# Patient Record
Sex: MALE | Race: WHITE | NOT HISPANIC OR LATINO | Employment: OTHER | URBAN - METROPOLITAN AREA
[De-identification: names, ages, dates, MRNs, and addresses within clinical notes are randomized per-mention and may not be internally consistent; named-entity substitution may affect disease eponyms.]

---

## 2022-06-17 ENCOUNTER — TRANSFERRED RECORDS (OUTPATIENT)
Dept: HEALTH INFORMATION MANAGEMENT | Facility: CLINIC | Age: 67
End: 2022-06-17

## 2022-06-20 ENCOUNTER — TRANSFERRED RECORDS (OUTPATIENT)
Dept: HEALTH INFORMATION MANAGEMENT | Facility: CLINIC | Age: 67
End: 2022-06-20

## 2022-10-11 ENCOUNTER — TRANSFERRED RECORDS (OUTPATIENT)
Dept: CARDIOLOGY | Facility: CLINIC | Age: 67
End: 2022-10-11

## 2022-10-13 ENCOUNTER — TRANSFERRED RECORDS (OUTPATIENT)
Dept: HEALTH INFORMATION MANAGEMENT | Facility: CLINIC | Age: 67
End: 2022-10-13

## 2022-10-13 ENCOUNTER — MEDICAL CORRESPONDENCE (OUTPATIENT)
Dept: HEALTH INFORMATION MANAGEMENT | Facility: CLINIC | Age: 67
End: 2022-10-13

## 2022-10-14 ENCOUNTER — VIRTUAL VISIT (OUTPATIENT)
Dept: CARDIOLOGY | Facility: CLINIC | Age: 67
End: 2022-10-14
Attending: INTERNAL MEDICINE
Payer: MEDICARE

## 2022-10-14 DIAGNOSIS — I42.8 NONISCHEMIC CARDIOMYOPATHY (H): ICD-10-CM

## 2022-10-14 DIAGNOSIS — I50.22 CHRONIC SYSTOLIC HEART FAILURE (H): ICD-10-CM

## 2022-10-14 DIAGNOSIS — Z95.810 ICD (IMPLANTABLE CARDIOVERTER-DEFIBRILLATOR) IN PLACE: ICD-10-CM

## 2022-10-14 DIAGNOSIS — Z20.822 ENCOUNTER FOR LABORATORY TESTING FOR COVID-19 VIRUS: ICD-10-CM

## 2022-10-14 DIAGNOSIS — Z45.02 ICD (IMPLANTABLE CARDIOVERTER-DEFIBRILLATOR) DISCHARGE: ICD-10-CM

## 2022-10-14 DIAGNOSIS — I47.29 PAROXYSMAL VENTRICULAR TACHYCARDIA (H): Primary | ICD-10-CM

## 2022-10-14 PROCEDURE — G0463 HOSPITAL OUTPT CLINIC VISIT: HCPCS | Mod: PN,RTG | Performed by: INTERNAL MEDICINE

## 2022-10-14 PROCEDURE — 99215 OFFICE O/P EST HI 40 MIN: CPT | Mod: 95 | Performed by: INTERNAL MEDICINE

## 2022-10-14 NOTE — NURSING NOTE
Chief Complaint   Patient presents with     Consult     Pts nurse can fax medication list to clinic (no reconciliation available)     MAITE Dias/BERENICE

## 2022-10-14 NOTE — LETTER
October 18, 2022      TO: Rohit Garvey  [address]  [facility] [address]         Dear Rohit,    You are scheduled for a VT ablation, at [facility], [place], with Dr. Leon. The hospital is located at [address] [place] [address] on the [place] of the [place].  If you need to cancel this procedure, please call [phone]. Please note the following schedule below:    You will need to have a covid swab done prior to procedure.          - PCR Covid Swab:              - Perform within 4 days of procedure                             - If being performed at an outside lab, result needs to be faxed to [phone].       Pre-Anesthesia Phone Call will occur 1-2 days prior to procedure date.  You do not need to come to the [place].    Visitor Policy: Two visitors.    Date:__October 31, 2022__Time: __6am____To the [address] at the [facility]  VT Ablation    1. Please review the attached instructions on showering before your procedure at the end of this letter.  2. Your history and physical will be completed by our nurse practitioner when you arrive.  3. Please do not eat anything for 8 hours prior to your procedure. You may have sips of water up until 2 hours prior to your arrival.  4. If you are diabetic follow the following instructions: (Contact your diabetes team if you have questions)   * Hold oral diabetic medications (metformin, glipizide, etc) and short-acting insulins (aspart, lispro, regular) the morning of the procedure.              * Hold SGLT2 inhibitors (empagliflozin (Jardiance)) 4 days prior.   5. The morning of your procedure, you may take your scheduled medications with a sip of water.  HOLD:  - Amiodarone and mexiletine 2 days prior  - Carvedilol day of  6. You may stay overnight and need a  to take you home.        Post-Procedure Instructions  Care of groin site:    Remove the Band-Aid after 24 hours. If there is minor oozing, apply another Band-aid and  remove it after 12 hours.     Do NOT take a bath, use a hot tub, pool, or submerse in water for at least 3 days. You may shower.     It is normal to have a small bruise or lump at the site.    Do not scrub the site.    Do not use lotion or powder near the puncture site for 3 days.    If you start bleeding from the site in your groin: Lie down flat and press firmly on the site. Call your physician immediately, or, come to the emergency room.  Call 911 right away if you have bleeding that is heavy or does not stop.    Call your doctor/provider if:     You have a large or growing hard lump around the site.     The site is red, swollen, hot or tender.     Blood or fluid is draining from the site.     You have chills or a fever greater than 101 F (38 C).     Your leg or arm turns bluish, feels numb or cool.     You have hives, a rash or unusual itching.      Activity Restrictions    For the first 2 days: Do not stoop or squat. When you cough, sneeze or move your bowels, hold your hand over the puncture site and press gently.    Do not lift more than 10 pounds or exertional activity for 10 days.  - No driving for 24 hours after (with or without general anesthesia).     Date: __To be determined post procedure_____ Follow up appointment      Please do not hesitate to utilize [facility] or call us if you have any questions or concerns.    Zeinab Josue RN  Electrophysiology Nurse Coordinator  [phone]    Catrina HOLGUIN Procedure   [phone]        Showering Before Surgery   Your surgeon has asked you to take 2 showers before surgery.  Why is this important?  It is normal for bacteria (germs) to be on your skin. The skin protects us from these germs. When you have surgery, we cut the skin. Sometimes germs get into the cuts and cause infection (illness caused by germs). By following the instructions below and using special soap, you will lower the number of germs on your skin. This decreases your chance of  infection.  Special soap  Buy or get 8 ounces of antiseptic surgical soap called 4% CHG. Common name brands of this soap are Hibiclens and Exidine.   You can find it at your local pharmacy, clinic or retail store. If you have trouble, ask your pharmacist to help you find the right substitute.   A note about shaving:  Do not shave within 12 inches of your incision (surgical cut) area for at least 3 days before surgery. Shaving can make small cuts in the skin. This puts you at a higher risk of infection.  Items you will need for each shower:    1 newly washed towel    4 ounces of one of the above soaps  Follow these instructions:  The evening before surgery   1. Wash your hair and body with your regular shampoo and soap. Make sure you rinse the shampoo and soap from your hair and body.   2. Using clean hands, apply about 2 ounces of soap gently on your skin from the neck to your toes. Use on your groin area last. Do not use this soap on your face or head. If you get any soap in your eyes, ears or mouth, rinse right away.   3. Repeat step 2. It is very important to let the soap stay on your skin for at least 1 minute.   4. Rinse well and dry off using a clean towel.If you feel any tingling, itching or other irritation, rinse right away. It is normal to feel some coolness on the skin after using the antiseptic soap. Your skin may feel a bit dry after the shower, but do not use any lotions, creams or moisturizers. Do not use hair spray or other products in your hair.  5. Dress in freshly washed clothes or pajamas. Use fresh pillowcases and sheets on your bed.  The morning of surgery  1. Wash your hair and body with your regular shampoo and soap. Make sure you rinse the shampoo and soap from your hair and body.   2. Using clean hands, apply about 2 ounces of soap gently on your skin from the neck to your toes. Use on your groin area last. Do not use this soap on your face or head. If you get any soap in your eyes, ears or  mouth, rinse right away.   3. Repeat step 2. It is very important to let the soap stay on your skin for at least 1 minute.   4. Rinse well and dry off using a clean towel.If you feel any tingling, itching or other irritation, rinse right away. It is normal to feel some coolness on the skin after using the antiseptic soap. Your skin may feel a bit dry after the shower, but do not use any lotions, creams or moisturizers. Do not use hair spray or other products in your hair.  5. Dress in clean clothes.  If you have any questions about showering or an allergy to CHG soap, please call the Preadmissions Nursing Department at the hospital where you are having your surgery.  [facility]: [phone]  [facility]: [phone]  [place]: [phone] or [phone]  [facility]: [phone].  [facility]: [phone]  [place]: [phone]  [facility] ([place]): [phone]  [facility] ([facility]): [phone]  This phone number will be answered between the hours of 8:00 a.m. and 6:30 p.m. Monday through Friday.

## 2022-10-14 NOTE — PROGRESS NOTES
"Rohit is a 66 year old who is being evaluated via a billable video visit.      How would you like to obtain your AVS? Mail a copy  If the video visit is dropped, the invitation should be resent by: Send to e-mail at: [email or web address]  Will anyone else be joining your video visit?  pts sister Wendy and nurse at [facility], /[facility]                ELECTROPHYSIOLOGY VIRTUAL CLINIC VISIT  Mr. Rohit Garvey is a 66 year old male who is being evaluated via a billable video visit.      The patient has been notified of following:     \"This video visit will be conducted via a call between you and your physician/provider. We have found that certain health care needs can be provided without the need for an in-person physical exam.  This service lets us provide the care you need with a video conversation.  If a prescription is necessary we can send it directly to your pharmacy.  If lab work is needed we can place an order for that and you can then stop by our lab to have the test done at a later time.    If during the course of the call the physician/provider feels a video visit is not appropriate, you will not be charged for this service.\"     Physician/provider has received verbal consent for a Video Visit from the patient? Yes    Assessment/Recommendations   Assessment/Plan:    Mr Garvey is a 66 year old male patient with PMHx significant for NICM with EF 45% and VT s/p secondary prevention ICD 6/20/2022, HTN, HPL, type 2 DM, intellectual delay (possibly related to viral illnesses during infancy). He is referred for management of recurrence ICD therapies.    Mr. Najera is having a high burden of ventricular arrhythmias despite being on 2 antiarrhythmic medications, amiodarone and mexiletine.  The review of the device interrogation shows mostly a ventricular tachycardia at about 130 bpm, monomorphic on all available tracings with us in morphology.  ATP is sometimes successful however, can also " accelerate the rhythm into a fast VT or VF resulting in a shock.  The patient clearly has an indication for a VT ablation given recurrence of arrhythmias while on antiarrhythmic medications.  Increasing amiodarone at this stage has not shown to be beneficial long-term and puts the patient at risk of serious side effects.  We discussed with the patient the risks of the ablation procedure that include bleeding in the groin, vascular complications, pericardial effusion and tamponade, CVA, complete AV block, abdominal bleeding, and heart failure decompensation, with risk of death from any of the above.  Since the scar on the MRI is mid myocardial and subepicardial mostly, there is a good chance that we will also need epicardial access.  We will get the ischemic work-up that he had in June along with the images of the MRI to be available to guide the ablation procedure.  We will also get the potential CT scans that he had in June to guide our epicardial access.  We will have him stop amiodarone and mexiletine 2 days before the procedure.  The patient and family expressed good understanding and agreement to proceed with VT ablation.  We will try to fit the patient in as soon as we can in a timely manner.  We will plan to admit the patient for observation after the procedure if needed.    We will follow-up with the patient about a month after the VT ablation.     History of Present Illness/Subjective    Mr. Rohit Garvey is a 66 year old male who presents for VT management.    Mr Garvey is a 66 year old male patient with PMHx significant for NICM with EF 45% and VT s/p secondary prevention ICD 6/20/2022, HTN, HPL, type 2 DM, intellectual delay (possibly related to viral illnesses during infancy). He is referred for management of recurrence ICD therapies. The information was obtained from his sister and his nurse in the assisted living who were both present during this visit.    Mr Garvey lives in a nursing home due to  intellectual delay.  In June 2022, he was found to be very spacey and pale by the nursing staff.  On examination his heart rate was 130 bpm with a blood pressure of 80/55.  He was also fatigued.  The patient was transported to the ED and was awake all the time.  Upon arrival to the ED he was found to have a wide-complex tachycardia and he was reportedly given medications first for cardioversion.  After medications failed, he received a cardioversion.  The details of whether he ever lost consciousness during that visit is unknown.  He has had a history of syncope in the bathroom a few weeks prior to his admission in June.  He reportedly had an evaluation as an inpatient at that time that was negative for ischemia, possible angiogram, and he was found to have a mildly reduced LVEF of 45 to 50% on an echocardiogram.  He had a dual-chamber ICD system implanted on 6/20/2022.  The patient also had a cardiac MRI revealing a left ventricular ejection fraction of 44%, right ventricular ejection fraction of 35%, with subepicardial and mid myocardial late gadolinium enhancement extending from the base to the mid cavity and involving the anterior, anteroseptal, anterolateral, and lateral walls.  He was started on amiodarone and was discharged.  The patient's VT recurred after discharge with many episodes in September and October, with ATP and shocks he followed up with his primary electrophysiologist, Dr James, in [place].  Device interrogation showed 16 therapy sessions between shocks and ATP's in the last 2 weeks.  The patient does feel the fast heartbeat sometimes accompanied with some weakness before the ICD shocks.  A repeat echocardiogram done on 1/26/2022 shows a stable LV ejection fraction of 45 to 50% with mild LV dilation and mild mitral and aortic and tricuspid regurgitation, with RVSP mildly elevated at 39 mmHg.  The patient was referred for consideration of VT ablation.  The patient is currently on amiodarone 200  twice daily and mexiletine 150 mg 3 times daily for rhythm control.    I have reviewed and updated the patient's Past Medical History, Social History, Family History and Medication List.     Cardiographics (Personally Reviewed) :   TTE June 2022 in September 2022 reviewed as above.  CMR June 17, 2022 reviewed as above.       Physical Examination   The rest of a comprehensive physical examination is deferred due to public health emergency video visit restrictions.  CONSITUTIONAL: no acute distress  HEENT: no icterus, no redness or discharge, neck supple  CV: no visible edema of visualized extremities. No JVD.   RESPIRATORY: respirations nonlabored, no cough  NEURO: AA&Ox3, speech fluent/appropriate, motor grossly nonfocal  PSYCH: cooperative, affect appropriate  DERM: no rashes on visualized face/neck/upper extremities       Medications  Allergies   No current outpatient medications on file.   Amiodarone 200 BID  Mexiletine 150 TID  Furosemide 20 BID  Jardiance 10mg daily  Fish Oil 1000 mg TID  Calcium 600 BID  Metformin 500 2 tabs BID  Carvedilol 12.5 BID  Atrovastatin 80 daily  Tamsolusin 0.4 mg daily  Metamucil daily   Aspirin 81 daily No Known Allergies      Lab Results (Personally Reviewed)    Chemistry/lipid CBC Cardiac Enzymes/BNP/TSH/INR   No results found for: CREATININE, BUN, NA, CO2  No results found for: CR    No results found for: CHOL, HDL, LDL, CHOLHDL   No results found for: WBC, HGB, HCT, MCV, PLT No results found for: CKTOTAL, CKMB, TROPONINI, BNP, TSH, INR       Video-Visit Details    Type of service:  Video Visit    Video Start Time: 12:00    Video End Time:12:45    Originating Location (pt. Location): Assisted Living    Distant Location (provider location):  [facility]     Platform used for Video Visit: Kimberly    I have reviewed the note as documented above.  This accurately captures the substance of my virtual visit with the patient. The patient states understanding  and is agreeable with the plan.   Sky Leon MD [facility]  Cardiology - Electrophysiology    Total time spent on patient visit, reviewing notes, imaging, labs, orders, and completing necessary documentation: 60 minutes.

## 2022-10-14 NOTE — LETTER
"10/14/2022      RE: Rohit Garvey  No address on file.       Dear Colleague,    Thank you for the opportunity to participate in the care of your patient, Rohit Garvey, at the [facility] [place] [facility]. Please see a copy of my visit note below.    Rohit is a 66 year old who is being evaluated via a billable video visit.      How would you like to obtain your AVS? Mail a copy  If the video visit is dropped, the invitation should be resent by: Send to e-mail at: [email or web address]  Will anyone else be joining your video visit?  pts sister Wendy and nurse at assisted [facility]  MAITE Dias/BERENICE                ELECTROPHYSIOLOGY VIRTUAL CLINIC VISIT  Mr. Rohit Garvey is a 66 year old male who is being evaluated via a billable video visit.      The patient has been notified of following:     \"This video visit will be conducted via a call between you and your physician/provider. We have found that certain health care needs can be provided without the need for an in-person physical exam.  This service lets us provide the care you need with a video conversation.  If a prescription is necessary we can send it directly to your pharmacy.  If lab work is needed we can place an order for that and you can then stop by our lab to have the test done at a later time.    If during the course of the call the physician/provider feels a video visit is not appropriate, you will not be charged for this service.\"     Physician/provider has received verbal consent for a Video Visit from the patient? Yes    Assessment/Recommendations   Assessment/Plan:    Mr Garvey is a 66 year old male patient with PMHx significant for NICM with EF 45% and VT s/p secondary prevention ICD 6/20/2022, HTN, HPL, type 2 DM, intellectual delay (possibly related to viral illnesses during infancy). He is referred for management of recurrence ICD therapies.    Mr. Najera is " having a high burden of ventricular arrhythmias despite being on 2 antiarrhythmic medications, amiodarone and mexiletine.  The review of the device interrogation shows mostly a ventricular tachycardia at about 130 bpm, monomorphic on all available tracings with us in morphology.  ATP is sometimes successful however, can also accelerate the rhythm into a fast VT or VF resulting in a shock.  The patient clearly has an indication for a VT ablation given recurrence of arrhythmias while on antiarrhythmic medications.  Increasing amiodarone at this stage has not shown to be beneficial long-term and puts the patient at risk of serious side effects.  We discussed with the patient the risks of the ablation procedure that include bleeding in the groin, vascular complications, pericardial effusion and tamponade, CVA, complete AV block, abdominal bleeding, and heart failure decompensation, with risk of death from any of the above.  Since the scar on the MRI is mid myocardial and subepicardial mostly, there is a good chance that we will also need epicardial access.  We will get the ischemic work-up that he had in June along with the images of the MRI to be available to guide the ablation procedure.  We will also get the potential CT scans that he had in June to guide our epicardial access.  We will have him stop amiodarone and mexiletine 2 days before the procedure.  The patient and family expressed good understanding and agreement to proceed with VT ablation.  We will try to fit the patient in as soon as we can in a timely manner.  We will plan to admit the patient for observation after the procedure if needed.    We will follow-up with the patient about a month after the VT ablation.     History of Present Illness/Subjective    Mr. Rohit Garvey is a 66 year old male who presents for VT management.    Mr Garvey is a 66 year old male patient with PMHx significant for NICM with EF 45% and VT s/p secondary prevention ICD  6/20/2022, HTN, HPL, type 2 DM, intellectual delay (possibly related to viral illnesses during infancy). He is referred for management of recurrence ICD therapies. The information was obtained from his sister and his nurse in the assisted living who were both present during this visit.    Mr Garvey lives in a nursing home due to intellectual delay.  In June 2022, he was found to be very spacey and pale by the nursing staff.  On examination his heart rate was 130 bpm with a blood pressure of 80/55.  He was also fatigued.  The patient was transported to the ED and was awake all the time.  Upon arrival to the ED he was found to have a wide-complex tachycardia and he was reportedly given medications first for cardioversion.  After medications failed, he received a cardioversion.  The details of whether he ever lost consciousness during that visit is unknown.  He has had a history of syncope in the bathroom a few weeks prior to his admission in June.  He reportedly had an evaluation as an inpatient at that time that was negative for ischemia, possible angiogram, and he was found to have a mildly reduced LVEF of 45 to 50% on an echocardiogram.  He had a dual-chamber ICD system implanted on 6/20/2022.  The patient also had a cardiac MRI revealing a left ventricular ejection fraction of 44%, right ventricular ejection fraction of 35%, with subepicardial and mid myocardial late gadolinium enhancement extending from the base to the mid cavity and involving the anterior, anteroseptal, anterolateral, and lateral walls.  He was started on amiodarone and was discharged.  The patient's VT recurred after discharge with many episodes in September and October, with ATP and shocks he followed up with his primary electrophysiologist, Dr James, in [place].  Device interrogation showed 16 therapy sessions between shocks and ATP's in the last 2 weeks.  The patient does feel the fast heartbeat sometimes accompanied with some weakness  before the ICD shocks.  A repeat echocardiogram done on 1/26/2022 shows a stable LV ejection fraction of 45 to 50% with mild LV dilation and mild mitral and aortic and tricuspid regurgitation, with RVSP mildly elevated at 39 mmHg.  The patient was referred for consideration of VT ablation.  The patient is currently on amiodarone 200 twice daily and mexiletine 150 mg 3 times daily for rhythm control.    I have reviewed and updated the patient's Past Medical History, Social History, Family History and Medication List.     Cardiographics (Personally Reviewed) :   TTE June 2022 in September 2022 reviewed as above.  CMR June 17, 2022 reviewed as above.       Physical Examination   The rest of a comprehensive physical examination is deferred due to public health emergency video visit restrictions.  CONSITUTIONAL: no acute distress  HEENT: no icterus, no redness or discharge, neck supple  CV: no visible edema of visualized extremities. No JVD.   RESPIRATORY: respirations nonlabored, no cough  NEURO: AA&Ox3, speech fluent/appropriate, motor grossly nonfocal  PSYCH: cooperative, affect appropriate  DERM: no rashes on visualized face/neck/upper extremities       Medications  Allergies   No current outpatient medications on file.   Amiodarone 200 BID  Mexiletine 150 TID  Furosemide 20 BID  Jardiance 10mg daily  Fish Oil 1000 mg TID  Calcium 600 BID  Metformin 500 2 tabs BID  Carvedilol 12.5 BID  Atrovastatin 80 daily  Tamsolusin 0.4 mg daily  Metamucil daily   Aspirin 81 daily No Known Allergies      Lab Results (Personally Reviewed)    Chemistry/lipid CBC Cardiac Enzymes/BNP/TSH/INR   No results found for: CREATININE, BUN, NA, CO2  No results found for: CR    No results found for: CHOL, HDL, LDL, CHOLHDL   No results found for: WBC, HGB, HCT, MCV, PLT No results found for: CKTOTAL, CKMB, TROPONINI, BNP, TSH, INR       Video-Visit Details    Type of service:  Video Visit    Video Start Time: 12:00    Video End  Time:12:45    Originating Location (pt. Location): Assisted Living    Distant Location (provider location):  [facility]     Platform used for Video Visit: Kimberly    I have reviewed the note as documented above.  This accurately captures the substance of my virtual visit with the patient. The patient states understanding and is agreeable with the plan.   Sky Leon MD [facility]  Cardiology - Electrophysiology    Total time spent on patient visit, reviewing notes, imaging, labs, orders, and completing necessary documentation: 60 minutes.                      Please do not hesitate to contact me if you have any questions/concerns.     Sincerely,     Sky Leon MD

## 2022-10-14 NOTE — LETTER
Date:October 17, 2022      Provider requested that no letter be sent. Do not send.       [facility]

## 2022-10-18 RX ORDER — SODIUM CHLORIDE 9 MG/ML
INJECTION, SOLUTION INTRAVENOUS CONTINUOUS
Status: CANCELLED | OUTPATIENT
Start: 2022-10-18

## 2022-10-18 RX ORDER — FUROSEMIDE 20 MG
20 TABLET ORAL DAILY PRN
Status: ON HOLD | COMMUNITY
Start: 2022-09-21 | End: 2022-12-29

## 2022-10-18 RX ORDER — MEXILETINE HYDROCHLORIDE 150 MG/1
CAPSULE ORAL
Status: ON HOLD | COMMUNITY
Start: 2022-10-03 | End: 2022-11-05

## 2022-10-18 RX ORDER — ASPIRIN 81 MG/1
81 TABLET, COATED ORAL DAILY
Status: ON HOLD | COMMUNITY
Start: 2022-08-04 | End: 2022-12-29

## 2022-10-18 RX ORDER — TAMSULOSIN HYDROCHLORIDE 0.4 MG/1
0.4 CAPSULE ORAL EVERY EVENING
COMMUNITY
Start: 2022-09-19

## 2022-10-18 RX ORDER — CARVEDILOL 12.5 MG/1
12.5 TABLET ORAL 2 TIMES DAILY WITH MEALS
Status: ON HOLD | COMMUNITY
Start: 2022-09-29 | End: 2022-11-05

## 2022-10-18 RX ORDER — LISINOPRIL 5 MG/1
5 TABLET ORAL EVERY MORNING
Status: ON HOLD | COMMUNITY
Start: 2022-06-21 | End: 2022-11-01

## 2022-10-18 RX ORDER — ATORVASTATIN CALCIUM 80 MG/1
80 TABLET, FILM COATED ORAL DAILY
COMMUNITY
Start: 2022-09-21

## 2022-10-18 RX ORDER — LIDOCAINE 40 MG/G
CREAM TOPICAL
Status: CANCELLED | OUTPATIENT
Start: 2022-10-18

## 2022-10-18 RX ORDER — PSYLLIUM SEED (WITH SUGAR)
1-2 POWDER (GRAM) ORAL DAILY
COMMUNITY
Start: 2022-07-16

## 2022-10-18 RX ORDER — AMIODARONE HYDROCHLORIDE 200 MG/1
TABLET ORAL
Status: ON HOLD | COMMUNITY
Start: 2022-09-21 | End: 2022-11-01

## 2022-10-18 RX ORDER — EMPAGLIFLOZIN 10 MG/1
10 TABLET, FILM COATED ORAL DAILY
COMMUNITY
Start: 2022-09-27

## 2022-10-20 ENCOUNTER — TELEPHONE (OUTPATIENT)
Dept: CARDIOLOGY | Facility: CLINIC | Age: 67
End: 2022-10-20

## 2022-10-20 NOTE — TELEPHONE ENCOUNTER
[facility] ABBY Michael wants to speak to Dr. Leon's RNCC regarding med holds and any other orders prior to the Oct 31 VT ablation.     Catrina Yeung  Periop Electrophysiology   [phone]

## 2022-10-21 NOTE — TELEPHONE ENCOUNTER
Called and spoke to ABBY Michael at patient's long term care facility. She said that patient's sister, Wendy, had dropped off the instruction sheet. Fernanda doesn't have further questions regarding medication holds. Patient was covid positive 8/23/22 so does not require covid swab prior to procedure. Fernanda faxed this information to [phone] & [phone].

## 2022-10-26 ENCOUNTER — DOCUMENTATION ONLY (OUTPATIENT)
Dept: OTHER | Facility: CLINIC | Age: 67
End: 2022-10-26

## 2022-10-27 ENCOUNTER — TELEPHONE (OUTPATIENT)
Dept: CARDIOLOGY | Facility: CLINIC | Age: 67
End: 2022-10-27

## 2022-10-28 ENCOUNTER — DOCUMENTATION ONLY (OUTPATIENT)
Dept: OTHER | Facility: CLINIC | Age: 67
End: 2022-10-28

## 2022-10-31 ENCOUNTER — ANCILLARY PROCEDURE (OUTPATIENT)
Dept: CARDIOLOGY | Facility: CLINIC | Age: 67
DRG: 273 | End: 2022-10-31
Attending: INTERNAL MEDICINE
Payer: MEDICARE

## 2022-10-31 ENCOUNTER — ANESTHESIA (OUTPATIENT)
Dept: CARDIOLOGY | Facility: CLINIC | Age: 67
DRG: 273 | End: 2022-10-31
Payer: MEDICARE

## 2022-10-31 ENCOUNTER — ANESTHESIA EVENT (OUTPATIENT)
Dept: CARDIOLOGY | Facility: CLINIC | Age: 67
DRG: 273 | End: 2022-10-31
Payer: MEDICARE

## 2022-10-31 ENCOUNTER — HOSPITAL ENCOUNTER (INPATIENT)
Facility: CLINIC | Age: 67
LOS: 4 days | Discharge: HOME HEALTH | DRG: 273 | End: 2022-11-05
Attending: INTERNAL MEDICINE | Admitting: INTERNAL MEDICINE
Payer: MEDICARE

## 2022-10-31 DIAGNOSIS — Z45.02 ENCOUNTER FOR ADJUSTMENT AND MANAGEMENT OF AUTOMATIC IMPLANTABLE CARDIAC DEFIBRILLATOR: ICD-10-CM

## 2022-10-31 DIAGNOSIS — I47.29 PAROXYSMAL VENTRICULAR TACHYCARDIA (H): Primary | ICD-10-CM

## 2022-10-31 DIAGNOSIS — I47.29 PAROXYSMAL VENTRICULAR TACHYCARDIA (H): ICD-10-CM

## 2022-10-31 DIAGNOSIS — I42.8 NICM (NONISCHEMIC CARDIOMYOPATHY) (H): Primary | ICD-10-CM

## 2022-10-31 LAB
ABO/RH(D): NORMAL
ACT BLD: 114 SECONDS (ref 74–150)
ACT BLD: 118 SECONDS (ref 74–150)
ACT BLD: 177 SECONDS (ref 74–150)
ACT BLD: 211 SECONDS (ref 74–150)
ACT BLD: 280 SECONDS (ref 74–150)
ACT BLD: 297 SECONDS (ref 74–150)
ACT BLD: 301 SECONDS (ref 74–150)
ACT BLD: 309 SECONDS (ref 74–150)
ACT BLD: 322 SECONDS (ref 74–150)
ACT BLD: 335 SECONDS (ref 74–150)
ACT BLD: 373 SECONDS (ref 74–150)
ALBUMIN SERPL BCG-MCNC: 3.8 G/DL (ref 3.5–5.2)
ALP SERPL-CCNC: 97 U/L (ref 35–129)
ALT SERPL W P-5'-P-CCNC: 159 U/L (ref 10–50)
ANION GAP SERPL CALCULATED.3IONS-SCNC: 12 MMOL/L (ref 7–15)
ANTIBODY SCREEN: NEGATIVE
APTT PPP: 27 SECONDS (ref 22–38)
AST SERPL W P-5'-P-CCNC: 165 U/L (ref 10–50)
ATRIAL RATE - MUSE: 52 BPM
BASOPHILS # BLD AUTO: 0 10E3/UL (ref 0–0.2)
BASOPHILS NFR BLD AUTO: 0 %
BILIRUB SERPL-MCNC: 1 MG/DL
BUN SERPL-MCNC: 24 MG/DL (ref 8–23)
CALCIUM SERPL-MCNC: 8.4 MG/DL (ref 8.8–10.2)
CHLORIDE SERPL-SCNC: 105 MMOL/L (ref 98–107)
CREAT SERPL-MCNC: 1.37 MG/DL (ref 0.51–1.17)
CREAT SERPL-MCNC: 1.43 MG/DL (ref 0.51–1.17)
DEPRECATED HCO3 PLAS-SCNC: 22 MMOL/L (ref 22–29)
DIASTOLIC BLOOD PRESSURE - MUSE: NORMAL MMHG
EOSINOPHIL # BLD AUTO: 0 10E3/UL (ref 0–0.7)
EOSINOPHIL NFR BLD AUTO: 0 %
ERYTHROCYTE [DISTWIDTH] IN BLOOD BY AUTOMATED COUNT: 17.6 % (ref 10–15)
GFR SERPL CREATININE-BSD FRML MDRD: 54 ML/MIN/1.73M2
GFR SERPL CREATININE-BSD FRML MDRD: 57 ML/MIN/1.73M2
GLUCOSE BLDC GLUCOMTR-MCNC: 148 MG/DL (ref 70–99)
GLUCOSE BLDC GLUCOMTR-MCNC: 225 MG/DL (ref 70–99)
GLUCOSE SERPL-MCNC: 323 MG/DL (ref 70–99)
HCT VFR BLD AUTO: 34.2 % (ref 35–53)
HGB BLD-MCNC: 11.1 G/DL (ref 11.7–17.7)
IMM GRANULOCYTES # BLD: 0.2 10E3/UL
IMM GRANULOCYTES NFR BLD: 1 %
INR PPP: 1.23 (ref 0.85–1.15)
INTERPRETATION ECG - MUSE: NORMAL
LYMPHOCYTES # BLD AUTO: 0.4 10E3/UL (ref 0.8–5.3)
LYMPHOCYTES NFR BLD AUTO: 3 %
MCH RBC QN AUTO: 29.9 PG (ref 26.5–33)
MCHC RBC AUTO-ENTMCNC: 32.5 G/DL (ref 31.5–36.5)
MCV RBC AUTO: 92 FL (ref 78–100)
MONOCYTES # BLD AUTO: 0.2 10E3/UL (ref 0–1.3)
MONOCYTES NFR BLD AUTO: 1 %
NEUTROPHILS # BLD AUTO: 12.3 10E3/UL (ref 1.6–8.3)
NEUTROPHILS NFR BLD AUTO: 95 %
NRBC # BLD AUTO: 0 10E3/UL
NRBC BLD AUTO-RTO: 0 /100
P AXIS - MUSE: 26 DEGREES
PLATELET # BLD AUTO: 191 10E3/UL (ref 150–450)
POTASSIUM SERPL-SCNC: 4.2 MMOL/L (ref 3.4–5.3)
POTASSIUM SERPL-SCNC: 5.3 MMOL/L (ref 3.4–5.3)
PR INTERVAL - MUSE: 204 MS
PROT SERPL-MCNC: 5.9 G/DL (ref 6.4–8.3)
QRS DURATION - MUSE: 148 MS
QT - MUSE: 538 MS
QTC - MUSE: 500 MS
R AXIS - MUSE: -71 DEGREES
RBC # BLD AUTO: 3.71 10E6/UL (ref 3.8–5.9)
SODIUM SERPL-SCNC: 139 MMOL/L (ref 136–145)
SPECIMEN EXPIRATION DATE: NORMAL
SYSTOLIC BLOOD PRESSURE - MUSE: NORMAL MMHG
T AXIS - MUSE: 84 DEGREES
VENTRICULAR RATE- MUSE: 52 BPM
WBC # BLD AUTO: 13.1 10E3/UL (ref 4–11)

## 2022-10-31 PROCEDURE — 93662 INTRACARDIAC ECG (ICE): CPT | Performed by: INTERNAL MEDICINE

## 2022-10-31 PROCEDURE — 999N000054 HC STATISTIC EKG NON-CHARGEABLE

## 2022-10-31 PROCEDURE — 96372 THER/PROPH/DIAG INJ SC/IM: CPT

## 2022-10-31 PROCEDURE — 93654 COMPRE EP EVAL TX VT: CPT | Performed by: INTERNAL MEDICINE

## 2022-10-31 PROCEDURE — 250N000012 HC RX MED GY IP 250 OP 636 PS 637

## 2022-10-31 PROCEDURE — 250N000011 HC RX IP 250 OP 636: Performed by: ANESTHESIOLOGY

## 2022-10-31 PROCEDURE — 272N000001 HC OR GENERAL SUPPLY STERILE: Performed by: INTERNAL MEDICINE

## 2022-10-31 PROCEDURE — 250N000009 HC RX 250: Performed by: NURSE ANESTHETIST, CERTIFIED REGISTERED

## 2022-10-31 PROCEDURE — 93005 ELECTROCARDIOGRAM TRACING: CPT

## 2022-10-31 PROCEDURE — 82962 GLUCOSE BLOOD TEST: CPT

## 2022-10-31 PROCEDURE — 36415 COLL VENOUS BLD VENIPUNCTURE: CPT | Performed by: INTERNAL MEDICINE

## 2022-10-31 PROCEDURE — 93010 ELECTROCARDIOGRAM REPORT: CPT | Mod: 76 | Performed by: INTERNAL MEDICINE

## 2022-10-31 PROCEDURE — 93462 L HRT CATH TRNSPTL PUNCTURE: CPT | Performed by: INTERNAL MEDICINE

## 2022-10-31 PROCEDURE — C1733 CATH, EP, OTHR THAN COOL-TIP: HCPCS | Performed by: INTERNAL MEDICINE

## 2022-10-31 PROCEDURE — C1887 CATHETER, GUIDING: HCPCS | Performed by: INTERNAL MEDICINE

## 2022-10-31 PROCEDURE — 85730 THROMBOPLASTIN TIME PARTIAL: CPT | Performed by: INTERNAL MEDICINE

## 2022-10-31 PROCEDURE — 80053 COMPREHEN METABOLIC PANEL: CPT | Performed by: ANESTHESIOLOGY

## 2022-10-31 PROCEDURE — 258N000003 HC RX IP 258 OP 636: Performed by: ANESTHESIOLOGY

## 2022-10-31 PROCEDURE — 86901 BLOOD TYPING SEROLOGIC RH(D): CPT | Performed by: NURSE PRACTITIONER

## 2022-10-31 PROCEDURE — B2111ZZ FLUOROSCOPY OF MULTIPLE CORONARY ARTERIES USING LOW OSMOLAR CONTRAST: ICD-10-PCS | Performed by: INTERNAL MEDICINE

## 2022-10-31 PROCEDURE — 02K83ZZ MAP CONDUCTION MECHANISM, PERCUTANEOUS APPROACH: ICD-10-PCS | Performed by: INTERNAL MEDICINE

## 2022-10-31 PROCEDURE — 84132 ASSAY OF SERUM POTASSIUM: CPT | Performed by: ANESTHESIOLOGY

## 2022-10-31 PROCEDURE — 93462 L HRT CATH TRNSPTL PUNCTURE: CPT | Mod: GC | Performed by: INTERNAL MEDICINE

## 2022-10-31 PROCEDURE — 93287 PERI-PX DEVICE EVAL & PRGR: CPT

## 2022-10-31 PROCEDURE — 93654 COMPRE EP EVAL TX VT: CPT | Mod: GC | Performed by: INTERNAL MEDICINE

## 2022-10-31 PROCEDURE — C1894 INTRO/SHEATH, NON-LASER: HCPCS | Performed by: INTERNAL MEDICINE

## 2022-10-31 PROCEDURE — 0W9D30Z DRAINAGE OF PERICARDIAL CAVITY WITH DRAINAGE DEVICE, PERCUTANEOUS APPROACH: ICD-10-PCS | Performed by: INTERNAL MEDICINE

## 2022-10-31 PROCEDURE — 85610 PROTHROMBIN TIME: CPT | Performed by: INTERNAL MEDICINE

## 2022-10-31 PROCEDURE — 36415 COLL VENOUS BLD VENIPUNCTURE: CPT | Performed by: ANESTHESIOLOGY

## 2022-10-31 PROCEDURE — 250N000013 HC RX MED GY IP 250 OP 250 PS 637: Performed by: NURSE PRACTITIONER

## 2022-10-31 PROCEDURE — C1732 CATH, EP, DIAG/ABL, 3D/VECT: HCPCS | Performed by: INTERNAL MEDICINE

## 2022-10-31 PROCEDURE — 84132 ASSAY OF SERUM POTASSIUM: CPT | Performed by: INTERNAL MEDICINE

## 2022-10-31 PROCEDURE — 85004 AUTOMATED DIFF WBC COUNT: CPT | Performed by: INTERNAL MEDICINE

## 2022-10-31 PROCEDURE — C1759 CATH, INTRA ECHOCARDIOGRAPHY: HCPCS | Performed by: INTERNAL MEDICINE

## 2022-10-31 PROCEDURE — 36415 COLL VENOUS BLD VENIPUNCTURE: CPT | Performed by: NURSE PRACTITIONER

## 2022-10-31 PROCEDURE — 93662 INTRACARDIAC ECG (ICE): CPT | Mod: 26 | Performed by: INTERNAL MEDICINE

## 2022-10-31 PROCEDURE — 86850 RBC ANTIBODY SCREEN: CPT | Performed by: NURSE PRACTITIONER

## 2022-10-31 PROCEDURE — 85347 COAGULATION TIME ACTIVATED: CPT

## 2022-10-31 PROCEDURE — 370N000017 HC ANESTHESIA TECHNICAL FEE, PER MIN: Performed by: INTERNAL MEDICINE

## 2022-10-31 PROCEDURE — 99152 MOD SED SAME PHYS/QHP 5/>YRS: CPT | Mod: GC | Performed by: INTERNAL MEDICINE

## 2022-10-31 PROCEDURE — 250N000009 HC RX 250: Performed by: ANESTHESIOLOGY

## 2022-10-31 PROCEDURE — 250N000011 HC RX IP 250 OP 636: Performed by: NURSE ANESTHETIST, CERTIFIED REGISTERED

## 2022-10-31 RX ORDER — CEFAZOLIN SODIUM 1 G/3ML
INJECTION, POWDER, FOR SOLUTION INTRAMUSCULAR; INTRAVENOUS PRN
Status: DISCONTINUED | OUTPATIENT
Start: 2022-10-31 | End: 2022-10-31

## 2022-10-31 RX ORDER — PHENYLEPHRINE HCL IN 0.9% NACL 50MG/250ML
.5-1.25 PLASTIC BAG, INJECTION (ML) INTRAVENOUS CONTINUOUS
Status: DISCONTINUED | OUTPATIENT
Start: 2022-10-31 | End: 2022-10-31 | Stop reason: HOSPADM

## 2022-10-31 RX ORDER — TAMSULOSIN HYDROCHLORIDE 0.4 MG/1
0.4 CAPSULE ORAL ONCE
Status: DISCONTINUED | OUTPATIENT
Start: 2022-10-31 | End: 2022-11-03

## 2022-10-31 RX ORDER — ONDANSETRON 2 MG/ML
INJECTION INTRAMUSCULAR; INTRAVENOUS PRN
Status: DISCONTINUED | OUTPATIENT
Start: 2022-10-31 | End: 2022-10-31

## 2022-10-31 RX ORDER — HEPARIN SODIUM 1000 [USP'U]/ML
INJECTION, SOLUTION INTRAVENOUS; SUBCUTANEOUS PRN
Status: DISCONTINUED | OUTPATIENT
Start: 2022-10-31 | End: 2022-10-31

## 2022-10-31 RX ORDER — ATORVASTATIN CALCIUM 20 MG/1
20 TABLET, FILM COATED ORAL DAILY
Status: DISCONTINUED | OUTPATIENT
Start: 2022-10-31 | End: 2022-11-05 | Stop reason: HOSPADM

## 2022-10-31 RX ORDER — COLCHICINE 0.6 MG/1
0.6 TABLET ORAL DAILY
Status: DISCONTINUED | OUTPATIENT
Start: 2022-11-01 | End: 2022-11-01

## 2022-10-31 RX ORDER — NALOXONE HYDROCHLORIDE 0.4 MG/ML
0.4 INJECTION, SOLUTION INTRAMUSCULAR; INTRAVENOUS; SUBCUTANEOUS
Status: DISCONTINUED | OUTPATIENT
Start: 2022-10-31 | End: 2022-11-03

## 2022-10-31 RX ORDER — PROTAMINE SULFATE 10 MG/ML
INJECTION, SOLUTION INTRAVENOUS PRN
Status: DISCONTINUED | OUTPATIENT
Start: 2022-10-31 | End: 2022-10-31

## 2022-10-31 RX ORDER — PROPOFOL 10 MG/ML
INJECTION, EMULSION INTRAVENOUS PRN
Status: DISCONTINUED | OUTPATIENT
Start: 2022-10-31 | End: 2022-10-31

## 2022-10-31 RX ORDER — DEXAMETHASONE SODIUM PHOSPHATE 4 MG/ML
INJECTION, SOLUTION INTRA-ARTICULAR; INTRALESIONAL; INTRAMUSCULAR; INTRAVENOUS; SOFT TISSUE PRN
Status: DISCONTINUED | OUTPATIENT
Start: 2022-10-31 | End: 2022-10-31

## 2022-10-31 RX ORDER — LIDOCAINE 40 MG/G
CREAM TOPICAL
Status: DISCONTINUED | OUTPATIENT
Start: 2022-10-31 | End: 2022-10-31 | Stop reason: HOSPADM

## 2022-10-31 RX ORDER — SODIUM CHLORIDE, SODIUM LACTATE, POTASSIUM CHLORIDE, CALCIUM CHLORIDE 600; 310; 30; 20 MG/100ML; MG/100ML; MG/100ML; MG/100ML
INJECTION, SOLUTION INTRAVENOUS CONTINUOUS PRN
Status: DISCONTINUED | OUTPATIENT
Start: 2022-10-31 | End: 2022-10-31

## 2022-10-31 RX ORDER — MEXILETINE HYDROCHLORIDE 150 MG/1
150 CAPSULE ORAL EVERY 8 HOURS SCHEDULED
Status: DISCONTINUED | OUTPATIENT
Start: 2022-10-31 | End: 2022-11-01

## 2022-10-31 RX ORDER — FUROSEMIDE 20 MG
20 TABLET ORAL DAILY
Status: DISCONTINUED | OUTPATIENT
Start: 2022-10-31 | End: 2022-11-05 | Stop reason: HOSPADM

## 2022-10-31 RX ORDER — NALOXONE HYDROCHLORIDE 0.4 MG/ML
0.2 INJECTION, SOLUTION INTRAMUSCULAR; INTRAVENOUS; SUBCUTANEOUS
Status: DISCONTINUED | OUTPATIENT
Start: 2022-10-31 | End: 2022-11-03

## 2022-10-31 RX ORDER — AMIODARONE HYDROCHLORIDE 200 MG/1
200 TABLET ORAL DAILY
Status: DISCONTINUED | OUTPATIENT
Start: 2022-10-31 | End: 2022-11-05 | Stop reason: HOSPADM

## 2022-10-31 RX ORDER — DOPAMINE HYDROCHLORIDE 160 MG/100ML
INJECTION, SOLUTION INTRAVENOUS CONTINUOUS PRN
Status: DISCONTINUED | OUTPATIENT
Start: 2022-10-31 | End: 2022-10-31

## 2022-10-31 RX ORDER — OXYCODONE AND ACETAMINOPHEN 5; 325 MG/1; MG/1
1 TABLET ORAL EVERY 4 HOURS PRN
Status: DISCONTINUED | OUTPATIENT
Start: 2022-10-31 | End: 2022-11-03

## 2022-10-31 RX ORDER — FENTANYL CITRATE 50 UG/ML
INJECTION, SOLUTION INTRAMUSCULAR; INTRAVENOUS PRN
Status: DISCONTINUED | OUTPATIENT
Start: 2022-10-31 | End: 2022-10-31

## 2022-10-31 RX ORDER — CARVEDILOL 12.5 MG/1
12.5 TABLET ORAL 2 TIMES DAILY WITH MEALS
Status: DISCONTINUED | OUTPATIENT
Start: 2022-10-31 | End: 2022-11-05 | Stop reason: HOSPADM

## 2022-10-31 RX ORDER — LISINOPRIL 5 MG/1
5 TABLET ORAL EVERY MORNING
Status: DISCONTINUED | OUTPATIENT
Start: 2022-11-01 | End: 2022-11-01

## 2022-10-31 RX ORDER — SODIUM CHLORIDE 9 MG/ML
INJECTION, SOLUTION INTRAVENOUS CONTINUOUS
Status: DISCONTINUED | OUTPATIENT
Start: 2022-10-31 | End: 2022-10-31 | Stop reason: HOSPADM

## 2022-10-31 RX ADMIN — SODIUM CHLORIDE, POTASSIUM CHLORIDE, SODIUM LACTATE AND CALCIUM CHLORIDE: 600; 310; 30; 20 INJECTION, SOLUTION INTRAVENOUS at 13:12

## 2022-10-31 RX ADMIN — INSULIN ASPART 2 UNITS: 100 INJECTION, SOLUTION INTRAVENOUS; SUBCUTANEOUS at 17:00

## 2022-10-31 RX ADMIN — Medication 20 MG: at 11:40

## 2022-10-31 RX ADMIN — SUGAMMADEX 200 MG: 100 INJECTION, SOLUTION INTRAVENOUS at 15:40

## 2022-10-31 RX ADMIN — PHENYLEPHRINE HYDROCHLORIDE 50 MCG: 10 INJECTION INTRAVENOUS at 09:33

## 2022-10-31 RX ADMIN — ATORVASTATIN CALCIUM 20 MG: 20 TABLET, FILM COATED ORAL at 21:22

## 2022-10-31 RX ADMIN — FENTANYL CITRATE 100 MCG: 50 INJECTION, SOLUTION INTRAMUSCULAR; INTRAVENOUS at 08:33

## 2022-10-31 RX ADMIN — HEPARIN SODIUM 10000 UNITS: 1000 INJECTION, SOLUTION INTRAVENOUS; SUBCUTANEOUS at 10:07

## 2022-10-31 RX ADMIN — HEPARIN SODIUM 12000 UNITS: 1000 INJECTION, SOLUTION INTRAVENOUS; SUBCUTANEOUS at 11:28

## 2022-10-31 RX ADMIN — HEPARIN SODIUM 2000 UNITS: 1000 INJECTION, SOLUTION INTRAVENOUS; SUBCUTANEOUS at 13:45

## 2022-10-31 RX ADMIN — NOREPINEPHRINE BITARTRATE 6.4 MCG: 1 INJECTION, SOLUTION, CONCENTRATE INTRAVENOUS at 10:11

## 2022-10-31 RX ADMIN — PHENYLEPHRINE HYDROCHLORIDE 100 MCG: 10 INJECTION INTRAVENOUS at 09:22

## 2022-10-31 RX ADMIN — DOPAMINE HYDROCHLORIDE 3 MCG/KG/MIN: 160 INJECTION, SOLUTION INTRAVENOUS at 10:11

## 2022-10-31 RX ADMIN — ONDANSETRON 4 MG: 2 INJECTION INTRAMUSCULAR; INTRAVENOUS at 14:42

## 2022-10-31 RX ADMIN — Medication 50 MG: at 08:33

## 2022-10-31 RX ADMIN — Medication 30 MG: at 09:56

## 2022-10-31 RX ADMIN — PROPOFOL 180 MG: 10 INJECTION, EMULSION INTRAVENOUS at 08:33

## 2022-10-31 RX ADMIN — HEPARIN SODIUM 10000 UNITS: 1000 INJECTION, SOLUTION INTRAVENOUS; SUBCUTANEOUS at 09:54

## 2022-10-31 RX ADMIN — FENTANYL CITRATE 50 MCG: 50 INJECTION, SOLUTION INTRAMUSCULAR; INTRAVENOUS at 10:17

## 2022-10-31 RX ADMIN — NOREPINEPHRINE BITARTRATE 6.4 MCG: 1 INJECTION, SOLUTION, CONCENTRATE INTRAVENOUS at 13:11

## 2022-10-31 RX ADMIN — NOREPINEPHRINE BITARTRATE 6.4 MCG: 1 INJECTION, SOLUTION, CONCENTRATE INTRAVENOUS at 10:07

## 2022-10-31 RX ADMIN — NOREPINEPHRINE BITARTRATE 12.8 MCG: 1 INJECTION, SOLUTION, CONCENTRATE INTRAVENOUS at 10:13

## 2022-10-31 RX ADMIN — DEXAMETHASONE SODIUM PHOSPHATE 4 MG: 4 INJECTION, SOLUTION INTRA-ARTICULAR; INTRALESIONAL; INTRAMUSCULAR; INTRAVENOUS; SOFT TISSUE at 08:58

## 2022-10-31 RX ADMIN — PROTAMINE SULFATE 50 MG: 10 INJECTION, SOLUTION INTRAVENOUS at 14:41

## 2022-10-31 RX ADMIN — PHENYLEPHRINE HYDROCHLORIDE 100 MCG: 10 INJECTION INTRAVENOUS at 10:05

## 2022-10-31 RX ADMIN — MEXILETINE HYDROCHLORIDE 150 MG: 150 CAPSULE ORAL at 21:22

## 2022-10-31 RX ADMIN — SODIUM CHLORIDE, POTASSIUM CHLORIDE, SODIUM LACTATE AND CALCIUM CHLORIDE: 600; 310; 30; 20 INJECTION, SOLUTION INTRAVENOUS at 08:18

## 2022-10-31 RX ADMIN — FUROSEMIDE 20 MG: 20 TABLET ORAL at 21:23

## 2022-10-31 RX ADMIN — SODIUM CHLORIDE, SODIUM LACTATE, POTASSIUM CHLORIDE, CALCIUM CHLORIDE: 600; 310; 30; 20 INJECTION, SOLUTION INTRAVENOUS at 09:36

## 2022-10-31 RX ADMIN — AMIODARONE HYDROCHLORIDE 200 MG: 200 TABLET ORAL at 21:23

## 2022-10-31 RX ADMIN — Medication 10 MG: at 13:54

## 2022-10-31 RX ADMIN — Medication 20 MG: at 12:43

## 2022-10-31 RX ADMIN — CEFAZOLIN 2 G: 1 INJECTION, POWDER, FOR SOLUTION INTRAMUSCULAR; INTRAVENOUS at 13:11

## 2022-10-31 RX ADMIN — PROTAMINE SULFATE 50 MG: 10 INJECTION, SOLUTION INTRAVENOUS at 10:22

## 2022-10-31 RX ADMIN — CEFAZOLIN 2 G: 1 INJECTION, POWDER, FOR SOLUTION INTRAMUSCULAR; INTRAVENOUS at 09:11

## 2022-10-31 RX ADMIN — MIDAZOLAM 1 MG: 1 INJECTION INTRAMUSCULAR; INTRAVENOUS at 08:26

## 2022-10-31 RX ADMIN — PHENYLEPHRINE HYDROCHLORIDE 0.3 MCG/KG/MIN: 10 INJECTION INTRAVENOUS at 09:35

## 2022-10-31 RX ADMIN — Medication 20 MG: at 10:54

## 2022-10-31 ASSESSMENT — VISUAL ACUITY: OU: NORMAL ACUITY

## 2022-10-31 ASSESSMENT — ACTIVITIES OF DAILY LIVING (ADL)
ADLS_ACUITY_SCORE: 35
ADLS_ACUITY_SCORE: 37
ADLS_ACUITY_SCORE: 35
ADLS_ACUITY_SCORE: 35

## 2022-10-31 NOTE — Clinical Note
Potential access sites were evaluated for patency using ultrasound.   The right femoral artery, right femoral vein, and left femoral vein were selected. Access was obtained under with Sonosite and Fluoroscopic guidance using a micropuncture 21 gauge needle with direct visualization of needle entry.

## 2022-10-31 NOTE — ANESTHESIA POSTPROCEDURE EVALUATION
Patient: Rohit Garvey    Procedure: Procedure(s):  EP Ablation VT       Anesthesia Type:  General    Note:  Disposition: Inpatient   Postop Pain Control: Uneventful            Sign Out: Well controlled pain   PONV: No   Neuro/Psych: Uneventful            Sign Out: Acceptable/Baseline neuro status   Airway/Respiratory: Uneventful            Sign Out: Acceptable/Baseline resp. status   CV/Hemodynamics: Uneventful            Sign Out: Acceptable CV status; No obvious hypovolemia; No obvious fluid overload   Other NRE: NONE   DID A NON-ROUTINE EVENT OCCUR? No           Last vitals:  Vitals Value Taken Time   BP 90/64 10/31/22 1830   Temp 36.6  C (97.8  F) 10/31/22 1700   Pulse 49 10/31/22 1839   Resp 9 10/31/22 1839   SpO2 98 % 10/31/22 1839   Vitals shown include unvalidated device data.    Electronically Signed By: ZEN PRINCE MD  October 31, 2022  6:41 PM

## 2022-10-31 NOTE — DISCHARGE INSTRUCTIONS
General discharge instructions  Please take HALF of your Lisinopril dose until cardiology follow up, take 2.5 mg daily  Please HOLD your Coreg until cardiology follow up  Please follow up with PCP and cardiology within one week  Please follow up with EP cardiology as arranged    Post-Ablation Discharge Instructions    Care of groin site:        Remove the Band-Aid after 24 hours. If there is minor oozing, apply another Band-aid and remove it after 12 hours.         Do NOT take a bath, use a hot tub, pool, or submerse in water for at least 3 days You may shower.         It is normal to have a small bruise or lump at the site.        Do not scrub the site.        Do not use lotion or powder near the puncture site for 3 days.        For the first 2 days: Do not stoop or squat. When you cough, sneeze or move your bowels, hold your hand over the puncture site and press gently.        Do not lift more than 10 pounds or exertional activity for 10 days.      If you start bleeding from the site in your groin:  Lie down flat and press firmly on the site.  Call your physician immediately, or, come to the emergency room.    Call 911 right away if you have bleeding that is heavy or does not stop.     Call your doctor/provider if:        You have a large or growing hard lump around the site.        The site is red, swollen, hot or tender.        Blood or fluid is draining from the site.        You have chills or a fever greater than 101 F (38 C).        Your leg or arm turns bluish, feels numb or cool.        You have hives, a rash or unusual itching.     Cardiovascular Clinic:   [address] [place] [address]. [place] [address]  Your Care Team:  EP Cardiology   Telephone Number     Sandhya Leon [phone]   Zeinab Josue RN [phone]     For scheduling appts or procedures:    Catrina Yeung   [phone]   For the Device Clinic (Pacemakers and ICD's)   RN's :   Livia Peters During  business hours: [phone]     After business hours:   [phone]- select option 4 and ask for job code [phone].       As always, Thank you for trusting us with your health care needs

## 2022-10-31 NOTE — ANESTHESIA CARE TRANSFER NOTE
Patient: Rohit Garvey    Procedure: Procedure(s):  EP Ablation VT       Diagnosis: ventricular tachycardia  Diagnosis Additional Information: No value filed.    Anesthesia Type:   General     Note:    Oropharynx: oropharynx clear of all foreign objects and spontaneously breathing  Level of Consciousness: drowsy  Oxygen Supplementation: face mask  Level of Supplemental Oxygen (L/min / FiO2): 6  Independent Airway: airway patency satisfactory and stable  Dentition: dentition unchanged  Vital Signs Stable: post-procedure vital signs reviewed and stable  Report to RN Given: handoff report given  Patient transferred to: PACU    Handoff Report: Identifed the Patient, Identified the Reponsible Provider, Reviewed the pertinent medical history, Discussed the surgical course, Reviewed Intra-OP anesthesia mangement and issues during anesthesia, Set expectations for post-procedure period and Allowed opportunity for questions and acknowledgement of understanding      Vitals:  Vitals Value Taken Time   /70 10/31/22 1559   Temp     Pulse 51 10/31/22 1605   Resp 17 10/31/22 1605   SpO2 100 % 10/31/22 1605   Vitals shown include unvalidated device data.    Electronically Signed By: LINDA Rowell CRNA  October 31, 2022  4:07 PM

## 2022-10-31 NOTE — H&P
H&P from Dr. Leon's Office Visit on 10/14/22 reviewed. Following today's exam there are no interval changes.       LINDA Hendricks CNP  Electrophysiology Consult Service  Pager: [phone]

## 2022-10-31 NOTE — ANESTHESIA PREPROCEDURE EVALUATION
Anesthesia Pre-Procedure Evaluation    Patient: Rohit Garvey   MRN: [id number] : 1955        Procedure : Procedure(s):  EP Ablation VT          Past Medical History:   Diagnosis Date     Diabetes (H)      Hypertension       History reviewed. No pertinent surgical history.   No Known Allergies   Social History     Tobacco Use     Smoking status: Never     Smokeless tobacco: Never   Substance Use Topics     Alcohol use: Not on file      Wt Readings from Last 1 Encounters:   10/31/22 69.3 kg (152 lb 12.5 oz)        Anesthesia Evaluation            ROS/MED HX  ENT/Pulmonary:       Neurologic:       Cardiovascular:     (+) hypertension-----Irregular Heartbeat/Palpitations,  (-) murmur and wheezes   METS/Exercise Tolerance:     Hematologic:       Musculoskeletal:       GI/Hepatic:       Renal/Genitourinary:       Endo:     (+) type II DM,     Psychiatric/Substance Use:       Infectious Disease:       Malignancy:       Other:            Physical Exam    Airway        Mallampati: II   TM distance: > 3 FB   Neck ROM: full   Mouth opening: > 3 cm    Respiratory Devices and Support         Dental       (+) upper dentures, lower dentures and missing      Cardiovascular          Rhythm and rate: regular and normal (-) no systolic click and no murmur    Pulmonary   pulmonary exam normal        breath sounds clear to auscultation   (-) no wheezes        OUTSIDE LABS:  CBC: No results found for: WBC, HGB, HCT, PLT  BMP:   Lab Results   Component Value Date    POTASSIUM 4.2 10/31/2022    CR 1.37 (H) 10/31/2022     COAGS: No results found for: PTT, INR, FIBR  POC: No results found for: BGM, HCG, HCGS  HEPATIC: No results found for: ALBUMIN, PROTTOTAL, ALT, AST, GGT, ALKPHOS, BILITOTAL, BILIDIRECT, HENNY  OTHER: No results found for: PH, LACT, A1C, NEETA, PHOS, MAG, LIPASE, AMYLASE, TSH, T4, T3, CRP, SED    Anesthesia Plan    ASA Status:  2   NPO Status:  NPO Appropriate    Anesthesia Type: General.     - Airway: ETT    Induction: Intravenous.   Maintenance: Inhalation.        Consents    Anesthesia Plan(s) and associated risks, benefits, and realistic alternatives discussed. Questions answered and patient/representative(s) expressed understanding.    - Discussed:     - Discussed with:  Patient      - Extended Intubation/Ventilatory Support Discussed: Yes.      - Patient is DNR/DNI Status: No    Use of blood products discussed: Yes.     - Discussed with: Patient.     Postoperative Care    Pain management: IV analgesics.   PONV prophylaxis: Ondansetron (or other 5HT-3), Dexamethasone or Solumedrol     Comments:                Christopher J. Behrens, MD

## 2022-10-31 NOTE — Clinical Note
Pt arrived under the care of anesthesia. All vital signs, medications, airway management, etc are managed by CRNA at bedside. See anesthesia flowsheet for details.

## 2022-10-31 NOTE — Clinical Note
Hemodynamic equipment used: 5 lead ECG, LIKEPAK Hands Off Patches, LIFEPAK With 3 Leads, Machine BP Cuff and pulse oximeter probe. Floor

## 2022-10-31 NOTE — Clinical Note
dry, intact, no bleeding and no hematoma. Sheaths removed. Peterson pressure held. Hemostasis obtained. 6 hours bedrest ordered.

## 2022-10-31 NOTE — OR NURSING
Informed Sandhya Alvarez NP that patient did not have type and screen and also did not take morning dose of carvedilol. Okay to proceed without dose of carvedilol and no type and screen per Sandhya Alvarez NP

## 2022-10-31 NOTE — ANESTHESIA PROCEDURE NOTES
Airway       Patient location during procedure: OR       Procedure Start/Stop Times: 10/31/2022 8:36 AM  Staff -        CRNA: Kervin Matthews APRN CRNA       Performed By: CRNA  Consent for Airway        Urgency: elective  Indications and Patient Condition       Indications for airway management: mohsen-procedural       Induction type:intravenous       Mask difficulty assessment: 1 - vent by mask    Final Airway Details       Final airway type: endotracheal airway       Successful airway: ETT - single and Oral  Endotracheal Airway Details        ETT size (mm): 7.5       Cuffed: yes       Successful intubation technique: direct laryngoscopy       DL Blade Type: Arango 2       Grade View of Cords: 1       Adjucts: stylet       Position: Right       Measured from: gums/teeth       Secured at (cm): 23       Bite block used: Oral Airway (At end of case)    Post intubation assessment        Placement verified by: capnometry, equal breath sounds and chest rise        Number of attempts at approach: 1       Number of other approaches attempted: 0       Secured with: pink tape       Ease of procedure: easy       Dentition: Intact and Unchanged    Medication(s) Administered   Medication Administration Time: 10/31/2022 8:36 AM

## 2022-11-01 ENCOUNTER — APPOINTMENT (OUTPATIENT)
Dept: GENERAL RADIOLOGY | Facility: CLINIC | Age: 67
DRG: 273 | End: 2022-11-01
Attending: INTERNAL MEDICINE
Payer: MEDICARE

## 2022-11-01 ENCOUNTER — APPOINTMENT (OUTPATIENT)
Dept: CT IMAGING | Facility: CLINIC | Age: 67
DRG: 273 | End: 2022-11-01
Attending: NURSE PRACTITIONER
Payer: MEDICARE

## 2022-11-01 ENCOUNTER — APPOINTMENT (OUTPATIENT)
Dept: CARDIOLOGY | Facility: CLINIC | Age: 67
DRG: 273 | End: 2022-11-01
Attending: NURSE PRACTITIONER
Payer: MEDICARE

## 2022-11-01 ENCOUNTER — ANCILLARY PROCEDURE (OUTPATIENT)
Dept: CARDIOLOGY | Facility: CLINIC | Age: 67
DRG: 273 | End: 2022-11-01
Attending: INTERNAL MEDICINE
Payer: MEDICARE

## 2022-11-01 ENCOUNTER — APPOINTMENT (OUTPATIENT)
Dept: ULTRASOUND IMAGING | Facility: CLINIC | Age: 67
DRG: 273 | End: 2022-11-01
Attending: INTERNAL MEDICINE
Payer: MEDICARE

## 2022-11-01 DIAGNOSIS — I42.8 NONISCHEMIC CARDIOMYOPATHY (H): ICD-10-CM

## 2022-11-01 DIAGNOSIS — Z45.02 ENCOUNTER FOR ADJUSTMENT AND MANAGEMENT OF AUTOMATIC IMPLANTABLE CARDIAC DEFIBRILLATOR: ICD-10-CM

## 2022-11-01 DIAGNOSIS — I42.8 NONISCHEMIC CARDIOMYOPATHY (H): Primary | ICD-10-CM

## 2022-11-01 PROBLEM — I47.20 PAROXYSMAL VENTRICULAR TACHYCARDIA (H): Status: ACTIVE | Noted: 2022-11-01

## 2022-11-01 PROBLEM — I47.29 PAROXYSMAL VENTRICULAR TACHYCARDIA (H): Status: ACTIVE | Noted: 2022-11-01

## 2022-11-01 LAB
ALBUMIN SERPL BCG-MCNC: 3.7 G/DL (ref 3.5–5.2)
ALBUMIN UR-MCNC: 30 MG/DL
ALP SERPL-CCNC: 115 U/L (ref 35–129)
ALT SERPL W P-5'-P-CCNC: 216 U/L (ref 10–50)
ANION GAP SERPL CALCULATED.3IONS-SCNC: 10 MMOL/L (ref 7–15)
ANION GAP SERPL CALCULATED.3IONS-SCNC: 14 MMOL/L (ref 7–15)
APPEARANCE UR: ABNORMAL
AST SERPL W P-5'-P-CCNC: ABNORMAL U/L
BASE EXCESS BLDV CALC-SCNC: -2.9 MMOL/L (ref -7.7–1.9)
BASE EXCESS BLDV CALC-SCNC: -4.7 MMOL/L (ref -7.7–1.9)
BASOPHILS # BLD AUTO: 0 10E3/UL (ref 0–0.2)
BASOPHILS NFR BLD AUTO: 0 %
BILIRUB SERPL-MCNC: 0.7 MG/DL
BILIRUB UR QL STRIP: NEGATIVE
BUN SERPL-MCNC: 33.3 MG/DL (ref 8–23)
BUN SERPL-MCNC: 37.6 MG/DL (ref 8–23)
CALCIUM SERPL-MCNC: 6.9 MG/DL (ref 8.8–10.2)
CALCIUM SERPL-MCNC: 7.8 MG/DL (ref 8.8–10.2)
CHLORIDE SERPL-SCNC: 101 MMOL/L (ref 98–107)
CHLORIDE SERPL-SCNC: 108 MMOL/L (ref 98–107)
COLOR UR AUTO: YELLOW
CREAT SERPL-MCNC: 1.76 MG/DL (ref 0.51–1.17)
CREAT SERPL-MCNC: 1.96 MG/DL (ref 0.51–1.17)
DEPRECATED HCO3 PLAS-SCNC: 18 MMOL/L (ref 22–29)
DEPRECATED HCO3 PLAS-SCNC: 19 MMOL/L (ref 22–29)
EOSINOPHIL # BLD AUTO: 0 10E3/UL (ref 0–0.7)
EOSINOPHIL NFR BLD AUTO: 0 %
ERYTHROCYTE [DISTWIDTH] IN BLOOD BY AUTOMATED COUNT: 18.2 % (ref 10–15)
ERYTHROCYTE [DISTWIDTH] IN BLOOD BY AUTOMATED COUNT: 18.4 % (ref 10–15)
GFR SERPL CREATININE-BSD FRML MDRD: 37 ML/MIN/1.73M2
GFR SERPL CREATININE-BSD FRML MDRD: 42 ML/MIN/1.73M2
GLUCOSE BLDC GLUCOMTR-MCNC: 253 MG/DL (ref 70–99)
GLUCOSE BLDC GLUCOMTR-MCNC: 258 MG/DL (ref 70–99)
GLUCOSE BLDC GLUCOMTR-MCNC: 292 MG/DL (ref 70–99)
GLUCOSE BLDC GLUCOMTR-MCNC: 319 MG/DL (ref 70–99)
GLUCOSE BLDC GLUCOMTR-MCNC: 324 MG/DL (ref 70–99)
GLUCOSE BLDC GLUCOMTR-MCNC: 377 MG/DL (ref 70–99)
GLUCOSE BLDC GLUCOMTR-MCNC: 378 MG/DL (ref 70–99)
GLUCOSE SERPL-MCNC: 313 MG/DL (ref 70–99)
GLUCOSE SERPL-MCNC: 339 MG/DL (ref 70–99)
GLUCOSE UR STRIP-MCNC: 200 MG/DL
HCO3 BLDV-SCNC: 21 MMOL/L (ref 21–28)
HCO3 BLDV-SCNC: 23 MMOL/L (ref 21–28)
HCT VFR BLD AUTO: 27.9 % (ref 35–53)
HCT VFR BLD AUTO: 31.7 % (ref 35–53)
HGB BLD-MCNC: 10 G/DL (ref 11.7–17.7)
HGB BLD-MCNC: 9.1 G/DL (ref 11.7–17.7)
HGB UR QL STRIP: ABNORMAL
HYALINE CASTS: 4 /LPF
IMM GRANULOCYTES # BLD: 0.1 10E3/UL
IMM GRANULOCYTES NFR BLD: 1 %
KETONES UR STRIP-MCNC: ABNORMAL MG/DL
LACTATE SERPL-SCNC: 1.9 MMOL/L (ref 0.7–2)
LEUKOCYTE ESTERASE UR QL STRIP: ABNORMAL
LVEF ECHO: NORMAL
LYMPHOCYTES # BLD AUTO: 0.3 10E3/UL (ref 0.8–5.3)
LYMPHOCYTES NFR BLD AUTO: 2 %
MAGNESIUM SERPL-MCNC: 1.5 MG/DL (ref 1.7–2.3)
MCH RBC QN AUTO: 29.8 PG (ref 26.5–33)
MCH RBC QN AUTO: 30.4 PG (ref 26.5–33)
MCHC RBC AUTO-ENTMCNC: 31.5 G/DL (ref 31.5–36.5)
MCHC RBC AUTO-ENTMCNC: 32.6 G/DL (ref 31.5–36.5)
MCV RBC AUTO: 93 FL (ref 78–100)
MCV RBC AUTO: 94 FL (ref 78–100)
MONOCYTES # BLD AUTO: 1.1 10E3/UL (ref 0–1.3)
MONOCYTES NFR BLD AUTO: 7 %
MUCOUS THREADS #/AREA URNS LPF: PRESENT /LPF
NEUTROPHILS # BLD AUTO: 14.8 10E3/UL (ref 1.6–8.3)
NEUTROPHILS NFR BLD AUTO: 90 %
NITRATE UR QL: NEGATIVE
NRBC # BLD AUTO: 0 10E3/UL
NRBC BLD AUTO-RTO: 0 /100
O2/TOTAL GAS SETTING VFR VENT: 21 %
O2/TOTAL GAS SETTING VFR VENT: 28 %
OXYHGB MFR BLDV: 43 % (ref 70–75)
OXYHGB MFR BLDV: 63 % (ref 70–75)
PCO2 BLDV: 40 MM HG (ref 40–50)
PCO2 BLDV: 44 MM HG (ref 40–50)
PH BLDV: 7.33 [PH] (ref 7.32–7.43)
PH BLDV: 7.33 [PH] (ref 7.32–7.43)
PH UR STRIP: 5 [PH] (ref 5–7)
PHOSPHATE SERPL-MCNC: 4 MG/DL (ref 2.5–4.5)
PLATELET # BLD AUTO: 153 10E3/UL (ref 150–450)
PLATELET # BLD AUTO: 155 10E3/UL (ref 150–450)
PO2 BLDV: 27 MM HG (ref 25–47)
PO2 BLDV: 39 MM HG (ref 25–47)
POTASSIUM SERPL-SCNC: 4.4 MMOL/L (ref 3.4–5.3)
POTASSIUM SERPL-SCNC: 4.8 MMOL/L (ref 3.4–5.3)
PROT SERPL-MCNC: 5.9 G/DL (ref 6.4–8.3)
RBC # BLD AUTO: 2.99 10E6/UL (ref 3.8–5.9)
RBC # BLD AUTO: 3.36 10E6/UL (ref 3.8–5.9)
RBC URINE: 20 /HPF
SODIUM SERPL-SCNC: 134 MMOL/L (ref 136–145)
SODIUM SERPL-SCNC: 136 MMOL/L (ref 136–145)
SP GR UR STRIP: 1.02 (ref 1–1.03)
SQUAMOUS EPITHELIAL: <1 /HPF
UROBILINOGEN UR STRIP-MCNC: NORMAL MG/DL
WBC # BLD AUTO: 14.4 10E3/UL (ref 4–11)
WBC # BLD AUTO: 16.3 10E3/UL (ref 4–11)
WBC URINE: 12 /HPF

## 2022-11-01 PROCEDURE — 99233 SBSQ HOSP IP/OBS HIGH 50: CPT | Performed by: INTERNAL MEDICINE

## 2022-11-01 PROCEDURE — 74176 CT ABD & PELVIS W/O CONTRAST: CPT | Mod: 26 | Performed by: RADIOLOGY

## 2022-11-01 PROCEDURE — 36415 COLL VENOUS BLD VENIPUNCTURE: CPT | Performed by: NURSE PRACTITIONER

## 2022-11-01 PROCEDURE — 87086 URINE CULTURE/COLONY COUNT: CPT | Performed by: INTERNAL MEDICINE

## 2022-11-01 PROCEDURE — 99291 CRITICAL CARE FIRST HOUR: CPT | Mod: 25 | Performed by: STUDENT IN AN ORGANIZED HEALTH CARE EDUCATION/TRAINING PROGRAM

## 2022-11-01 PROCEDURE — 93308 TTE F-UP OR LMTD: CPT | Mod: 26 | Performed by: INTERNAL MEDICINE

## 2022-11-01 PROCEDURE — 87040 BLOOD CULTURE FOR BACTERIA: CPT | Performed by: INTERNAL MEDICINE

## 2022-11-01 PROCEDURE — 99356 PR PROLONGED SERV,INPATIENT,1ST HR: CPT | Performed by: INTERNAL MEDICINE

## 2022-11-01 PROCEDURE — 250N000011 HC RX IP 250 OP 636: Performed by: STUDENT IN AN ORGANIZED HEALTH CARE EDUCATION/TRAINING PROGRAM

## 2022-11-01 PROCEDURE — 258N000003 HC RX IP 258 OP 636: Performed by: INTERNAL MEDICINE

## 2022-11-01 PROCEDURE — 250N000009 HC RX 250: Performed by: INTERNAL MEDICINE

## 2022-11-01 PROCEDURE — 82805 BLOOD GASES W/O2 SATURATION: CPT | Performed by: HOSPITALIST

## 2022-11-01 PROCEDURE — 200N000002 HC R&B ICU UMMC

## 2022-11-01 PROCEDURE — 71045 X-RAY EXAM CHEST 1 VIEW: CPT | Mod: 26 | Performed by: RADIOLOGY

## 2022-11-01 PROCEDURE — 250N000011 HC RX IP 250 OP 636: Performed by: INTERNAL MEDICINE

## 2022-11-01 PROCEDURE — 250N000012 HC RX MED GY IP 250 OP 636 PS 637: Performed by: INTERNAL MEDICINE

## 2022-11-01 PROCEDURE — 82962 GLUCOSE BLOOD TEST: CPT

## 2022-11-01 PROCEDURE — 250N000009 HC RX 250: Performed by: STUDENT IN AN ORGANIZED HEALTH CARE EDUCATION/TRAINING PROGRAM

## 2022-11-01 PROCEDURE — 82805 BLOOD GASES W/O2 SATURATION: CPT | Performed by: INTERNAL MEDICINE

## 2022-11-01 PROCEDURE — 250N000013 HC RX MED GY IP 250 OP 250 PS 637: Performed by: NURSE PRACTITIONER

## 2022-11-01 PROCEDURE — 272N000451 HC KIT SHRLOCK 5FR POWER PICC DOUBLE LUMEN

## 2022-11-01 PROCEDURE — 93325 DOPPLER ECHO COLOR FLOW MAPG: CPT | Mod: 26 | Performed by: INTERNAL MEDICINE

## 2022-11-01 PROCEDURE — 250N000011 HC RX IP 250 OP 636: Performed by: HOSPITALIST

## 2022-11-01 PROCEDURE — 83605 ASSAY OF LACTIC ACID: CPT | Performed by: INTERNAL MEDICINE

## 2022-11-01 PROCEDURE — 99357 PR PROLONGED SERV,INPATIENT,EA ADDL 30 MIN: CPT | Performed by: INTERNAL MEDICINE

## 2022-11-01 PROCEDURE — 93325 DOPPLER ECHO COLOR FLOW MAPG: CPT

## 2022-11-01 PROCEDURE — 80069 RENAL FUNCTION PANEL: CPT | Performed by: NURSE PRACTITIONER

## 2022-11-01 PROCEDURE — 83735 ASSAY OF MAGNESIUM: CPT | Performed by: STUDENT IN AN ORGANIZED HEALTH CARE EDUCATION/TRAINING PROGRAM

## 2022-11-01 PROCEDURE — 81003 URINALYSIS AUTO W/O SCOPE: CPT | Performed by: INTERNAL MEDICINE

## 2022-11-01 PROCEDURE — G1010 CDSM STANSON: HCPCS

## 2022-11-01 PROCEDURE — 71250 CT THORAX DX C-: CPT | Mod: MG

## 2022-11-01 PROCEDURE — 76705 ECHO EXAM OF ABDOMEN: CPT

## 2022-11-01 PROCEDURE — 36415 COLL VENOUS BLD VENIPUNCTURE: CPT | Performed by: INTERNAL MEDICINE

## 2022-11-01 PROCEDURE — 93321 DOPPLER ECHO F-UP/LMTD STD: CPT | Mod: 26 | Performed by: INTERNAL MEDICINE

## 2022-11-01 PROCEDURE — 84145 PROCALCITONIN (PCT): CPT | Performed by: INTERNAL MEDICINE

## 2022-11-01 PROCEDURE — 76705 ECHO EXAM OF ABDOMEN: CPT | Mod: 26 | Performed by: RADIOLOGY

## 2022-11-01 PROCEDURE — 999N000065 XR CHEST PORT 1 VIEW

## 2022-11-01 PROCEDURE — 36569 INSJ PICC 5 YR+ W/O IMAGING: CPT

## 2022-11-01 PROCEDURE — 93280 PM DEVICE PROGR EVAL DUAL: CPT | Mod: 26 | Performed by: INTERNAL MEDICINE

## 2022-11-01 PROCEDURE — 85027 COMPLETE CBC AUTOMATED: CPT | Performed by: INTERNAL MEDICINE

## 2022-11-01 PROCEDURE — 258N000003 HC RX IP 258 OP 636: Performed by: NURSE PRACTITIONER

## 2022-11-01 PROCEDURE — 84155 ASSAY OF PROTEIN SERUM: CPT | Performed by: INTERNAL MEDICINE

## 2022-11-01 PROCEDURE — 258N000003 HC RX IP 258 OP 636: Performed by: STUDENT IN AN ORGANIZED HEALTH CARE EDUCATION/TRAINING PROGRAM

## 2022-11-01 PROCEDURE — 85027 COMPLETE CBC AUTOMATED: CPT | Performed by: NURSE PRACTITIONER

## 2022-11-01 PROCEDURE — 71250 CT THORAX DX C-: CPT | Mod: 26 | Performed by: RADIOLOGY

## 2022-11-01 PROCEDURE — 93280 PM DEVICE PROGR EVAL DUAL: CPT

## 2022-11-01 PROCEDURE — 93321 DOPPLER ECHO F-UP/LMTD STD: CPT

## 2022-11-01 RX ORDER — COLCHICINE 0.6 MG/1
0.3 TABLET ORAL DAILY
Qty: 60 TABLET | Refills: 0 | Status: ON HOLD | OUTPATIENT
Start: 2022-11-01 | End: 2023-04-27

## 2022-11-01 RX ORDER — HEPARIN SODIUM,PORCINE 10 UNIT/ML
5-20 VIAL (ML) INTRAVENOUS EVERY 24 HOURS
Status: DISCONTINUED | OUTPATIENT
Start: 2022-11-01 | End: 2022-11-05 | Stop reason: HOSPADM

## 2022-11-01 RX ORDER — LIDOCAINE 40 MG/G
CREAM TOPICAL
Status: ACTIVE | OUTPATIENT
Start: 2022-11-01 | End: 2022-11-04

## 2022-11-01 RX ORDER — METRONIDAZOLE 500 MG/100ML
500 INJECTION, SOLUTION INTRAVENOUS EVERY 12 HOURS
Status: DISCONTINUED | OUTPATIENT
Start: 2022-11-01 | End: 2022-11-02

## 2022-11-01 RX ORDER — POLYETHYLENE GLYCOL 3350 17 G/17G
17 POWDER, FOR SOLUTION ORAL DAILY PRN
Status: DISCONTINUED | OUTPATIENT
Start: 2022-11-01 | End: 2022-11-05 | Stop reason: HOSPADM

## 2022-11-01 RX ORDER — COLCHICINE 0.6 MG/1
0.6 TABLET ORAL 2 TIMES DAILY
Qty: 60 TABLET | Refills: 0 | Status: SHIPPED | OUTPATIENT
Start: 2022-11-01 | End: 2022-11-01

## 2022-11-01 RX ORDER — NOREPINEPHRINE BITARTRATE 0.06 MG/ML
.01-.6 INJECTION, SOLUTION INTRAVENOUS CONTINUOUS
Status: DISCONTINUED | OUTPATIENT
Start: 2022-11-01 | End: 2022-11-01

## 2022-11-01 RX ORDER — CEFTRIAXONE 2 G/1
2 INJECTION, POWDER, FOR SOLUTION INTRAMUSCULAR; INTRAVENOUS EVERY 24 HOURS
Status: DISCONTINUED | OUTPATIENT
Start: 2022-11-01 | End: 2022-11-03

## 2022-11-01 RX ORDER — HYDROMORPHONE HCL IN WATER/PF 6 MG/30 ML
0.2 PATIENT CONTROLLED ANALGESIA SYRINGE INTRAVENOUS ONCE
Status: DISCONTINUED | OUTPATIENT
Start: 2022-11-01 | End: 2022-11-03

## 2022-11-01 RX ORDER — COLCHICINE 0.6 MG
0.3 TABLET ORAL DAILY
Status: DISCONTINUED | OUTPATIENT
Start: 2022-11-02 | End: 2022-11-05 | Stop reason: HOSPADM

## 2022-11-01 RX ORDER — MAGNESIUM SULFATE HEPTAHYDRATE 40 MG/ML
4 INJECTION, SOLUTION INTRAVENOUS ONCE
Status: COMPLETED | OUTPATIENT
Start: 2022-11-01 | End: 2022-11-02

## 2022-11-01 RX ORDER — DOBUTAMINE HYDROCHLORIDE 200 MG/100ML
2.5 INJECTION INTRAVENOUS CONTINUOUS
Status: DISCONTINUED | OUTPATIENT
Start: 2022-11-01 | End: 2022-11-05 | Stop reason: HOSPADM

## 2022-11-01 RX ORDER — VANCOMYCIN HYDROCHLORIDE 1 G/200ML
1000 INJECTION, SOLUTION INTRAVENOUS EVERY 24 HOURS
Status: DISCONTINUED | OUTPATIENT
Start: 2022-11-02 | End: 2022-11-03

## 2022-11-01 RX ORDER — NICOTINE POLACRILEX 4 MG
15-30 LOZENGE BUCCAL
Status: DISCONTINUED | OUTPATIENT
Start: 2022-11-01 | End: 2022-11-05 | Stop reason: HOSPADM

## 2022-11-01 RX ORDER — AMIODARONE HYDROCHLORIDE 200 MG/1
200 TABLET ORAL DAILY
Qty: 90 TABLET | Refills: 3 | Status: ON HOLD
Start: 2022-11-01 | End: 2022-12-30

## 2022-11-01 RX ORDER — DEXTROSE MONOHYDRATE 25 G/50ML
25-50 INJECTION, SOLUTION INTRAVENOUS
Status: DISCONTINUED | OUTPATIENT
Start: 2022-11-01 | End: 2022-11-05 | Stop reason: HOSPADM

## 2022-11-01 RX ORDER — HEPARIN SODIUM,PORCINE 10 UNIT/ML
5-20 VIAL (ML) INTRAVENOUS
Status: DISCONTINUED | OUTPATIENT
Start: 2022-11-01 | End: 2022-11-05 | Stop reason: HOSPADM

## 2022-11-01 RX ORDER — NOREPINEPHRINE BITARTRATE 0.06 MG/ML
.01-.6 INJECTION, SOLUTION INTRAVENOUS CONTINUOUS
Status: DISCONTINUED | OUTPATIENT
Start: 2022-11-01 | End: 2022-11-05

## 2022-11-01 RX ADMIN — INSULIN ASPART 3 UNITS: 100 INJECTION, SOLUTION INTRAVENOUS; SUBCUTANEOUS at 20:25

## 2022-11-01 RX ADMIN — LIDOCAINE HYDROCHLORIDE ANHYDROUS 3 ML: 10 INJECTION, SOLUTION INFILTRATION at 18:37

## 2022-11-01 RX ADMIN — AMIODARONE HYDROCHLORIDE 200 MG: 200 TABLET ORAL at 08:23

## 2022-11-01 RX ADMIN — NOREPINEPHRINE BITARTRATE 0.03 MCG/KG/MIN: 1 INJECTION, SOLUTION, CONCENTRATE INTRAVENOUS at 18:31

## 2022-11-01 RX ADMIN — Medication 0.06 MCG/KG/MIN: at 20:27

## 2022-11-01 RX ADMIN — MEXILETINE HYDROCHLORIDE 150 MG: 150 CAPSULE ORAL at 08:23

## 2022-11-01 RX ADMIN — MEXILETINE HYDROCHLORIDE 150 MG: 150 CAPSULE ORAL at 13:21

## 2022-11-01 RX ADMIN — SODIUM CHLORIDE 1000 ML: 9 INJECTION, SOLUTION INTRAVENOUS at 13:20

## 2022-11-01 RX ADMIN — CARVEDILOL 12.5 MG: 12.5 TABLET, FILM COATED ORAL at 08:23

## 2022-11-01 RX ADMIN — FUROSEMIDE 20 MG: 20 TABLET ORAL at 08:23

## 2022-11-01 RX ADMIN — SODIUM CHLORIDE, POTASSIUM CHLORIDE, SODIUM LACTATE AND CALCIUM CHLORIDE 500 ML: 600; 310; 30; 20 INJECTION, SOLUTION INTRAVENOUS at 18:26

## 2022-11-01 RX ADMIN — DOBUTAMINE HYDROCHLORIDE 2.5 MCG/KG/MIN: 200 INJECTION INTRAVENOUS at 22:18

## 2022-11-01 RX ADMIN — CEFTRIAXONE SODIUM 2 G: 2 INJECTION, POWDER, FOR SOLUTION INTRAMUSCULAR; INTRAVENOUS at 20:17

## 2022-11-01 RX ADMIN — MAGNESIUM SULFATE HEPTAHYDRATE 4 G: 40 INJECTION, SOLUTION INTRAVENOUS at 22:39

## 2022-11-01 RX ADMIN — VANCOMYCIN HYDROCHLORIDE 1750 MG: 1 INJECTION, POWDER, LYOPHILIZED, FOR SOLUTION INTRAVENOUS at 20:34

## 2022-11-01 RX ADMIN — LISINOPRIL 5 MG: 5 TABLET ORAL at 08:22

## 2022-11-01 RX ADMIN — SODIUM CHLORIDE 750 ML: 9 INJECTION, SOLUTION INTRAVENOUS at 07:14

## 2022-11-01 RX ADMIN — METRONIDAZOLE 500 MG: 500 INJECTION, SOLUTION INTRAVENOUS at 21:30

## 2022-11-01 RX ADMIN — COLCHICINE 0.6 MG: 0.6 TABLET, FILM COATED ORAL at 09:36

## 2022-11-01 RX ADMIN — ATORVASTATIN CALCIUM 20 MG: 20 TABLET, FILM COATED ORAL at 08:23

## 2022-11-01 ASSESSMENT — ACTIVITIES OF DAILY LIVING (ADL)
ADLS_ACUITY_SCORE: 36
ADLS_ACUITY_SCORE: 35
ADLS_ACUITY_SCORE: 36
ADLS_ACUITY_SCORE: 35
ADLS_ACUITY_SCORE: 35
ADLS_ACUITY_SCORE: 36
ADLS_ACUITY_SCORE: 35
ADLS_ACUITY_SCORE: 36

## 2022-11-01 NOTE — PROGRESS NOTES
Admission Status; Secondary Review Determination     Under the authority of the Utilization Management Commitee, the utilization review process indicated a secondary review on the above patient. The review outcome is based on review of the medical records, discussions with staff, and applying clinical experience noted on the date of the review.     (x) Inpatient Status Appropriate - This patient's medical care is consistent with medical management for inpatient care and reasonable inpatient medical practice.     RATIONALE FOR DETERMINATION: 66 year old male patient with PMHx significant for NICM with EF 45% and VT s/p secondary prevention ICD 6/20/2022, HTN, HPL, type 2 DM, intellectual delay (possibly related to viral illnesses during infancy). He underwent EP ablation of VT due to recurring ICD therapies.    Procedure was performed on 10/31/22.      Today, his post-procedure course notable for hypotension requiring initial 750 ml NS bolus at 0714 and a 1000 ml NS bolus at 1320 today.     His SBP today has declined into the 70s today, with most recent BP 77/53.  HR has been in the 49-69 bpm range.    Repeat labs this aftn show hgb near 9 (11 yesterday) and creatinine elevated to 1.76 (was 1.4 yesterday)    TTE shows reduced EF 35-40%.      Given the patient's course is now complicated by significant hypotension post-procedure, inpatient status is appropriate.      At the time of admission with the information available to the attending physician more than 2 nights Hospital complex care was anticipated, based on patient risk of adverse outcome if treated as outpatient and complex care required. Inpatient admission is appropriate based on the Medicare guidelines.    The information on this document is developed by the utilization review team in order for the business office to ensure compliance. This only denotes the appropriateness of proper admission status and does not reflect the quality of care rendered.   The  definitions of Inpatient Status and Observation Status used in making the determination above are those provided in the CMS Coverage Manual, Chapter 1 and Chapter 6, section 70.4.     Sincerely,     Jomar Arroyo MD  Utilization Review   Physician Advisor   [facility]

## 2022-11-01 NOTE — H&P
Cardiology ICU Inpatient H&P  November 1, 2022      HPI:   Rohit Garvey is a 67 year old man with history of NICM with EF 45% and VT s/p secondary prevention ICD 6/20/2022, HTN, HLD, type 2 DM, intellectual delay (possibly related to viral illnesses during infancy). He was admitted on 10/31/22 following a VT ablation after having recurrent ventricular arrhythmias while being on amiodarone and mexiletine. He had both endocardial and epicardial ablation catheters via R femoral vein and artery access and anterior access for the pericardial space. The RV septum and up to the pulmonary valve, and the LV septum up to the RCC of the aortic valve. A pericardial drain was placed. He was admitted overnight for monitoring. Notably he is pacer dependent and had >90% RV pacing despite lengthening the AV delay in the EP lab.     On 11/1 morning, he started getting more hypotensive with systolic blood pressures in the 70s-80s. Pacer was set with lower rate limit of 50 which he was being paced at. He received 1.75 liters of NS without improvement in his blood pressures. He started having worsening ROSALINO. Hgb dropped from 11.1 to 9.1. Echo showed a small pericardial effusion without any evidence of tamponade. He has had minimal output from his pericardial drain today. Because of ongoing hypotension he is being transferred to the ICU.     At the time of interview, the patient denies chest pain, dyspnea at rest or with exertion, orthopnea, PND, palpitations, lightheadedness, abdominal pain, cough, headaches, back pain, flank pain.     Review of Systems:    Complete review of systems was performed and negative except per HPI.    PMH:  Past Medical History:   Diagnosis Date     Diabetes (H)      Hypertension      Active Problems:  Patient Active Problem List    Diagnosis Date Noted     Paroxysmal ventricular tachycardia 11/01/2022     Priority: Medium     Social History:  Social History     Tobacco Use     Smoking status: Never      Smokeless tobacco: Never     Family History:  History reviewed. No pertinent family history.    Medications:    amiodarone  200 mg Oral Daily     atorvastatin  20 mg Oral Daily     [Held by provider] carvedilol  12.5 mg Oral BID w/meals     [START ON 11/2/2022] colchicine  0.3 mg Oral Daily     [Held by provider] furosemide  20 mg Oral Daily     insulin aspart  1-3 Units Subcutaneous TID AC     insulin aspart  1-3 Units Subcutaneous At Bedtime     [Held by provider] metFORMIN  500 mg Oral BID w/meals     tamsulosin  0.4 mg Oral Once           Physical Exam:  Temp:  [96.9  F (36.1  C)-98.8  F (37.1  C)] 98.4  F (36.9  C)  Pulse:  [49-70] 70  Resp:  [4-23] 16  BP: ()/(42-90) 87/57  SpO2:  [94 %-100 %] 94 %    Intake/Output Summary (Last 24 hours) at 11/1/2022 1625  Last data filed at 11/1/2022 1354  Gross per 24 hour   Intake 2460 ml   Output 1215 ml   Net 1245 ml     GEN: pleasant, no acute distress  HEENT: no icterus  CV: normal rate, regular rhythm, loud friction rub. Normal S1/S2. JVP ~8 cm H2O.   CHEST: clear to ausculation bilaterally, no rales or wheezing  ABD: mildly distended, soft, no tenderness throughout, no guarding, negative ross's sign   EXTR: pulses intact in BLE and BUE. No clubbing, cyanosis or edema. No flank bruising. Bilateral thighs are soft without palpable fluid collections.   NEURO: alert oriented, speech fluent/appropriate, motor grossly nonfocal    Diagnostics:  All labs and imaging were reviewed, of note:  Cr 1.3->1.7  Glucose 339  WBC 14  Hgb 11.1->9.1       Transthoracic echocardiogram:   11/01/22   Left ventricular function is decreased. The ejection fraction is 35-40%  (moderately reduced). Moderate diffuse hypokinesis.  Global right ventricular function appears moderately reduced based on limited  views.  Small pericardial effusion, with the largest collection of fluid posterior to  the left ventricle at the apex (1.1cm).  There is no prior study for direct  comparison.    ---------------------------------------------------------------------------------  Assessment and Plan:  67 year old man with NICM and paroxysmal VT s/p dual chamber ICD in 6/2022 admitted initially to [facility] floor service for monitoring after endocardial and epicardial VT ablation on 10/31/22. Transferred to the ICU on 11/1 with shock of unclear etiology.     Neurologic  #history of developmental delay   -no acute issues     CV  #Shock, unclear etiology  Favoring distributive shock vs cardiogenic shock from bradycardia and being pacer dependent, essentially with minimal native conduction system function (>90% RV pacing noted in EP lab when AV delay lengthened). No evidence of tamponade on echo. No evidence of RP bleeding on CT chest/abd/pelvis or exam. Transaminases are mildly elevated but no clinical s/s of cholangitis or hepatitis.   -Workup: CT chest/abd/pelvis, procalcitonin, blood cultures, UA, abdominal US   -treatment:    - 500 LR now     - NE to keep maps >65     - PICC line placement for Shena output and pressor if needed     - increased lower rate limit 80 from 50 earlier     #Chronic systolic heart failure/HFrEF (EF 35-40) secondary to nonischemic cardiomyopathy  NYHA Symptom Class II  ACC/AHA Stage C  ACE-I/ARB/ARNi: holding home lisinopril 5   BB: holding home coreg 12.5   Aldosterone antagonist: not on PTA   SGLT2i: holding home jardiance with ROSALINO   SCD prophylaxis: ICD [employer]   Fluid status: Euvolemic  Diuretic: hold home lasix 20 mg daily     #Hx of VT s/p ablation 10/31/22   - continue pericardial drain to water seal for now  - continue amiodarone 200 mg daily   - stopped mexiletine per EP recs   - continue colchicine 0.3 mg daily for post epicardial ablation pericarditis prophylaxis   - EP following     #HTN  -hold home lisionpril and coreg     #HLD    - holding home atorvastatin with elevated LFTs     Pulmonary  On room air no concerns     GI  #Elevated transaminases   Mild. No  abdominal pain. Hepatocellular pattern. Perhaps congestive hepatopathy.   -abdominal US  -trend LFTs     Renal  #ROSALINO   Baseline Cr ~1. Suspect ATN from hypotension.   -UA   -treat shock as above   -trend creatinine     Infectious Disease  No active issues, infectious workup as above     Endocrine   #hyperglycemia  #DM2  -start lantus 5 units at bedtime   -increase sliding scale insulin to 1:50 correction factor   -ACHS fingersticks   -hold metformin and jardiance     ICU Checklist:  #Feeding: regular diet   #Analgesia: percocet   #Sedation: n/a  #Thromboembolism ppx: n/a  #HOB: 30 degrees   #Ulcer ppx: n/a  #Glucose: as above   #SBT/SAT: n/a  #Bowel reg: miralax   #Lines/tubes/drains:   -PIV x2  -pericardial drain  #Code status: full   #Dispo: ICU     I have discussed the above with Dr. Weston who agrees with the above assessment and plan    Navdeep Verma MD  Cardiology Fellow  Pager: [phone]

## 2022-11-01 NOTE — PLAN OF CARE
A&Ox4. Flat effect. VSS except for asymptomatic hypotension. Bilateral groin sites wnl and JENNIFER. Pericardial drain to suction, no output. Duque pulled at 0915 and only dribbling since. Bladder scan at 1600 50cc. Chest CT performed. Blood cultures drawn. D/t low BP, transfer to ICU orders placed, report given to 4C and patient left unit at 1645.

## 2022-11-01 NOTE — OR NURSING
EZIO ROBISON called for low SBP, new bleeding around pericardial drain, and high blood sugar. lowest 81/58. Oral amiodarone, and lasix to be given as scheduled once received from pharmacy

## 2022-11-01 NOTE — PROGRESS NOTES
Transfer        8:06 AM  ---------------------------------------------------------------------  Transferred to/from: PACU  Via: Bed  Reason for transfer:  Level of care  Family: pt spoke with sister ritchie  Belongings: shoes, shirt pants, jacket,cane  Chart: Sent with pt  Medications: Meds from bin sent with pt    RN skin assessment completed with Ya MORA, Kiana NELSON     Temp:  [96.9  F (36.1  C)-98.8  F (37.1  C)] 98.8  F (37.1  C)  Pulse:  [49-56] 49  Resp:  [4-23] 17  BP: ()/(45-90) 109/90  SpO2:  [96 %-100 %] 100 %

## 2022-11-01 NOTE — OR NURSING
MD at bedside to assess patient. Increased amount of drainage around pericardial drain. Also ~5mL UOP in past 2 hours. BPs stable. No new interventions/orders at this time

## 2022-11-01 NOTE — PROVIDER NOTIFICATION
EP notified of patient's low BP and elevated blood glucose. Spoke with Sandhya Hurtado. Plan to give fluid bolus and evaluate echo results. Continue to monitor.

## 2022-11-01 NOTE — PROGRESS NOTES
[facility]   EP Cardiology  Progress Note     ASSESSMENT/PLAN:  Rohit Garvey is a 67 year old year old adult with PMH significant for NICM with LVEF 45% and VT s/p secondary prevention ICD 6/20/2022, HTN, HPL, DM2, intellectual delay (possibly related to viral illnesses during infancy)    NICM LVEF 40-45%, NYHA II:  - ACEi/ARB: on hold due to ROSALINO (as below). Confirmed was not on PTA lasix so d/c'd off medication list.   - BB: On hold due to hypotension   - Aldosterone antagonist: deferred due to ROSALINO.  - SCD prophylaxis: s/p ICD 6/2022 as secondary prevention  - Fluid status: likely hypovolemic, giving fluids (as below), holding PTA lasix.    Ventricular Tachycardia:   S/p secondary prevention ICD 6/2022 with ongoing recurrences despite 2 AATs. Now s/p endo/epi VT ablation 10/31/22. Had post procedure hypotension overnight s/p IVF bolus. Now again having persistent hypotension with minimal urine output, s/p another IVF bolus. Groin sites CDI, no bleeding, no hematomas.    - Limited echo showed small pericardial effusion. Unlikely cause of his hypotension, but did aspirate pericardial tube and placed to h20 seal container.   - Repeat labs showed Hgb declined to 9.1 and Scr up to 1.78. Hgb might be dilutional but will get CT C/A/P to rule out any site of bleedings. ROSALINO likely d/t hypotension. Holding Coreg, Lisinopril, Lasix.   - Increased LRL to 80 bpm to support cardiac output  - Transfer to [facility] ICU service for pressor due to persistent hypotension.   - Continue amiodarone 200 mg daily   - colchicine 0.3 mg daily (dose d/t drug interactions with amio/statin) for pericarditis prophylaxis post epicardial ablation    ROSALINO:  - likely due to hypotension, holding antihypertensive/diuretics for now, pressor for BP support  - avoid nephrotoxins  - trend SCR  - strict I&O    DM2  Hyperglycemia:   - holding metformin d/t ROSALINO  - insulin sliding scale and BS checks    Chronic Stable Conditions:    HLP: On Statin      FEN: Regular Cardiac Diet  Code status: Full code  Prophylaxis:  SCD, ambulation  Isolation: None  Disposition: Transfer to ICU for higher level of care    ===============================================================    Interval History:  Notes from overnight indicate patient had hypotension. Labs showed Hgb 11.1 and SCr 1.43 BUN 24. He was given lasix yesterday night. Low urine output overnight into today. He was given IV fluid bolus 750cc this morning and BPs improved.     HPI:  Mr. Garvey is a 66 year old male patient with PMHx significant for NICM with LVEF 45% and VT s/p secondary prevention ICD 6/20/2022, HTN, HPL, DM2, intellectual delay (possibly related to viral illnesses during infancy). In 6/2022, he was found to be very spacey and pale by the nursing staff.  On examination his heart rate was 130 bpm with a blood pressure of 80/55.  He was also fatigued.  The patient was transported to the [facility] ER and was awake all the time.  Upon arrival to the ER he was found to have a wide-complex tachycardia and he was reportedly given medications first for cardioversion.  After medications failed, he received an external defibrillation. He has had a history of syncope in the bathroom a few weeks prior to his admission in June.  He reportedly had an evaluation as an inpatient at that time that was negative for ischemia, possible angiogram, and he was found to have a mildly reduced LVEF of 45 to 50% on an echocardiogram.  He had a dual-chamber ICD system implanted on 6/20/2022.  The patient also had a cardiac MRI revealing a left ventricular ejection fraction of 44%, right ventricular ejection fraction of 35%, with subepicardial and mid myocardial late gadolinium enhancement extending from the base to the mid cavity and involving the anterior, anteroseptal, anterolateral, and lateral walls.  He was started on amiodarone and was discharged.The patient's VT recurred after discharge with many  episodes in September and October, with ATP and shocks he followed up with his primary electrophysiologist, Dr James, in [place].  Device interrogation showed 16 therapy sessions between shocks and ATP's in the last 2 weeks.  The patient does feel the fast heartbeat sometimes accompanied with some weakness before the ICD shocks.  A repeat echocardiogram done on 1/26/2022 shows a stable LV ejection fraction of 45 to 50% with mild LV dilation and mild mitral and aortic and tricuspid regurgitation, with RVSP mildly elevated at 39 mmHg.  The patient was referred for consideration of VT ablation.  The patient is currently on amiodarone 200 twice daily and mexiletine 150 mg 3 times daily for rhythm control. He was referred here for consideration for ablation. He underewnt endo/epi VT ablation on 10/31/22.     Physical Exam:  Temp:  [96.9  F (36.1  C)-98.8  F (37.1  C)] 98.4  F (36.9  C)  Pulse:  [49-79] 79  Resp:  [4-23] 16  BP: ()/(42-90) 86/58  SpO2:  [94 %-100 %] 94 %    I/O:    Wt:   Wt Readings from Last 5 Encounters:   10/31/22 69.3 kg (152 lb 12.5 oz)         General: NAD  HEENT:  PERRLA, EOMI.   Neck: supple.    CV: RRR. No murmur appreciated. No rubs or gallops. Peripheral radial pulse intact.  Resp: No increased work of breathing or use of accessory muscles, breathing comfortably on room air.  Lung sounds clear throughout/bilaterally  Abdomen:  Normal active bowel sounds.  Abdomen is soft. No distension, non-tender to palpation.    Extremities: WWP.   Skin:  Warm and dry. No erythema, rashes, ulceration or diaphoresis.  Neuro: Alert and oriented x3.    Lines/Drains: pericardial drain capped, oozing surrounding site     Medications:   amiodarone  200 mg Oral Daily    atorvastatin  20 mg Oral Daily    [Held by provider] carvedilol  12.5 mg Oral BID w/meals    [START ON 11/2/2022] colchicine  0.3 mg Oral Daily    [Held by provider] furosemide  20 mg Oral Daily    insulin aspart  1-3 Units Subcutaneous TID AC     insulin aspart  1-3 Units Subcutaneous At Bedtime    [Held by provider] metFORMIN  500 mg Oral BID w/meals    tamsulosin  0.4 mg Oral Once         Labs:   CMP  Recent Labs   Lab 11/01/22  1350 11/01/22  1217 11/01/22  0851 11/01/22  0046 10/31/22  2119 10/31/22  0707 10/31/22  0636     --   --   --  139  --   --    POTASSIUM 4.4  --   --   --  5.3  --  4.2   CHLORIDE 108*  --   --   --  105  --   --    CO2 18*  --   --   --  22  --   --    ANIONGAP 10  --   --   --  12  --   --    * 377* 319* 378* 323*   < >  --    BUN 33.3*  --   --   --  24.0*  --   --    CR 1.76*  --   --   --  1.43*  --  1.37*   GFRESTIMATED 42*  --   --   --  54*  --  57*   NEETA 6.9*  --   --   --  8.4*  --   --    PROTTOTAL  --   --   --   --  5.9*  --   --    ALBUMIN  --   --   --   --  3.8  --   --    BILITOTAL  --   --   --   --  1.0  --   --    ALKPHOS  --   --   --   --  97  --   --    AST  --   --   --   --  165*  --   --    ALT  --   --   --   --  159*  --   --     < > = values in this interval not displayed.     CBC  Recent Labs   Lab 11/01/22  1350 10/31/22  2119   WBC 14.4* 13.1*   RBC 2.99* 3.71*   HGB 9.1* 11.1*   HCT 27.9* 34.2*   MCV 93 92   MCH 30.4 29.9   MCHC 32.6 32.5   RDW 18.2* 17.6*    191     INR  Recent Labs   Lab 10/31/22  2119   INR 1.23*     Diagnostics:  ECG:      10/31/22 Echo:  Interpretation Summary  Left ventricular function is decreased. The ejection fraction is 35-40%  (moderately reduced). Moderate diffuse hypokinesis.  Global right ventricular function appears moderately reduced based on limited  views.  Small pericardial effusion, with the largest collection of fluid posterior to  the left ventricle at the apex (1.1cm).  There is no prior study for direct comparison.    APPTime Spent on this Encounter   I spent 120 minutes on the unit/floor managing the care of Rohit Garvey. Over 50% of my time was spent on the following:   - Counseling the patient and/or family regarding: diagnostic  results, prognosis and risks and benefits of treatment options  - Coordination of care with the: consultant(s) and nurse      Patient seen and discussed with Dr. Leon, who agrees with above plan.    Sandhya Nova, CNP  Electrophysiology Consult Service  Pager: [phone]    EP STAFF NOTE  I have seen and examined the patient as part of a shared visit with EZIO Hendricks NP.  I agree with the note above. I reviewed today's vital signs and medications. I have reviewed and discussed with the advanced practice provider their physical examination, assessment, and plan   Briefly, soft BPs after VT ablatioin  My key history/exam findings are: RRR.   The key management decisions made by me: drain aspirated, IVF, CT scan, transfer to cards ICU team.  Total time spent on patient visit, reviewing notes, imaging, labs, orders, and completing necessary documentation: 45 minutes.  >50% of visit spent on counseling patient and/or coordination of care.     Sky Leon MD [facility]  Cardiology - Electrophysiology

## 2022-11-01 NOTE — OR NURSING
MD at bedside assessing patient. Labs ordered. Low SBP and urine output discussed - no new orders at this time. Bedrest up at 2145

## 2022-11-01 NOTE — PROGRESS NOTES
"Admitted/transferred from: 6C at 1730  Reason for admission/transfer: hypotension requiring pressors  Patient status upon admission/transfer: hypotensive but stable  Interventions: Picc palcement LR bolus, and peripheral levo ordered  Plan: Central levo once picc placement verified for MAP greater than 65. Broad spectrum antibiotics   2 RN skin assessment: completed by Moon  Result of skin assessment and interventions/actions: Two puncture sites in groin. 1x Chest tube. Dressing clean and intact.     Height, weight, drug calc weight: 5'4\", 76.5 kg    Patient belongings: No belongings or medications with patient.   "

## 2022-11-01 NOTE — PLAN OF CARE
D: S/p VT ablation 10/31/22. PMHx significant for NICM with EF 45% and VT s/p secondary prevention ICD 6/20/2022, HTN, HPL, type 2 DM, and intellectual delay.     I: Monitored vitals and assessed pt status. Assisted with ambulation to and from bathroom. Pericardial drain placed to waterseal per EP verbal order at bedside. Drain site dressing changed. 1L fluid bolus given for low BP and oliguria.      Changed: Aviles removed at 0915  Running: PIV x2 SL  PRN: 1L fluid bolus given for hypotension  Tele: AV Paced @ 70  O2: room air  Mobility: Up to bathroom with assist of 1, GB and cane     A: A&Ox4. VSS. Bilateral groin sites CDI. Pericardial drain to H2O seal. Site bleeding and new shadowing on dressing. Blood glucose elevated- EP aware and metformin restarted.      P:  Monitor BP and labs. Repeat Hgb at 1900. Bladder scan if no UOP after bolus post aviles removal. Continue to monitor pt status and report changes to EP treatment team.

## 2022-11-01 NOTE — PROCEDURES
[facility]    Double Lumen PICC Placement    Date/Time: 11/1/2022 6:31 PM  Performed by: Rachael Prasad RN  Authorized by: Navdeep Verma MD   Indications: vascular access      UNIVERSAL PROTOCOL   Site Marked: Yes  Prior Images Obtained and Reviewed:  Yes  Required items: Required blood products, implants, devices and special equipment available    Patient identity confirmed:  Verbally with patient, arm band and hospital-assigned identification number  Patient was reevaluated immediately before administering moderate or deep sedation or anesthesia  Confirmation Checklist:  Patient's identity using two indicators, relevant allergies, procedure was appropriate and matched the consent or emergent situation and correct equipment/implants were available  Time out: Immediately prior to the procedure a time out was called    Universal Protocol: the Joint Commission Universal Protocol was followed    Preparation: Patient was prepped and draped in usual sterile fashion       ANESTHESIA    Anesthesia: See MAR for details  Local Anesthetic:  Lidocaine 1% without epinephrine  Anesthetic Total (mL):  3      SEDATION    Patient Sedated: No        Preparation: skin prepped with ChloraPrep  Skin prep agent: skin prep agent completely dried prior to procedure  Sterile barriers: maximum sterile barriers were used: cap, mask, sterile gown, sterile gloves, and large sterile sheet  Hand hygiene: hand hygiene performed prior to central venous catheter insertion  Type of line used: PICC  Catheter type: double lumen  Lumen type: non-valved and power PICC  Lumen Identification: Purple and Red  Catheter size: 5 Fr  Brand: Bard  Lot number: [id number]  Placement method: venipuncture, MST, ultrasound and tip navigation system  Number of attempts: 1  Difficulty threading catheter: no  Successful placement: yes  Orientation: right    Location: brachial vein (medial) (vein diameter- 0.56cm)  Site  rationale: left AICD  Arm circumference: adults 10 cm  Extremity circumference: 32  Visible catheter length: 0  Total catheter length: 42  Dressing and securement: alcohol impregnated caps, blood cleaned with CHG, chlorhexidine disc applied, glue, site cleansed, statlock, sterile dressing applied and transparent securement dressing  Post procedure assessment: blood return through all ports, free fluid flow and placement verified by x-ray  PROCEDURE Describe Procedure: Pending CXR.

## 2022-11-01 NOTE — UTILIZATION REVIEW
Admission Status; Secondary Review Determination      Under the authority of the Utilization Management Commitee, the utilization review process indicated a secondary review on the above patient. The review outcome is based on review of the medical records, discussions with staff, and applying clinical experience noted on the date of the review.      (x) Inpatient Status Appropriate - This patient's medical care is consistent with medical management for inpatient care and reasonable inpatient medical practice.      RATIONALE FOR DETERMINATION: 66 year old male patient with PMHx significant for NICM with EF 45% and VT s/p secondary prevention ICD 6/20/2022, HTN, HPL, type 2 DM, intellectual delay (possibly related to viral illnesses during infancy). He underwent EP ablation of VT due to recurring ICD therapies.     Procedure was performed on 10/31/22.       Today, his post-procedure course notable for hypotension requiring initial 750 ml NS bolus at 0714 and a 1000 ml NS bolus at 1320 today.      His SBP today has declined into the 70s today, with most recent BP 77/53.  HR has been in the 49-69 bpm range.     Repeat labs this aftn show hgb near 9 (11 yesterday) and creatinine elevated to 1.76 (was 1.4 yesterday)     TTE shows reduced EF 35-40%.       Given the patient's course is now complicated by significant hypotension post-procedure, inpatient status is appropriate.       At the time of admission with the information available to the attending physician more than 2 nights Hospital complex care was anticipated, based on patient risk of adverse outcome if treated as outpatient and complex care required. Inpatient admission is appropriate based on the Medicare guidelines.    The information on this document is developed by the utilization review team in order for the business office to ensure compliance. This only denotes the appropriateness of proper admission status and does not reflect the quality of care  rendered.   The definitions of Inpatient Status and Observation Status used in making the determination above are those provided in the CMS Coverage Manual, Chapter 1 and Chapter 6, section 70.4.      Sincerely,      Jomar Arroyo MD  Utilization Review   Physician Advisor   [facility]

## 2022-11-02 ENCOUNTER — APPOINTMENT (OUTPATIENT)
Dept: CARDIOLOGY | Facility: CLINIC | Age: 67
DRG: 273 | End: 2022-11-02
Payer: MEDICARE

## 2022-11-02 LAB
ALBUMIN SERPL BCG-MCNC: 3.4 G/DL (ref 3.5–5.2)
ALBUMIN SERPL BCG-MCNC: 3.6 G/DL (ref 3.5–5.2)
ALBUMIN SERPL BCG-MCNC: 3.6 G/DL (ref 3.5–5.2)
ALP SERPL-CCNC: 126 U/L (ref 35–129)
ALP SERPL-CCNC: 139 U/L (ref 35–129)
ALP SERPL-CCNC: 164 U/L (ref 35–129)
ALT SERPL W P-5'-P-CCNC: 257 U/L (ref 10–50)
ALT SERPL W P-5'-P-CCNC: 275 U/L (ref 10–50)
ALT SERPL W P-5'-P-CCNC: 452 U/L (ref 10–50)
ANION GAP SERPL CALCULATED.3IONS-SCNC: 12 MMOL/L (ref 7–15)
ANION GAP SERPL CALCULATED.3IONS-SCNC: 12 MMOL/L (ref 7–15)
ANION GAP SERPL CALCULATED.3IONS-SCNC: 9 MMOL/L (ref 7–15)
AST SERPL W P-5'-P-CCNC: 245 U/L (ref 10–50)
AST SERPL W P-5'-P-CCNC: 262 U/L (ref 10–50)
AST SERPL W P-5'-P-CCNC: 404 U/L (ref 10–50)
ATRIAL RATE - MUSE: 50 BPM
BASE EXCESS BLDV CALC-SCNC: -1.6 MMOL/L (ref -7.7–1.9)
BASE EXCESS BLDV CALC-SCNC: -2.2 MMOL/L (ref -7.7–1.9)
BASE EXCESS BLDV CALC-SCNC: -3.3 MMOL/L (ref -7.7–1.9)
BASE EXCESS BLDV CALC-SCNC: -3.8 MMOL/L (ref -7.7–1.9)
BASOPHILS # BLD AUTO: 0 10E3/UL (ref 0–0.2)
BASOPHILS # BLD AUTO: 0 10E3/UL (ref 0–0.2)
BASOPHILS NFR BLD AUTO: 0 %
BASOPHILS NFR BLD AUTO: 0 %
BILIRUB SERPL-MCNC: 1 MG/DL
BILIRUB SERPL-MCNC: 1 MG/DL
BILIRUB SERPL-MCNC: 1.1 MG/DL
BUN SERPL-MCNC: 39 MG/DL (ref 8–23)
BUN SERPL-MCNC: 40.9 MG/DL (ref 8–23)
BUN SERPL-MCNC: 41.1 MG/DL (ref 8–23)
CALCIUM SERPL-MCNC: 7.7 MG/DL (ref 8.8–10.2)
CALCIUM SERPL-MCNC: 7.8 MG/DL (ref 8.8–10.2)
CALCIUM SERPL-MCNC: 7.9 MG/DL (ref 8.8–10.2)
CHLORIDE SERPL-SCNC: 101 MMOL/L (ref 98–107)
CHLORIDE SERPL-SCNC: 103 MMOL/L (ref 98–107)
CHLORIDE SERPL-SCNC: 103 MMOL/L (ref 98–107)
CREAT SERPL-MCNC: 1.7 MG/DL (ref 0.51–1.17)
CREAT SERPL-MCNC: 1.86 MG/DL (ref 0.51–1.17)
CREAT SERPL-MCNC: 1.91 MG/DL (ref 0.51–1.17)
DEPRECATED HCO3 PLAS-SCNC: 18 MMOL/L (ref 22–29)
DEPRECATED HCO3 PLAS-SCNC: 18 MMOL/L (ref 22–29)
DEPRECATED HCO3 PLAS-SCNC: 20 MMOL/L (ref 22–29)
DIASTOLIC BLOOD PRESSURE - MUSE: NORMAL MMHG
EOSINOPHIL # BLD AUTO: 0 10E3/UL (ref 0–0.7)
EOSINOPHIL # BLD AUTO: 0 10E3/UL (ref 0–0.7)
EOSINOPHIL NFR BLD AUTO: 0 %
EOSINOPHIL NFR BLD AUTO: 0 %
ERYTHROCYTE [DISTWIDTH] IN BLOOD BY AUTOMATED COUNT: 18.2 % (ref 10–15)
ERYTHROCYTE [DISTWIDTH] IN BLOOD BY AUTOMATED COUNT: 18.4 % (ref 10–15)
GFR SERPL CREATININE-BSD FRML MDRD: 38 ML/MIN/1.73M2
GFR SERPL CREATININE-BSD FRML MDRD: 39 ML/MIN/1.73M2
GFR SERPL CREATININE-BSD FRML MDRD: 44 ML/MIN/1.73M2
GLUCOSE BLDC GLUCOMTR-MCNC: 217 MG/DL (ref 70–99)
GLUCOSE BLDC GLUCOMTR-MCNC: 229 MG/DL (ref 70–99)
GLUCOSE BLDC GLUCOMTR-MCNC: 236 MG/DL (ref 70–99)
GLUCOSE BLDC GLUCOMTR-MCNC: 239 MG/DL (ref 70–99)
GLUCOSE BLDC GLUCOMTR-MCNC: 241 MG/DL (ref 70–99)
GLUCOSE BLDC GLUCOMTR-MCNC: 262 MG/DL (ref 70–99)
GLUCOSE BLDC GLUCOMTR-MCNC: 271 MG/DL (ref 70–99)
GLUCOSE SERPL-MCNC: 249 MG/DL (ref 70–99)
GLUCOSE SERPL-MCNC: 251 MG/DL (ref 70–99)
GLUCOSE SERPL-MCNC: 261 MG/DL (ref 70–99)
HCO3 BLDV-SCNC: 22 MMOL/L (ref 21–28)
HCO3 BLDV-SCNC: 23 MMOL/L (ref 21–28)
HCT VFR BLD AUTO: 28.8 % (ref 35–53)
HCT VFR BLD AUTO: 31.3 % (ref 35–53)
HGB BLD-MCNC: 10.6 G/DL (ref 11.7–17.7)
HGB BLD-MCNC: 9.8 G/DL (ref 11.7–17.7)
IMM GRANULOCYTES # BLD: 0.1 10E3/UL
IMM GRANULOCYTES # BLD: 0.1 10E3/UL
IMM GRANULOCYTES NFR BLD: 1 %
IMM GRANULOCYTES NFR BLD: 1 %
INTERPRETATION ECG - MUSE: NORMAL
LACTATE SERPL-SCNC: 0.8 MMOL/L (ref 0.7–2)
LYMPHOCYTES # BLD AUTO: 0.4 10E3/UL (ref 0.8–5.3)
LYMPHOCYTES # BLD AUTO: 0.5 10E3/UL (ref 0.8–5.3)
LYMPHOCYTES NFR BLD AUTO: 3 %
LYMPHOCYTES NFR BLD AUTO: 3 %
MAGNESIUM SERPL-MCNC: 2.8 MG/DL (ref 1.7–2.3)
MCH RBC QN AUTO: 29.9 PG (ref 26.5–33)
MCH RBC QN AUTO: 29.9 PG (ref 26.5–33)
MCHC RBC AUTO-ENTMCNC: 33.9 G/DL (ref 31.5–36.5)
MCHC RBC AUTO-ENTMCNC: 34 G/DL (ref 31.5–36.5)
MCV RBC AUTO: 88 FL (ref 78–100)
MCV RBC AUTO: 88 FL (ref 78–100)
MONOCYTES # BLD AUTO: 1.2 10E3/UL (ref 0–1.3)
MONOCYTES # BLD AUTO: 2 10E3/UL (ref 0–1.3)
MONOCYTES NFR BLD AUTO: 10 %
MONOCYTES NFR BLD AUTO: 11 %
MRSA DNA SPEC QL NAA+PROBE: NEGATIVE
NEUTROPHILS # BLD AUTO: 10.3 10E3/UL (ref 1.6–8.3)
NEUTROPHILS # BLD AUTO: 15.8 10E3/UL (ref 1.6–8.3)
NEUTROPHILS NFR BLD AUTO: 85 %
NEUTROPHILS NFR BLD AUTO: 86 %
NRBC # BLD AUTO: 0 10E3/UL
NRBC # BLD AUTO: 0 10E3/UL
NRBC BLD AUTO-RTO: 0 /100
NRBC BLD AUTO-RTO: 0 /100
O2/TOTAL GAS SETTING VFR VENT: 2 %
O2/TOTAL GAS SETTING VFR VENT: 28 %
OXYHGB MFR BLDV: 58 % (ref 70–75)
OXYHGB MFR BLDV: 66 % (ref 70–75)
OXYHGB MFR BLDV: 97 % (ref 70–75)
OXYHGB MFR BLDV: 98 % (ref 70–75)
P AXIS - MUSE: -23 DEGREES
PCO2 BLDV: 35 MM HG (ref 40–50)
PCO2 BLDV: 35 MM HG (ref 40–50)
PCO2 BLDV: 41 MM HG (ref 40–50)
PCO2 BLDV: 42 MM HG (ref 40–50)
PH BLDV: 7.33 [PH] (ref 7.32–7.43)
PH BLDV: 7.34 [PH] (ref 7.32–7.43)
PH BLDV: 7.41 [PH] (ref 7.32–7.43)
PH BLDV: 7.42 [PH] (ref 7.32–7.43)
PHOSPHATE SERPL-MCNC: 4.2 MG/DL (ref 2.5–4.5)
PLATELET # BLD AUTO: 148 10E3/UL (ref 150–450)
PLATELET # BLD AUTO: 201 10E3/UL (ref 150–450)
PO2 BLDV: 108 MM HG (ref 25–47)
PO2 BLDV: 119 MM HG (ref 25–47)
PO2 BLDV: 34 MM HG (ref 25–47)
PO2 BLDV: 40 MM HG (ref 25–47)
POTASSIUM SERPL-SCNC: 4.2 MMOL/L (ref 3.4–5.3)
POTASSIUM SERPL-SCNC: 4.3 MMOL/L (ref 3.4–5.3)
POTASSIUM SERPL-SCNC: 4.3 MMOL/L (ref 3.4–5.3)
PR INTERVAL - MUSE: 186 MS
PROCALCITONIN SERPL IA-MCNC: 0.2 NG/ML
PROT SERPL-MCNC: 5.7 G/DL (ref 6.4–8.3)
PROT SERPL-MCNC: 5.8 G/DL (ref 6.4–8.3)
PROT SERPL-MCNC: 5.8 G/DL (ref 6.4–8.3)
QRS DURATION - MUSE: 172 MS
QT - MUSE: 598 MS
QTC - MUSE: 545 MS
R AXIS - MUSE: -69 DEGREES
RBC # BLD AUTO: 3.28 10E6/UL (ref 3.8–5.9)
RBC # BLD AUTO: 3.55 10E6/UL (ref 3.8–5.9)
SA TARGET DNA: NEGATIVE
SODIUM SERPL-SCNC: 130 MMOL/L (ref 136–145)
SODIUM SERPL-SCNC: 133 MMOL/L (ref 136–145)
SODIUM SERPL-SCNC: 133 MMOL/L (ref 136–145)
SYSTOLIC BLOOD PRESSURE - MUSE: NORMAL MMHG
T AXIS - MUSE: 98 DEGREES
TOTAL PROTEIN SERUM FOR ELP: 5.3 G/DL (ref 6.4–8.3)
VENTRICULAR RATE- MUSE: 50 BPM
WBC # BLD AUTO: 12 10E3/UL (ref 4–11)
WBC # BLD AUTO: 18.4 10E3/UL (ref 4–11)

## 2022-11-02 PROCEDURE — 84450 TRANSFERASE (AST) (SGOT): CPT | Performed by: INTERNAL MEDICINE

## 2022-11-02 PROCEDURE — 93308 TTE F-UP OR LMTD: CPT | Mod: 26 | Performed by: INTERNAL MEDICINE

## 2022-11-02 PROCEDURE — 84155 ASSAY OF PROTEIN SERUM: CPT | Performed by: INTERNAL MEDICINE

## 2022-11-02 PROCEDURE — 87641 MR-STAPH DNA AMP PROBE: CPT

## 2022-11-02 PROCEDURE — 83735 ASSAY OF MAGNESIUM: CPT | Performed by: HOSPITALIST

## 2022-11-02 PROCEDURE — 250N000011 HC RX IP 250 OP 636

## 2022-11-02 PROCEDURE — 93325 DOPPLER ECHO COLOR FLOW MAPG: CPT

## 2022-11-02 PROCEDURE — 200N000002 HC R&B ICU UMMC

## 2022-11-02 PROCEDURE — 99291 CRITICAL CARE FIRST HOUR: CPT | Mod: GC | Performed by: STUDENT IN AN ORGANIZED HEALTH CARE EDUCATION/TRAINING PROGRAM

## 2022-11-02 PROCEDURE — 80053 COMPREHEN METABOLIC PANEL: CPT | Performed by: STUDENT IN AN ORGANIZED HEALTH CARE EDUCATION/TRAINING PROGRAM

## 2022-11-02 PROCEDURE — 250N000011 HC RX IP 250 OP 636: Performed by: INTERNAL MEDICINE

## 2022-11-02 PROCEDURE — 250N000011 HC RX IP 250 OP 636: Performed by: STUDENT IN AN ORGANIZED HEALTH CARE EDUCATION/TRAINING PROGRAM

## 2022-11-02 PROCEDURE — 250N000012 HC RX MED GY IP 250 OP 636 PS 637: Performed by: INTERNAL MEDICINE

## 2022-11-02 PROCEDURE — 84165 PROTEIN E-PHORESIS SERUM: CPT | Mod: TC | Performed by: PATHOLOGY

## 2022-11-02 PROCEDURE — 84100 ASSAY OF PHOSPHORUS: CPT | Performed by: HOSPITALIST

## 2022-11-02 PROCEDURE — 82805 BLOOD GASES W/O2 SATURATION: CPT | Performed by: INTERNAL MEDICINE

## 2022-11-02 PROCEDURE — 93325 DOPPLER ECHO COLOR FLOW MAPG: CPT | Mod: 26 | Performed by: INTERNAL MEDICINE

## 2022-11-02 PROCEDURE — 83605 ASSAY OF LACTIC ACID: CPT | Performed by: STUDENT IN AN ORGANIZED HEALTH CARE EDUCATION/TRAINING PROGRAM

## 2022-11-02 PROCEDURE — 250N000013 HC RX MED GY IP 250 OP 250 PS 637: Performed by: NURSE PRACTITIONER

## 2022-11-02 PROCEDURE — 85004 AUTOMATED DIFF WBC COUNT: CPT | Performed by: INTERNAL MEDICINE

## 2022-11-02 PROCEDURE — 93321 DOPPLER ECHO F-UP/LMTD STD: CPT

## 2022-11-02 PROCEDURE — 93321 DOPPLER ECHO F-UP/LMTD STD: CPT | Mod: 26 | Performed by: INTERNAL MEDICINE

## 2022-11-02 PROCEDURE — 83521 IG LIGHT CHAINS FREE EACH: CPT | Performed by: INTERNAL MEDICINE

## 2022-11-02 RX ORDER — METRONIDAZOLE 500 MG/100ML
500 INJECTION, SOLUTION INTRAVENOUS EVERY 12 HOURS
Status: DISCONTINUED | OUTPATIENT
Start: 2022-11-02 | End: 2022-11-03

## 2022-11-02 RX ORDER — FUROSEMIDE 10 MG/ML
40 INJECTION INTRAMUSCULAR; INTRAVENOUS ONCE
Status: COMPLETED | OUTPATIENT
Start: 2022-11-02 | End: 2022-11-02

## 2022-11-02 RX ORDER — HEPARIN SODIUM 5000 [USP'U]/.5ML
5000 INJECTION, SOLUTION INTRAVENOUS; SUBCUTANEOUS EVERY 8 HOURS
Status: DISCONTINUED | OUTPATIENT
Start: 2022-11-02 | End: 2022-11-05 | Stop reason: HOSPADM

## 2022-11-02 RX ADMIN — Medication 0.3 MG: at 07:54

## 2022-11-02 RX ADMIN — CEFTRIAXONE SODIUM 2 G: 2 INJECTION, POWDER, FOR SOLUTION INTRAMUSCULAR; INTRAVENOUS at 18:21

## 2022-11-02 RX ADMIN — INSULIN GLARGINE 5 UNITS: 100 INJECTION, SOLUTION SUBCUTANEOUS at 00:56

## 2022-11-02 RX ADMIN — VANCOMYCIN HYDROCHLORIDE 1000 MG: 1 INJECTION, SOLUTION INTRAVENOUS at 21:14

## 2022-11-02 RX ADMIN — HEPARIN SODIUM 5000 UNITS: 5000 INJECTION, SOLUTION INTRAVENOUS; SUBCUTANEOUS at 22:00

## 2022-11-02 RX ADMIN — FUROSEMIDE 40 MG: 10 INJECTION, SOLUTION INTRAVENOUS at 12:28

## 2022-11-02 RX ADMIN — METRONIDAZOLE 500 MG: 500 INJECTION, SOLUTION INTRAVENOUS at 07:48

## 2022-11-02 RX ADMIN — METRONIDAZOLE 500 MG: 500 INJECTION, SOLUTION INTRAVENOUS at 19:59

## 2022-11-02 RX ADMIN — HEPARIN SODIUM 5000 UNITS: 5000 INJECTION, SOLUTION INTRAVENOUS; SUBCUTANEOUS at 14:57

## 2022-11-02 RX ADMIN — INSULIN ASPART 2 UNITS: 100 INJECTION, SOLUTION INTRAVENOUS; SUBCUTANEOUS at 07:54

## 2022-11-02 RX ADMIN — AMIODARONE HYDROCHLORIDE 200 MG: 200 TABLET ORAL at 07:48

## 2022-11-02 ASSESSMENT — ACTIVITIES OF DAILY LIVING (ADL)
ADLS_ACUITY_SCORE: 40
ADLS_ACUITY_SCORE: 41
ADLS_ACUITY_SCORE: 40
ADLS_ACUITY_SCORE: 41
ADLS_ACUITY_SCORE: 40
ADLS_ACUITY_SCORE: 36
ADLS_ACUITY_SCORE: 40
ADLS_ACUITY_SCORE: 41
ADLS_ACUITY_SCORE: 40
ADLS_ACUITY_SCORE: 41

## 2022-11-02 NOTE — PROGRESS NOTES
Cardiology ICU Progress Note    Brief HPI:  67 year old man with NICM and paroxysmal VT s/p dual chamber ICD in 6/2022 admitted initially to [facility] floor service for monitoring after endocardial and epicardial VT ablation on 10/31/22. Transferred to the ICU on 11/1 with shock.     Subjective and Interval:  -mixed venous oxyhemoglobin 43% after PICC line placed yesterday, started on dobutamine 2.5 and came up to 66% corresponding to a Shena CO/CI of 5.2/2.8  -feeling the same this am, no complaints, no chest pain or dyspnea or orthopnea     Assessment and plan by system:   Today's changes:  -q6h Shena, wean dobutamine as able  -continue antibiotics today, follow cultures   -sending amyloid serum workup, should see cardiology and genetics as outpatient at some point (?Fabry disease)     Neurologic  #history of developmental delay   -no acute issues     Cardiovascular  #Cardiogenic shock   Favoring cardiogenic shock perhaps from myocardial stunning as the etiology of his decompensation (had 5 different VT foci ablated on 10/31). His response to dobutamine supports this. Less likely would be a distributive shock from sepsis as he has no signs or symptoms of an infectious source. No evidence of tamponade on echo. No evidence of RP bleeding on CT chest/abd/pelvis or exam. Transaminases are mildly elevated but no clinical s/s of cholangitis or hepatitis.   -continue dobutamine and NE as needed, trend mixed venous blood gases     #Chronic systolic heart failure/HFrEF (EF 35-40) secondary to nonischemic cardiomyopathy  NYHA Symptom Class II  ACC/AHA Stage C  ACE-I/ARB/ARNi: holding home lisinopril 5   BB: holding home coreg 12.5   Aldosterone antagonist: not on PTA   SGLT2i: holding home jardiance with ROSALINO   SCD prophylaxis: ICD Medtronic   Fluid status: Euvolemic  Diuretic: hold home lasix 20 mg daily      #Hx of VT s/p ablation 10/31/22   - clamp pericardial drain today and get echo, planning to pull this afternoon   -  "continue amiodarone 200 mg daily   - stopped mexiletine per EP recs   - continue colchicine 0.3 mg daily for post epicardial ablation pericarditis prophylaxis   - EP following      #HTN  -hold home lisionpril and coreg      #HLD    - holding home atorvastatin with elevated LFTs      Pulmonary  #no active issues, on room air         Gastrointestinal, Nutrition  #Elevated transaminases   Mild. No abdominal pain. Hepatocellular pattern. Suspect from ischemic hepatitis when he was hypotensive. Abdominal US with cholelithiasis without evidence of cholecystitis.   -abdominal US  -trend LFTs       Renal, Electrolytes  #ROSALINO   Baseline Cr ~1. Suspect ATN from hypotension.   -treat shock as above   -trend creatinine     Infectious Disease  -follow blood cultures from 11/1, UC, continue vanc/ctx/flagyl   -mrsa nasal swab     Hematology  #Leukocytosis  -suspect from procedure and shock, trend     #Anemia   -hgb 10.6, stable, trend      Endocrinology  #hyperglycemia  #DM2  -increase lantus to 10 units at bedtime   -increase sliding scale insulin to 1:25 correction factor   -ACHS fingersticks   -hold metformin and jardiance     Integumentary:  - No skin issues      ICU Checklist:  #Feeding: regular  #Analgesia: percocet  #Sedation: n/a  #Thromboembolism ppx: heparin  #HOB: 30 degrees   #Ulcer ppx: n/a  #Glucose: as above  #SBT/SAT: n/a  #Bowel reg: miralax  #Lines/tubes/drains: PIV x2, RUE PICC, R radial arterial line, aviles, pericardial drain   #Code status: full   #Family: updated at bedside   #Dispo: ICU     Patient seen and discussed with staff physician.    Navdeep Verma MD  Cardiology Fellow  Pager: [phone]    Objective:  Most recent vital signs:  BP (!) 172/76   Pulse 80   Temp 98.8  F (37.1  C) (Oral)   Resp 22   Ht 1.626 m (5' 4\")   Wt 78.5 kg (173 lb 1 oz)   SpO2 97%   BMI 29.71 kg/m    Temp:  [97.6  F (36.4  C)-100.3  F (37.9  C)] 98.8  F (37.1  C)  Pulse:  [49-99] 80  Resp:  [12-32] 22  BP: ()/(42-76) " 172/76  MAP:  [52 mmHg-102 mmHg] 71 mmHg  Arterial Line BP: ()/(40-76) 104/54  SpO2:  [88 %-100 %] 97 %  Wt Readings from Last 2 Encounters:   11/02/22 78.5 kg (173 lb 1 oz)       Intake/Output Summary (Last 24 hours) at 11/2/2022 0831  Last data filed at 11/2/2022 0800  Gross per 24 hour   Intake 4120.01 ml   Output 633 ml   Net 3487.01 ml     Physical exam:  GEN: pleasant, no acute distress  HEENT: no icterus  CV: normal rate, regular rhythm, loud friction rub. Normal S1/S2. JVP ~8 cm H2O.   CHEST: clear to ausculation bilaterally, no rales or wheezing  ABD: mildly distended, soft, no tenderness throughout, no guarding, negative ross's sign   EXTR: pulses intact in BLE and BUE. No clubbing, cyanosis or edema. No flank bruising. Bilateral thighs are soft without palpable fluid collections.   NEURO: alert oriented, speech fluent/appropriate, motor grossly nonfocal    Labs (Past three days):  CBC  Recent Labs   Lab 11/02/22  0355 11/01/22  1641 11/01/22  1350 10/31/22  2119   WBC 18.4* 16.3* 14.4* 13.1*   RBC 3.55* 3.36* 2.99* 3.71*   HGB 10.6* 10.0* 9.1* 11.1*   HCT 31.3* 31.7* 27.9* 34.2*   MCV 88 94 93 92   MCH 29.9 29.8 30.4 29.9   MCHC 33.9 31.5 32.6 32.5   RDW 18.4* 18.4* 18.2* 17.6*    153 155 191     BMP  Recent Labs   Lab 11/02/22  0753 11/02/22  0355 11/02/22  0055 11/01/22  1706 11/01/22  1641 11/01/22  1350   NA  --  133* 133*  --  134* 136   POTASSIUM  --  4.3 4.3  --  4.8 4.4   CHLORIDE  --  103 103  --  101 108*   CO2  --  18* 18*  --  19* 18*   ANIONGAP  --  12 12  --  14 10   * 229*  249* 239*  251*   < > 313* 339*   BUN  --  40.9* 41.1*  --  37.6* 33.3*   CR  --  1.86* 1.91*  --  1.96* 1.76*   GFRESTIMATED  --  39* 38*  --  37* 42*   NEETA  --  7.9* 7.8*  --  7.8* 6.9*   MAG  --  2.8*  --   --  1.5*  --    PHOS  --  4.2  --   --  4.0  --     < > = values in this interval not displayed.     Troponins:     INR  Recent Labs   Lab 10/31/22  2119   INR 1.23*     Liver panel  Recent  Labs   Lab 11/02/22  0355 11/02/22  0055 11/01/22  1641 10/31/22  2119   PROTTOTAL 5.8* 5.8* 5.9* 5.9*   ALBUMIN 3.6 3.6 3.7 3.8   BILITOTAL 1.0 1.0 0.7 1.0   ALKPHOS 139* 126 115 97   * 245*  --  165*   * 257* 216* 159*       Imaging/procedure results:  CT Chest Abdomen Pelvis w/o Contrast  Narrative: EXAMINATION: CT CHEST ABDOMEN PELVIS W/O CONTRAST, 11/1/2022 4:30 PM    TECHNIQUE:  Helical CT images from the thoracic inlet through the  symphysis pubis were obtained  without contrast.     COMPARISON: Outside cardiac MRI 6/17/2022    HISTORY: concern for bleeding    FINDINGS:    Chest:   9 mm hypodense right thyroid nodule. Normal branching pattern of the  thoracic great vessels. Left chest wall cardiac device with leads  terminating in the right atrium and right ventricle. Anterior  mediastinal drain. Heart is mildly enlarged. Trace pericardial  effusion. Mild coronary artery calcifications. Aortic annulus  calcifications. The main pulmonary artery and thoracic aorta are  normal in caliber. No axillary, mediastinal or hilar lymphadenopathy.  Esophagus is unremarkable.    Evaluation of the lung parenchyma is limited due to motion artifact.  Layering debris in the mid thoracic trachea. Small right greater than  left bilateral pleural effusions and adjacent atelectasis. No  pneumothorax. Mild apical predominant centrilobular changes.    Abdomen and pelvis: No focal hepatic lesion. Liver appears high in  density which can be seen with amiodarone use given provided history  on electronic medical record. Residual contrast within the gallbladder  and calcified gallstone. Mild nonspecific gallbladder wall thickening.  No intra or extrahepatic biliary ductal dilation. Unremarkable  noncontrast appearance of the spleen, adrenal glands, and pancreas.  2.4 cm exophytic cyst in the superior pole of the left kidney. Mild  bilateral symmetric perinephric soft tissue stranding. Kidneys appear  slightly tendons which  may represent delayed nephrogram given excreted  contrast in the bladder and recent contrast cardiac studies.  Otherwise, unremarkable appearance of the kidneys. No hydronephrosis  or hydroureter.    Incompletely distended urinary bladder with residual contrast and foci  of air anteriorly. Mild prostatomegaly. Symmetric seminal vesicles.  Pelvic phleboliths. The small and large bowel is normal in caliber  without evidence of a bowel obstruction. Appendix is normal with some  retained contrast material noted in the lumen. Colonic diverticulosis  without adjacent inflammatory change. Trace fluid free fluid in the  paracolic gutters and pelvis. No intra-abdominal free air. No enlarged  abdominal or pelvic lymph nodes. No abdominal aortic aneurysm. No  evidence of intra-abdominal bleed.    Bones and soft tissues: Mild soft tissue edema overlying the bilateral  hips. No acute or aggressive appearing osseous lesion. Bilateral paolo  and screw fixation in the proximal femurs. Multilevel degenerative  changes of the spine.  Impression: IMPRESSION:   1. Postoperative changes in the chest with mediastinal drain in place  and trace pericardial effusion.   2. Small right greater than left bilateral pleural effusions and  adjacent atelectasis.  3. Cholelithiasis and residual contrast within the gallbladder. The  gallbladder is incompletely distended with nonspecific mild  gallbladder wall thickening. Recommend correlation for focal symptoms  and further evaluation with dedicated gallbladder ultrasound if  clinically indicated although this is favored to be due to degree of  distention.  4. Trace abdominal pelvic free fluid. No evidence of intra-abdominal  bleed as questioned.  5. Colonic diverticulosis without CT evidence of acute diverticulitis.  6. Small amount of antidependent air within the urinary bladder,  recommend correlation for recent catheterization. Excreted contrast in  the bladder and slight dense nephrograms in the  kidneys consistent  with recent cardiac studies.  7. Increased density of the liver can be seen with amiodarone  usage/toxicity. Correlate clinically with length of the use and  levels.    I have personally reviewed the examination and initial interpretation  and I agree with the findings.    ZEN SHEPARD MD         SYSTEM ID:  [id number]  US Abdomen Limited Portable  Narrative: EXAMINATION: Limited Abdominal Ultrasound, 11/1/2022 6:55 PM     COMPARISON: same day CT    HISTORY: Elevated transaminases    FINDINGS:   Limited evaluation due to patient unable to hold breath or move.    Fluid: No evidence of ascites or pleural effusions.    Liver: The liver demonstrates slightly increased echotexture,  measuring 15.5 cm in craniocaudal dimension. There is no focal mass.     Gallbladder: Partially decompressed with mild wall thickening  measuring up to 4.9 mm. Multiple small stones. No pericholecystic  fluid, positive sonographic Hurd's sign.    Bile Ducts: Both the intra- and extrahepatic biliary system are of  normal caliber.  The common bile duct measures 3.1 mm in diameter.    Pancreas: Visualized portions of the head and body of the pancreas are  unremarkable.     Kidney: The right kidney measures 10.8 cm long. There is no  hydronephrosis or hydroureter, no shadowing renal calculi, cystic  lesion or mass.   Impression: IMPRESSION:   1.  Cholelithiasis. Mild wall thickening is likely secondary to  nondistention. No pericholecystic fluid or positive sonographic  Hurd's sign to suggest acute cholecystitis. Consider HIDA scan if  cholecystitis is suspected clinically.  2.  Liver appears echogenic without evidence of steatosis on  corresponding CT same date possibly due to other forms of intrinsic  parenchymal disease.    I have personally reviewed the examination and initial interpretation  and I agree with the findings.    ZEN SHEPARD MD         SYSTEM ID:  [id number]  XR Chest Port 1 View  Narrative: EXAM:  XR CHEST PORT 1 VIEW  11/1/2022 6:55 PM     HISTORY:  eval PICC line       COMPARISON:  Same day CT chest abdomen pelvis    FINDINGS:   AP portable view of the chest. Right arm PICC tip projects in the  right atrium. Left chest wall cardiac device with leads projecting  over the right atrium and right ventricle. Mediastinal drain. Midline  trachea. Mild cardiomegaly. Small bilateral pleural effusions and  bibasilar hazy opacities, likely atelectasis. Visualized upper abdomen  is unremarkable. No acute osseous abnormality.  Impression: IMPRESSION:   1. Right arm PICC tip projects in the right atrium.  2. Cardiomegaly and small bilateral pleural effusions and adjacent  atelectasis.    I have personally reviewed the examination and initial interpretation  and I agree with the findings.    ZEN SHEPARD MD         SYSTEM ID:  [id number]  Cardiac Device Check - Inpatient  Patient seen on PCU 6C for evaluation and iterative programming of their ICD per MD orders.    Device: Medtronic [id number] Evera MRI XT   Normal device function  Mode: AAIR<>DDDR  bpm  AP: 70%  : 90%  Thoracic Impedance: Near reference line.   Short V-V intervals: 0  Lead Trends Appear Stable.  Estimated battery longevity to RRT = 4.8-6.3 years.  Battery voltage = 2.90 V.   Atrial Arrhythmia: None  AF [place] = 0%\X090A\Anticoagulant: None  Ventricular Arrhythmia: None  Setting Changes: Lower rate limit increased to 80 bpm per Dr Leon    Plan: Continue to follow patient through hospitalization, then resume normal follow up.  Sandhya Briggs RN    Dual lead ICD    I have reviewed and interpreted the device interrogation, settings, programming and nurse's summary. The device is functioning within normal device parameters. I agree with the current findings, assessment and plan.  Echo Limited  [id number]  [id number]  [id number]  [id number]^SANDEEP BUI^SANDHYA^ANUP     [facility],[place]  Echocardiography [facility]  [address] [place] [address]  [place] [address]     Name: RONEY SLOAN  MRN: [id number]  : 1955  Study Date: 2022 10:51 AM  Age: 67 yrs  Gender: Male  [place] [facility] [place]  Reason For Study: Pericardial Effusion  Ordering Physician: OLIVER SANTANA  Referring Physician: KRYSTAL FOLEY  Performed By: Boone Walker     BSA: 1.7 m2  Height: 64 in  Weight: 152 lb  HR: 50  BP: 109/90 mmHg  ______________________________________________________________________________  Procedure  Limited Portable Echo Adult.  ______________________________________________________________________________  Interpretation Summary  Left ventricular function is decreased. The ejection fraction is 35-40%  (moderately reduced). Moderate diffuse hypokinesis.  Global right ventricular function appears moderately reduced based on limited  views.  Small pericardial effusion, with the largest collection of fluid posterior to  the left ventricle at the apex (1.1cm).  There is no prior study for direct comparison.  ______________________________________________________________________________  Left Ventricle  Left ventricular size is normal. Left ventricular wall thickness is normal.  Left ventricular function is decreased. The ejection fraction is 35-40%  (moderately reduced). Left ventricular diastolic function is not assessable.  Moderate diffuse hypokinesis is present.     Right Ventricle  Global right ventricular function appears moderately reduced based on limited  views.     Mitral Valve  The mitral valve is normal. Trace mitral insufficiency is present.     Aortic Valve  The valve leaflets are not well visualized. On Doppler interrogation, there is  no significant stenosis or regurgitation.     Tricuspid Valve  Mild tricuspid insufficiency is present. The peak velocity of the tricuspid  regurgitant jet is not obtainable. Pulmonary artery systolic pressure cannot  be assessed.     Pulmonic Valve  The pulmonic valve is normal. Trace pulmonic  insufficiency is present.     Vessels  The inferior vena cava cannot be assessed.     Pericardium  Small pericardial effusion, with the largest collection of fluid posterior to  the left ventricle (1.1cm).     Compared to Previous Study  There is no prior study for direct comparison.     ______________________________________________________________________________  MMode/2D Measurements & Calculations  IVSd: 1.3 cm  LVIDd: 4.5 cm  LVIDs: 3.3 cm  LVPWd: 0.85 cm  FS: 26.6 %  LV mass(C)d: 164.6 grams  LV mass(C)dI: 94.5 grams/m2     Ao root diam: 2.7 cm  LA dimension: 4.2 cm  LA/Ao: 1.6  RWT: 0.38     Doppler Measurements & Calculations  Ao V2 max: 155.0 cm/sec  Ao max P.6 mmHg  TR max monserrat: 184.0 cm/sec  TR max P.5 mmHg     ______________________________________________________________________________  Report approved by: Sarah Fnich 2022 12:05 PM

## 2022-11-02 NOTE — PROCEDURES
Operation/Procedure: Right radial arterial line placement    Consent for operation or procedure: Risks and benefits discussed with patient and consent obtained    Anesthesia: 1 cc of 1% Lidocaine    Indications: Hemodynamic monitoring    After properly positioning the patient's wrist in the standard fashion, the site was prepped and draped in a sterile fashion.  Lidocaine was administered subcutaneously as a local infiltration and given time to take effect.  Next, the radial artery was entered, noting bright red, pulsatile flow.  A guidewire was easily inserted, the needle removed, and the catheter was then placed using the Seldinger technique.  The guidewire was removed, with good flow present.  The catheter was then connected to the transducer with a good waveform noted.  The catheter was secured with suture and an occlusive dressing was placed after properly cleaning and prepping the site.    Complications:  The patient tolerated the procedure well and no complications were noted.    Estimated Blood Loss: minimal     Plan: Arterial line to remain in place for hemodynamic monitoring.    Ad Elias MD PhD  Cardiology Fellow  P: Text Page

## 2022-11-02 NOTE — PHARMACY-VANCOMYCIN DOSING SERVICE
Pharmacy Vancomycin Initial Note  Date of Service 2022  Patient's  1955  67 year old, adult    Indication: Sepsis    Current estimated CrCl = Estimated Creatinine Clearance (based on SCr of 1.96 mg/dL (H))  Female: 27.9 mL/min (A)  Male: 34.2 mL/min (A)    Creatinine for last 3 days  10/31/2022:  6:36 AM Creatinine 1.37 mg/dL;  9:19 PM Creatinine 1.43 mg/dL  2022:  1:50 PM Creatinine 1.76 mg/dL;  4:41 PM Creatinine 1.96 mg/dL    Recent Vancomycin Level(s) for last 3 days  No results found for requested labs within last 72 hours.      Vancomycin IV Administrations (past 72 hours)      No vancomycin orders with administrations in past 72 hours.                Nephrotoxins and other renal medications (From now, onward)    Start     Dose/Rate Route Frequency Ordered Stop    22  vancomycin (VANCOCIN) 1000 mg in dextrose 5% 200 mL PREMIX         1,000 mg  200 mL/hr over 1 Hours Intravenous EVERY 24 HOURS 22  vancomycin (VANCOCIN) 1,750 mg in sodium chloride 0.9 % 500 mL intermittent infusion         1,750 mg  over 120 Minutes Intravenous ONCE 22 183  norepinephrine (LEVOPHED) 4 mg/250 mL NS infusion ADULT (PERIPHERAL)         0.03-0.125 mcg/kg/min × 69.3 kg (Dosing Weight)  7.8-32.5 mL/hr  Intravenous CONTINUOUS 22 1822      10/31/22 2000  [Held by provider]  furosemide (LASIX) tablet 20 mg        (Held by provider since 2022 at 1345 by Sandhya Mireles, LINDA CNP.Hold Reason: Change in Vitals)    20 mg Oral DAILY 10/31/22 1951            Contrast Orders - past 72 hours (72h ago, onward)    None          [employer]ight[employer] Prediction of Planned Initial Vancomycin Regimen    Loading dose: 1750 mg at 19:30 2022.  Regimen: 1000 mg IV every 24 hours.  Start time: 19:39 on 2022  Exposure target: AUC24 (range)400-600 mg/L.hr   AUC24,ss: 523 mg/L.hr  Probability of AUC24 > 400: 78 %  Ctrough,ss: 16.6  mg/L  Probability of Ctrough,ss > 20: 33 %  Probability of nephrotoxicity (Lodise DELMY 2009): 12 %        Plan:  1. Start vancomycin  1750 mg iv x 1 then 1000 mg iv q24h   2. Vancomycin monitoring method: AUC  3. Vancomycin therapeutic monitoring goal: 400-600 mg*h/L  4. Pharmacy will check vancomycin levels as appropriate in 1-3 Days.    5. Serum creatinine levels will be ordered daily for the first week of therapy and at least twice weekly for subsequent weeks.      Jazz Quinonez, PharmD

## 2022-11-02 NOTE — PROVIDER NOTIFICATION
Cards 2/ [facility] CC notified @ 2055 that maps in 50s with Levo @ 0.12. Team coming to place Mirian and thinking about placing aviles.

## 2022-11-02 NOTE — PLAN OF CARE
Goal Outcome Evaluation:      Plan of Care Reviewed With: patient    Overall Patient Progress: no changeOverall Patient Progress: no change    Outcome Evaluation: Increasing Presser needs, A-line placed by team. Pt experencing oligura. Duque placed. Low Svo2 and Oligura. Team started Dobutamine @ 2.5 mcg/kg/min. Levo needs decreased and oligura resolved. Pt also had some failure to capture HR=75 instead of low set pacer rate of 80, after starting dobutamine HR was @ 80.   For vital signs and complete assessments, please see documentation flowsheets.

## 2022-11-03 ENCOUNTER — APPOINTMENT (OUTPATIENT)
Dept: PHYSICAL THERAPY | Facility: CLINIC | Age: 67
DRG: 273 | End: 2022-11-03
Payer: MEDICARE

## 2022-11-03 ENCOUNTER — APPOINTMENT (OUTPATIENT)
Dept: OCCUPATIONAL THERAPY | Facility: CLINIC | Age: 67
DRG: 273 | End: 2022-11-03
Payer: MEDICARE

## 2022-11-03 LAB
ALBUMIN SERPL BCG-MCNC: 3 G/DL (ref 3.5–5.2)
ALBUMIN SERPL BCG-MCNC: 3.1 G/DL (ref 3.5–5.2)
ALBUMIN SERPL ELPH-MCNC: 3.2 G/DL (ref 3.7–5.1)
ALP SERPL-CCNC: 155 U/L (ref 35–129)
ALP SERPL-CCNC: 168 U/L (ref 35–129)
ALPHA1 GLOB SERPL ELPH-MCNC: 0.3 G/DL (ref 0.2–0.4)
ALPHA2 GLOB SERPL ELPH-MCNC: 0.5 G/DL (ref 0.5–0.9)
ALT SERPL W P-5'-P-CCNC: 502 U/L (ref 10–50)
ALT SERPL W P-5'-P-CCNC: 535 U/L (ref 10–50)
ANION GAP SERPL CALCULATED.3IONS-SCNC: 10 MMOL/L (ref 7–15)
ANION GAP SERPL CALCULATED.3IONS-SCNC: 9 MMOL/L (ref 7–15)
AST SERPL W P-5'-P-CCNC: 386 U/L (ref 10–50)
AST SERPL W P-5'-P-CCNC: 421 U/L (ref 10–50)
B-GLOBULIN SERPL ELPH-MCNC: 0.7 G/DL (ref 0.6–1)
BASE EXCESS BLDV CALC-SCNC: -0.4 MMOL/L (ref -7.7–1.9)
BASE EXCESS BLDV CALC-SCNC: 1.7 MMOL/L (ref -7.7–1.9)
BASE EXCESS BLDV CALC-SCNC: 1.8 MMOL/L (ref -7.7–1.9)
BASE EXCESS BLDV CALC-SCNC: 2.2 MMOL/L (ref -7.7–1.9)
BASOPHILS # BLD AUTO: 0 10E3/UL (ref 0–0.2)
BASOPHILS # BLD AUTO: 0 10E3/UL (ref 0–0.2)
BASOPHILS NFR BLD AUTO: 0 %
BASOPHILS NFR BLD AUTO: 0 %
BILIRUB SERPL-MCNC: 0.8 MG/DL
BILIRUB SERPL-MCNC: 0.8 MG/DL
BUN SERPL-MCNC: 27.7 MG/DL (ref 8–23)
BUN SERPL-MCNC: 32.9 MG/DL (ref 8–23)
CALCIUM SERPL-MCNC: 7.7 MG/DL (ref 8.8–10.2)
CALCIUM SERPL-MCNC: 7.7 MG/DL (ref 8.8–10.2)
CHLORIDE SERPL-SCNC: 102 MMOL/L (ref 98–107)
CHLORIDE SERPL-SCNC: 104 MMOL/L (ref 98–107)
CREAT SERPL-MCNC: 1.32 MG/DL (ref 0.51–1.17)
CREAT SERPL-MCNC: 1.34 MG/DL (ref 0.51–1.17)
DEPRECATED HCO3 PLAS-SCNC: 20 MMOL/L (ref 22–29)
DEPRECATED HCO3 PLAS-SCNC: 21 MMOL/L (ref 22–29)
EOSINOPHIL # BLD AUTO: 0.1 10E3/UL (ref 0–0.7)
EOSINOPHIL # BLD AUTO: 0.4 10E3/UL (ref 0–0.7)
EOSINOPHIL NFR BLD AUTO: 1 %
EOSINOPHIL NFR BLD AUTO: 5 %
ERYTHROCYTE [DISTWIDTH] IN BLOOD BY AUTOMATED COUNT: 18 % (ref 10–15)
ERYTHROCYTE [DISTWIDTH] IN BLOOD BY AUTOMATED COUNT: 18.2 % (ref 10–15)
GAMMA GLOB SERPL ELPH-MCNC: 0.6 G/DL (ref 0.7–1.6)
GFR SERPL CREATININE-BSD FRML MDRD: 58 ML/MIN/1.73M2
GFR SERPL CREATININE-BSD FRML MDRD: 59 ML/MIN/1.73M2
GLUCOSE BLDC GLUCOMTR-MCNC: 131 MG/DL (ref 70–99)
GLUCOSE BLDC GLUCOMTR-MCNC: 166 MG/DL (ref 70–99)
GLUCOSE BLDC GLUCOMTR-MCNC: 179 MG/DL (ref 70–99)
GLUCOSE BLDC GLUCOMTR-MCNC: 202 MG/DL (ref 70–99)
GLUCOSE SERPL-MCNC: 143 MG/DL (ref 70–99)
GLUCOSE SERPL-MCNC: 186 MG/DL (ref 70–99)
HCO3 BLDV-SCNC: 24 MMOL/L (ref 21–28)
HCO3 BLDV-SCNC: 27 MMOL/L (ref 21–28)
HCT VFR BLD AUTO: 26.6 % (ref 35–53)
HCT VFR BLD AUTO: 26.6 % (ref 35–53)
HGB BLD-MCNC: 9 G/DL (ref 11.7–17.7)
HGB BLD-MCNC: 9.1 G/DL (ref 11.7–17.7)
IMM GRANULOCYTES # BLD: 0 10E3/UL
IMM GRANULOCYTES # BLD: 0.1 10E3/UL
IMM GRANULOCYTES NFR BLD: 0 %
IMM GRANULOCYTES NFR BLD: 1 %
KAPPA LC FREE SER-MCNC: 2.13 MG/DL (ref 0.33–1.94)
KAPPA LC FREE/LAMBDA FREE SER NEPH: 1.15 {RATIO} (ref 0.26–1.65)
LACTATE SERPL-SCNC: 0.7 MMOL/L (ref 0.7–2)
LAMBDA LC FREE SERPL-MCNC: 1.85 MG/DL (ref 0.57–2.63)
LYMPHOCYTES # BLD AUTO: 0.4 10E3/UL (ref 0.8–5.3)
LYMPHOCYTES # BLD AUTO: 0.4 10E3/UL (ref 0.8–5.3)
LYMPHOCYTES NFR BLD AUTO: 4 %
LYMPHOCYTES NFR BLD AUTO: 5 %
M PROTEIN SERPL ELPH-MCNC: 0 G/DL
MAGNESIUM SERPL-MCNC: 2.2 MG/DL (ref 1.7–2.3)
MCH RBC QN AUTO: 29.4 PG (ref 26.5–33)
MCH RBC QN AUTO: 29.5 PG (ref 26.5–33)
MCHC RBC AUTO-ENTMCNC: 33.8 G/DL (ref 31.5–36.5)
MCHC RBC AUTO-ENTMCNC: 34.2 G/DL (ref 31.5–36.5)
MCV RBC AUTO: 86 FL (ref 78–100)
MCV RBC AUTO: 87 FL (ref 78–100)
MONOCYTES # BLD AUTO: 0.6 10E3/UL (ref 0–1.3)
MONOCYTES # BLD AUTO: 0.8 10E3/UL (ref 0–1.3)
MONOCYTES NFR BLD AUTO: 7 %
MONOCYTES NFR BLD AUTO: 9 %
NEUTROPHILS # BLD AUTO: 6.6 10E3/UL (ref 1.6–8.3)
NEUTROPHILS # BLD AUTO: 7.7 10E3/UL (ref 1.6–8.3)
NEUTROPHILS NFR BLD AUTO: 83 %
NEUTROPHILS NFR BLD AUTO: 85 %
NRBC # BLD AUTO: 0 10E3/UL
NRBC # BLD AUTO: 0 10E3/UL
NRBC BLD AUTO-RTO: 0 /100
NRBC BLD AUTO-RTO: 0 /100
O2/TOTAL GAS SETTING VFR VENT: 2 %
O2/TOTAL GAS SETTING VFR VENT: 21 %
OXYHGB MFR BLDV: 47 % (ref 70–75)
OXYHGB MFR BLDV: 56 % (ref 70–75)
OXYHGB MFR BLDV: 58 % (ref 70–75)
OXYHGB MFR BLDV: 97 % (ref 70–75)
PCO2 BLDV: 39 MM HG (ref 40–50)
PCO2 BLDV: 41 MM HG (ref 40–50)
PCO2 BLDV: 42 MM HG (ref 40–50)
PCO2 BLDV: 43 MM HG (ref 40–50)
PH BLDV: 7.4 [PH] (ref 7.32–7.43)
PH BLDV: 7.41 [PH] (ref 7.32–7.43)
PH BLDV: 7.41 [PH] (ref 7.32–7.43)
PH BLDV: 7.43 [PH] (ref 7.32–7.43)
PHOSPHATE SERPL-MCNC: 2.4 MG/DL (ref 2.5–4.5)
PLATELET # BLD AUTO: 113 10E3/UL (ref 150–450)
PLATELET # BLD AUTO: 120 10E3/UL (ref 150–450)
PO2 BLDV: 27 MM HG (ref 25–47)
PO2 BLDV: 32 MM HG (ref 25–47)
PO2 BLDV: 32 MM HG (ref 25–47)
PO2 BLDV: 99 MM HG (ref 25–47)
POTASSIUM SERPL-SCNC: 3.5 MMOL/L (ref 3.4–5.3)
POTASSIUM SERPL-SCNC: 3.9 MMOL/L (ref 3.4–5.3)
PROT PATTERN SERPL ELPH-IMP: ABNORMAL
PROT SERPL-MCNC: 5.3 G/DL (ref 6.4–8.3)
PROT SERPL-MCNC: 5.4 G/DL (ref 6.4–8.3)
RBC # BLD AUTO: 3.06 10E6/UL (ref 3.8–5.9)
RBC # BLD AUTO: 3.08 10E6/UL (ref 3.8–5.9)
SODIUM SERPL-SCNC: 132 MMOL/L (ref 136–145)
SODIUM SERPL-SCNC: 134 MMOL/L (ref 136–145)
WBC # BLD AUTO: 8 10E3/UL (ref 4–11)
WBC # BLD AUTO: 9.1 10E3/UL (ref 4–11)

## 2022-11-03 PROCEDURE — 97161 PT EVAL LOW COMPLEX 20 MIN: CPT | Mod: GP

## 2022-11-03 PROCEDURE — 250N000013 HC RX MED GY IP 250 OP 250 PS 637: Performed by: STUDENT IN AN ORGANIZED HEALTH CARE EDUCATION/TRAINING PROGRAM

## 2022-11-03 PROCEDURE — 250N000011 HC RX IP 250 OP 636

## 2022-11-03 PROCEDURE — 82805 BLOOD GASES W/O2 SATURATION: CPT

## 2022-11-03 PROCEDURE — 82805 BLOOD GASES W/O2 SATURATION: CPT | Performed by: INTERNAL MEDICINE

## 2022-11-03 PROCEDURE — 99291 CRITICAL CARE FIRST HOUR: CPT | Mod: FS | Performed by: STUDENT IN AN ORGANIZED HEALTH CARE EDUCATION/TRAINING PROGRAM

## 2022-11-03 PROCEDURE — 97165 OT EVAL LOW COMPLEX 30 MIN: CPT | Mod: GO | Performed by: OCCUPATIONAL THERAPIST

## 2022-11-03 PROCEDURE — 250N000011 HC RX IP 250 OP 636: Performed by: HOSPITALIST

## 2022-11-03 PROCEDURE — 84165 PROTEIN E-PHORESIS SERUM: CPT | Mod: 26

## 2022-11-03 PROCEDURE — 83605 ASSAY OF LACTIC ACID: CPT

## 2022-11-03 PROCEDURE — 97530 THERAPEUTIC ACTIVITIES: CPT | Mod: GP

## 2022-11-03 PROCEDURE — 97535 SELF CARE MNGMENT TRAINING: CPT | Mod: GO | Performed by: OCCUPATIONAL THERAPIST

## 2022-11-03 PROCEDURE — 80053 COMPREHEN METABOLIC PANEL: CPT | Performed by: INTERNAL MEDICINE

## 2022-11-03 PROCEDURE — 84100 ASSAY OF PHOSPHORUS: CPT | Performed by: STUDENT IN AN ORGANIZED HEALTH CARE EDUCATION/TRAINING PROGRAM

## 2022-11-03 PROCEDURE — 200N000002 HC R&B ICU UMMC

## 2022-11-03 PROCEDURE — 250N000013 HC RX MED GY IP 250 OP 250 PS 637: Performed by: NURSE PRACTITIONER

## 2022-11-03 PROCEDURE — 84155 ASSAY OF PROTEIN SERUM: CPT | Performed by: INTERNAL MEDICINE

## 2022-11-03 PROCEDURE — 84450 TRANSFERASE (AST) (SGOT): CPT | Performed by: INTERNAL MEDICINE

## 2022-11-03 PROCEDURE — 97116 GAIT TRAINING THERAPY: CPT | Mod: GP

## 2022-11-03 PROCEDURE — 85048 AUTOMATED LEUKOCYTE COUNT: CPT | Performed by: INTERNAL MEDICINE

## 2022-11-03 PROCEDURE — 250N000011 HC RX IP 250 OP 636: Performed by: STUDENT IN AN ORGANIZED HEALTH CARE EDUCATION/TRAINING PROGRAM

## 2022-11-03 PROCEDURE — 83735 ASSAY OF MAGNESIUM: CPT | Performed by: STUDENT IN AN ORGANIZED HEALTH CARE EDUCATION/TRAINING PROGRAM

## 2022-11-03 RX ORDER — FUROSEMIDE 10 MG/ML
80 INJECTION INTRAMUSCULAR; INTRAVENOUS ONCE
Status: COMPLETED | OUTPATIENT
Start: 2022-11-03 | End: 2022-11-03

## 2022-11-03 RX ORDER — POTASSIUM CHLORIDE 750 MG/1
20 TABLET, EXTENDED RELEASE ORAL ONCE
Status: COMPLETED | OUTPATIENT
Start: 2022-11-03 | End: 2022-11-03

## 2022-11-03 RX ADMIN — METRONIDAZOLE 500 MG: 500 INJECTION, SOLUTION INTRAVENOUS at 08:15

## 2022-11-03 RX ADMIN — POTASSIUM CHLORIDE 20 MEQ: 750 TABLET, EXTENDED RELEASE ORAL at 20:19

## 2022-11-03 RX ADMIN — FUROSEMIDE 80 MG: 10 INJECTION, SOLUTION INTRAVENOUS at 06:16

## 2022-11-03 RX ADMIN — POTASSIUM & SODIUM PHOSPHATES POWDER PACK 280-160-250 MG 1 PACKET: 280-160-250 PACK at 16:10

## 2022-11-03 RX ADMIN — POTASSIUM & SODIUM PHOSPHATES POWDER PACK 280-160-250 MG 1 PACKET: 280-160-250 PACK at 12:05

## 2022-11-03 RX ADMIN — Medication 0.3 MG: at 08:15

## 2022-11-03 RX ADMIN — POTASSIUM & SODIUM PHOSPHATES POWDER PACK 280-160-250 MG 1 PACKET: 280-160-250 PACK at 08:15

## 2022-11-03 RX ADMIN — HEPARIN SODIUM 5000 UNITS: 5000 INJECTION, SOLUTION INTRAVENOUS; SUBCUTANEOUS at 14:46

## 2022-11-03 RX ADMIN — HEPARIN SODIUM 5000 UNITS: 5000 INJECTION, SOLUTION INTRAVENOUS; SUBCUTANEOUS at 06:16

## 2022-11-03 RX ADMIN — HEPARIN SODIUM 5000 UNITS: 5000 INJECTION, SOLUTION INTRAVENOUS; SUBCUTANEOUS at 21:48

## 2022-11-03 RX ADMIN — DOBUTAMINE HYDROCHLORIDE 2.5 MCG/KG/MIN: 200 INJECTION INTRAVENOUS at 20:45

## 2022-11-03 RX ADMIN — AMIODARONE HYDROCHLORIDE 200 MG: 200 TABLET ORAL at 08:15

## 2022-11-03 ASSESSMENT — ACTIVITIES OF DAILY LIVING (ADL)
ADLS_ACUITY_SCORE: 41

## 2022-11-03 NOTE — PROGRESS NOTES
Major Shift Events: dobutamine stopped briefly this am, levo was turned on right after. Oxyhemoglobin was low and dobutamine restarted. Vital signs stable. Eating well voiding on his own and having bowel movements.   Plan: continue current plan. Possible swan tomorrow.   For vital signs and complete assessments, please see documentation flowsheets.

## 2022-11-03 NOTE — PLAN OF CARE
ICU End of Shift Summary. See flowsheets for vital signs and detailed assessment.    Changes this shift: Pressors stopped since yesterday evening, MAP remains >65. VSS HR in 80's on 1-2L NC. Voiding using bedside urinal, had one BM overnight. Dobutamine infusing at a straight rate of 2.5. No acute overnight event. Calls appropriately.     Plan:    Continue with plan of care and notify provider with changed.       Goal Outcome Evaluation:      Plan of Care Reviewed With: patient    Overall Patient Progress: improvingOverall Patient Progress: improving

## 2022-11-03 NOTE — PROGRESS NOTES
11/03/22 0916   Appointment Info   Signing Clinician's Name / Credentials (OT) Denise JonasOTR/L   Living Environment   People in Home facility resident  (Assisted Living)   Current Living Arrangements assisted living   Home Accessibility no concerns   Transportation Anticipated family or friend will provide   Living Environment Comments pt. lives in AL facility everything on one level, walk in shower.   Self-Care   Usual Activity Tolerance good   Current Activity Tolerance fair   Regular Exercise Yes   Activity/Exercise Type other (see comments)  (group exercise class at AL)   Exercise Amount/Frequency 2 times/wk   Equipment Currently Used at Home walker, rolling;grab bar, tub/shower;shower chair   Fall history within last six months yes   Number of times patient has fallen within last six months   (per pt.s sister he had a recent fall in the bathroom 2/2 low BP)   Activity/Exercise/Self-Care Comment pt. reports being indep. with BADLs including dressing, bathing, toileting and amb. with 4WW   Instrumental Activities of Daily Living (IADL)   IADL Comments has A at AL with IADLs prn   General Information   Onset of Illness/Injury or Date of Surgery 11/01/22   Referring Physician Addis Machuca, APRN CNP   Patient/Family Therapy Goal Statement (OT) to get back home   Additional Occupational Profile Info/Pertinent History of Current Problem hx of NICM and VT s/p 2ry prevention ICD placed in June 2022 who has had recurrent VT despite amiodarone and mexiletine and was admitted for an elective endo/epi ablation of his VT   Existing Precautions/Restrictions fall  (s/p ablation)   Cognitive Status Examination   Orientation Status orientation to person, place and time   Cognitive Status Comments pt.s cognition appears baseline; hx. DD   Visual Perception   Visual Impairment/Limitations WFL   Range of Motion Comprehensive   General Range of Motion no range of motion deficits identified   Strength Comprehensive (MMT)    Comment, General Manual Muscle Testing (MMT) Assessment BUE strength not formally tested, appears WFL, with general post proc. weakness   Clinical Impression   Criteria for Skilled Therapeutic Interventions Met (OT) Yes, treatment indicated   OT Diagnosis impaired ADLs/mobility   Influenced by the following impairments post op weakness,prec.   OT Problem List-Impairments impacting ADL activity tolerance impaired;strength;post-surgical precautions   ADL comments/analysis below baseline with ADLs/mobility   Assessment of Occupational Performance 3-5 Performance Deficits   Identified Performance Deficits dressing, bathing, toileting   Planned Therapy Interventions (OT) ADL retraining;transfer training;home program guidelines;progressive activity/exercise;strengthening   Clinical Decision Making Complexity (OT) low complexity   Anticipated Equipment Needs Upon Discharge (OT)   (TBD)   Risk & Benefits of therapy have been explained evaluation/treatment results reviewed;care plan/treatment goals reviewed;risks/benefits reviewed;current/potential barriers reviewed;participants voiced agreement with care plan;participants included;patient;sibling   OT Total Evaluation Time   OT Eval, Low Complexity Minutes (49298) 10   OT Goals   Therapy Frequency (OT) Daily   OT Predicted Duration/Target Date for Goal Attainment 11/10/22   OT Discharge Planning   OT Plan OT PLAN: ADLs, standing g/h, item ret., toileting   OT Discharge Recommendation (DC Rec) home with assist   OT Rationale for DC Rec pt. doing well and has great home support. Anticipate that pt. will be approp. for d/c back to AL.   OT Brief overview of current status min/CGA with BADls, transf.s   Total Session Time   Total Session Time (sum of timed and untimed services) 10

## 2022-11-03 NOTE — PROGRESS NOTES
"   11/03/22 1100   Appointment Info   Signing Clinician's Name / Credentials (PT) Sarah Arndt, PT   Living Environment   People in Home facility resident  (Assisted living facility)   Current Living Arrangements assisted living   Home Accessibility no concerns   Transportation Anticipated family or friend will provide   Living Environment Comments Lives in [facility], all needs on one level, walk in shower   Self-Care   Usual Activity Tolerance good   Current Activity Tolerance fair   Regular Exercise Yes   Activity/Exercise Type other (see comments)  (group class at facility)   Exercise Amount/Frequency 2 times/wk   Equipment Currently Used at Home walker, rolling;grab bar, tub/shower;shower chair   Fall history within last six months yes   Number of times patient has fallen within last six months 1  (fell in bathroom due to low blood pressure)   Activity/Exercise/Self-Care Comment Previously IND-MOD IND, uses 4WW \"almost all the time\", MOD IND-IND with BADLs   General Information   Onset of Illness/Injury or Date of Surgery 11/01/22   Referring Physician Addis Machuca, APRN CNP   Patient/Family Therapy Goals Statement (PT) to go home   Pertinent History of Current Problem (include personal factors and/or comorbidities that impact the POC) 67 year old man with NICM and paroxysmal VT s/p dual chamber ICD in 6/2022 admitted initially to [facility] floor service for monitoring after endocardial and epicardial VT ablation on 10/31/22. Transferred to the ICU on 11/1 with shock   Existing Precautions/Restrictions cardiac;fall   Heart Disease Risk Factors Medical history;Age;Gender;Overweight   Cognition   Affect/Mental Status (Cognition) WFL   Orientation Status (Cognition) oriented x 3   Follows Commands (Cognition) WFL   Pain Assessment   Patient Currently in Pain No   Posture    Posture Forward head position;Protracted shoulders   Range of Motion (ROM)   Range of Motion ROM is WFL   ROM Comment Grossly WFL with functional " mobility   Strength (Manual Muscle Testing)   Strength (Manual Muscle Testing) Deficits observed during functional mobility   Transfers   Comment, (Transfers) Sit <> stand CGA   Gait/Stairs (Locomotion)   Comment, (Gait/Stairs) Amb x 10' with 4WW, CGA-min A   Balance   Balance Comments Good static sitting balance, impaired dynamic balance with unsteadiness noted, furniture surfing   Clinical Impression   Criteria for Skilled Therapeutic Intervention Yes, treatment indicated   PT Diagnosis (PT) Impaired functional mobility   Influenced by the following impairments cognition, strength, fatigue, activity tolerance, dizziness   Functional limitations due to impairments gait, stairs, transfers, bed mobility, functional endurance   Clinical Presentation (PT Evaluation Complexity) Stable/Uncomplicated   Clinical Presentation Rationale clinical reasoning   Clinical Decision Making (Complexity) low complexity   Planned Therapy Interventions (PT) balance training;gait training;home exercise program;neuromuscular re-education;patient/family education;strengthening;stair training;transfer training;progressive activity/exercise;risk factor education;home program guidelines   Risk & Benefits of therapy have been explained evaluation/treatment results reviewed;care plan/treatment goals reviewed;risks/benefits reviewed;patient   PT Total Evaluation Time   PT Eval, Low Complexity Minutes (83266) 11   Plan of Care Review   Plan of Care Reviewed With patient   Physical Therapy Goals   PT Frequency 6x/week   PT Predicted Duration/Target Date for Goal Attainment 11/10/22   PT Goals Transfers;Gait;Stairs;PT Goal 1   PT: Transfers Modified independent;Sit to/from stand;Within precautions   PT: Gait Rolling walker;150 feet;Within precautions;Modified independent   PT: Stairs Supervision/stand-by assist;Within precautions;3 stairs;Rail on both sides   PT: Goal 1 Pt will complete a formal balance assessment to demonstrate pt is a decreased  falls risk for home mobility   PT Discharge Planning   PT Plan Progress gait, stairs, balance   PT Discharge Recommendation (DC Rec) home with assist;home with home care physical therapy   PT Rationale for DC Rec Pt moving close to baseline, CGA-min A to manage 4WW and safely utilize breaks, anticipate pt will continue to improve and will be safe to discharge back to [facility], may benefit from home PT to address balance and strength deficits   PT Brief overview of current status Ax1 with FWW, gait belt   Total Session Time   Total Session Time (sum of timed and untimed services) 11

## 2022-11-03 NOTE — PHARMACY-ADMISSION MEDICATION HISTORY
Admission Medication History Completed by Pharmacy    See [facility] Admission Navigator for allergy information, preferred outpatient pharmacy, prior to admission medications and immunization status.     Medication History Sources:     Chart review, SureScripts    Changes made to PTA medication list (reason):    Added: None    Deleted: None    Changed: Amiodarone was 200 mg BID on last fill from 10/17/22 > to admission    Additional Information:    Lisinopril has not been filled since 6/21/22, likely patient not taking any more (set to not continue on discharge)    Prior to Admission medications    Medication Sig Last Dose Taking? Auth Provider Long Term End Date   amiodarone (PACERONE) 200 MG tablet Take 1 tablet (200 mg) by mouth daily  Yes Sandhya Mireles APRN CNP Yes    ASPIRIN LOW DOSE 81 MG EC tablet Take 81 mg by mouth daily 10/30/2022 Yes Reported, Patient     atorvastatin (LIPITOR) 80 MG tablet Take 80 mg by mouth daily 10/30/2022 Yes Reported, Patient Yes    colchicine (COLCYRS) 0.6 MG tablet Take 0.5 tablets (0.3 mg) by mouth daily  Yes Sandhya Mireles APRN CNP     furosemide (LASIX) 20 MG tablet Take 20 mg by mouth daily as needed 10/30/2022 Yes Reported, Patient Yes    GNP NATURAL FIBER 28.3 % POWD Give 1-2 TABLESPOONFUL DAILY in a GLASS of JUICE 10/30/2022 Yes Reported, Patient     metFORMIN (GLUCOPHAGE) 500 MG tablet Take 1,000 mg by mouth 2 times daily (with meals) 10/30/2022 Yes Reported, Patient Yes    mexiletine (MEXITIL) 150 MG capsule TAKE ONE CAPSULE BY MOUTH THREE TIMES DAILY WITH FOOD 10/30/2022 Yes Reported, Patient Yes    tamsulosin (FLOMAX) 0.4 MG capsule Take 0.4 mg by mouth every evening 10/30/2022 Yes Reported, Patient     carvedilol (COREG) 12.5 MG tablet Take 12.5 mg by mouth 2 times daily (with meals) 10/29/2022  Reported, Patient Yes    JARDIANCE 10 MG TABS tablet Take 10 mg by mouth daily 10/26/2022  Reported, Patient         Date completed: 11/03/22    Medication  history completed by: Elenita Marcano [facility]

## 2022-11-03 NOTE — PROGRESS NOTES
Cardiology ICU Progress Note    Brief HPI:  67 year old man with NICM and paroxysmal VT s/p dual chamber ICD in 6/2022 admitted initially to [facility] floor service for monitoring after endocardial and epicardial VT ablation on 10/31/22. Transferred to the ICU on 11/1 with shock.     Subjective and Interval:  cSVO2  56% this morning on 2.5 of dobutamine, norepinephrine off. Feeling good. Good UO, CR normalizing 1.34, lactic 0.70, LFT's still rising.      Today's changes:  -failed trial of dobutamine off with a cSVO2 of 47% and hypotension  -restart dobutamine at 2.5  -cSVO2 Q 6 hours  -diurese with lasix for a goal of even to sightly negative today  -Stop antibiotics  - PT/OT    Assessment and plan by system:  Neurologic  #History of developmental delay   -no acute issues     Cardiovascular  #Cardiogenic shock   Favoring cardiogenic shock perhaps from myocardial stunning as the etiology of his decompensation (had 5 different VT foci ablated on 10/31). His response to dobutamine supports this. Less likely would be a distributive shock from sepsis as he has no signs or symptoms of an infectious source. No evidence of tamponade on echo. No evidence of RP bleeding on CT chest/abd/pelvis or exam. Transaminases are mildly elevated but no clinical s/s of cholangitis or hepatitis.   -Try to wean dobutamine off today    #Chronic systolic heart failure/HFrEF (EF 35-40) secondary to nonischemic cardiomyopathy  NYHA Symptom Class II  ACC/AHA Stage C  ACE-I/ARB/ARNi: holding home lisinopril 5   BB: holding home coreg 12.5   Aldosterone antagonist: not on PTA   SGLT2i: holding home jardiance with ROSALINO   Diuretic: hold home lasix 20 mg daily   SCD prophylaxis: ICD [employer]   -Fluid status: diurese        #Hx of VT s/p ablation 10/31/22   - pericardial drain pulled 11/2  - continue amiodarone 200 mg daily   - stopped mexiletine per EP recs   - continue colchicine 0.3 mg daily for post epicardial ablation pericarditis prophylaxis   - EP  "following      #HTN  -hold home lisionpril and coreg      #HLD    - holding home atorvastatin with elevated LFTs      Pulmonary  #no active issues  -on room air         Gastrointestinal, Nutrition  #Elevated transaminases   Mild. No abdominal pain. Hepatocellular pattern. Suspect from ischemic hepatitis when he was hypotensive. Abdominal US with cholelithiasis without evidence of cholecystitis.   -LFT's still rising      -trend LFTs       Renal, Electrolytes  #ROSALINO   Baseline Cr ~1. Suspect ATN from hypotension.   Cr  1.34 (trending down 1.96->1.86->1.70)  -treat shock as above   -trend creatinine     Infectious Disease  -follow blood cultures from 11/1, UC,  ctx/flagyl discontinued 11/3  -mrsa nasal swab negative-Vanco discontinued 11/3    Hematology  #Leukocytosis  -suspect from procedure and shock, trend   WBC 9.1 trending down from 18  #Anemia   -hgb 9, slowly trending down   -monitor daily  Endocrinology  #hyperglycemia  #DM2  -increase lantus to 10 units at bedtime   -increase sliding scale insulin to 1:25 correction factor   -ACHS fingersticks   -hold metformin and jardiance     Integumentary:  - No skin issues      ICU Checklist:  #Feeding: regular  #Analgesia: percocet  #Sedation: n/a  #Thromboembolism ppx: heparin  #HOB: 30 degrees   #Ulcer ppx: n/a  #Glucose: as above  #SBT/SAT: n/a  #Bowel reg: miralax  #Lines/tubes/drains: PIV x2, RUE PICC, R radial arterial line, aviles, pericardial drain   #Code status: full   #Family: updated at bedside   #Dispo: ICU     Patient seen and discussed with staff physician.    LINDA Nolasco DNP CNP  [facility] Cardiology  Pager [phone]    Objective:  Most recent vital signs:  BP (!) 172/76   Pulse 80   Temp 98.2  F (36.8  C) (Oral)   Resp 19   Ht 1.626 m (5' 4\")   Wt 74.4 kg (164 lb 0.4 oz)   SpO2 99%   BMI 28.15 kg/m    Temp:  [98  F (36.7  C)-98.9  F (37.2  C)] 98.2  F (36.8  C)  Pulse:  [76-80] 80  Resp:  [13-30] 19  MAP:  [63 mmHg-83 mmHg] 78 " mmHg  Arterial Line BP: ()/(47-63) 133/55  SpO2:  [85 %-100 %] 99 %  Wt Readings from Last 2 Encounters:   11/03/22 74.4 kg (164 lb 0.4 oz)         Intake/Output Summary (Last 24 hours) at 11/3/2022 0716  Last data filed at 11/3/2022 0700  Gross per 24 hour   Intake 1728.13 ml   Output 1955 ml   Net -226.87 ml       Physical exam:  GEN: pleasant, no acute distress  HEENT: no icterus  CV: normal rate, regular rhythm. Normal S1/S2.    CHEST: clear to ausculation bilaterally, no rales or wheezing  ABD: soft, no tenderness throughout, no guarding   EXTR: pulses intact in BLE and BUE. No clubbing, cyanosis or edema.   NEURO: alert oriented, speech fluent/appropriate, motor grossly nonfocal    Labs (Past three days):  CBC  Recent Labs   Lab 11/03/22 0415 11/02/22  1656 11/02/22  0355 11/01/22  1641   WBC 9.1 12.0* 18.4* 16.3*   RBC 3.06* 3.28* 3.55* 3.36*   HGB 9.0* 9.8* 10.6* 10.0*   HCT 26.6* 28.8* 31.3* 31.7*   MCV 87 88 88 94   MCH 29.4 29.9 29.9 29.8   MCHC 33.8 34.0 33.9 31.5   RDW 18.2* 18.2* 18.4* 18.4*   * 148* 201 153     BMP  Recent Labs   Lab 11/03/22  0415 11/02/22  2205 11/02/22  1656 11/02/22  1654 11/02/22  0753 11/02/22  0355 11/02/22  0055 11/01/22  1706 11/01/22  1641   *  --  130*  --   --  133* 133*  --  134*   POTASSIUM 3.9  --  4.2  --   --  4.3 4.3  --  4.8   CHLORIDE 104  --  101  --   --  103 103  --  101   CO2 21*  --  20*  --   --  18* 18*  --  19*   ANIONGAP 9  --  9  --   --  12 12  --  14   * 236* 261* 262*   < > 229*  249* 239*  251*   < > 313*   BUN 32.9*  --  39.0*  --   --  40.9* 41.1*  --  37.6*   CR 1.34*  --  1.70*  --   --  1.86* 1.91*  --  1.96*   GFRESTIMATED 58*  --  44*  --   --  39* 38*  --  37*   NEETA 7.7*  --  7.7*  --   --  7.9* 7.8*  --  7.8*   MAG 2.2  --   --   --   --  2.8*  --   --  1.5*   PHOS 2.4*  --   --   --   --  4.2  --   --  4.0    < > = values in this interval not displayed.     Troponins:     INR  Recent Labs   Lab 10/31/22  7134    INR 1.23*     Liver panel  Recent Labs   Lab 22  0415 22  1656 22  0355 22  0055   PROTTOTAL 5.3* 5.7* 5.8* 5.8*   ALBUMIN 3.1* 3.4* 3.6 3.6   BILITOTAL 0.8 1.1 1.0 1.0   ALKPHOS 155* 164* 139* 126   * 404* 262* 245*   * 452* 275* 257*       Imaging/procedure results:  Echocardiogram Limited  [id number]  [id number]  [id number]  [id number]^VILMA^BUDDY^JOSSELYN     [facility]  Echocardiography Laboratory  [address] [place] [id number]     Name: RONEY SLOAN  MRN: [id number]  : 1955  Study Date: 2022 11:07 AM  Age: 67 yrs  Gender: Male  [place] [facility] [place] For Study: Pericardial Effusion  Ordering Physician: BUDDY ESPARZA  Referring Physician: KRYSTAL FOLEY  Performed By: Adriana Pearson RDCS     BSA: 1.7 m2  Height: 64 in  Weight: 152 lb  BP: 172/76 mmHg  ______________________________________________________________________________  Procedure  Limited Portable Echo Adult.  ______________________________________________________________________________  Interpretation Summary  Trivial pericardial effusion is present.  Dilation of the inferior vena cava is present with normal respiratory  variation in diameter.  ______________________________________________________________________________  Vessels  Dilation of the inferior vena cava is present with normal respiratory  variation in diameter.     Pericardium  Trivial pericardial effusion is present.     ______________________________________________________________________________  Report approved by: Sarah Stoddard 2022 11:53 AM     ______________________________________________________________________________

## 2022-11-03 NOTE — PLAN OF CARE
ICU End of Shift Summary. See flowsheets for vital signs and detailed assessment.    Changes this shift: Levophed off since 1700, maintaining MAP goal >65.  Dobutamine continues at 2.5. ECHO. Pericardial drain removed at 1300. BG high, increased sliding scale to high insulin resistance. Eating well. Lasix with good UOP. Duque discontinued this evening with small void since.     Plan:  Continue with plan of care.

## 2022-11-04 ENCOUNTER — APPOINTMENT (OUTPATIENT)
Dept: PHYSICAL THERAPY | Facility: CLINIC | Age: 67
DRG: 273 | End: 2022-11-04
Attending: INTERNAL MEDICINE
Payer: MEDICARE

## 2022-11-04 ENCOUNTER — APPOINTMENT (OUTPATIENT)
Dept: OCCUPATIONAL THERAPY | Facility: CLINIC | Age: 67
DRG: 273 | End: 2022-11-04
Attending: INTERNAL MEDICINE
Payer: MEDICARE

## 2022-11-04 LAB
ALBUMIN SERPL BCG-MCNC: 3 G/DL (ref 3.5–5.2)
ALBUMIN SERPL BCG-MCNC: 3.1 G/DL (ref 3.5–5.2)
ALBUMIN SERPL BCG-MCNC: 3.1 G/DL (ref 3.5–5.2)
ALP SERPL-CCNC: 159 U/L (ref 35–129)
ALP SERPL-CCNC: 181 U/L (ref 35–129)
ALP SERPL-CCNC: 202 U/L (ref 35–129)
ALT SERPL W P-5'-P-CCNC: 506 U/L (ref 10–50)
ALT SERPL W P-5'-P-CCNC: 509 U/L (ref 10–50)
ALT SERPL W P-5'-P-CCNC: 512 U/L (ref 10–50)
ANION GAP SERPL CALCULATED.3IONS-SCNC: 11 MMOL/L (ref 7–15)
AST SERPL W P-5'-P-CCNC: 288 U/L (ref 10–50)
AST SERPL W P-5'-P-CCNC: 299 U/L (ref 10–50)
AST SERPL W P-5'-P-CCNC: 316 U/L (ref 10–50)
BACTERIA UR CULT: NO GROWTH
BASE EXCESS BLDV CALC-SCNC: 1.6 MMOL/L (ref -7.7–1.9)
BASE EXCESS BLDV CALC-SCNC: 2 MMOL/L (ref -7.7–1.9)
BASE EXCESS BLDV CALC-SCNC: 2.2 MMOL/L (ref -7.7–1.9)
BASE EXCESS BLDV CALC-SCNC: 4.3 MMOL/L (ref -7.7–1.9)
BASOPHILS # BLD AUTO: 0 10E3/UL (ref 0–0.2)
BASOPHILS # BLD AUTO: 0 10E3/UL (ref 0–0.2)
BASOPHILS NFR BLD AUTO: 0 %
BASOPHILS NFR BLD AUTO: 0 %
BILIRUB SERPL-MCNC: 0.7 MG/DL
BILIRUB SERPL-MCNC: 0.7 MG/DL
BILIRUB SERPL-MCNC: 0.8 MG/DL
BUN SERPL-MCNC: 18.6 MG/DL (ref 8–23)
BUN SERPL-MCNC: 19.9 MG/DL (ref 8–23)
BUN SERPL-MCNC: 21.7 MG/DL (ref 8–23)
CALCIUM SERPL-MCNC: 7.7 MG/DL (ref 8.8–10.2)
CALCIUM SERPL-MCNC: 8 MG/DL (ref 8.8–10.2)
CALCIUM SERPL-MCNC: 8 MG/DL (ref 8.8–10.2)
CHLORIDE SERPL-SCNC: 100 MMOL/L (ref 98–107)
CHLORIDE SERPL-SCNC: 104 MMOL/L (ref 98–107)
CHLORIDE SERPL-SCNC: 94 MMOL/L (ref 98–107)
CREAT SERPL-MCNC: 1.01 MG/DL (ref 0.51–1.17)
CREAT SERPL-MCNC: 1.04 MG/DL (ref 0.51–1.17)
CREAT SERPL-MCNC: 1.07 MG/DL (ref 0.51–1.17)
DEPRECATED HCO3 PLAS-SCNC: 21 MMOL/L (ref 22–29)
DEPRECATED HCO3 PLAS-SCNC: 22 MMOL/L (ref 22–29)
DEPRECATED HCO3 PLAS-SCNC: 23 MMOL/L (ref 22–29)
EOSINOPHIL # BLD AUTO: 0.5 10E3/UL (ref 0–0.7)
EOSINOPHIL # BLD AUTO: 0.5 10E3/UL (ref 0–0.7)
EOSINOPHIL NFR BLD AUTO: 7 %
EOSINOPHIL NFR BLD AUTO: 9 %
ERYTHROCYTE [DISTWIDTH] IN BLOOD BY AUTOMATED COUNT: 17.8 % (ref 10–15)
ERYTHROCYTE [DISTWIDTH] IN BLOOD BY AUTOMATED COUNT: 18 % (ref 10–15)
GFR SERPL CREATININE-BSD FRML MDRD: 76 ML/MIN/1.73M2
GFR SERPL CREATININE-BSD FRML MDRD: 79 ML/MIN/1.73M2
GFR SERPL CREATININE-BSD FRML MDRD: 82 ML/MIN/1.73M2
GLUCOSE BLDC GLUCOMTR-MCNC: 128 MG/DL (ref 70–99)
GLUCOSE BLDC GLUCOMTR-MCNC: 137 MG/DL (ref 70–99)
GLUCOSE BLDC GLUCOMTR-MCNC: 171 MG/DL (ref 70–99)
GLUCOSE BLDC GLUCOMTR-MCNC: 193 MG/DL (ref 70–99)
GLUCOSE BLDC GLUCOMTR-MCNC: 267 MG/DL (ref 70–99)
GLUCOSE BLDC GLUCOMTR-MCNC: 284 MG/DL (ref 70–99)
GLUCOSE SERPL-MCNC: 132 MG/DL (ref 70–99)
GLUCOSE SERPL-MCNC: 206 MG/DL (ref 70–99)
GLUCOSE SERPL-MCNC: 270 MG/DL (ref 70–99)
HBA1C MFR BLD: 6.7 %
HCO3 BLDV-SCNC: 27 MMOL/L (ref 21–28)
HCO3 BLDV-SCNC: 29 MMOL/L (ref 21–28)
HCT VFR BLD AUTO: 26.9 % (ref 35–53)
HCT VFR BLD AUTO: 27.1 % (ref 35–53)
HGB BLD-MCNC: 9.1 G/DL (ref 11.7–17.7)
HGB BLD-MCNC: 9.3 G/DL (ref 11.7–17.7)
IMM GRANULOCYTES # BLD: 0 10E3/UL
IMM GRANULOCYTES # BLD: 0.1 10E3/UL
IMM GRANULOCYTES NFR BLD: 0 %
IMM GRANULOCYTES NFR BLD: 1 %
LACTATE SERPL-SCNC: 1.4 MMOL/L (ref 0.7–2)
LYMPHOCYTES # BLD AUTO: 0.4 10E3/UL (ref 0.8–5.3)
LYMPHOCYTES # BLD AUTO: 0.4 10E3/UL (ref 0.8–5.3)
LYMPHOCYTES NFR BLD AUTO: 5 %
LYMPHOCYTES NFR BLD AUTO: 6 %
MAGNESIUM SERPL-MCNC: 1.9 MG/DL (ref 1.7–2.3)
MCH RBC QN AUTO: 29.9 PG (ref 26.5–33)
MCH RBC QN AUTO: 30.2 PG (ref 26.5–33)
MCHC RBC AUTO-ENTMCNC: 33.8 G/DL (ref 31.5–36.5)
MCHC RBC AUTO-ENTMCNC: 34.3 G/DL (ref 31.5–36.5)
MCV RBC AUTO: 88 FL (ref 78–100)
MCV RBC AUTO: 89 FL (ref 78–100)
MONOCYTES # BLD AUTO: 0.5 10E3/UL (ref 0–1.3)
MONOCYTES # BLD AUTO: 0.5 10E3/UL (ref 0–1.3)
MONOCYTES NFR BLD AUTO: 7 %
MONOCYTES NFR BLD AUTO: 8 %
NEUTROPHILS # BLD AUTO: 4.6 10E3/UL (ref 1.6–8.3)
NEUTROPHILS # BLD AUTO: 6 10E3/UL (ref 1.6–8.3)
NEUTROPHILS NFR BLD AUTO: 77 %
NEUTROPHILS NFR BLD AUTO: 80 %
NRBC # BLD AUTO: 0 10E3/UL
NRBC # BLD AUTO: 0 10E3/UL
NRBC BLD AUTO-RTO: 0 /100
NRBC BLD AUTO-RTO: 0 /100
O2/TOTAL GAS SETTING VFR VENT: 21 %
OXYHGB MFR BLDV: 57 % (ref 70–75)
OXYHGB MFR BLDV: 58 % (ref 70–75)
OXYHGB MFR BLDV: 59 % (ref 70–75)
OXYHGB MFR BLDV: 61 % (ref 70–75)
PCO2 BLDV: 42 MM HG (ref 40–50)
PH BLDV: 7.41 [PH] (ref 7.32–7.43)
PH BLDV: 7.41 [PH] (ref 7.32–7.43)
PH BLDV: 7.42 [PH] (ref 7.32–7.43)
PH BLDV: 7.45 [PH] (ref 7.32–7.43)
PHOSPHATE SERPL-MCNC: 2.3 MG/DL (ref 2.5–4.5)
PLATELET # BLD AUTO: 123 10E3/UL (ref 150–450)
PLATELET # BLD AUTO: 137 10E3/UL (ref 150–450)
PO2 BLDV: 30 MM HG (ref 25–47)
PO2 BLDV: 32 MM HG (ref 25–47)
PO2 BLDV: 32 MM HG (ref 25–47)
PO2 BLDV: 33 MM HG (ref 25–47)
POTASSIUM SERPL-SCNC: 3.8 MMOL/L (ref 3.4–5.3)
POTASSIUM SERPL-SCNC: 4.2 MMOL/L (ref 3.4–5.3)
POTASSIUM SERPL-SCNC: 4.4 MMOL/L (ref 3.4–5.3)
PROT SERPL-MCNC: 5.3 G/DL (ref 6.4–8.3)
PROT SERPL-MCNC: 5.4 G/DL (ref 6.4–8.3)
PROT SERPL-MCNC: 5.5 G/DL (ref 6.4–8.3)
RBC # BLD AUTO: 3.04 10E6/UL (ref 3.8–5.9)
RBC # BLD AUTO: 3.08 10E6/UL (ref 3.8–5.9)
SODIUM SERPL-SCNC: 128 MMOL/L (ref 136–145)
SODIUM SERPL-SCNC: 132 MMOL/L (ref 136–145)
SODIUM SERPL-SCNC: 137 MMOL/L (ref 136–145)
WBC # BLD AUTO: 6 10E3/UL (ref 4–11)
WBC # BLD AUTO: 7.5 10E3/UL (ref 4–11)

## 2022-11-04 PROCEDURE — 200N000002 HC R&B ICU UMMC

## 2022-11-04 PROCEDURE — 83036 HEMOGLOBIN GLYCOSYLATED A1C: CPT | Performed by: NURSE PRACTITIONER

## 2022-11-04 PROCEDURE — 93010 ELECTROCARDIOGRAM REPORT: CPT | Performed by: INTERNAL MEDICINE

## 2022-11-04 PROCEDURE — 250N000011 HC RX IP 250 OP 636: Performed by: STUDENT IN AN ORGANIZED HEALTH CARE EDUCATION/TRAINING PROGRAM

## 2022-11-04 PROCEDURE — 97535 SELF CARE MNGMENT TRAINING: CPT | Mod: GO | Performed by: OCCUPATIONAL THERAPIST

## 2022-11-04 PROCEDURE — 80053 COMPREHEN METABOLIC PANEL: CPT | Performed by: INTERNAL MEDICINE

## 2022-11-04 PROCEDURE — 84100 ASSAY OF PHOSPHORUS: CPT

## 2022-11-04 PROCEDURE — 83605 ASSAY OF LACTIC ACID: CPT | Performed by: NURSE PRACTITIONER

## 2022-11-04 PROCEDURE — 85025 COMPLETE CBC W/AUTO DIFF WBC: CPT | Performed by: INTERNAL MEDICINE

## 2022-11-04 PROCEDURE — 250N000013 HC RX MED GY IP 250 OP 250 PS 637: Performed by: NURSE PRACTITIONER

## 2022-11-04 PROCEDURE — 82805 BLOOD GASES W/O2 SATURATION: CPT | Performed by: INTERNAL MEDICINE

## 2022-11-04 PROCEDURE — 84450 TRANSFERASE (AST) (SGOT): CPT | Performed by: NURSE PRACTITIONER

## 2022-11-04 PROCEDURE — 250N000013 HC RX MED GY IP 250 OP 250 PS 637: Performed by: STUDENT IN AN ORGANIZED HEALTH CARE EDUCATION/TRAINING PROGRAM

## 2022-11-04 PROCEDURE — 93005 ELECTROCARDIOGRAM TRACING: CPT

## 2022-11-04 PROCEDURE — 84155 ASSAY OF PROTEIN SERUM: CPT | Performed by: NURSE PRACTITIONER

## 2022-11-04 PROCEDURE — 250N000011 HC RX IP 250 OP 636: Performed by: NURSE PRACTITIONER

## 2022-11-04 PROCEDURE — 97116 GAIT TRAINING THERAPY: CPT | Mod: GP

## 2022-11-04 PROCEDURE — 83735 ASSAY OF MAGNESIUM: CPT

## 2022-11-04 PROCEDURE — 82805 BLOOD GASES W/O2 SATURATION: CPT | Performed by: NURSE PRACTITIONER

## 2022-11-04 PROCEDURE — 84155 ASSAY OF PROTEIN SERUM: CPT | Performed by: INTERNAL MEDICINE

## 2022-11-04 PROCEDURE — 97530 THERAPEUTIC ACTIVITIES: CPT | Mod: GP

## 2022-11-04 PROCEDURE — 84450 TRANSFERASE (AST) (SGOT): CPT | Performed by: INTERNAL MEDICINE

## 2022-11-04 PROCEDURE — 250N000011 HC RX IP 250 OP 636

## 2022-11-04 PROCEDURE — 99291 CRITICAL CARE FIRST HOUR: CPT | Mod: FS | Performed by: NURSE PRACTITIONER

## 2022-11-04 RX ORDER — MAGNESIUM SULFATE HEPTAHYDRATE 40 MG/ML
2 INJECTION, SOLUTION INTRAVENOUS ONCE
Status: COMPLETED | OUTPATIENT
Start: 2022-11-04 | End: 2022-11-04

## 2022-11-04 RX ORDER — FUROSEMIDE 10 MG/ML
40 INJECTION INTRAMUSCULAR; INTRAVENOUS ONCE
Status: COMPLETED | OUTPATIENT
Start: 2022-11-04 | End: 2022-11-04

## 2022-11-04 RX ORDER — SENNOSIDES 8.6 MG
8.6 TABLET ORAL 2 TIMES DAILY
Status: DISCONTINUED | OUTPATIENT
Start: 2022-11-04 | End: 2022-11-05 | Stop reason: HOSPADM

## 2022-11-04 RX ADMIN — FUROSEMIDE 40 MG: 10 INJECTION, SOLUTION INTRAVENOUS at 16:59

## 2022-11-04 RX ADMIN — POTASSIUM & SODIUM PHOSPHATES POWDER PACK 280-160-250 MG 1 PACKET: 280-160-250 PACK at 14:18

## 2022-11-04 RX ADMIN — POTASSIUM & SODIUM PHOSPHATES POWDER PACK 280-160-250 MG 1 PACKET: 280-160-250 PACK at 09:36

## 2022-11-04 RX ADMIN — AMIODARONE HYDROCHLORIDE 200 MG: 200 TABLET ORAL at 07:47

## 2022-11-04 RX ADMIN — HEPARIN SODIUM 5000 UNITS: 5000 INJECTION, SOLUTION INTRAVENOUS; SUBCUTANEOUS at 06:44

## 2022-11-04 RX ADMIN — POTASSIUM & SODIUM PHOSPHATES POWDER PACK 280-160-250 MG 1 PACKET: 280-160-250 PACK at 06:44

## 2022-11-04 RX ADMIN — Medication 0.3 MG: at 07:47

## 2022-11-04 RX ADMIN — HEPARIN SODIUM 5000 UNITS: 5000 INJECTION, SOLUTION INTRAVENOUS; SUBCUTANEOUS at 22:25

## 2022-11-04 RX ADMIN — HEPARIN SODIUM 5000 UNITS: 5000 INJECTION, SOLUTION INTRAVENOUS; SUBCUTANEOUS at 14:18

## 2022-11-04 RX ADMIN — MAGNESIUM SULFATE HEPTAHYDRATE 2 G: 40 INJECTION, SOLUTION INTRAVENOUS at 06:44

## 2022-11-04 ASSESSMENT — ACTIVITIES OF DAILY LIVING (ADL)
ADLS_ACUITY_SCORE: 41
DEPENDENT_IADLS:: CLEANING;COOKING;LAUNDRY;SHOPPING;MEAL PREPARATION;MEDICATION MANAGEMENT;MONEY MANAGEMENT;TRANSPORTATION
ADLS_ACUITY_SCORE: 41

## 2022-11-04 NOTE — PLAN OF CARE
ICU End of Shift Summary. See flowsheets for vital signs and detailed assessment.    Changes this shift: A&Ox4, follows commands appropriately. RA, VSS, MAP >65. Dobutamine off this morning and Arterial line removed. One dose 40mg Lasix given with good effect, multiple Bm's on bedside commode.     Plan: Transfer to [facility] when bed available.       Plan of Care Reviewed With: patient, sibling    Overall Patient Progress: improvingOverall Patient Progress: improving    Outcome Evaluation: Dobutamine off. A. line removed. VSS on RA. PT/OT today.

## 2022-11-04 NOTE — PROGRESS NOTES
Cardiology ICU Progress Note    Brief HPI: Rohit Garvey is a 67 year old man with PMHx of NICM, paroxysmal VT s/p dual chamber ICD (6/2022) who was admitted 10/31/22 after elective endocardial and epicardial VT ablation. Patient was transferred to the ICU on 11/1 with c/f cardiogenic shock.     Subjective and Interval: NAEO. Patient slept well. Net neg yesterday with lasix. Denies SOB, chest pain. Yesterday, failed trial of dobutamine off with a SVO2 of 47% and hypotension requiring Levophed briefly, stopped antibiotics, PT/OT ordered    Assessment and plan by system:   Today's changes:  Trial Dobutamine wean again with repeat labs at 1100  Bowel reg  Add on LFT's if not already drawn  Add on A1c  Follow up BMP (pending     Neurologic  # History of developmental delay   -no acute issues      Cardiovascular  # Cardiogenic shock  # Paroxysmal VT s/p dual chamber ICD  and VT ablation 10/31/22  # Chronic systolic heart failure/HFrEF (EF 35-40) secondary to nonischemic cardiomyopathy   # Hx of VT s/p ablation 10/31/22   # HTN  # HLD    # Pericardial effusion s/p pericardial drain. Pulled 11/2  Favoring cardiogenic shock perhaps from myocardial stunning as the etiology of his decompensation (had 5 different VT foci ablated on 10/31). His response to dobutamine supports this.    TTE 11/2- trivial effusion, dilated IVC  TTE 11/1- EF 35-40%, mod diffuse hypokinesis, small effusion    Pressors/Inotropes  Remains on Dobutamine @ 2.5 mcg/kg  Off pressors since yest, 11/3 10 am.     Hemodynamics:  SvO2 61  Lactic today pending, previously WNL    GDMT:  ACE-I/ARB/ARNi: holding home lisinopril 5   BB: holding home coreg 12.5   Aldosterone antagonist: not on PTA   SGLT2i: holding home jardiance with ROSALINO   Diuretic: hold home lasix 20 mg daily   SCD prophylaxis: ICD Medtronic   Statin: holding home atorvastatin with elevated LFTs     Plan:  - trial wean off Dobutamine  - EP following and appreciate recommendations. Will  continue amiodarone 200 mg daily, stopped mexiletine per their recs   - continue colchicine 0.3 mg daily for post epicardial ablation pericarditis prophylaxis      Pulmonary  No active issues  -on room air         Gastrointestinal, Nutrition  # Elevated transaminases   # Cholelithiasis without cholecystitis.  Todays LFT's pending. ALT climbing. yest 502-->535, AST downtrending yest 386 (421) Tbili WNL  No abdominal pain. Suspect from ischemic hepatitis when he was hypotensive. Abdominal US with cholelithiasis without evidence of cholecystitis.   Nutrition: Regular diet  BM: Bowel reg in place with Miralax    Plan:  -trend LFTs       Renal, Electrolytes  # ROSALINO   # Hyponatremia  # Hypophosphatemia  # Hypoalbuminemia  Baseline Cr ~1. Suspect ATN from hypotension.   BMP pending this am. Cr yest  1.32 (1.34)  I/O's: 2.3 L UOP yest, net neg 1.3 L yest    Plan:  - Strict I/O's    Infectious Disease  # Leukocytosis- resolved  WBC WNL 7.5, tmax 99  Cultures negative thus far  MRSA neg  Antibiotics:   Ceftriaxone 11/1-11/3   Flagyl 11/1- 11/3   Vanc 11/1- 11/3  Plan:  -follow blood cultures from 11/1,      Hematology  # Anemia, acute blood loss, critical illness  # Thrombocytopenia  Stable counts. Hgb 9.1 (9.1) Plt 123 (113)     Plan:  Heparin for DVT Prophy    Endocrinology  # DM2  Blood sugars last 24 hours 128-202    -increased Lantus to 15 units at bedtime   -increase sliding scale insulin to 1:25 correction factor     Plan:  Continue to hold metformin and jardiance   Add on A1c    Integumentary  - No skin issues    Lines/tubes/drains:   PIV x2, RUE PICC, R radial arterial line, aviles      ICU:  #Feeding: regular  #Analgesia: n/a  #Sedation: n/a  #Thromboembolism ppx: heparin  #HOB: 30 degrees   #Ulcer ppx: n/a  #Glucose: Lantus and SSI  #Family: updated at bedside   #Dispo: ICU     Patient seen and discussed with staff physician.    Time Spent on this Encounter   I spent 45 minutes on the unit/floor managing the care  "of Rohit Garvey. Over 50% of my time was spent on the following:   - Counseling the patient and/or family regarding: diagnostic results, prognosis, risks and benefits of treatment options, recommended follow-up and medical compliance  - Coordination of care with the: consultant(s), care coordinator/ and nurse  Specifically plan of care, medications, therapy and rehab, nutrition and incentive spirometry    LINDA Cantor CNP, APRN, CNP  [facility]  Interventional Cardiology-CSI Service  Pager [phone]     Objective:  Most recent vital signs:  BP (!) 172/76   Pulse 79   Temp 97.6  F (36.4  C) (Oral)   Resp 14   Ht 1.626 m (5' 4\")   Wt 76.6 kg (168 lb 14 oz)   SpO2 99%   BMI 28.99 kg/m    Temp:  [97.6  F (36.4  C)-99  F (37.2  C)] 97.6  F (36.4  C)  Pulse:  [78-80] 79  Resp:  [13-29] 14  MAP:  [62 mmHg-90 mmHg] 88 mmHg  Arterial Line BP: ()/(42-71) 137/65  SpO2:  [95 %-99 %] 99 %  Wt Readings from Last 2 Encounters:   11/04/22 76.6 kg (168 lb 14 oz)       Intake/Output Summary (Last 24 hours) at 11/4/2022 0556  Last data filed at 11/4/2022 0500  Gross per 24 hour   Intake 1090.82 ml   Output 2400 ml   Net -1309.18 ml     Physical exam:  General: In bed, in NAD  HEENT: PERRL, no scleral icterus or injection  CARDIAC: RRR, no m/r/g appreciated. Peripheral pulses dopplered  RESP: room air, CTAB, no wheezes, rhonchi or crackles appreciated.  GI: soft, BS hypoactive  :no aviles  EXTREMITIES: NO LE edema, pulses 2+. Femoral access site w/o bleeding, dressing c/d/i.   SKIN: No acute lesions appreciated  NEURO: A & O x 4    Labs (Past three days):  CBC  Recent Labs   Lab 11/04/22  0353 11/03/22  1610 11/03/22  0415 11/02/22  1656   WBC 7.5 8.0 9.1 12.0*   RBC 3.04* 3.08* 3.06* 3.28*   HGB 9.1* 9.1* 9.0* 9.8*   HCT 26.9* 26.6* 26.6* 28.8*   MCV 89 86 87 88   MCH 29.9 29.5 29.4 29.9   MCHC 33.8 34.2 33.8 34.0   RDW 18.0* 18.0* 18.2* 18.2* "   * 113* 120* 148*     BMP  Recent Labs   Lab 22  0353 22  0214 22  2151 22  1615 22  1610 22  0805 22  0415 22  2205 22  1656 22  0753 22  0355 22  1706 22  1641   NA  --   --   --   --  132*  --  134*  --  130*  --  133*   < > 134*   POTASSIUM  --   --   --   --  3.5  --  3.9  --  4.2  --  4.3   < > 4.8   CHLORIDE  --   --   --   --  102  --  104  --  101  --  103   < > 101   CO2  --   --   --   --  20*  --  21*  --  20*  --  18*   < > 19*   ANIONGAP  --   --   --   --  10  --  9  --  9  --  12   < > 14   GLC  --  128* 166* 179* 186*   < > 143*   < > 261*   < > 229*  249*   < > 313*   BUN  --   --   --   --  27.7*  --  32.9*  --  39.0*  --  40.9*   < > 37.6*   CR  --   --   --   --  1.32*  --  1.34*  --  1.70*  --  1.86*   < > 1.96*   GFRESTIMATED  --   --   --   --  59*  --  58*  --  44*  --  39*   < > 37*   NEETA  --   --   --   --  7.7*  --  7.7*  --  7.7*  --  7.9*   < > 7.8*   MAG 1.9  --   --   --   --   --  2.2  --   --   --  2.8*  --  1.5*   PHOS 2.3*  --   --   --   --   --  2.4*  --   --   --  4.2  --  4.0    < > = values in this interval not displayed.     Troponins:     INR  Recent Labs   Lab 10/31/22  2119   INR 1.23*     Liver panel  Recent Labs   Lab 22  1610 22  0415 22  1656 22  0355   PROTTOTAL 5.4* 5.3* 5.7* 5.8*   ALBUMIN 3.0* 3.1* 3.4* 3.6   BILITOTAL 0.8 0.8 1.1 1.0   ALKPHOS 168* 155* 164* 139*   * 421* 404* 262*   * 502* 452* 275*       Imaging/procedure results:  Echocardiogram Limited  [id number]  [id number]  [id number]  [id number]^VILMA^BUDDY^JOSSELYN     [facility],[place]  Echocardiography Laboratory  [address] [place], [place] [id number]     Name: RONEY SLOAN  MRN: [id number]  : 1955  Study Date: 2022 11:07 AM  Age: 67 yrs  Gender: Male  [place] [facility] [place] For Study: Pericardial Effusion  Ordering Physician: VILMA  BUDDY ARCOS  Referring Physician: KRYSTAL FOLEY  Performed By: Adriana Pearson RDCS     BSA: 1.7 m2  Height: 64 in  Weight: 152 lb  BP: 172/76 mmHg  ______________________________________________________________________________  Procedure  Limited Portable Echo Adult.  ______________________________________________________________________________  Interpretation Summary  Trivial pericardial effusion is present.  Dilation of the inferior vena cava is present with normal respiratory  variation in diameter.  ______________________________________________________________________________  Vessels  Dilation of the inferior vena cava is present with normal respiratory  variation in diameter.     Pericardium  Trivial pericardial effusion is present.     ______________________________________________________________________________  Report approved by: Sarah Stoddard 11/02/2022 11:53 AM     ______________________________________________________________________________

## 2022-11-04 NOTE — CONSULTS
Care Management Initial Consult    General Information  Assessment completed with: Patient, Family,    Type of CM/SW Visit: Initial Assessment    Primary Care Provider verified and updated as needed:     Readmission within the last 30 days: no previous admission in last 30 days   Return Category: Progression of disease  Reason for Consult: discharge planning  Advance Care Planning: Advance Care Planning Reviewed: no concerns identified, questions answered, present on chart          Communication Assessment  Patient's communication style: spoken language (English or Bilingual)    Hearing Difficulty or Deaf: yes   Wear Glasses or Blind: no    Cognitive  Cognitive/Neuro/Behavioral: WDL  Level of Consciousness: alert  Arousal Level: opens eyes spontaneously  Orientation: oriented x 4  Mood/Behavior: calm, cooperative  Best Language: 0 - No aphasia  Speech: clear, spontaneous, logical    Living Environment:   People in home: other (see comments) (Residents of assisted living)     Current living Arrangements: residential facility  Name of Facility: [facility] [place]   Able to return to prior arrangements: yes       Family/Social Support:  Care provided by: other (see comments) (staff of [facility])  Provides care for: no one  Marital Status: Single  Sibling(s)          Description of Support System: Supportive, Involved    Support Assessment: Adequate family and caregiver support, Adequate social supports    Current Resources:   Patient receiving home care services: No     Community Resources: None  Equipment currently used at home: walker, rolling, grab bar, tub/shower, shower chair  Supplies currently used at home: None    Employment/Financial:  Employment Status: disabled        Financial Concerns: No concerns identified   Referral to Financial Worker: No       Lifestyle & Psychosocial Needs:  Social Determinants of Health     Tobacco Use: Low Risk      Smoking Tobacco Use: Never     Smokeless Tobacco Use: Never      Passive Exposure: Not on file   Alcohol Use: Not on file   Financial Resource Strain: Not on file   Food Insecurity: Not on file   Transportation Needs: Not on file   Physical Activity: Not on file   Stress: Not on file   Social Connections: Not on file   Intimate Partner Violence: Not on file   Depression: Not at risk     PHQ-2 Score: 2   Housing Stability: Not on file       Functional Status:  Prior to admission patient needed assistance:   Dependent ADLs:: Independent  Dependent IADLs:: Cleaning, Cooking, Laundry, Shopping, Meal Preparation, Medication Management, Money Management, Transportation  Assesssment of Functional Status: Not at baseline with ADL Functioning, Not at baseline with mobility    Mental Health Status:  Mental Health Status: No Current Concerns       Chemical Dependency Status:  Chemical Dependency Status: No Current Concerns             Values/Beliefs:  Spiritual, Cultural Beliefs, Sikh Practices, Values that affect care: no               Additional Information:  Met with patient and sister in patients room. Patient lives at [facility] in [place], [place]. He is mostly independent at baseline but has services at the [facility] for meals, laundry, and medication management by a RN at the facility. Patients sister is his health care directive person. She stated that she and her  will drive patient back to [facility] at discharge. Writer spoke to Naldo RN who stated they work with [facility] for home PT/OT. Writer  made referrals to [facility]  for home PT/OT to see patient at his [facility]. RNCC/SW will continue to follow for needs.     Update: [facility] can accept for PT/OT-faxed over orders. Will need to fax over discharge paperwork when patient discharges.     [facility]  [phone]    [facility]-PT/OT (Accepted)   P:[phone]  F;[phone]    Maddie He, RN, BSN  RN Care Coordinator    [facility]  [phone]         Maddie He RN

## 2022-11-04 NOTE — PLAN OF CARE
ICU End of Shift Summary. See flowsheets for vital signs and detailed assessment.    Changes this shift: Appears to have slept well. On room air overnight. Levophed remains off, MAPs > 65. BM x1. Voiding well.     Plan: Continue with plan of care.      Goal Outcome Evaluation:      Plan of Care Reviewed With: patient    Overall Patient Progress: improvingOverall Patient Progress: improving    Outcome Evaluation: VSS on RA. Up Ax1 to bedside commode. Dobutamine @ 2.5

## 2022-11-05 VITALS
DIASTOLIC BLOOD PRESSURE: 61 MMHG | TEMPERATURE: 98.4 F | SYSTOLIC BLOOD PRESSURE: 101 MMHG | RESPIRATION RATE: 16 BRPM | OXYGEN SATURATION: 97 % | BODY MASS INDEX: 27.78 KG/M2 | HEIGHT: 64 IN | HEART RATE: 80 BPM | WEIGHT: 162.7 LBS

## 2022-11-05 LAB
ALBUMIN SERPL BCG-MCNC: 3.2 G/DL (ref 3.5–5.2)
ALP SERPL-CCNC: 197 U/L (ref 35–129)
ALT SERPL W P-5'-P-CCNC: 472 U/L (ref 10–50)
ANION GAP SERPL CALCULATED.3IONS-SCNC: 11 MMOL/L (ref 7–15)
AST SERPL W P-5'-P-CCNC: 256 U/L (ref 10–50)
BASE EXCESS BLDV CALC-SCNC: 2.6 MMOL/L (ref -7.7–1.9)
BASOPHILS # BLD AUTO: 0 10E3/UL (ref 0–0.2)
BASOPHILS NFR BLD AUTO: 0 %
BILIRUB SERPL-MCNC: 0.9 MG/DL
BUN SERPL-MCNC: 15.2 MG/DL (ref 8–23)
CALCIUM SERPL-MCNC: 7.5 MG/DL (ref 8.8–10.2)
CHLORIDE SERPL-SCNC: 102 MMOL/L (ref 98–107)
CREAT SERPL-MCNC: 1.03 MG/DL (ref 0.51–1.17)
DEPRECATED HCO3 PLAS-SCNC: 22 MMOL/L (ref 22–29)
EOSINOPHIL # BLD AUTO: 0.5 10E3/UL (ref 0–0.7)
EOSINOPHIL NFR BLD AUTO: 9 %
ERYTHROCYTE [DISTWIDTH] IN BLOOD BY AUTOMATED COUNT: 17.8 % (ref 10–15)
GFR SERPL CREATININE-BSD FRML MDRD: 80 ML/MIN/1.73M2
GLUCOSE BLDC GLUCOMTR-MCNC: 115 MG/DL (ref 70–99)
GLUCOSE SERPL-MCNC: 115 MG/DL (ref 70–99)
HCO3 BLDV-SCNC: 28 MMOL/L (ref 21–28)
HCT VFR BLD AUTO: 28.1 % (ref 35–53)
HGB BLD-MCNC: 9.2 G/DL (ref 11.7–17.7)
HOLD SPECIMEN: NORMAL
HOLD SPECIMEN: NORMAL
IMM GRANULOCYTES # BLD: 0 10E3/UL
IMM GRANULOCYTES NFR BLD: 1 %
LYMPHOCYTES # BLD AUTO: 0.5 10E3/UL (ref 0.8–5.3)
LYMPHOCYTES NFR BLD AUTO: 8 %
MAGNESIUM SERPL-MCNC: 1.9 MG/DL (ref 1.7–2.3)
MCH RBC QN AUTO: 29.5 PG (ref 26.5–33)
MCHC RBC AUTO-ENTMCNC: 32.7 G/DL (ref 31.5–36.5)
MCV RBC AUTO: 90 FL (ref 78–100)
MDC_IDC_EPISODE_DTM: NORMAL
MDC_IDC_EPISODE_DURATION: 1 S
MDC_IDC_EPISODE_DURATION: 10 S
MDC_IDC_EPISODE_DURATION: 10 S
MDC_IDC_EPISODE_DURATION: 106 S
MDC_IDC_EPISODE_DURATION: 11 S
MDC_IDC_EPISODE_DURATION: 126 S
MDC_IDC_EPISODE_DURATION: 13 S
MDC_IDC_EPISODE_DURATION: 13 S
MDC_IDC_EPISODE_DURATION: 141 S
MDC_IDC_EPISODE_DURATION: 157 S
MDC_IDC_EPISODE_DURATION: 16 S
MDC_IDC_EPISODE_DURATION: 17 S
MDC_IDC_EPISODE_DURATION: 2 S
MDC_IDC_EPISODE_DURATION: 20 S
MDC_IDC_EPISODE_DURATION: 206 S
MDC_IDC_EPISODE_DURATION: 221 S
MDC_IDC_EPISODE_DURATION: 23 S
MDC_IDC_EPISODE_DURATION: 26 S
MDC_IDC_EPISODE_DURATION: 28 S
MDC_IDC_EPISODE_DURATION: 3 S
MDC_IDC_EPISODE_DURATION: 30 S
MDC_IDC_EPISODE_DURATION: 37 S
MDC_IDC_EPISODE_DURATION: 39 S
MDC_IDC_EPISODE_DURATION: 4 S
MDC_IDC_EPISODE_DURATION: 4 S
MDC_IDC_EPISODE_DURATION: 43 S
MDC_IDC_EPISODE_DURATION: 43 S
MDC_IDC_EPISODE_DURATION: 44 S
MDC_IDC_EPISODE_DURATION: 5 S
MDC_IDC_EPISODE_DURATION: 5 S
MDC_IDC_EPISODE_DURATION: 502 S
MDC_IDC_EPISODE_DURATION: 56 S
MDC_IDC_EPISODE_DURATION: 646 S
MDC_IDC_EPISODE_DURATION: 72 S
MDC_IDC_EPISODE_DURATION: 77 S
MDC_IDC_EPISODE_DURATION: 808 S
MDC_IDC_EPISODE_DURATION: 9 S
MDC_IDC_EPISODE_DURATION: 91 S
MDC_IDC_EPISODE_DURATION: 93 S
MDC_IDC_EPISODE_DURATION: 93 S
MDC_IDC_EPISODE_DURATION: 96 S
MDC_IDC_EPISODE_ID: 1
MDC_IDC_EPISODE_ID: 102
MDC_IDC_EPISODE_ID: 114
MDC_IDC_EPISODE_ID: 115
MDC_IDC_EPISODE_ID: 152
MDC_IDC_EPISODE_ID: 177
MDC_IDC_EPISODE_ID: 179
MDC_IDC_EPISODE_ID: 203
MDC_IDC_EPISODE_ID: 210
MDC_IDC_EPISODE_ID: 214
MDC_IDC_EPISODE_ID: 216
MDC_IDC_EPISODE_ID: 225
MDC_IDC_EPISODE_ID: 234
MDC_IDC_EPISODE_ID: 237
MDC_IDC_EPISODE_ID: 238
MDC_IDC_EPISODE_ID: 24
MDC_IDC_EPISODE_ID: 253
MDC_IDC_EPISODE_ID: 254
MDC_IDC_EPISODE_ID: 255
MDC_IDC_EPISODE_ID: 260
MDC_IDC_EPISODE_ID: 267
MDC_IDC_EPISODE_ID: 273
MDC_IDC_EPISODE_ID: 294
MDC_IDC_EPISODE_ID: 295
MDC_IDC_EPISODE_ID: 30
MDC_IDC_EPISODE_ID: 301
MDC_IDC_EPISODE_ID: 315
MDC_IDC_EPISODE_ID: 319
MDC_IDC_EPISODE_ID: 335
MDC_IDC_EPISODE_ID: 336
MDC_IDC_EPISODE_ID: 337
MDC_IDC_EPISODE_ID: 338
MDC_IDC_EPISODE_ID: 339
MDC_IDC_EPISODE_ID: 340
MDC_IDC_EPISODE_ID: 341
MDC_IDC_EPISODE_ID: 342
MDC_IDC_EPISODE_ID: 343
MDC_IDC_EPISODE_ID: 344
MDC_IDC_EPISODE_ID: 345
MDC_IDC_EPISODE_ID: 346
MDC_IDC_EPISODE_ID: 347
MDC_IDC_EPISODE_ID: 348
MDC_IDC_EPISODE_ID: 349
MDC_IDC_EPISODE_ID: 350
MDC_IDC_EPISODE_ID: 351
MDC_IDC_EPISODE_ID: 352
MDC_IDC_EPISODE_ID: 353
MDC_IDC_EPISODE_ID: 354
MDC_IDC_EPISODE_ID: 355
MDC_IDC_EPISODE_ID: 356
MDC_IDC_EPISODE_ID: 357
MDC_IDC_EPISODE_ID: 4
MDC_IDC_EPISODE_ID: 43
MDC_IDC_EPISODE_ID: 45
MDC_IDC_EPISODE_ID: 49
MDC_IDC_EPISODE_ID: 5
MDC_IDC_EPISODE_ID: 50
MDC_IDC_EPISODE_ID: 73
MDC_IDC_EPISODE_ID: 79
MDC_IDC_EPISODE_ID: 8
MDC_IDC_EPISODE_ID: 85
MDC_IDC_EPISODE_ID: 87
MDC_IDC_EPISODE_ID: 92
MDC_IDC_EPISODE_ID: 96
MDC_IDC_EPISODE_TYPE: NORMAL
MDC_IDC_LEAD_IMPLANT_DT: NORMAL
MDC_IDC_LEAD_LOCATION: NORMAL
MDC_IDC_LEAD_LOCATION_DETAIL_1: NORMAL
MDC_IDC_LEAD_MFG: NORMAL
MDC_IDC_LEAD_MODEL: NORMAL
MDC_IDC_LEAD_POLARITY_TYPE: NORMAL
MDC_IDC_LEAD_SERIAL: NORMAL
MDC_IDC_LEAD_SPECIAL_FUNCTION: NORMAL
MDC_IDC_MSMT_BATTERY_DTM: NORMAL
MDC_IDC_MSMT_BATTERY_DTM: NORMAL
MDC_IDC_MSMT_BATTERY_REMAINING_LONGEVITY: 68 MO
MDC_IDC_MSMT_BATTERY_REMAINING_LONGEVITY: 76 MO
MDC_IDC_MSMT_BATTERY_RRT_TRIGGER: 2.73
MDC_IDC_MSMT_BATTERY_RRT_TRIGGER: 2.73
MDC_IDC_MSMT_BATTERY_STATUS: NORMAL
MDC_IDC_MSMT_BATTERY_STATUS: NORMAL
MDC_IDC_MSMT_BATTERY_VOLTAGE: 2.85 V
MDC_IDC_MSMT_BATTERY_VOLTAGE: 2.98 V
MDC_IDC_MSMT_CAP_CHARGE_DTM: NORMAL
MDC_IDC_MSMT_CAP_CHARGE_ENERGY: 18 J
MDC_IDC_MSMT_CAP_CHARGE_ENERGY: 18 J
MDC_IDC_MSMT_CAP_CHARGE_ENERGY: 35 J
MDC_IDC_MSMT_CAP_CHARGE_ENERGY: 35 J
MDC_IDC_MSMT_CAP_CHARGE_TIME: 3.7
MDC_IDC_MSMT_CAP_CHARGE_TIME: 3.7
MDC_IDC_MSMT_CAP_CHARGE_TIME: 7.84
MDC_IDC_MSMT_CAP_CHARGE_TIME: 7.84
MDC_IDC_MSMT_CAP_CHARGE_TYPE: NORMAL
MDC_IDC_MSMT_LEADCHNL_RA_IMPEDANCE_VALUE: 399 OHM
MDC_IDC_MSMT_LEADCHNL_RA_IMPEDANCE_VALUE: 437 OHM
MDC_IDC_MSMT_LEADCHNL_RA_PACING_THRESHOLD_AMPLITUDE: 1.25 V
MDC_IDC_MSMT_LEADCHNL_RA_PACING_THRESHOLD_AMPLITUDE: 1.25 V
MDC_IDC_MSMT_LEADCHNL_RA_PACING_THRESHOLD_PULSEWIDTH: 0.4 MS
MDC_IDC_MSMT_LEADCHNL_RA_PACING_THRESHOLD_PULSEWIDTH: 0.4 MS
MDC_IDC_MSMT_LEADCHNL_RA_SENSING_INTR_AMPL: 0.5 MV
MDC_IDC_MSMT_LEADCHNL_RA_SENSING_INTR_AMPL: 0.6 MV
MDC_IDC_MSMT_LEADCHNL_RV_IMPEDANCE_VALUE: 266 OHM
MDC_IDC_MSMT_LEADCHNL_RV_IMPEDANCE_VALUE: 266 OHM
MDC_IDC_MSMT_LEADCHNL_RV_IMPEDANCE_VALUE: 342 OHM
MDC_IDC_MSMT_LEADCHNL_RV_IMPEDANCE_VALUE: 342 OHM
MDC_IDC_MSMT_LEADCHNL_RV_PACING_THRESHOLD_AMPLITUDE: 1.75 V
MDC_IDC_MSMT_LEADCHNL_RV_PACING_THRESHOLD_AMPLITUDE: 2 V
MDC_IDC_MSMT_LEADCHNL_RV_PACING_THRESHOLD_PULSEWIDTH: 0.4 MS
MDC_IDC_MSMT_LEADCHNL_RV_PACING_THRESHOLD_PULSEWIDTH: 0.4 MS
MDC_IDC_MSMT_LEADCHNL_RV_SENSING_INTR_AMPL: 5.1 MV
MDC_IDC_PG_IMPLANT_DTM: NORMAL
MDC_IDC_PG_IMPLANT_DTM: NORMAL
MDC_IDC_PG_MFG: NORMAL
MDC_IDC_PG_MFG: NORMAL
MDC_IDC_PG_MODEL: NORMAL
MDC_IDC_PG_MODEL: NORMAL
MDC_IDC_PG_SERIAL: NORMAL
MDC_IDC_PG_SERIAL: NORMAL
MDC_IDC_PG_TYPE: NORMAL
MDC_IDC_PG_TYPE: NORMAL
MDC_IDC_SESS_CLINIC_NAME: NORMAL
MDC_IDC_SESS_CLINIC_NAME: NORMAL
MDC_IDC_SESS_DTM: NORMAL
MDC_IDC_SESS_DTM: NORMAL
MDC_IDC_SESS_TYPE: NORMAL
MDC_IDC_SESS_TYPE: NORMAL
MDC_IDC_SET_BRADY_AT_MODE_SWITCH_RATE: 171 {BEATS}/MIN
MDC_IDC_SET_BRADY_AT_MODE_SWITCH_RATE: 171 {BEATS}/MIN
MDC_IDC_SET_BRADY_HYSTRATE: NORMAL
MDC_IDC_SET_BRADY_HYSTRATE: NORMAL
MDC_IDC_SET_BRADY_LOWRATE: 50 {BEATS}/MIN
MDC_IDC_SET_BRADY_LOWRATE: 60 {BEATS}/MIN
MDC_IDC_SET_BRADY_MAX_SENSOR_RATE: 110 {BEATS}/MIN
MDC_IDC_SET_BRADY_MAX_SENSOR_RATE: 110 {BEATS}/MIN
MDC_IDC_SET_BRADY_MAX_TRACKING_RATE: 110 {BEATS}/MIN
MDC_IDC_SET_BRADY_MAX_TRACKING_RATE: 110 {BEATS}/MIN
MDC_IDC_SET_BRADY_MODE: NORMAL
MDC_IDC_SET_BRADY_MODE: NORMAL
MDC_IDC_SET_BRADY_PAV_DELAY_LOW: 180 MS
MDC_IDC_SET_BRADY_PAV_DELAY_LOW: 180 MS
MDC_IDC_SET_BRADY_SAV_DELAY_LOW: 150 MS
MDC_IDC_SET_BRADY_SAV_DELAY_LOW: 150 MS
MDC_IDC_SET_LEADCHNL_RA_PACING_AMPLITUDE: 2.25 V
MDC_IDC_SET_LEADCHNL_RA_PACING_AMPLITUDE: 2.25 V
MDC_IDC_SET_LEADCHNL_RA_PACING_ANODE_ELECTRODE_1: NORMAL
MDC_IDC_SET_LEADCHNL_RA_PACING_ANODE_ELECTRODE_1: NORMAL
MDC_IDC_SET_LEADCHNL_RA_PACING_ANODE_LOCATION_1: NORMAL
MDC_IDC_SET_LEADCHNL_RA_PACING_ANODE_LOCATION_1: NORMAL
MDC_IDC_SET_LEADCHNL_RA_PACING_CAPTURE_MODE: NORMAL
MDC_IDC_SET_LEADCHNL_RA_PACING_CAPTURE_MODE: NORMAL
MDC_IDC_SET_LEADCHNL_RA_PACING_CATHODE_ELECTRODE_1: NORMAL
MDC_IDC_SET_LEADCHNL_RA_PACING_CATHODE_ELECTRODE_1: NORMAL
MDC_IDC_SET_LEADCHNL_RA_PACING_CATHODE_LOCATION_1: NORMAL
MDC_IDC_SET_LEADCHNL_RA_PACING_CATHODE_LOCATION_1: NORMAL
MDC_IDC_SET_LEADCHNL_RA_PACING_POLARITY: NORMAL
MDC_IDC_SET_LEADCHNL_RA_PACING_POLARITY: NORMAL
MDC_IDC_SET_LEADCHNL_RA_PACING_PULSEWIDTH: 0.4 MS
MDC_IDC_SET_LEADCHNL_RA_PACING_PULSEWIDTH: 0.4 MS
MDC_IDC_SET_LEADCHNL_RA_SENSING_ANODE_ELECTRODE_1: NORMAL
MDC_IDC_SET_LEADCHNL_RA_SENSING_ANODE_ELECTRODE_1: NORMAL
MDC_IDC_SET_LEADCHNL_RA_SENSING_ANODE_LOCATION_1: NORMAL
MDC_IDC_SET_LEADCHNL_RA_SENSING_ANODE_LOCATION_1: NORMAL
MDC_IDC_SET_LEADCHNL_RA_SENSING_CATHODE_ELECTRODE_1: NORMAL
MDC_IDC_SET_LEADCHNL_RA_SENSING_CATHODE_ELECTRODE_1: NORMAL
MDC_IDC_SET_LEADCHNL_RA_SENSING_CATHODE_LOCATION_1: NORMAL
MDC_IDC_SET_LEADCHNL_RA_SENSING_CATHODE_LOCATION_1: NORMAL
MDC_IDC_SET_LEADCHNL_RA_SENSING_POLARITY: NORMAL
MDC_IDC_SET_LEADCHNL_RA_SENSING_POLARITY: NORMAL
MDC_IDC_SET_LEADCHNL_RA_SENSING_SENSITIVITY: 0.3 MV
MDC_IDC_SET_LEADCHNL_RA_SENSING_SENSITIVITY: 0.3 MV
MDC_IDC_SET_LEADCHNL_RV_PACING_AMPLITUDE: 3.5 V
MDC_IDC_SET_LEADCHNL_RV_PACING_AMPLITUDE: 4 V
MDC_IDC_SET_LEADCHNL_RV_PACING_ANODE_ELECTRODE_1: NORMAL
MDC_IDC_SET_LEADCHNL_RV_PACING_ANODE_ELECTRODE_1: NORMAL
MDC_IDC_SET_LEADCHNL_RV_PACING_ANODE_LOCATION_1: NORMAL
MDC_IDC_SET_LEADCHNL_RV_PACING_ANODE_LOCATION_1: NORMAL
MDC_IDC_SET_LEADCHNL_RV_PACING_CAPTURE_MODE: NORMAL
MDC_IDC_SET_LEADCHNL_RV_PACING_CAPTURE_MODE: NORMAL
MDC_IDC_SET_LEADCHNL_RV_PACING_CATHODE_ELECTRODE_1: NORMAL
MDC_IDC_SET_LEADCHNL_RV_PACING_CATHODE_ELECTRODE_1: NORMAL
MDC_IDC_SET_LEADCHNL_RV_PACING_CATHODE_LOCATION_1: NORMAL
MDC_IDC_SET_LEADCHNL_RV_PACING_CATHODE_LOCATION_1: NORMAL
MDC_IDC_SET_LEADCHNL_RV_PACING_POLARITY: NORMAL
MDC_IDC_SET_LEADCHNL_RV_PACING_POLARITY: NORMAL
MDC_IDC_SET_LEADCHNL_RV_PACING_PULSEWIDTH: 0.4 MS
MDC_IDC_SET_LEADCHNL_RV_PACING_PULSEWIDTH: 0.4 MS
MDC_IDC_SET_LEADCHNL_RV_SENSING_ANODE_ELECTRODE_1: NORMAL
MDC_IDC_SET_LEADCHNL_RV_SENSING_ANODE_ELECTRODE_1: NORMAL
MDC_IDC_SET_LEADCHNL_RV_SENSING_ANODE_LOCATION_1: NORMAL
MDC_IDC_SET_LEADCHNL_RV_SENSING_ANODE_LOCATION_1: NORMAL
MDC_IDC_SET_LEADCHNL_RV_SENSING_CATHODE_ELECTRODE_1: NORMAL
MDC_IDC_SET_LEADCHNL_RV_SENSING_CATHODE_ELECTRODE_1: NORMAL
MDC_IDC_SET_LEADCHNL_RV_SENSING_CATHODE_LOCATION_1: NORMAL
MDC_IDC_SET_LEADCHNL_RV_SENSING_CATHODE_LOCATION_1: NORMAL
MDC_IDC_SET_LEADCHNL_RV_SENSING_POLARITY: NORMAL
MDC_IDC_SET_LEADCHNL_RV_SENSING_POLARITY: NORMAL
MDC_IDC_SET_LEADCHNL_RV_SENSING_SENSITIVITY: 0.3 MV
MDC_IDC_SET_LEADCHNL_RV_SENSING_SENSITIVITY: 0.3 MV
MDC_IDC_SET_ZONE_DETECTION_BEATS_DENOMINATOR: 40 {BEATS}
MDC_IDC_SET_ZONE_DETECTION_BEATS_DENOMINATOR: 40 {BEATS}
MDC_IDC_SET_ZONE_DETECTION_BEATS_NUMERATOR: 30 {BEATS}
MDC_IDC_SET_ZONE_DETECTION_BEATS_NUMERATOR: 30 {BEATS}
MDC_IDC_SET_ZONE_DETECTION_INTERVAL: 300 MS
MDC_IDC_SET_ZONE_DETECTION_INTERVAL: 300 MS
MDC_IDC_SET_ZONE_DETECTION_INTERVAL: 350 MS
MDC_IDC_SET_ZONE_DETECTION_INTERVAL: 350 MS
MDC_IDC_SET_ZONE_DETECTION_INTERVAL: 510 MS
MDC_IDC_SET_ZONE_DETECTION_INTERVAL: 510 MS
MDC_IDC_SET_ZONE_DETECTION_INTERVAL: 540 MS
MDC_IDC_SET_ZONE_DETECTION_INTERVAL: 540 MS
MDC_IDC_SET_ZONE_DETECTION_INTERVAL: NORMAL
MDC_IDC_SET_ZONE_DETECTION_INTERVAL: NORMAL
MDC_IDC_SET_ZONE_TYPE: NORMAL
MDC_IDC_STAT_AT_BURDEN_PERCENT: 0 %
MDC_IDC_STAT_AT_BURDEN_PERCENT: 0 %
MDC_IDC_STAT_AT_DTM_END: NORMAL
MDC_IDC_STAT_AT_DTM_END: NORMAL
MDC_IDC_STAT_AT_DTM_START: NORMAL
MDC_IDC_STAT_AT_DTM_START: NORMAL
MDC_IDC_STAT_BRADY_AP_VP_PERCENT: 65.74 %
MDC_IDC_STAT_BRADY_AP_VP_PERCENT: 65.74 %
MDC_IDC_STAT_BRADY_AP_VS_PERCENT: 0.33 %
MDC_IDC_STAT_BRADY_AP_VS_PERCENT: 0.33 %
MDC_IDC_STAT_BRADY_AS_VP_PERCENT: 33.82 %
MDC_IDC_STAT_BRADY_AS_VP_PERCENT: 33.82 %
MDC_IDC_STAT_BRADY_AS_VS_PERCENT: 0.11 %
MDC_IDC_STAT_BRADY_AS_VS_PERCENT: 0.11 %
MDC_IDC_STAT_BRADY_DTM_END: NORMAL
MDC_IDC_STAT_BRADY_DTM_END: NORMAL
MDC_IDC_STAT_BRADY_DTM_START: NORMAL
MDC_IDC_STAT_BRADY_DTM_START: NORMAL
MDC_IDC_STAT_BRADY_RA_PERCENT_PACED: 66.04 %
MDC_IDC_STAT_BRADY_RA_PERCENT_PACED: 66.04 %
MDC_IDC_STAT_BRADY_RV_PERCENT_PACED: 99.53 %
MDC_IDC_STAT_BRADY_RV_PERCENT_PACED: 99.53 %
MDC_IDC_STAT_EPISODE_RECENT_COUNT: 0
MDC_IDC_STAT_EPISODE_RECENT_COUNT_DTM_END: NORMAL
MDC_IDC_STAT_EPISODE_RECENT_COUNT_DTM_START: NORMAL
MDC_IDC_STAT_EPISODE_TOTAL_COUNT: 0
MDC_IDC_STAT_EPISODE_TOTAL_COUNT: 12
MDC_IDC_STAT_EPISODE_TOTAL_COUNT: 12
MDC_IDC_STAT_EPISODE_TOTAL_COUNT: 308
MDC_IDC_STAT_EPISODE_TOTAL_COUNT: 308
MDC_IDC_STAT_EPISODE_TOTAL_COUNT: 37
MDC_IDC_STAT_EPISODE_TOTAL_COUNT: 37
MDC_IDC_STAT_EPISODE_TOTAL_COUNT_DTM_END: NORMAL
MDC_IDC_STAT_EPISODE_TOTAL_COUNT_DTM_START: NORMAL
MDC_IDC_STAT_EPISODE_TYPE: NORMAL
MDC_IDC_STAT_TACHYTHERAPY_ATP_DELIVERED_RECENT: 0
MDC_IDC_STAT_TACHYTHERAPY_ATP_DELIVERED_RECENT: 0
MDC_IDC_STAT_TACHYTHERAPY_ATP_DELIVERED_TOTAL: 21
MDC_IDC_STAT_TACHYTHERAPY_ATP_DELIVERED_TOTAL: 21
MDC_IDC_STAT_TACHYTHERAPY_RECENT_DTM_END: NORMAL
MDC_IDC_STAT_TACHYTHERAPY_RECENT_DTM_END: NORMAL
MDC_IDC_STAT_TACHYTHERAPY_RECENT_DTM_START: NORMAL
MDC_IDC_STAT_TACHYTHERAPY_RECENT_DTM_START: NORMAL
MDC_IDC_STAT_TACHYTHERAPY_SHOCKS_ABORTED_RECENT: 0
MDC_IDC_STAT_TACHYTHERAPY_SHOCKS_ABORTED_RECENT: 0
MDC_IDC_STAT_TACHYTHERAPY_SHOCKS_ABORTED_TOTAL: 4
MDC_IDC_STAT_TACHYTHERAPY_SHOCKS_ABORTED_TOTAL: 4
MDC_IDC_STAT_TACHYTHERAPY_SHOCKS_DELIVERED_RECENT: 0
MDC_IDC_STAT_TACHYTHERAPY_SHOCKS_DELIVERED_RECENT: 0
MDC_IDC_STAT_TACHYTHERAPY_SHOCKS_DELIVERED_TOTAL: 22
MDC_IDC_STAT_TACHYTHERAPY_SHOCKS_DELIVERED_TOTAL: 22
MDC_IDC_STAT_TACHYTHERAPY_TOTAL_DTM_END: NORMAL
MDC_IDC_STAT_TACHYTHERAPY_TOTAL_DTM_END: NORMAL
MDC_IDC_STAT_TACHYTHERAPY_TOTAL_DTM_START: NORMAL
MDC_IDC_STAT_TACHYTHERAPY_TOTAL_DTM_START: NORMAL
MONOCYTES # BLD AUTO: 0.5 10E3/UL (ref 0–1.3)
MONOCYTES NFR BLD AUTO: 8 %
NEUTROPHILS # BLD AUTO: 4.3 10E3/UL (ref 1.6–8.3)
NEUTROPHILS NFR BLD AUTO: 74 %
NRBC # BLD AUTO: 0 10E3/UL
NRBC BLD AUTO-RTO: 0 /100
O2/TOTAL GAS SETTING VFR VENT: 21 %
OXYHGB MFR BLDV: 54 % (ref 70–75)
PCO2 BLDV: 46 MM HG (ref 40–50)
PH BLDV: 7.4 [PH] (ref 7.32–7.43)
PHOSPHATE SERPL-MCNC: 2.6 MG/DL (ref 2.5–4.5)
PLATELET # BLD AUTO: 149 10E3/UL (ref 150–450)
PO2 BLDV: 31 MM HG (ref 25–47)
POTASSIUM SERPL-SCNC: 3.8 MMOL/L (ref 3.4–5.3)
PROT SERPL-MCNC: 5.4 G/DL (ref 6.4–8.3)
RBC # BLD AUTO: 3.12 10E6/UL (ref 3.8–5.9)
SODIUM SERPL-SCNC: 135 MMOL/L (ref 136–145)
WBC # BLD AUTO: 5.8 10E3/UL (ref 4–11)

## 2022-11-05 PROCEDURE — 99238 HOSP IP/OBS DSCHRG MGMT 30/<: CPT | Performed by: NURSE PRACTITIONER

## 2022-11-05 PROCEDURE — 82805 BLOOD GASES W/O2 SATURATION: CPT | Performed by: INTERNAL MEDICINE

## 2022-11-05 PROCEDURE — 80053 COMPREHEN METABOLIC PANEL: CPT | Performed by: INTERNAL MEDICINE

## 2022-11-05 PROCEDURE — 83735 ASSAY OF MAGNESIUM: CPT | Performed by: STUDENT IN AN ORGANIZED HEALTH CARE EDUCATION/TRAINING PROGRAM

## 2022-11-05 PROCEDURE — 84100 ASSAY OF PHOSPHORUS: CPT | Performed by: STUDENT IN AN ORGANIZED HEALTH CARE EDUCATION/TRAINING PROGRAM

## 2022-11-05 PROCEDURE — 85014 HEMATOCRIT: CPT | Performed by: INTERNAL MEDICINE

## 2022-11-05 PROCEDURE — 250N000011 HC RX IP 250 OP 636

## 2022-11-05 PROCEDURE — 250N000011 HC RX IP 250 OP 636: Performed by: INTERNAL MEDICINE

## 2022-11-05 PROCEDURE — 250N000013 HC RX MED GY IP 250 OP 250 PS 637: Performed by: NURSE PRACTITIONER

## 2022-11-05 PROCEDURE — 36592 COLLECT BLOOD FROM PICC: CPT | Performed by: INTERNAL MEDICINE

## 2022-11-05 RX ORDER — LISINOPRIL 2.5 MG/1
2.5 TABLET ORAL DAILY
Qty: 90 TABLET | Refills: 3 | Status: SHIPPED | OUTPATIENT
Start: 2022-11-05 | End: 2022-11-05

## 2022-11-05 RX ADMIN — Medication 5 ML: at 09:17

## 2022-11-05 RX ADMIN — Medication 0.3 MG: at 08:25

## 2022-11-05 RX ADMIN — AMIODARONE HYDROCHLORIDE 200 MG: 200 TABLET ORAL at 08:25

## 2022-11-05 RX ADMIN — HEPARIN SODIUM 5000 UNITS: 5000 INJECTION, SOLUTION INTRAVENOUS; SUBCUTANEOUS at 06:29

## 2022-11-05 ASSESSMENT — ACTIVITIES OF DAILY LIVING (ADL)
ADLS_ACUITY_SCORE: 41
ADLS_ACUITY_SCORE: 42
ADLS_ACUITY_SCORE: 41
ADLS_ACUITY_SCORE: 41

## 2022-11-05 NOTE — PLAN OF CARE
Temp: 97.8  F (36.6  C) Temp src: Oral BP: 113/75 Pulse: 79   Resp: 16 SpO2: 98 % O2 Device: None (Room air)       D: s/p elective endocardial & epicardial VT ablation c/b cardiogenic shock  History of NICM, ICD, recurrent VT, CHF, HTN, HLD, T2DM, developmental delay    I/A: Rohit is AOx4. Tele in place, paced. VSS on RA. Right & left PIVs in place, SL. Right double lumen PICC in place, SL. Bilateral groin sites soft, non-tender, CMS intact.Up A1 with walker and gait belt to bathroom x1, BM x1.     P: Continue to monitor and follow POC. Notify CSI with changes.

## 2022-11-05 NOTE — DISCHARGE SUMMARY
[address] [address]  p: [phone]    Discharge Summary: Cardiology Service    Rohit Garvey MRN# [id number]   YOB: 1955 Age: 67 year old       Admission Date: 10/31/2022  Discharge Date: 11/05/22      Discharge Diagnoses:   # History of developmental delay   # Cardiogenic shock  # Paroxysmal VT s/p dual chamber ICD  and VT ablation 10/31/22  # Chronic systolic heart failure/HFrEF (EF 35-40) secondary to nonischemic cardiomyopathy   # Hx of VT s/p ablation 10/31/22   # HTN  # HLD    # Pericardial effusion s/p pericardial drain. Pulled 11/2  # Elevated transaminases   # Cholelithiasis without cholecystitis.  # ROSALINO   # Hyponatremia  # Hypophosphatemia  # Hypoalbuminemia  # Leukocytosis  # Anemia, acute blood loss, critical illness  # Thrombocytopenia  # DM2    Pertinent Procedures:  Ablation  Echo  Cxr    Consults:  Ep  Nutrition    Imaging with results:  Echocardiogram 11/2: Interpretation Summary  Trivial pericardial effusion is present.  Dilation of the inferior vena cava is present with normal respiratory  variation in diameter.    Brief HPI: Rohit Garvey is a 67 year old man with PMHx of NICM, paroxysmal VT s/p dual chamber ICD (6/2022) who was admitted 10/31/22 after elective endocardial and epicardial VT ablation. Patient was transferred to the ICU on 11/1 with c/f cardiogenic shock. He was discharged 11/5 doing quite well, though not yet on his GDMT given borderline BP and patient/family request to not resume until patient is seen by his home cardiologist next week.     Hospital Course by Diagnosis/system     Neurologic  # History of developmental delay   -no acute issues      Cardiovascular  # Cardiogenic shock  # Paroxysmal VT s/p dual chamber ICD  and VT ablation 10/31/22  # Chronic systolic heart failure/HFrEF (EF 35-40) secondary to nonischemic cardiomyopathy   # Hx of VT s/p ablation 10/31/22   # HTN  # HLD    #  Pericardial effusion s/p pericardial drain. Pulled 11/2  Favoring cardiogenic shock perhaps from myocardial stunning as the etiology of his decompensation (had 5 different VT foci ablated on 10/31). His response to dobutamine supports this.    TTE 11/2- trivial effusion, dilated IVC  TTE 11/1- EF 35-40%, mod diffuse hypokinesis, small effusion     GDMT:  ACE-I/ARB/ARNi: holding home lisinopril 5   BB: holding home coreg 12.5   Aldosterone antagonist: not on PTA   SGLT2i: holding home jardiance with ROSALINO   Diuretic: hold home lasix 20 mg daily   SCD prophylaxis: ICD Medtronic   Statin: holding home atorvastatin with elevated LFTs    - Follow up EP as arranged      Pulmonary  No active issues  -on room air         Gastrointestinal, Nutrition  # Elevated transaminases   # Cholelithiasis without cholecystitis.  Todays LFT's pending. ALT climbing. yest 502-->535, AST downtrending yest 386 (421) Tbili WNL  No abdominal pain. Suspect from ischemic hepatitis when he was hypotensive. Abdominal US with cholelithiasis without evidence of cholecystitis.   Nutrition: Regular diet  BM: Bowel reg in place with Miralax      Renal, Electrolytes  # ROSALINO   # Hyponatremia  # Hypophosphatemia  # Hypoalbuminemia  Baseline Cr ~1. Suspect ATN from hypotension.   Kidney function stable at time of discharge, Follow up PCP 1 week with CMP/CBC     Infectious Disease  # Leukocytosis- resolved  WBC WNL 7.5, tmax 99  Cultures negative thus far  MRSA neg  Antibiotics:                Ceftriaxone 11/1-11/3                Flagyl 11/1- 11/3                Vanc 11/1- 11/3     Hematology  # Anemia, acute blood loss, critical illness  # Thrombocytopenia  Stable counts. Hgb 9.1 (9.1) Plt 123 (113)     Endocrinology  # DM2    Condition on discharge  Temp:  [97.6  F (36.4  C)-98.4  F (36.9  C)] 98.4  F (36.9  C)  Pulse:  [79-80] 80  Resp:  [12-24] 16  BP: ()/(28-84) 101/61  MAP:  [87 mmHg-90 mmHg] 87 mmHg  Arterial Line BP: (113-119)/(69-75)  119/69  SpO2:  [92 %-100 %] 97 %  General: Alert, interactive, NAD  Eyes: sclera anicteric  Neck: JVP not visible   Cardiovascular: regular rate and rhythm, normal S1 and S2, no murmurs, gallops, or rubs  Resp: clear to auscultation bilaterally, no rales, wheezes, or rhonchi  GI: Soft, nontender, nondistended. +BS.  No HSM or masses, no rebound or guarding.  Extremities: No edema, no cyanosis or clubbing, dorsalis pedis and posterior tibialis pulses 2+ bilaterally  Skin: Warm and dry, no jaundice or rash  Neuro: CN 2-12 intact, moves all extremities equally  Psych: Alert & oriented x 3    Medication Changes:  Start Colchicine per EP  Hold GDMT until follow up home cardiologist or PCP given borderline BP    Discharge medications:   Current Discharge Medication List      START taking these medications    Details   colchicine (COLCYRS) 0.6 MG tablet Take 0.5 tablets (0.3 mg) by mouth daily  Qty: 60 tablet, Refills: 0    Associated Diagnoses: Paroxysmal ventricular tachycardia         CONTINUE these medications which have CHANGED    Details   amiodarone (PACERONE) 200 MG tablet Take 1 tablet (200 mg) by mouth daily  Qty: 90 tablet, Refills: 3    Associated Diagnoses: Paroxysmal ventricular tachycardia      lisinopril (ZESTRIL) 2.5 MG tablet Take 1 tablet (2.5 mg) by mouth daily  Qty: 90 tablet, Refills: 3    Associated Diagnoses: NICM (nonischemic cardiomyopathy) (H)         CONTINUE these medications which have NOT CHANGED    Details   ASPIRIN LOW DOSE 81 MG EC tablet Take 81 mg by mouth daily      atorvastatin (LIPITOR) 80 MG tablet Take 80 mg by mouth daily      furosemide (LASIX) 20 MG tablet Take 20 mg by mouth daily as needed      GNP NATURAL FIBER 28.3 % POWD Give 1-2 TABLESPOONFUL DAILY in a GLASS of JUICE      metFORMIN (GLUCOPHAGE) 500 MG tablet Take 1,000 mg by mouth 2 times daily (with meals)      mexiletine (MEXITIL) 150 MG capsule TAKE ONE CAPSULE BY MOUTH THREE TIMES DAILY WITH FOOD      tamsulosin  (FLOMAX) 0.4 MG capsule Take 0.4 mg by mouth every evening      JARDIANCE 10 MG TABS tablet Take 10 mg by mouth daily         STOP taking these medications       carvedilol (COREG) 12.5 MG tablet Comments:   Reason for Stopping:               Labs or imaging requiring follow-up after discharge:  CMP, CBC 1 week    Follow-up:  PCP and cardiologist 1 week  EP as arranged    Code status:  Full    LINDA Keller, CNP  [facility]  Interventional Cardiology-CSI Service  Pager [phone]       Patient Care Team:  Kevyn Salazar as PCP - General  Sky Leon MD as Assigned Heart and Vascular Provider

## 2022-11-05 NOTE — PLAN OF CARE
Transferred to: (6C) at (3045)  Status at time of transfer: BP stable, all lines SL'D, on RA, O2 sats stable, on continuous monitoring, with zoll, with nurse  Belongings: with patient   Duque removed? (if no, why?): N/A  Chart and medications: with patient   Family notified: Not able to contact

## 2022-11-05 NOTE — PLAN OF CARE
Transfer  Transferred from:   Via: bed  Reason for transfer: Pt appropriate for 6C- improved patient condition  Belongings: Sent with pt  Chart: Sent with pt   Medications: Sent with pt  Infusion:  NA  Skin assessment: Two person assessment completed with Maria Guadalupe HARE Notable for redness behind both ears, bilateral groin sites (soft, non-tender bruising near left groin site) , and 3 small scabbed wounds - 1 below left clavicle, 1 mid upper abdomen, and 1 right wrist at arterial line removal site.    Report taken from: Aura PALMA

## 2022-11-05 NOTE — PLAN OF CARE
Occupational Therapy Discharge Summary    Reason for therapy discharge:    Discharged to home.    Progress towards therapy goal(s). See goals on Care Plan in [facility] electronic health record for goal details.  Goals partially met.  Barriers to achieving goals:   discharge from facility.    Therapy recommendation(s):    Continued therapy is recommended.  Rationale/Recommendations:  Continue with home OT.

## 2022-11-06 LAB
BACTERIA BLD CULT: NO GROWTH
BACTERIA BLD CULT: NO GROWTH

## 2022-11-06 NOTE — PROGRESS NOTES
DISCHARGE   Discharged to: Home  Via: Automobile  Accompanied by: Family  Discharge Instructions: diet, activity, medications, follow up appointments, when to call the MD, and what to watchout for (i.e. s/s of infection, increasing SOB, palpitations, chest pain,) Gave education of groin site maintenance.   Prescriptions: To be filled by discharge pharmacy per pt's request; medication list reviewed & sent with pt  Follow Up Appointments: arranged; information given  Belongings: All sent with pt  IV: PICC out, PIV x 2 out.   Telemetry: off  Pt and family exhibits understanding of above discharge instructions; all questions answered.  Discharge Paperwork: Given to patient.     Education given to patient and POA.

## 2022-11-07 ENCOUNTER — PATIENT OUTREACH (OUTPATIENT)
Dept: CARE COORDINATION | Facility: CLINIC | Age: 67
End: 2022-11-07

## 2022-11-07 LAB
ATRIAL RATE - MUSE: 576 BPM
DIASTOLIC BLOOD PRESSURE - MUSE: NORMAL MMHG
INTERPRETATION ECG - MUSE: NORMAL
P AXIS - MUSE: -13 DEGREES
PR INTERVAL - MUSE: 186 MS
QRS DURATION - MUSE: 186 MS
QT - MUSE: 510 MS
QTC - MUSE: 588 MS
R AXIS - MUSE: -58 DEGREES
SYSTOLIC BLOOD PRESSURE - MUSE: NORMAL MMHG
T AXIS - MUSE: 77 DEGREES
VENTRICULAR RATE- MUSE: 80 BPM

## 2022-11-07 NOTE — PROGRESS NOTES
Clinic Care Coordination Contact  [facility]: Post-Discharge Note  SITUATION                                                      Admission:    Admission Date: 10/31/22   Reason for Admission: History of developmental delay   # Cardiogenic shock  # Paroxysmal VT s/p dual chamber ICD  and VT ablation 10/31/22  # Chronic systolic heart failure/HFrEF (EF 35-40) secondary to nonischemic cardiomyopathy   # Hx of VT s/p ablation 10/31/22   # HTN  # HLD    # Pericardial effusion s/p pericardial drain. Pulled 11/2  # Elevated transaminases   # Cholelithiasis without cholecystitis.  # ROSALINO   # Hyponatremia  # Hypophosphatemia  # Hypoalbuminemia  # Leukocytosis  # Anemia, acute blood loss, critical illness  # Thrombocytopenia  # DM2  Discharge:   Discharge Date: 11/05/22  Discharge Diagnosis: History of developmental delay   # Cardiogenic shock  # Paroxysmal VT s/p dual chamber ICD  and VT ablation 10/31/22  # Chronic systolic heart failure/HFrEF (EF 35-40) secondary to nonischemic cardiomyopathy   # Hx of VT s/p ablation 10/31/22   # HTN  # HLD    # Pericardial effusion s/p pericardial drain. Pulled 11/2  # Elevated transaminases   # Cholelithiasis without cholecystitis.  # ROSALINO   # Hyponatremia  # Hypophosphatemia  # Hypoalbuminemia  # Leukocytosis  # Anemia, acute blood loss, critical illness  # Thrombocytopenia  # DM2    BACKGROUND                                                    Neurologic  # History of developmental delay   -no acute issues      Cardiovascular  # Cardiogenic shock  # Paroxysmal VT s/p dual chamber ICD  and VT ablation 10/31/22  # Chronic systolic heart failure/HFrEF (EF 35-40) secondary to nonischemic cardiomyopathy   # Hx of VT s/p ablation 10/31/22   # HTN  # HLD    # Pericardial effusion s/p pericardial drain. Pulled 11/2  Favoring cardiogenic shock perhaps from myocardial stunning as the etiology of his decompensation (had 5 different VT foci ablated on 10/31). His response to dobutamine  supports this.    TTE 11/2- trivial effusion, dilated IVC  TTE 11/1- EF 35-40%, mod diffuse hypokinesis, small effusion     GDMT:  ACE-I/ARB/ARNi: holding home lisinopril 5   BB: holding home coreg 12.5   Aldosterone antagonist: not on PTA   SGLT2i: holding home jardiance with ROSALINO   Diuretic: hold home lasix 20 mg daily   SCD prophylaxis: ICD Medtronic   Statin: holding home atorvastatin with elevated LFTs    - Follow up EP as arranged      Pulmonary  No active issues  -on room air         Gastrointestinal, Nutrition  # Elevated transaminases   # Cholelithiasis without cholecystitis.  Todays LFT's pending. ALT climbing. yest 502-->535, AST downtrending yest 386 (421) Tbili WNL  No abdominal pain. Suspect from ischemic hepatitis when he was hypotensive. Abdominal US with cholelithiasis without evidence of cholecystitis.   Nutrition: Regular diet  BM: Bowel reg in place with Miralax      Renal, Electrolytes  # ROSALINO   # Hyponatremia  # Hypophosphatemia  # Hypoalbuminemia  Baseline Cr ~1. Suspect ATN from hypotension.   Kidney function stable at time of discharge, Follow up PCP 1 week with CMP/CBC     Infectious Disease  # Leukocytosis- resolved  WBC WNL 7.5, tmax 99  Cultures negative thus far  MRSA neg  Antibiotics:                Ceftriaxone 11/1-11/3                Flagyl 11/1- 11/3                Vanc 11/1- 11/3     Hematology  # Anemia, acute blood loss, critical illness  # Thrombocytopenia  Stable counts. Hgb 9.1 (9.1) Plt 123 (113)      Endocrinology  # DM2     Condition on discharge  Temp:  [97.6  F (36.4  C)-98.4  F (36.9  C)] 98.4  F (36.9  C)  Pulse:  [79-80] 80  Resp:  [12-24] 16  BP: ()/(28-84) 101/61  MAP:  [87 mmHg-90 mmHg] 87 mmHg  Arterial Line BP: (113-119)/(69-75) 119/69  SpO2:  [92 %-100 %] 97 %  General: Alert, interactive, NAD  Eyes: sclera anicteric  Neck: JVP not visible   Cardiovascular: regular rate and rhythm, normal S1 and S2, no murmurs, gallops, or rubs  Resp: clear to auscultation  bilaterally, no rales, wheezes, or rhonchi  GI: Soft, nontender, nondistended. +BS.  No HSM or masses, no rebound or guarding.  Extremities: No edema, no cyanosis or clubbing, dorsalis pedis and posterior tibialis pulses 2+ bilaterally  Skin: Warm and dry, no jaundice or rash  Neuro: CN 2-12 intact, moves all extremities equally  Psych: Alert & oriented x 3     Medication Changes:  Start Colchicine per EP  Hold GDMT until follow up home cardiologist or PCP given borderline BP     Discharge medications:         Current Discharge Medication List         ASSESSMENT           Discharge Assessment  How are you doing now that you are home?: Spoke with pt's sister and POA who shares that patient is doing well at [facility]. She did not some concern of pt being given Lisonopril in the hospital as he has not been on this medication since June. It was eventually removed from his medication list, but not until after she talked to the nurse about it. Feels that this could have had a negative effect on pt's health had it not been noticed. Writer did apologize for this and offered complaint line which she took. No other concerns/needs at this time  How are your symptoms? (Red Flag symptoms escalate to triage hotline per guidelines): Improved  Do you feel your condition is stable enough to be safe at home until your provider visit?: Yes  Does the patient have their discharge instructions? : Yes  Does the patient have questions regarding their discharge instructions? : No  Were you started on any new medications or were there changes to any of your previous medications? : Yes  Does the patient have all of their medications?: Yes  Do you have questions regarding any of your medications? : No  Do you have all of your needed medical supplies or equipment (DME)?  (i.e. oxygen tank, CPAP, cane, etc.): Yes  Discharge follow-up appointment scheduled within 14 calendar days? : Yes  Discharge Follow Up Appointment Date: 11/10/22  Discharge Follow  Up Appointment Scheduled with?: Primary Care Provider    Post-op ([facility] CTA Only)  If the patient had a surgery or procedure, do they have any questions for a nurse?: No    Post-op (Clinicians Only)  Did the patient have surgery or a procedure: No  Fever: No  Chills: No        PLAN                                                      Outpatient Plan:  Labs or imaging requiring follow-up after discharge:  CMP, CBC 1 week     Follow-up:  PCP and cardiologist 1 week  EP as arranged     Code status:  Full    Future Appointments   Date Time Provider Department Center   2/15/2023  4:00 PM Sky Leon MD [facility]         For any urgent concerns, please contact our 24 hour nurse triage line: [phone] ([phone])         MARI Huang

## 2022-11-14 LAB
MDC_IDC_LEAD_IMPLANT_DT: NORMAL
MDC_IDC_LEAD_IMPLANT_DT: NORMAL
MDC_IDC_LEAD_LOCATION: NORMAL
MDC_IDC_LEAD_LOCATION: NORMAL
MDC_IDC_LEAD_LOCATION_DETAIL_1: NORMAL
MDC_IDC_LEAD_LOCATION_DETAIL_1: NORMAL
MDC_IDC_LEAD_MFG: NORMAL
MDC_IDC_LEAD_MFG: NORMAL
MDC_IDC_LEAD_MODEL: NORMAL
MDC_IDC_LEAD_MODEL: NORMAL
MDC_IDC_LEAD_POLARITY_TYPE: NORMAL
MDC_IDC_LEAD_POLARITY_TYPE: NORMAL
MDC_IDC_LEAD_SERIAL: NORMAL
MDC_IDC_LEAD_SERIAL: NORMAL
MDC_IDC_LEAD_SPECIAL_FUNCTION: NORMAL
MDC_IDC_LEAD_SPECIAL_FUNCTION: NORMAL
MDC_IDC_MSMT_BATTERY_DTM: NORMAL
MDC_IDC_MSMT_BATTERY_REMAINING_LONGEVITY: 68 MO
MDC_IDC_MSMT_BATTERY_RRT_TRIGGER: 2.73
MDC_IDC_MSMT_BATTERY_STATUS: NORMAL
MDC_IDC_MSMT_BATTERY_VOLTAGE: 2.9 V
MDC_IDC_MSMT_CAP_CHARGE_DTM: NORMAL
MDC_IDC_MSMT_CAP_CHARGE_DTM: NORMAL
MDC_IDC_MSMT_CAP_CHARGE_ENERGY: 18 J
MDC_IDC_MSMT_CAP_CHARGE_ENERGY: 35 J
MDC_IDC_MSMT_CAP_CHARGE_TIME: 3.7
MDC_IDC_MSMT_CAP_CHARGE_TIME: 7.84
MDC_IDC_MSMT_CAP_CHARGE_TYPE: NORMAL
MDC_IDC_MSMT_CAP_CHARGE_TYPE: NORMAL
MDC_IDC_MSMT_LEADCHNL_RA_IMPEDANCE_VALUE: 399 OHM
MDC_IDC_MSMT_LEADCHNL_RA_PACING_THRESHOLD_AMPLITUDE: 1 V
MDC_IDC_MSMT_LEADCHNL_RA_PACING_THRESHOLD_PULSEWIDTH: 0.4 MS
MDC_IDC_MSMT_LEADCHNL_RA_SENSING_INTR_AMPL: 0.75 MV
MDC_IDC_MSMT_LEADCHNL_RA_SENSING_INTR_AMPL: 0.75 MV
MDC_IDC_MSMT_LEADCHNL_RV_IMPEDANCE_VALUE: 304 OHM
MDC_IDC_MSMT_LEADCHNL_RV_IMPEDANCE_VALUE: 342 OHM
MDC_IDC_MSMT_LEADCHNL_RV_PACING_THRESHOLD_AMPLITUDE: 1.5 V
MDC_IDC_MSMT_LEADCHNL_RV_PACING_THRESHOLD_PULSEWIDTH: 0.4 MS
MDC_IDC_MSMT_LEADCHNL_RV_SENSING_INTR_AMPL: 7.25 MV
MDC_IDC_MSMT_LEADCHNL_RV_SENSING_INTR_AMPL: 7.25 MV
MDC_IDC_PG_IMPLANT_DTM: NORMAL
MDC_IDC_PG_MFG: NORMAL
MDC_IDC_PG_MODEL: NORMAL
MDC_IDC_PG_SERIAL: NORMAL
MDC_IDC_PG_TYPE: NORMAL
MDC_IDC_SESS_CLINIC_NAME: NORMAL
MDC_IDC_SESS_DTM: NORMAL
MDC_IDC_SESS_TYPE: NORMAL
MDC_IDC_SET_BRADY_AT_MODE_SWITCH_RATE: 171 {BEATS}/MIN
MDC_IDC_SET_BRADY_HYSTRATE: NORMAL
MDC_IDC_SET_BRADY_LOWRATE: 70 {BEATS}/MIN
MDC_IDC_SET_BRADY_MAX_SENSOR_RATE: 110 {BEATS}/MIN
MDC_IDC_SET_BRADY_MAX_TRACKING_RATE: 110 {BEATS}/MIN
MDC_IDC_SET_BRADY_MODE: NORMAL
MDC_IDC_SET_BRADY_PAV_DELAY_LOW: 180 MS
MDC_IDC_SET_BRADY_SAV_DELAY_LOW: 150 MS
MDC_IDC_SET_LEADCHNL_RA_PACING_AMPLITUDE: 2.25 V
MDC_IDC_SET_LEADCHNL_RA_PACING_ANODE_ELECTRODE_1: NORMAL
MDC_IDC_SET_LEADCHNL_RA_PACING_ANODE_LOCATION_1: NORMAL
MDC_IDC_SET_LEADCHNL_RA_PACING_CAPTURE_MODE: NORMAL
MDC_IDC_SET_LEADCHNL_RA_PACING_CATHODE_ELECTRODE_1: NORMAL
MDC_IDC_SET_LEADCHNL_RA_PACING_CATHODE_LOCATION_1: NORMAL
MDC_IDC_SET_LEADCHNL_RA_PACING_POLARITY: NORMAL
MDC_IDC_SET_LEADCHNL_RA_PACING_PULSEWIDTH: 0.4 MS
MDC_IDC_SET_LEADCHNL_RA_SENSING_ANODE_ELECTRODE_1: NORMAL
MDC_IDC_SET_LEADCHNL_RA_SENSING_ANODE_LOCATION_1: NORMAL
MDC_IDC_SET_LEADCHNL_RA_SENSING_CATHODE_ELECTRODE_1: NORMAL
MDC_IDC_SET_LEADCHNL_RA_SENSING_CATHODE_LOCATION_1: NORMAL
MDC_IDC_SET_LEADCHNL_RA_SENSING_POLARITY: NORMAL
MDC_IDC_SET_LEADCHNL_RA_SENSING_SENSITIVITY: 0.3 MV
MDC_IDC_SET_LEADCHNL_RV_PACING_AMPLITUDE: 4 V
MDC_IDC_SET_LEADCHNL_RV_PACING_ANODE_ELECTRODE_1: NORMAL
MDC_IDC_SET_LEADCHNL_RV_PACING_ANODE_LOCATION_1: NORMAL
MDC_IDC_SET_LEADCHNL_RV_PACING_CAPTURE_MODE: NORMAL
MDC_IDC_SET_LEADCHNL_RV_PACING_CATHODE_ELECTRODE_1: NORMAL
MDC_IDC_SET_LEADCHNL_RV_PACING_CATHODE_LOCATION_1: NORMAL
MDC_IDC_SET_LEADCHNL_RV_PACING_POLARITY: NORMAL
MDC_IDC_SET_LEADCHNL_RV_PACING_PULSEWIDTH: 0.4 MS
MDC_IDC_SET_LEADCHNL_RV_SENSING_ANODE_ELECTRODE_1: NORMAL
MDC_IDC_SET_LEADCHNL_RV_SENSING_ANODE_LOCATION_1: NORMAL
MDC_IDC_SET_LEADCHNL_RV_SENSING_CATHODE_ELECTRODE_1: NORMAL
MDC_IDC_SET_LEADCHNL_RV_SENSING_CATHODE_LOCATION_1: NORMAL
MDC_IDC_SET_LEADCHNL_RV_SENSING_POLARITY: NORMAL
MDC_IDC_SET_LEADCHNL_RV_SENSING_SENSITIVITY: 0.3 MV
MDC_IDC_SET_ZONE_DETECTION_BEATS_DENOMINATOR: 40 {BEATS}
MDC_IDC_SET_ZONE_DETECTION_BEATS_NUMERATOR: 30 {BEATS}
MDC_IDC_SET_ZONE_DETECTION_INTERVAL: 300 MS
MDC_IDC_SET_ZONE_DETECTION_INTERVAL: 350 MS
MDC_IDC_SET_ZONE_DETECTION_INTERVAL: 510 MS
MDC_IDC_SET_ZONE_DETECTION_INTERVAL: 540 MS
MDC_IDC_SET_ZONE_DETECTION_INTERVAL: NORMAL
MDC_IDC_SET_ZONE_TYPE: NORMAL
MDC_IDC_STAT_AT_BURDEN_PERCENT: 0 %
MDC_IDC_STAT_AT_DTM_END: NORMAL
MDC_IDC_STAT_AT_DTM_START: NORMAL
MDC_IDC_STAT_BRADY_AP_VP_PERCENT: 77.47 %
MDC_IDC_STAT_BRADY_AP_VS_PERCENT: 0.19 %
MDC_IDC_STAT_BRADY_AS_VP_PERCENT: 20.97 %
MDC_IDC_STAT_BRADY_AS_VS_PERCENT: 1.37 %
MDC_IDC_STAT_BRADY_DTM_END: NORMAL
MDC_IDC_STAT_BRADY_DTM_START: NORMAL
MDC_IDC_STAT_BRADY_RA_PERCENT_PACED: 70.29 %
MDC_IDC_STAT_BRADY_RV_PERCENT_PACED: 90.12 %
MDC_IDC_STAT_EPISODE_RECENT_COUNT: 0
MDC_IDC_STAT_EPISODE_RECENT_COUNT_DTM_END: NORMAL
MDC_IDC_STAT_EPISODE_RECENT_COUNT_DTM_START: NORMAL
MDC_IDC_STAT_EPISODE_TOTAL_COUNT: 0
MDC_IDC_STAT_EPISODE_TOTAL_COUNT: 12
MDC_IDC_STAT_EPISODE_TOTAL_COUNT: 308
MDC_IDC_STAT_EPISODE_TOTAL_COUNT: 37
MDC_IDC_STAT_EPISODE_TOTAL_COUNT_DTM_END: NORMAL
MDC_IDC_STAT_EPISODE_TOTAL_COUNT_DTM_START: NORMAL
MDC_IDC_STAT_EPISODE_TYPE: NORMAL
MDC_IDC_STAT_TACHYTHERAPY_ATP_DELIVERED_RECENT: 0
MDC_IDC_STAT_TACHYTHERAPY_ATP_DELIVERED_TOTAL: 21
MDC_IDC_STAT_TACHYTHERAPY_RECENT_DTM_END: NORMAL
MDC_IDC_STAT_TACHYTHERAPY_RECENT_DTM_START: NORMAL
MDC_IDC_STAT_TACHYTHERAPY_SHOCKS_ABORTED_RECENT: 0
MDC_IDC_STAT_TACHYTHERAPY_SHOCKS_ABORTED_TOTAL: 4
MDC_IDC_STAT_TACHYTHERAPY_SHOCKS_DELIVERED_RECENT: 0
MDC_IDC_STAT_TACHYTHERAPY_SHOCKS_DELIVERED_TOTAL: 22
MDC_IDC_STAT_TACHYTHERAPY_TOTAL_DTM_END: NORMAL
MDC_IDC_STAT_TACHYTHERAPY_TOTAL_DTM_START: NORMAL

## 2022-12-06 ENCOUNTER — TRANSFERRED RECORDS (OUTPATIENT)
Dept: HEALTH INFORMATION MANAGEMENT | Facility: CLINIC | Age: 67
End: 2022-12-06

## 2022-12-06 ENCOUNTER — MEDICAL CORRESPONDENCE (OUTPATIENT)
Dept: HEALTH INFORMATION MANAGEMENT | Facility: CLINIC | Age: 67
End: 2022-12-06

## 2022-12-12 ENCOUNTER — TELEPHONE (OUTPATIENT)
Dept: CARDIOLOGY | Facility: CLINIC | Age: 67
End: 2022-12-12

## 2022-12-12 ENCOUNTER — TRANSFERRED RECORDS (OUTPATIENT)
Dept: HEALTH INFORMATION MANAGEMENT | Facility: CLINIC | Age: 67
End: 2022-12-12

## 2022-12-12 NOTE — TELEPHONE ENCOUNTER
Called ritchie back and gave her the instrunctions that if dharmesh has access to the pt [employer] he'll be able to click on the link

## 2022-12-12 NOTE — TELEPHONE ENCOUNTER
[facility] Call Center    Phone Message    May a detailed message be left on voicemail: yes     Reason for Call: Other:     Wendy is requesting a call back to discuss adding Abner to the video visit in January.    Action Taken: Other: cardio    Travel Screening: Not Applicable     Thank you!  [facility]

## 2022-12-16 ENCOUNTER — VIRTUAL VISIT (OUTPATIENT)
Dept: CARDIOLOGY | Facility: CLINIC | Age: 67
End: 2022-12-16
Attending: INTERNAL MEDICINE
Payer: MEDICARE

## 2022-12-16 DIAGNOSIS — I47.29 PAROXYSMAL VENTRICULAR TACHYCARDIA (H): Primary | ICD-10-CM

## 2022-12-16 PROCEDURE — 99215 OFFICE O/P EST HI 40 MIN: CPT | Mod: 95 | Performed by: INTERNAL MEDICINE

## 2022-12-16 RX ORDER — RANOLAZINE 500 MG/1
1000 TABLET, EXTENDED RELEASE ORAL
Status: ON HOLD | COMMUNITY
Start: 2022-12-13 | End: 2022-12-30

## 2022-12-16 RX ORDER — MAGNESIUM OXIDE 400 MG/1
800 TABLET ORAL DAILY
Status: ON HOLD | COMMUNITY
Start: 2022-12-06 | End: 2023-04-27

## 2022-12-16 RX ORDER — POTASSIUM CHLORIDE 1500 MG/1
TABLET, EXTENDED RELEASE ORAL
Status: ON HOLD | COMMUNITY
Start: 2022-12-05 | End: 2022-12-30

## 2022-12-16 NOTE — LETTER
"12/16/2022      RE: Rohit Garvey  [address]  [place] [address]       Dear Colleague,    Thank you for the opportunity to participate in the care of your patient, Rohit Garvey, at the [facility] [place] [facility]. Please see a copy of my visit note below.    Rohit is a 67 year old who is being evaluated via a billable video visit.      Pt states in [place]    How would you like to obtain your AVS? MyChart  If the video visit is dropped, the invitation should be resent by: Send to e-mail at: [email or web address]  Will anyone else be joining your video visit?   pts sister Citlali and brother Marciano Nguyenlilli Carter, VF/CMA          ELECTROPHYSIOLOGY VIRTUAL CLINIC VISIT  Mr. Rohit Garvey is a 67 year old male who is being evaluated via a billable video visit.      The patient has been notified of following:     \"This video visit will be conducted via a call between you and your physician/provider. We have found that certain health care needs can be provided without the need for an in-person physical exam.  This service lets us provide the care you need with a video conversation.  If a prescription is necessary we can send it directly to your pharmacy.  If lab work is needed we can place an order for that and you can then stop by our lab to have the test done at a later time.    If during the course of the call the physician/provider feels a video visit is not appropriate, you will not be charged for this service.\"     Physician/provider has received verbal consent for a Video Visit from the patient? 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       Ventricular " Tachycardia  NICM LVEF 45%, NYHA II:  1. ACEi/ARB: Not currently on due to LVEF 45%.  2. BB: Coreg was held due to hypotension  3. Aldosterone antagonist: Not currently required.  4. SCD prophylaxis: s/p secondary prevention ICD 6/20/22  5. Fluid status: appears euvolemic, continue lasix  6. VT: He was having a high burden of ventricular arrhythmias despite being on 2 antiarrhythmic medications, amiodarone and mexiletine.  The review of the prior device interrogation shows mostly a ventricular tachycardia at about 130 bpm, monomorphic on all available tracings with us in morphology.  ATP is sometimes successful however, can also accelerate the rhythm into a fast VT or VF resulting in a shock.He had VT ablation on 10/31/22. Unfortunately, he has continued to have ICD shocks. He has been on amiodarone and ranolazine was added. We discussed that we could consider a repeat ablation as he has failed amiodarone, mexiletine, and now ranolazine. We also discussed ablation and its indications, risks, and benefits. Complications include but are not limited to vascular injury, excessive bleeding requiring blood transfusion, groin hematoma, aortic injury at the time of transseptal puncture, bleeding/injury to abdominal structures at time of epicardial access, cardiac tamponade requiring pericardiocentesis or open heart surgery, and thromboembolic complications or strokes. We also discussed that VT ablation can be limited by inducibility of VT at the time of EPS/Abaltion and/or the origin of VT. Efficacy of ablation also deceases if multiple foci are found. After an extensive discussion, the patient's family would like to pursue repeat ablation in attempts to improve VT burden and symptoms. He and his guardian would like to try another ablation to see if we can get more control over hs VT and limit shocks.    We will follow-up with the patient about a month after the VT ablation.     History of Present Illness/Subjective    .  Rohit Garvey is a 67 year old male who presents for VT management.    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     The information was obtained from his sister and his nurse in the assisted living who were both present during this visit.    EP Visit 10/14/22: Mr Garvey lives in a nursing home due to intellectual delay.  In June 2022, he was found to be very spacey and pale by the nursing staff.  On examination his heart rate was 130 bpm with a blood pressure of 80/55.  He was also fatigued.  The patient was transported to the ED and was awake all the time.  Upon arrival to the ED he was found to have a wide-complex tachycardia and he was reportedly given medications first for cardioversion.  After medications failed, he received a cardioversion.  The details of whether he ever lost consciousness during that visit is unknown.  He has had a history of syncope in the bathroom a few weeks prior to his admission in June.  He reportedly had an evaluation as an inpatient at that time that was negative for ischemia, possible angiogram, and he was found to have a mildly reduced LVEF of 45 to 50% on an echocardiogram.  He had a dual-chamber ICD system implanted on 6/20/2022.  The patient also had a cardiac MRI revealing a left ventricular ejection fraction of 44%, right ventricular ejection fraction of 35%, with subepicardial and mid myocardial late gadolinium enhancement extending from the base to the mid cavity and involving the anterior, anteroseptal, anterolateral, and lateral walls.  He was started on amiodarone and was discharged.  The patient's VT recurred after discharge with many episodes in September and October, with ATP and shocks he followed up with his primary electrophysiologist, Dr James, in [place].   Device interrogation showed 16 therapy sessions between shocks and ATP's in the last 2 weeks.  The patient does feel the fast heartbeat sometimes accompanied with some weakness before the ICD shocks.  A repeat echocardiogram done on 1/26/2022 shows a stable LV ejection fraction of 45 to 50% with mild LV dilation and mild mitral and aortic and tricuspid regurgitation, with RVSP mildly elevated at 39 mmHg.  The patient was referred for consideration of VT ablation.  The patient is currently on amiodarone 200 twice daily and mexiletine 150 mg 3 times daily for rhythm control.    Rohit is following up today. He underwent a VT ablation on 10/31/22. He had some cardiogenic shock post ablation and required pressor. He has since recovered well and groin sites well healed. He has had recurrent VT after ablation with multiple ICD therapies. He was started on Ranolazine. He has continued to have device therapies. Several morphologies noted. He reports feeling at baseline. Rohit denies chest discomfort, palpitations, abdominal fullness/bloating or peripheral edema, shortness of breath, paroxysmal nocturnal dyspnea, orthopnea, lightheadedness, dizziness, pre-syncope, or syncope.  Current cardiac medications include: amiodarone, ranolazine, Lipitor, lasix, and ASA.     I have reviewed and updated the patient's Past Medical History, Social History, Family History and Medication List.     Cardiographics (Personally Reviewed) :   TTE June 2022 in September 2022 reviewed as above.  CMR June 17, 2022 reviewed as above.    11/1/22 Echo:  Interpretation Summary  Left ventricular function is decreased. The ejection fraction is 35-40%  (moderately reduced). Moderate diffuse hypokinesis.  Global right ventricular function appears moderately reduced based on limited  views.  Small pericardial effusion, with the largest collection of fluid posterior to  the left ventricle at the apex (1.1cm).  There is no prior study for direct comparison.      Physical Examination   The rest of a comprehensive physical examination is deferred due to public health emergency video visit restrictions.  CONSITUTIONAL: no acute distress  HEENT: no icterus, no redness or discharge, neck supple  CV: no visible edema of visualized extremities. No JVD.   RESPIRATORY: respirations nonlabored, no cough  NEURO: AA&Ox3, speech fluent/appropriate, motor grossly nonfocal  PSYCH: cooperative, affect appropriate  DERM: no rashes on visualized face/neck/upper extremities       Medications  Allergies   Current Outpatient Medications   Medication Sig Dispense Refill     amiodarone (PACERONE) 200 MG tablet Take 1 tablet (200 mg) by mouth daily 90 tablet 3     ASPIRIN LOW DOSE 81 MG EC tablet Take 81 mg by mouth daily       atorvastatin (LIPITOR) 80 MG tablet Take 80 mg by mouth daily       colchicine (COLCYRS) 0.6 MG tablet Take 0.5 tablets (0.3 mg) by mouth daily 60 tablet 0     furosemide (LASIX) 20 MG tablet Take 20 mg by mouth daily as needed       GNP NATURAL FIBER 28.3 % POWD Give 1-2 TABLESPOONFUL DAILY in a GLASS of JUICE       JARDIANCE 10 MG TABS tablet Take 10 mg by mouth daily       metFORMIN (GLUCOPHAGE) 500 MG tablet Take 1,000 mg by mouth 2 times daily (with meals)       tamsulosin (FLOMAX) 0.4 MG capsule Take 0.4 mg by mouth every evening     Amiodarone 200 BID  Mexiletine 150 TID  Furosemide 20 BID  Jardiance 10mg daily  Fish Oil 1000 mg TID  Calcium 600 BID  Metformin 500 2 tabs BID  Carvedilol 12.5 BID  Atrovastatin 80 daily  Tamsolusin 0.4 mg daily  Metamucil daily   Aspirin 81 daily No Known Allergies      Lab Results (Personally Reviewed)    Chemistry/lipid CBC Cardiac Enzymes/BNP/TSH/INR   Lab Results   Component Value Date    BUN 15.2 11/05/2022     (L) 11/05/2022    CO2 22 11/05/2022     Creatinine   Date Value Ref Range Status   11/05/2022 1.03 0.51 - 1.17 mg/dL Final     Comment:     Male and Female  0-2 Months    0.31-0.88 mg/dL  2-12 Months   0.16-0.39  mg/dL  1-2 Years     0.18-0.35 mg/dL  3-4 Years     0.26-0.42 mg/dL  5-6 Years     0.29-0.47 mg/dL  7-8 Years     0.34-0.53 mg/dL  9-10 Years    0.33-0.64 mg/dL  11-12 Years   0.44-0.68 mg/dL  13-14 Years   0.46-0.77 mg/dL    Female  15 Years and older  0.51-0.95 mg/dL    Male  15 Years and older  0.67-1.17 mg/dL           No results found for: CHOL, HDL, LDL, CHOLHDL   Lab Results   Component Value Date    WBC 5.8 11/05/2022    HGB 9.2 (L) 11/05/2022    HCT 28.1 (L) 11/05/2022    MCV 90 11/05/2022     (L) 11/05/2022    Lab Results   Component Value Date    INR 1.23 (H) 10/31/2022          Video-Visit Details    Type of service:  Video Visit    Video Start Time: 8:00    Video End Time: 8:30    Originating Location (pt. Location): Assisted Living    Distant Location (provider location):  [facility]     Platform used for Video Visit: Kimberly    I have reviewed the note as documented above.  This accurately captures the substance of my virtual visit with the patient. The patient states understanding and is agreeable with the plan.   Sky Leon MD [facility]  Cardiology - Electrophysiology    Total time spent on patient visit, reviewing notes, imaging, labs, orders, and completing necessary documentation: 45 minutes.

## 2022-12-16 NOTE — H&P (VIEW-ONLY)
"Rohit is a 67 year old who is being evaluated via a billable video visit.      [place] in [place]    How would you like to obtain your AVS? MyChart  If the video visit is dropped, the invitation should be resent by: Send to e-mail at: [email or web address]  Will anyone else be joining your video visit?   pts sister Citlali and brother Marciano Pina Emma, VF/CMA          ELECTROPHYSIOLOGY VIRTUAL CLINIC VISIT  Mr. Rohit Garvey is a 67 year old male who is being evaluated via a billable video visit.      The patient has been notified of following:     \"This video visit will be conducted via a call between you and your physician/provider. We have found that certain health care needs can be provided without the need for an in-person physical exam.  This service lets us provide the care you need with a video conversation.  If a prescription is necessary we can send it directly to your pharmacy.  If lab work is needed we can place an order for that and you can then stop by our lab to have the test done at a later time.    If during the course of the call the physician/provider feels a video visit is not appropriate, you will not be charged for this service.\"     Physician/provider has received verbal consent for a Video Visit from the patient? 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       Ventricular Tachycardia  NICM LVEF 45%, NYHA II:  1. ACEi/ARB: Not currently on due to LVEF 45%.  2. BB: Coreg was held due to hypotension  3. Aldosterone antagonist: Not currently required.  4. SCD prophylaxis: s/p secondary prevention ICD 6/20/22  5. Fluid status: appears euvolemic, continue lasix  6. VT: He was having a high burden of ventricular arrhythmias despite " being on 2 antiarrhythmic medications, amiodarone and mexiletine.  The review of the prior device interrogation shows mostly a ventricular tachycardia at about 130 bpm, monomorphic on all available tracings with us in morphology.  ATP is sometimes successful however, can also accelerate the rhythm into a fast VT or VF resulting in a shock.He had VT ablation on 10/31/22. Unfortunately, he has continued to have ICD shocks. He has been on amiodarone and ranolazine was added. We discussed that we could consider a repeat ablation as he has failed amiodarone, mexiletine, and now ranolazine. We also discussed ablation and its indications, risks, and benefits. Complications include but are not limited to vascular injury, excessive bleeding requiring blood transfusion, groin hematoma, aortic injury at the time of transseptal puncture, bleeding/injury to abdominal structures at time of epicardial access, cardiac tamponade requiring pericardiocentesis or open heart surgery, and thromboembolic complications or strokes. We also discussed that VT ablation can be limited by inducibility of VT at the time of EPS/Abaltion and/or the origin of VT. Efficacy of ablation also deceases if multiple foci are found. After an extensive discussion, the patient's family would like to pursue repeat ablation in attempts to improve VT burden and symptoms. He and his guardian would like to try another ablation to see if we can get more control over hs VT and limit shocks.    We will follow-up with the patient about a month after the VT ablation.     History of Present Illness/Subjective    Mr. Rohit Garvey is a 67 year old male who presents for VT management.    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     The information was obtained from  his sister and his nurse in the assisted living who were both present during this visit.    EP Visit 10/14/22: Mr Garvey lives in a nursing home due to intellectual delay.  In June 2022, he was found to be very spacey and pale by the nursing staff.  On examination his heart rate was 130 bpm with a blood pressure of 80/55.  He was also fatigued.  The patient was transported to the ED and was awake all the time.  Upon arrival to the ED he was found to have a wide-complex tachycardia and he was reportedly given medications first for cardioversion.  After medications failed, he received a cardioversion.  The details of whether he ever lost consciousness during that visit is unknown.  He has had a history of syncope in the bathroom a few weeks prior to his admission in June.  He reportedly had an evaluation as an inpatient at that time that was negative for ischemia, possible angiogram, and he was found to have a mildly reduced LVEF of 45 to 50% on an echocardiogram.  He had a dual-chamber ICD system implanted on 6/20/2022.  The patient also had a cardiac MRI revealing a left ventricular ejection fraction of 44%, right ventricular ejection fraction of 35%, with subepicardial and mid myocardial late gadolinium enhancement extending from the base to the mid cavity and involving the anterior, anteroseptal, anterolateral, and lateral walls.  He was started on amiodarone and was discharged.  The patient's VT recurred after discharge with many episodes in September and October, with ATP and shocks he followed up with his primary electrophysiologist, Dr James, in [place].  Device interrogation showed 16 therapy sessions between shocks and ATP's in the last 2 weeks.  The patient does feel the fast heartbeat sometimes accompanied with some weakness before the ICD shocks.  A repeat echocardiogram done on 1/26/2022 shows a stable LV ejection fraction of 45 to 50% with mild LV dilation and mild mitral and aortic and tricuspid  regurgitation, with RVSP mildly elevated at 39 mmHg.  The patient was referred for consideration of VT ablation.  The patient is currently on amiodarone 200 twice daily and mexiletine 150 mg 3 times daily for rhythm control.    Rohit is following up today. He underwent a VT ablation on 10/31/22. He had some cardiogenic shock post ablation and required pressor. He has since recovered well and groin sites well healed. He has had recurrent VT after ablation with multiple ICD therapies. He was started on Ranolazine. He has continued to have device therapies. Several morphologies noted. He reports feeling at baseline. Rohit denies chest discomfort, palpitations, abdominal fullness/bloating or peripheral edema, shortness of breath, paroxysmal nocturnal dyspnea, orthopnea, lightheadedness, dizziness, pre-syncope, or syncope.  Current cardiac medications include: amiodarone, ranolazine, Lipitor, lasix, and ASA.     I have reviewed and updated the patient's Past Medical History, Social History, Family History and Medication List.     Cardiographics (Personally Reviewed) :   TTE June 2022 in September 2022 reviewed as above.  CMR June 17, 2022 reviewed as above.    11/1/22 Echo:  Interpretation Summary  Left ventricular function is decreased. The ejection fraction is 35-40%  (moderately reduced). Moderate diffuse hypokinesis.  Global right ventricular function appears moderately reduced based on limited  views.  Small pericardial effusion, with the largest collection of fluid posterior to  the left ventricle at the apex (1.1cm).  There is no prior study for direct comparison.     Physical Examination   The rest of a comprehensive physical examination is deferred due to public health emergency video visit restrictions.  CONSITUTIONAL: no acute distress  HEENT: no icterus, no redness or discharge, neck supple  CV: no visible edema of visualized extremities. No JVD.   RESPIRATORY: respirations nonlabored, no cough  NEURO:  AA&Ox3, speech fluent/appropriate, motor grossly nonfocal  PSYCH: cooperative, affect appropriate  DERM: no rashes on visualized face/neck/upper extremities       Medications  Allergies   Current Outpatient Medications   Medication Sig Dispense Refill     amiodarone (PACERONE) 200 MG tablet Take 1 tablet (200 mg) by mouth daily 90 tablet 3     ASPIRIN LOW DOSE 81 MG EC tablet Take 81 mg by mouth daily       atorvastatin (LIPITOR) 80 MG tablet Take 80 mg by mouth daily       colchicine (COLCYRS) 0.6 MG tablet Take 0.5 tablets (0.3 mg) by mouth daily 60 tablet 0     furosemide (LASIX) 20 MG tablet Take 20 mg by mouth daily as needed       GNP NATURAL FIBER 28.3 % POWD Give 1-2 TABLESPOONFUL DAILY in a GLASS of JUICE       JARDIANCE 10 MG TABS tablet Take 10 mg by mouth daily       metFORMIN (GLUCOPHAGE) 500 MG tablet Take 1,000 mg by mouth 2 times daily (with meals)       tamsulosin (FLOMAX) 0.4 MG capsule Take 0.4 mg by mouth every evening     Amiodarone 200 BID  Mexiletine 150 TID  Furosemide 20 BID  Jardiance 10mg daily  Fish Oil 1000 mg TID  Calcium 600 BID  Metformin 500 2 tabs BID  Carvedilol 12.5 BID  Atrovastatin 80 daily  Tamsolusin 0.4 mg daily  Metamucil daily   Aspirin 81 daily No Known Allergies      Lab Results (Personally Reviewed)    Chemistry/lipid CBC Cardiac Enzymes/BNP/TSH/INR   Lab Results   Component Value Date    BUN 15.2 11/05/2022     (L) 11/05/2022    CO2 22 11/05/2022     Creatinine   Date Value Ref Range Status   11/05/2022 1.03 0.51 - 1.17 mg/dL Final     Comment:     Male and Female  0-2 Months    0.31-0.88 mg/dL  2-12 Months   0.16-0.39 mg/dL  1-2 Years     0.18-0.35 mg/dL  3-4 Years     0.26-0.42 mg/dL  5-6 Years     0.29-0.47 mg/dL  7-8 Years     0.34-0.53 mg/dL  9-10 Years    0.33-0.64 mg/dL  11-12 Years   0.44-0.68 mg/dL  13-14 Years   0.46-0.77 mg/dL    Female  15 Years and older  0.51-0.95 mg/dL    Male  15 Years and older  0.67-1.17 mg/dL           No results found for:  CHOL, HDL, LDL, CHOLHDL   Lab Results   Component Value Date    WBC 5.8 11/05/2022    HGB 9.2 (L) 11/05/2022    HCT 28.1 (L) 11/05/2022    MCV 90 11/05/2022     (L) 11/05/2022    Lab Results   Component Value Date    INR 1.23 (H) 10/31/2022          Video-Visit Details    Type of service:  Video Visit    Video Start Time: 8:00    Video End Time: 8:30    Originating Location (pt. Location): Assisted Living    Distant Location (provider location):  [facility]     Platform used for Video Visit: Kimberly    I have reviewed the note as documented above.  This accurately captures the substance of my virtual visit with the patient. The patient states understanding and is agreeable with the plan.   Sky Leon MD [facility]  Cardiology - Electrophysiology    Total time spent on patient visit, reviewing notes, imaging, labs, orders, and completing necessary documentation: 45 minutes.

## 2022-12-16 NOTE — NURSING NOTE
Chief Complaint   Patient presents with     Follow Up     No other vitals to report today     Yovani Carter, VF/CMA

## 2022-12-16 NOTE — PROGRESS NOTES
"Rohit is a 67 year old who is being evaluated via a billable video visit.      [place]    How would you like to obtain your AVS? MyChart  If the video visit is dropped, the invitation should be resent by: Send to e-mail at: [email or web address]  Will anyone else be joining your video visit?   pts sister Citlali and brother Marciano Pina Emma, VF/CMA          ELECTROPHYSIOLOGY VIRTUAL CLINIC VISIT  Mr. Rohit Garvey is a 67 year old male who is being evaluated via a billable video visit.      The patient has been notified of following:     \"This video visit will be conducted via a call between you and your physician/provider. We have found that certain health care needs can be provided without the need for an in-person physical exam.  This service lets us provide the care you need with a video conversation.  If a prescription is necessary we can send it directly to your pharmacy.  If lab work is needed we can place an order for that and you can then stop by our lab to have the test done at a later time.    If during the course of the call the physician/provider feels a video visit is not appropriate, you will not be charged for this service.\"     Physician/provider has received verbal consent for a Video Visit from the patient? 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       Ventricular Tachycardia  NICM LVEF 45%, NYHA II:  1. ACEi/ARB: Not currently on due to LVEF 45%.  2. BB: Coreg was held due to hypotension  3. Aldosterone antagonist: Not currently required.  4. SCD prophylaxis: s/p secondary prevention ICD 6/20/22  5. Fluid status: appears euvolemic, continue lasix  6. VT: He was having a high burden of ventricular arrhythmias despite " being on 2 antiarrhythmic medications, amiodarone and mexiletine.  The review of the prior device interrogation shows mostly a ventricular tachycardia at about 130 bpm, monomorphic on all available tracings with us in morphology.  ATP is sometimes successful however, can also accelerate the rhythm into a fast VT or VF resulting in a shock.He had VT ablation on 10/31/22. Unfortunately, he has continued to have ICD shocks. He has been on amiodarone and ranolazine was added. We discussed that we could consider a repeat ablation as he has failed amiodarone, mexiletine, and now ranolazine. We also discussed ablation and its indications, risks, and benefits. Complications include but are not limited to vascular injury, excessive bleeding requiring blood transfusion, groin hematoma, aortic injury at the time of transseptal puncture, bleeding/injury to abdominal structures at time of epicardial access, cardiac tamponade requiring pericardiocentesis or open heart surgery, and thromboembolic complications or strokes. We also discussed that VT ablation can be limited by inducibility of VT at the time of EPS/Abaltion and/or the origin of VT. Efficacy of ablation also deceases if multiple foci are found. After an extensive discussion, the patient's family would like to pursue repeat ablation in attempts to improve VT burden and symptoms. He and his guardian would like to try another ablation to see if we can get more control over hs VT and limit shocks.    We will follow-up with the patient about a month after the VT ablation.     History of Present Illness/Subjective    Mr. Rohit Garvey is a 67 year old male who presents for VT management.    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     The information was obtained from  his sister and his nurse in the assisted living who were both present during this visit.    EP Visit 10/14/22: Mr Garvey lives in a nursing home due to intellectual delay.  In June 2022, he was found to be very spacey and pale by the nursing staff.  On examination his heart rate was 130 bpm with a blood pressure of 80/55.  He was also fatigued.  The patient was transported to the ED and was awake all the time.  Upon arrival to the ED he was found to have a wide-complex tachycardia and he was reportedly given medications first for cardioversion.  After medications failed, he received a cardioversion.  The details of whether he ever lost consciousness during that visit is unknown.  He has had a history of syncope in the bathroom a few weeks prior to his admission in June.  He reportedly had an evaluation as an inpatient at that time that was negative for ischemia, possible angiogram, and he was found to have a mildly reduced LVEF of 45 to 50% on an echocardiogram.  He had a dual-chamber ICD system implanted on 6/20/2022.  The patient also had a cardiac MRI revealing a left ventricular ejection fraction of 44%, right ventricular ejection fraction of 35%, with subepicardial and mid myocardial late gadolinium enhancement extending from the base to the mid cavity and involving the anterior, anteroseptal, anterolateral, and lateral walls.  He was started on amiodarone and was discharged.  The patient's VT recurred after discharge with many episodes in September and October, with ATP and shocks he followed up with his primary electrophysiologist, Dr James, in [place].  Device interrogation showed 16 therapy sessions between shocks and ATP's in the last 2 weeks.  The patient does feel the fast heartbeat sometimes accompanied with some weakness before the ICD shocks.  A repeat echocardiogram done on 1/26/2022 shows a stable LV ejection fraction of 45 to 50% with mild LV dilation and mild mitral and aortic and tricuspid  regurgitation, with RVSP mildly elevated at 39 mmHg.  The patient was referred for consideration of VT ablation.  The patient is currently on amiodarone 200 twice daily and mexiletine 150 mg 3 times daily for rhythm control.    Rohit is following up today. He underwent a VT ablation on 10/31/22. He had some cardiogenic shock post ablation and required pressor. He has since recovered well and groin sites well healed. He has had recurrent VT after ablation with multiple ICD therapies. He was started on Ranolazine. He has continued to have device therapies. Several morphologies noted. He reports feeling at baseline. Rohit denies chest discomfort, palpitations, abdominal fullness/bloating or peripheral edema, shortness of breath, paroxysmal nocturnal dyspnea, orthopnea, lightheadedness, dizziness, pre-syncope, or syncope.  Current cardiac medications include: amiodarone, ranolazine, Lipitor, lasix, and ASA.     I have reviewed and updated the patient's Past Medical History, Social History, Family History and Medication List.     Cardiographics (Personally Reviewed) :   TTE June 2022 in September 2022 reviewed as above.  CMR June 17, 2022 reviewed as above.    11/1/22 Echo:  Interpretation Summary  Left ventricular function is decreased. The ejection fraction is 35-40%  (moderately reduced). Moderate diffuse hypokinesis.  Global right ventricular function appears moderately reduced based on limited  views.  Small pericardial effusion, with the largest collection of fluid posterior to  the left ventricle at the apex (1.1cm).  There is no prior study for direct comparison.     Physical Examination   The rest of a comprehensive physical examination is deferred due to public health emergency video visit restrictions.  CONSITUTIONAL: no acute distress  HEENT: no icterus, no redness or discharge, neck supple  CV: no visible edema of visualized extremities. No JVD.   RESPIRATORY: respirations nonlabored, no cough  NEURO:  AA&Ox3, speech fluent/appropriate, motor grossly nonfocal  PSYCH: cooperative, affect appropriate  DERM: no rashes on visualized face/neck/upper extremities       Medications  Allergies   Current Outpatient Medications   Medication Sig Dispense Refill     amiodarone (PACERONE) 200 MG tablet Take 1 tablet (200 mg) by mouth daily 90 tablet 3     ASPIRIN LOW DOSE 81 MG EC tablet Take 81 mg by mouth daily       atorvastatin (LIPITOR) 80 MG tablet Take 80 mg by mouth daily       colchicine (COLCYRS) 0.6 MG tablet Take 0.5 tablets (0.3 mg) by mouth daily 60 tablet 0     furosemide (LASIX) 20 MG tablet Take 20 mg by mouth daily as needed       GNP NATURAL FIBER 28.3 % POWD Give 1-2 TABLESPOONFUL DAILY in a GLASS of JUICE       JARDIANCE 10 MG TABS tablet Take 10 mg by mouth daily       metFORMIN (GLUCOPHAGE) 500 MG tablet Take 1,000 mg by mouth 2 times daily (with meals)       tamsulosin (FLOMAX) 0.4 MG capsule Take 0.4 mg by mouth every evening     Amiodarone 200 BID  Mexiletine 150 TID  Furosemide 20 BID  Jardiance 10mg daily  Fish Oil 1000 mg TID  Calcium 600 BID  Metformin 500 2 tabs BID  Carvedilol 12.5 BID  Atrovastatin 80 daily  Tamsolusin 0.4 mg daily  Metamucil daily   Aspirin 81 daily No Known Allergies      Lab Results (Personally Reviewed)    Chemistry/lipid CBC Cardiac Enzymes/BNP/TSH/INR   Lab Results   Component Value Date    BUN 15.2 11/05/2022     (L) 11/05/2022    CO2 22 11/05/2022     Creatinine   Date Value Ref Range Status   11/05/2022 1.03 0.51 - 1.17 mg/dL Final     Comment:     Male and Female  0-2 Months    0.31-0.88 mg/dL  2-12 Months   0.16-0.39 mg/dL  1-2 Years     0.18-0.35 mg/dL  3-4 Years     0.26-0.42 mg/dL  5-6 Years     0.29-0.47 mg/dL  7-8 Years     0.34-0.53 mg/dL  9-10 Years    0.33-0.64 mg/dL  11-12 Years   0.44-0.68 mg/dL  13-14 Years   0.46-0.77 mg/dL    Female  15 Years and older  0.51-0.95 mg/dL    Male  15 Years and older  0.67-1.17 mg/dL           No results found for:  CHOL, HDL, LDL, CHOLHDL   Lab Results   Component Value Date    WBC 5.8 11/05/2022    HGB 9.2 (L) 11/05/2022    HCT 28.1 (L) 11/05/2022    MCV 90 11/05/2022     (L) 11/05/2022    Lab Results   Component Value Date    INR 1.23 (H) 10/31/2022          Video-Visit Details    Type of service:  Video Visit    Video Start Time: 8:00    Video End Time: 8:30    Originating Location (pt. Location): Assisted Living    Distant Location (provider location):  [facility]     Platform used for Video Visit: Kimberly    I have reviewed the note as documented above.  This accurately captures the substance of my virtual visit with the patient. The patient states understanding and is agreeable with the plan.   Sky Leon MD [facility]  Cardiology - Electrophysiology    Total time spent on patient visit, reviewing notes, imaging, labs, orders, and completing necessary documentation: 45 minutes.

## 2022-12-20 RX ORDER — LIDOCAINE 40 MG/G
CREAM TOPICAL
Status: CANCELLED | OUTPATIENT
Start: 2022-12-20

## 2022-12-20 RX ORDER — SODIUM CHLORIDE 9 MG/ML
INJECTION, SOLUTION INTRAVENOUS CONTINUOUS
Status: CANCELLED | OUTPATIENT
Start: 2022-12-20

## 2022-12-20 NOTE — PATIENT INSTRUCTIONS
Plan:    VT ablation. You will be contacted with instructions and to schedule.      Your Care Team:  EP Cardiology   Telephone Number     Zeinab Josue RN [phone]    After business hours: [phone], ask for cardiologist on-call   Non-procedure scheduling:    Chelo LAIRD   [phone]   Procedure scheduling:    Catrina Yeung   [phone]   Device Clinic (Pacemakers, ICDs, Loop Recorders)    During business hours: [phone]  After business hours:   [phone]- select option 4 and ask for job code [id number].       Cardiovascular Clinic:   [address] [place] [address]. [place] [address]      As always, Thank you for trusting us with your health care needs!

## 2022-12-21 ENCOUNTER — PATIENT OUTREACH (OUTPATIENT)
Dept: CARDIOLOGY | Facility: CLINIC | Age: 67
End: 2022-12-21

## 2022-12-21 NOTE — TELEPHONE ENCOUNTER
Called and spoke with Peg to review pre-procedure instructions for upcoming VT ablation procedure on 12/29/22. She states she printed out the letter for patient's assisted living nurses to follow the instructions and med holds. She also said patient was just being discharged from the ER due to an ICD shock.    Informed Dr Leon and Sandhya Alvarez NP via email and sent previous shock tracings to them that are not yet in Epic.

## 2022-12-29 ENCOUNTER — ANESTHESIA EVENT (OUTPATIENT)
Dept: CARDIOLOGY | Facility: CLINIC | Age: 67
End: 2022-12-29
Payer: MEDICARE

## 2022-12-29 ENCOUNTER — ANCILLARY PROCEDURE (OUTPATIENT)
Dept: CARDIOLOGY | Facility: CLINIC | Age: 67
End: 2022-12-29
Attending: INTERNAL MEDICINE
Payer: MEDICARE

## 2022-12-29 ENCOUNTER — ANESTHESIA (OUTPATIENT)
Dept: CARDIOLOGY | Facility: CLINIC | Age: 67
End: 2022-12-29
Payer: MEDICARE

## 2022-12-29 ENCOUNTER — HOSPITAL ENCOUNTER (OUTPATIENT)
Facility: CLINIC | Age: 67
Discharge: HOME | End: 2022-12-30
Attending: INTERNAL MEDICINE | Admitting: INTERNAL MEDICINE
Payer: MEDICARE

## 2022-12-29 DIAGNOSIS — I47.29 PAROXYSMAL VENTRICULAR TACHYCARDIA (H): ICD-10-CM

## 2022-12-29 LAB
ABO/RH(D): NORMAL
ACT BLD: 122 SECONDS (ref 74–150)
ACT BLD: 135 SECONDS (ref 74–150)
ACT BLD: 267 SECONDS (ref 74–150)
ACT BLD: 284 SECONDS (ref 74–150)
ACT BLD: 288 SECONDS (ref 74–150)
ACT BLD: 292 SECONDS (ref 74–150)
ACT BLD: 305 SECONDS (ref 74–150)
ACT BLD: 309 SECONDS (ref 74–150)
ACT BLD: 335 SECONDS (ref 74–150)
ACT BLD: 352 SECONDS (ref 74–150)
ACT BLD: 352 SECONDS (ref 74–150)
ACT BLD: 365 SECONDS (ref 74–150)
ACT BLD: 377 SECONDS (ref 74–150)
ACT BLD: 88 SECONDS (ref 74–150)
ANION GAP SERPL CALCULATED.3IONS-SCNC: 14 MMOL/L (ref 7–15)
ANION GAP SERPL CALCULATED.3IONS-SCNC: 14 MMOL/L (ref 7–15)
ANTIBODY SCREEN: NEGATIVE
BUN SERPL-MCNC: 15.4 MG/DL (ref 8–23)
BUN SERPL-MCNC: 16 MG/DL (ref 8–23)
CALCIUM SERPL-MCNC: 8.3 MG/DL (ref 8.8–10.2)
CALCIUM SERPL-MCNC: 8.6 MG/DL (ref 8.8–10.2)
CHLORIDE SERPL-SCNC: 104 MMOL/L (ref 98–107)
CHLORIDE SERPL-SCNC: 107 MMOL/L (ref 98–107)
CREAT SERPL-MCNC: 1.17 MG/DL (ref 0.51–1.17)
CREAT SERPL-MCNC: 1.24 MG/DL (ref 0.51–1.17)
DEPRECATED HCO3 PLAS-SCNC: 17 MMOL/L (ref 22–29)
DEPRECATED HCO3 PLAS-SCNC: 19 MMOL/L (ref 22–29)
ERYTHROCYTE [DISTWIDTH] IN BLOOD BY AUTOMATED COUNT: 15.9 % (ref 10–15)
GFR SERPL CREATININE-BSD FRML MDRD: 64 ML/MIN/1.73M2
GFR SERPL CREATININE-BSD FRML MDRD: 68 ML/MIN/1.73M2
GLUCOSE BLDC GLUCOMTR-MCNC: 146 MG/DL (ref 70–99)
GLUCOSE BLDC GLUCOMTR-MCNC: 206 MG/DL (ref 70–99)
GLUCOSE BLDC GLUCOMTR-MCNC: 214 MG/DL (ref 70–99)
GLUCOSE BLDC GLUCOMTR-MCNC: 241 MG/DL (ref 70–99)
GLUCOSE BLDC GLUCOMTR-MCNC: 257 MG/DL (ref 70–99)
GLUCOSE SERPL-MCNC: 165 MG/DL (ref 70–99)
GLUCOSE SERPL-MCNC: 226 MG/DL (ref 70–99)
HCT VFR BLD AUTO: 31 % (ref 35–53)
HGB BLD-MCNC: 10.1 G/DL (ref 11.7–17.7)
MAGNESIUM SERPL-MCNC: 1.7 MG/DL (ref 1.7–2.3)
MCH RBC QN AUTO: 29.7 PG (ref 26.5–33)
MCHC RBC AUTO-ENTMCNC: 32.6 G/DL (ref 31.5–36.5)
MCV RBC AUTO: 91 FL (ref 78–100)
PLATELET # BLD AUTO: 176 10E3/UL (ref 150–450)
POTASSIUM SERPL-SCNC: 3.9 MMOL/L (ref 3.4–5.3)
POTASSIUM SERPL-SCNC: 4 MMOL/L (ref 3.4–5.3)
RBC # BLD AUTO: 3.4 10E6/UL (ref 3.8–5.9)
SARS-COV-2 RNA RESP QL NAA+PROBE: NEGATIVE
SODIUM SERPL-SCNC: 135 MMOL/L (ref 136–145)
SODIUM SERPL-SCNC: 140 MMOL/L (ref 136–145)
SPECIMEN EXPIRATION DATE: NORMAL
WBC # BLD AUTO: 6.4 10E3/UL (ref 4–11)

## 2022-12-29 PROCEDURE — 250N000011 HC RX IP 250 OP 636: Performed by: NURSE ANESTHETIST, CERTIFIED REGISTERED

## 2022-12-29 PROCEDURE — 93662 INTRACARDIAC ECG (ICE): CPT | Performed by: INTERNAL MEDICINE

## 2022-12-29 PROCEDURE — 93654 COMPRE EP EVAL TX VT: CPT | Mod: GC | Performed by: INTERNAL MEDICINE

## 2022-12-29 PROCEDURE — 83735 ASSAY OF MAGNESIUM: CPT | Performed by: INTERNAL MEDICINE

## 2022-12-29 PROCEDURE — 272N000001 HC OR GENERAL SUPPLY STERILE: Performed by: INTERNAL MEDICINE

## 2022-12-29 PROCEDURE — 258N000003 HC RX IP 258 OP 636: Performed by: NURSE ANESTHETIST, CERTIFIED REGISTERED

## 2022-12-29 PROCEDURE — C1759 CATH, INTRA ECHOCARDIOGRAPHY: HCPCS | Performed by: INTERNAL MEDICINE

## 2022-12-29 PROCEDURE — 93662 INTRACARDIAC ECG (ICE): CPT | Mod: 26 | Performed by: INTERNAL MEDICINE

## 2022-12-29 PROCEDURE — 250N000009 HC RX 250: Performed by: NURSE ANESTHETIST, CERTIFIED REGISTERED

## 2022-12-29 PROCEDURE — 250N000013 HC RX MED GY IP 250 OP 250 PS 637: Performed by: INTERNAL MEDICINE

## 2022-12-29 PROCEDURE — 93654 COMPRE EP EVAL TX VT: CPT | Performed by: INTERNAL MEDICINE

## 2022-12-29 PROCEDURE — C1733 CATH, EP, OTHR THAN COOL-TIP: HCPCS | Performed by: INTERNAL MEDICINE

## 2022-12-29 PROCEDURE — 85014 HEMATOCRIT: CPT | Performed by: INTERNAL MEDICINE

## 2022-12-29 PROCEDURE — 36415 COLL VENOUS BLD VENIPUNCTURE: CPT | Performed by: INTERNAL MEDICINE

## 2022-12-29 PROCEDURE — 80048 BASIC METABOLIC PNL TOTAL CA: CPT | Performed by: INTERNAL MEDICINE

## 2022-12-29 PROCEDURE — C1732 CATH, EP, DIAG/ABL, 3D/VECT: HCPCS | Performed by: INTERNAL MEDICINE

## 2022-12-29 PROCEDURE — C1887 CATHETER, GUIDING: HCPCS | Performed by: INTERNAL MEDICINE

## 2022-12-29 PROCEDURE — C1766 INTRO/SHEATH,STRBLE,NON-PEEL: HCPCS | Performed by: INTERNAL MEDICINE

## 2022-12-29 PROCEDURE — C1894 INTRO/SHEATH, NON-LASER: HCPCS | Performed by: INTERNAL MEDICINE

## 2022-12-29 PROCEDURE — 93462 L HRT CATH TRNSPTL PUNCTURE: CPT | Performed by: INTERNAL MEDICINE

## 2022-12-29 PROCEDURE — 93010 ELECTROCARDIOGRAM REPORT: CPT | Mod: 76 | Performed by: INTERNAL MEDICINE

## 2022-12-29 PROCEDURE — U0003 INFECTIOUS AGENT DETECTION BY NUCLEIC ACID (DNA OR RNA); SEVERE ACUTE RESPIRATORY SYNDROME CORONAVIRUS 2 (SARS-COV-2) (CORONAVIRUS DISEASE [COVID-19]), AMPLIFIED PROBE TECHNIQUE, MAKING USE OF HIGH THROUGHPUT TECHNOLOGIES AS DESCRIBED BY CMS-2020-01-R: HCPCS | Performed by: ANESTHESIOLOGY

## 2022-12-29 PROCEDURE — 250N000011 HC RX IP 250 OP 636: Performed by: INTERNAL MEDICINE

## 2022-12-29 PROCEDURE — 250N000013 HC RX MED GY IP 250 OP 250 PS 637: Performed by: STUDENT IN AN ORGANIZED HEALTH CARE EDUCATION/TRAINING PROGRAM

## 2022-12-29 PROCEDURE — 93287 PERI-PX DEVICE EVAL & PRGR: CPT

## 2022-12-29 PROCEDURE — 258N000003 HC RX IP 258 OP 636: Performed by: INTERNAL MEDICINE

## 2022-12-29 PROCEDURE — 120N000003 HC R&B IMCU UMMC

## 2022-12-29 PROCEDURE — 93005 ELECTROCARDIOGRAM TRACING: CPT

## 2022-12-29 PROCEDURE — 82962 GLUCOSE BLOOD TEST: CPT

## 2022-12-29 PROCEDURE — 86850 RBC ANTIBODY SCREEN: CPT | Performed by: INTERNAL MEDICINE

## 2022-12-29 PROCEDURE — 93462 L HRT CATH TRNSPTL PUNCTURE: CPT | Mod: GC | Performed by: INTERNAL MEDICINE

## 2022-12-29 PROCEDURE — 999N000054 HC STATISTIC EKG NON-CHARGEABLE

## 2022-12-29 PROCEDURE — 99214 OFFICE O/P EST MOD 30 MIN: CPT | Mod: 25 | Performed by: INTERNAL MEDICINE

## 2022-12-29 PROCEDURE — 85347 COAGULATION TIME ACTIVATED: CPT

## 2022-12-29 PROCEDURE — 370N000017 HC ANESTHESIA TECHNICAL FEE, PER MIN: Performed by: INTERNAL MEDICINE

## 2022-12-29 RX ORDER — AMIODARONE HYDROCHLORIDE 200 MG/1
200 TABLET ORAL DAILY
Status: CANCELLED | OUTPATIENT
Start: 2022-12-30

## 2022-12-29 RX ORDER — FENTANYL CITRATE 50 UG/ML
INJECTION, SOLUTION INTRAMUSCULAR; INTRAVENOUS PRN
Status: DISCONTINUED | OUTPATIENT
Start: 2022-12-29 | End: 2022-12-29

## 2022-12-29 RX ORDER — CARVEDILOL 3.12 MG/1
3.12 TABLET ORAL 2 TIMES DAILY WITH MEALS
Status: ON HOLD | COMMUNITY
End: 2023-04-27

## 2022-12-29 RX ORDER — DOPAMINE HYDROCHLORIDE 160 MG/100ML
INJECTION, SOLUTION INTRAVENOUS CONTINUOUS PRN
Status: DISCONTINUED | OUTPATIENT
Start: 2022-12-29 | End: 2022-12-29

## 2022-12-29 RX ORDER — PROTAMINE SULFATE 10 MG/ML
INJECTION, SOLUTION INTRAVENOUS PRN
Status: DISCONTINUED | OUTPATIENT
Start: 2022-12-29 | End: 2022-12-29

## 2022-12-29 RX ORDER — FENTANYL CITRATE 50 UG/ML
50 INJECTION, SOLUTION INTRAMUSCULAR; INTRAVENOUS EVERY 5 MIN PRN
Status: DISCONTINUED | OUTPATIENT
Start: 2022-12-29 | End: 2022-12-30 | Stop reason: HOSPADM

## 2022-12-29 RX ORDER — PROPOFOL 10 MG/ML
INJECTION, EMULSION INTRAVENOUS PRN
Status: DISCONTINUED | OUTPATIENT
Start: 2022-12-29 | End: 2022-12-29

## 2022-12-29 RX ORDER — SODIUM CHLORIDE 9 MG/ML
INJECTION, SOLUTION INTRAVENOUS CONTINUOUS
Status: DISCONTINUED | OUTPATIENT
Start: 2022-12-29 | End: 2022-12-29 | Stop reason: HOSPADM

## 2022-12-29 RX ORDER — ACETAMINOPHEN 325 MG/1
650 TABLET ORAL EVERY 4 HOURS PRN
Status: DISCONTINUED | OUTPATIENT
Start: 2022-12-29 | End: 2022-12-30 | Stop reason: HOSPADM

## 2022-12-29 RX ORDER — ONDANSETRON 4 MG/1
4 TABLET, ORALLY DISINTEGRATING ORAL EVERY 30 MIN PRN
Status: DISCONTINUED | OUTPATIENT
Start: 2022-12-29 | End: 2022-12-30 | Stop reason: HOSPADM

## 2022-12-29 RX ORDER — HYDROMORPHONE HYDROCHLORIDE 1 MG/ML
0.4 INJECTION, SOLUTION INTRAMUSCULAR; INTRAVENOUS; SUBCUTANEOUS EVERY 5 MIN PRN
Status: DISCONTINUED | OUTPATIENT
Start: 2022-12-29 | End: 2022-12-30 | Stop reason: HOSPADM

## 2022-12-29 RX ORDER — LIDOCAINE 40 MG/G
CREAM TOPICAL
Status: DISCONTINUED | OUTPATIENT
Start: 2022-12-29 | End: 2022-12-29 | Stop reason: HOSPADM

## 2022-12-29 RX ORDER — SODIUM CHLORIDE, SODIUM LACTATE, POTASSIUM CHLORIDE, CALCIUM CHLORIDE 600; 310; 30; 20 MG/100ML; MG/100ML; MG/100ML; MG/100ML
INJECTION, SOLUTION INTRAVENOUS CONTINUOUS PRN
Status: DISCONTINUED | OUTPATIENT
Start: 2022-12-29 | End: 2022-12-29

## 2022-12-29 RX ORDER — ONDANSETRON 2 MG/ML
INJECTION INTRAMUSCULAR; INTRAVENOUS PRN
Status: DISCONTINUED | OUTPATIENT
Start: 2022-12-29 | End: 2022-12-29

## 2022-12-29 RX ORDER — HYDROMORPHONE HYDROCHLORIDE 1 MG/ML
0.2 INJECTION, SOLUTION INTRAMUSCULAR; INTRAVENOUS; SUBCUTANEOUS EVERY 5 MIN PRN
Status: DISCONTINUED | OUTPATIENT
Start: 2022-12-29 | End: 2022-12-30 | Stop reason: HOSPADM

## 2022-12-29 RX ORDER — FUROSEMIDE 10 MG/ML
INJECTION INTRAMUSCULAR; INTRAVENOUS PRN
Status: DISCONTINUED | OUTPATIENT
Start: 2022-12-29 | End: 2022-12-29

## 2022-12-29 RX ORDER — METHYLPREDNISOLONE SODIUM SUCCINATE 125 MG/2ML
INJECTION, POWDER, LYOPHILIZED, FOR SOLUTION INTRAMUSCULAR; INTRAVENOUS
Status: DISCONTINUED | OUTPATIENT
Start: 2022-12-29 | End: 2022-12-29 | Stop reason: HOSPADM

## 2022-12-29 RX ORDER — ONDANSETRON 2 MG/ML
4 INJECTION INTRAMUSCULAR; INTRAVENOUS EVERY 30 MIN PRN
Status: DISCONTINUED | OUTPATIENT
Start: 2022-12-29 | End: 2022-12-30 | Stop reason: HOSPADM

## 2022-12-29 RX ORDER — FENTANYL CITRATE 50 UG/ML
25 INJECTION, SOLUTION INTRAMUSCULAR; INTRAVENOUS EVERY 5 MIN PRN
Status: DISCONTINUED | OUTPATIENT
Start: 2022-12-29 | End: 2022-12-30 | Stop reason: HOSPADM

## 2022-12-29 RX ORDER — MEXILETINE HYDROCHLORIDE 200 MG/1
200 CAPSULE ORAL EVERY 8 HOURS SCHEDULED
Status: DISCONTINUED | OUTPATIENT
Start: 2022-12-29 | End: 2022-12-30 | Stop reason: HOSPADM

## 2022-12-29 RX ORDER — SODIUM CHLORIDE, SODIUM LACTATE, POTASSIUM CHLORIDE, CALCIUM CHLORIDE 600; 310; 30; 20 MG/100ML; MG/100ML; MG/100ML; MG/100ML
INJECTION, SOLUTION INTRAVENOUS CONTINUOUS
Status: DISCONTINUED | OUTPATIENT
Start: 2022-12-29 | End: 2022-12-29

## 2022-12-29 RX ORDER — DEXAMETHASONE SODIUM PHOSPHATE 4 MG/ML
INJECTION, SOLUTION INTRA-ARTICULAR; INTRALESIONAL; INTRAMUSCULAR; INTRAVENOUS; SOFT TISSUE PRN
Status: DISCONTINUED | OUTPATIENT
Start: 2022-12-29 | End: 2022-12-29

## 2022-12-29 RX ORDER — CEFAZOLIN SODIUM 1 G/3ML
INJECTION, POWDER, FOR SOLUTION INTRAMUSCULAR; INTRAVENOUS PRN
Status: DISCONTINUED | OUTPATIENT
Start: 2022-12-29 | End: 2022-12-29

## 2022-12-29 RX ORDER — HEPARIN SODIUM 1000 [USP'U]/ML
INJECTION, SOLUTION INTRAVENOUS; SUBCUTANEOUS PRN
Status: DISCONTINUED | OUTPATIENT
Start: 2022-12-29 | End: 2022-12-29

## 2022-12-29 RX ADMIN — NOREPINEPHRINE BITARTRATE 6.4 MCG: 1 INJECTION, SOLUTION, CONCENTRATE INTRAVENOUS at 09:21

## 2022-12-29 RX ADMIN — NOREPINEPHRINE BITARTRATE 6.4 MCG: 1 INJECTION, SOLUTION, CONCENTRATE INTRAVENOUS at 14:08

## 2022-12-29 RX ADMIN — ONDANSETRON 4 MG: 2 INJECTION INTRAMUSCULAR; INTRAVENOUS at 10:53

## 2022-12-29 RX ADMIN — SODIUM CHLORIDE, POTASSIUM CHLORIDE, SODIUM LACTATE AND CALCIUM CHLORIDE: 600; 310; 30; 20 INJECTION, SOLUTION INTRAVENOUS at 07:32

## 2022-12-29 RX ADMIN — SUGAMMADEX 200 MG: 100 INJECTION, SOLUTION INTRAVENOUS at 18:02

## 2022-12-29 RX ADMIN — MEXILETINE HYDROCHLORIDE 200 MG: 200 CAPSULE ORAL at 23:35

## 2022-12-29 RX ADMIN — ROCURONIUM BROMIDE 50 MG: 50 INJECTION, SOLUTION INTRAVENOUS at 08:26

## 2022-12-29 RX ADMIN — NOREPINEPHRINE BITARTRATE 6.4 MCG: 1 INJECTION, SOLUTION, CONCENTRATE INTRAVENOUS at 14:29

## 2022-12-29 RX ADMIN — HUMAN INSULIN 4 UNITS: 100 INJECTION, SOLUTION SUBCUTANEOUS at 18:52

## 2022-12-29 RX ADMIN — NOREPINEPHRINE BITARTRATE 6.4 MCG: 1 INJECTION, SOLUTION, CONCENTRATE INTRAVENOUS at 08:15

## 2022-12-29 RX ADMIN — HUMAN INSULIN 4 UNITS: 100 INJECTION, SOLUTION SUBCUTANEOUS at 19:45

## 2022-12-29 RX ADMIN — NOREPINEPHRINE BITARTRATE 6.4 MCG: 1 INJECTION, SOLUTION, CONCENTRATE INTRAVENOUS at 17:04

## 2022-12-29 RX ADMIN — NOREPINEPHRINE BITARTRATE 6.4 MCG: 1 INJECTION, SOLUTION, CONCENTRATE INTRAVENOUS at 09:05

## 2022-12-29 RX ADMIN — ROCURONIUM BROMIDE 50 MG: 50 INJECTION, SOLUTION INTRAVENOUS at 07:42

## 2022-12-29 RX ADMIN — Medication 2000 UNITS: at 12:22

## 2022-12-29 RX ADMIN — PROTAMINE SULFATE 40 MG: 10 INJECTION, SOLUTION INTRAVENOUS at 17:39

## 2022-12-29 RX ADMIN — AMIODARONE HYDROCHLORIDE 1 MG/MIN: 50 INJECTION, SOLUTION INTRAVENOUS at 19:59

## 2022-12-29 RX ADMIN — Medication 10000 UNITS: at 09:02

## 2022-12-29 RX ADMIN — NOREPINEPHRINE BITARTRATE 6.4 MCG: 1 INJECTION, SOLUTION, CONCENTRATE INTRAVENOUS at 13:03

## 2022-12-29 RX ADMIN — PROTAMINE SULFATE 50 MG: 10 INJECTION, SOLUTION INTRAVENOUS at 13:17

## 2022-12-29 RX ADMIN — Medication 3000 UNITS: at 10:53

## 2022-12-29 RX ADMIN — NOREPINEPHRINE BITARTRATE 6.4 MCG: 1 INJECTION, SOLUTION, CONCENTRATE INTRAVENOUS at 17:00

## 2022-12-29 RX ADMIN — NOREPINEPHRINE BITARTRATE 6.4 MCG: 1 INJECTION, SOLUTION, CONCENTRATE INTRAVENOUS at 15:29

## 2022-12-29 RX ADMIN — ROCURONIUM BROMIDE 20 MG: 50 INJECTION, SOLUTION INTRAVENOUS at 10:59

## 2022-12-29 RX ADMIN — PHENYLEPHRINE HYDROCHLORIDE 0.3 MCG/KG/MIN: 10 INJECTION INTRAVENOUS at 08:07

## 2022-12-29 RX ADMIN — AMIODARONE HYDROCHLORIDE 150 MG: 1.5 INJECTION, SOLUTION INTRAVENOUS at 17:44

## 2022-12-29 RX ADMIN — AMIODARONE HYDROCHLORIDE 150 MG: 1.5 INJECTION, SOLUTION INTRAVENOUS at 19:33

## 2022-12-29 RX ADMIN — SODIUM CHLORIDE, POTASSIUM CHLORIDE, SODIUM LACTATE AND CALCIUM CHLORIDE: 600; 310; 30; 20 INJECTION, SOLUTION INTRAVENOUS at 13:14

## 2022-12-29 RX ADMIN — CEFAZOLIN 2 G: 1 INJECTION, POWDER, FOR SOLUTION INTRAMUSCULAR; INTRAVENOUS at 11:39

## 2022-12-29 RX ADMIN — NOREPINEPHRINE BITARTRATE 6.4 MCG: 1 INJECTION, SOLUTION, CONCENTRATE INTRAVENOUS at 14:27

## 2022-12-29 RX ADMIN — PROPOFOL 110 MG: 10 INJECTION, EMULSION INTRAVENOUS at 07:42

## 2022-12-29 RX ADMIN — NOREPINEPHRINE BITARTRATE 6.4 MCG: 1 INJECTION, SOLUTION, CONCENTRATE INTRAVENOUS at 12:38

## 2022-12-29 RX ADMIN — NOREPINEPHRINE BITARTRATE 6.4 MCG: 1 INJECTION, SOLUTION, CONCENTRATE INTRAVENOUS at 15:58

## 2022-12-29 RX ADMIN — NOREPINEPHRINE BITARTRATE 3.2 MCG: 1 INJECTION, SOLUTION, CONCENTRATE INTRAVENOUS at 17:59

## 2022-12-29 RX ADMIN — PHENYLEPHRINE HYDROCHLORIDE 100 MCG: 10 INJECTION INTRAVENOUS at 07:57

## 2022-12-29 RX ADMIN — PHENYLEPHRINE HYDROCHLORIDE 100 MCG: 10 INJECTION INTRAVENOUS at 08:07

## 2022-12-29 RX ADMIN — FENTANYL CITRATE 50 MCG: 50 INJECTION, SOLUTION INTRAMUSCULAR; INTRAVENOUS at 17:46

## 2022-12-29 RX ADMIN — MIDAZOLAM 2 MG: 1 INJECTION INTRAMUSCULAR; INTRAVENOUS at 07:32

## 2022-12-29 RX ADMIN — ROCURONIUM BROMIDE 30 MG: 50 INJECTION, SOLUTION INTRAVENOUS at 09:48

## 2022-12-29 RX ADMIN — FUROSEMIDE 20 MG: 10 INJECTION, SOLUTION INTRAMUSCULAR; INTRAVENOUS at 16:23

## 2022-12-29 RX ADMIN — Medication 10000 UNITS: at 15:50

## 2022-12-29 RX ADMIN — ONDANSETRON 4 MG: 2 INJECTION INTRAMUSCULAR; INTRAVENOUS at 17:54

## 2022-12-29 RX ADMIN — CEFAZOLIN 2 G: 1 INJECTION, POWDER, FOR SOLUTION INTRAMUSCULAR; INTRAVENOUS at 15:38

## 2022-12-29 RX ADMIN — Medication 5000 UNITS: at 09:20

## 2022-12-29 RX ADMIN — PROTAMINE SULFATE 10 MG: 10 INJECTION, SOLUTION INTRAVENOUS at 17:37

## 2022-12-29 RX ADMIN — DOPAMINE HYDROCHLORIDE 3 MCG/KG/MIN: 160 INJECTION, SOLUTION INTRAVENOUS at 08:15

## 2022-12-29 RX ADMIN — NOREPINEPHRINE BITARTRATE 6.4 MCG: 1 INJECTION, SOLUTION, CONCENTRATE INTRAVENOUS at 12:44

## 2022-12-29 RX ADMIN — CEFAZOLIN 2 G: 1 INJECTION, POWDER, FOR SOLUTION INTRAMUSCULAR; INTRAVENOUS at 07:45

## 2022-12-29 RX ADMIN — DEXAMETHASONE SODIUM PHOSPHATE 8 MG: 4 INJECTION, SOLUTION INTRA-ARTICULAR; INTRALESIONAL; INTRAMUSCULAR; INTRAVENOUS; SOFT TISSUE at 07:42

## 2022-12-29 RX ADMIN — Medication 3000 UNITS: at 17:00

## 2022-12-29 RX ADMIN — PHENYLEPHRINE HYDROCHLORIDE 100 MCG: 10 INJECTION INTRAVENOUS at 18:04

## 2022-12-29 RX ADMIN — NOREPINEPHRINE BITARTRATE 6.4 MCG: 1 INJECTION, SOLUTION, CONCENTRATE INTRAVENOUS at 09:30

## 2022-12-29 ASSESSMENT — ACTIVITIES OF DAILY LIVING (ADL)
ADLS_ACUITY_SCORE: 23
ADLS_ACUITY_SCORE: 21

## 2022-12-29 NOTE — ANESTHESIA PROCEDURE NOTES
Airway       Patient location during procedure: OR       Procedure Start/Stop Times: 12/29/2022 7:44 AM  Staff -        Performed By: CRNAIndications and Patient Condition       Indications for airway management: mohsen-procedural       Induction type:intravenous       Mask difficulty assessment: 2 - vent by mask + OA or adjuvant +/- NMBA    Final Airway Details       Final airway type: endotracheal airway       Successful airway: ETT - single  Endotracheal Airway Details        ETT size (mm): 8.0       Cuffed: yes       Cuff volume (mL): 7       Successful intubation technique: direct laryngoscopy       DL Blade Type: Arango 2       Grade View of Cords: 1       Adjucts: stylet       Position: Right       Measured from: lips       Secured at (cm): 24       Bite block used: None    Post intubation assessment        Placement verified by: capnometry, equal breath sounds and chest rise        Number of attempts at approach: 1       Number of other approaches attempted: 0       Secured with: pink tape       Ease of procedure: easy       Dentition: Intact and Unchanged       Dental guard used and removed. Dental Guard Type: Standard White.    Medication(s) Administered   Medication Administration Time: 12/29/2022 7:44 AM

## 2022-12-29 NOTE — Clinical Note
dry, intact, no bleeding and no hematoma. 8.5Fr Epicardial removed and steri-strip over site.   4Fr arterial sheath removed with figure 8 suture at site  9Fr LFV sheath removed and figure 8 suture at site  8/8.5Fr RFV sheath removed and figure 8 suture at site.

## 2022-12-29 NOTE — LETTER
[facility] [address] [address]  [phone]    FACSIMILE TRANSMITTAL SHEET    TO: [place] [facility]       FROM: Phoebe DIAZ  PHONE: [phone]  DATE: 12/30/22      NOTES/COMMENTS: P.S. discharge orders                                      IF YOU DID NOT RECEIVE THE CORRECT NUMBER OF PAGES OR THE FAX DID NOT COME THROUGH CLEARLY, PLEASE CALL THE SENDER     CONFIDENTIALITY STATEMENT: Confidential information that may accompany this transmission contains protected health information under state and federal law and is legally privileged. This information is intended only for the use of the individual or entity named above and may be used only for carrying out treatment, payment or other healthcare operations. The recipient or person responsible for delivering this information is prohibited by law from disclosing this information without proper authorization to any other party, unless required to do so by law or regulation. If you are not the intended recipient, you are hereby notified that any review, dissemination, distribution, or copying of this message is strictly prohibited. If you have received this communication in error, please destroy the materials and contact us immediately by calling the number listed above. No response indicates that the information was received by the appropriate authorized party

## 2022-12-29 NOTE — Clinical Note
Potential access sites were evaluated for patency using ultrasound.   The left femoral artery and right femoral vein was selected. Access was obtained under with Sonosite and Fluoroscopic guidance using a micropuncture 21 gauge needle with direct visualization of needle entry.

## 2022-12-29 NOTE — ANESTHESIA PREPROCEDURE EVALUATION
Anesthesia Pre-Procedure Evaluation    Patient: Rohit Garvey   MRN: [id number] : 1955        Procedure : Procedure(s):  EP Ablation VT          Past Medical History:   Diagnosis Date     Arrhythmia      Diabetes (H)      Hypertension      Pacemaker       Past Surgical History:   Procedure Laterality Date     EP ABLATION VT/PVC N/A 10/31/2022    Procedure: EP Ablation VT;  Surgeon: Syk Leon MD;  Location:  [facility]     EYE SURGERY       ORTHOPEDIC SURGERY       PICC DOUBLE LUMEN PLACEMENT Right 2022    5FR DL PICC, brachial medial vein. L-42cm.      No Known Allergies   Social History     Tobacco Use     Smoking status: Never     Smokeless tobacco: Never   Substance Use Topics     Alcohol use: Never      Wt Readings from Last 1 Encounters:   22 67.4 kg (148 lb 9.4 oz)        Anesthesia Evaluation            ROS/MED HX  ENT/Pulmonary:       Neurologic:       Cardiovascular:     (+) hypertension-----ICD Irregular Heartbeat/Palpitations,  (-) murmur and wheezes   METS/Exercise Tolerance:     Hematologic:       Musculoskeletal:       GI/Hepatic:       Renal/Genitourinary:       Endo:     (+) type II DM,     Psychiatric/Substance Use:       Infectious Disease:       Malignancy:       Other:            Physical Exam    Airway        Mallampati: II   TM distance: > 3 FB   Neck ROM: full   Mouth opening: > 3 cm    Respiratory Devices and Support         Dental       (+) upper dentures, lower dentures and missing      Cardiovascular          Rhythm and rate: regular and normal (-) no systolic click and no murmur    Pulmonary   pulmonary exam normal        breath sounds clear to auscultation   (-) no wheezes        OUTSIDE LABS:  CBC:   Lab Results   Component Value Date    WBC 6.4 2022    WBC 5.8 2022    HGB 10.1 (L) 2022    HGB 9.2 (L) 2022    HCT 31.0 (L) 2022    HCT 28.1 (L) 2022     2022     (L) 2022     BMP:   Lab  Results   Component Value Date     (L) 12/29/2022     (L) 11/05/2022    POTASSIUM 4.0 12/29/2022    POTASSIUM 3.8 11/05/2022    CHLORIDE 104 12/29/2022    CHLORIDE 102 11/05/2022    CO2 17 (L) 12/29/2022    CO2 22 11/05/2022    BUN 16.0 12/29/2022    BUN 15.2 11/05/2022    CR 1.24 (H) 12/29/2022    CR 1.03 11/05/2022     (H) 12/29/2022     (H) 12/29/2022     COAGS:   Lab Results   Component Value Date    PTT 27 10/31/2022    INR 1.23 (H) 10/31/2022     POC: No results found for: BGM, HCG, HCGS  HEPATIC:   Lab Results   Component Value Date    ALBUMIN 3.2 (L) 11/05/2022    PROTTOTAL 5.4 (L) 11/05/2022     (H) 11/05/2022     (H) 11/05/2022    ALKPHOS 197 (H) 11/05/2022    BILITOTAL 0.9 11/05/2022     OTHER:   Lab Results   Component Value Date    LACT 1.4 11/04/2022    A1C 6.7 (H) 11/04/2022    NEETA 8.6 (L) 12/29/2022    PHOS 2.6 11/05/2022    MAG 1.9 11/05/2022       Anesthesia Plan    ASA Status:  3   NPO Status:  NPO Appropriate    Anesthesia Type: General.     - Airway: ETT   Induction: Intravenous.   Maintenance: Balanced.        Consents    Anesthesia Plan(s) and associated risks, benefits, and realistic alternatives discussed. Questions answered and patient/representative(s) expressed understanding.     - Discussed: Risks, Benefits and Alternatives for BOTH SEDATION and the PROCEDURE were discussed     - Discussed with:  Patient      - Extended Intubation/Ventilatory Support Discussed: Yes.      - Patient is DNR/DNI Status: No    Use of blood products discussed: Yes.     - Discussed with: Patient.     Postoperative Care    Pain management: IV analgesics.   PONV prophylaxis: Ondansetron (or other 5HT-3), Dexamethasone or Solumedrol     Comments:                    Naveen Lowe MD, MD

## 2022-12-29 NOTE — INTERVAL H&P NOTE
"I have reviewed the surgical (or preoperative) H&P that is linked to this encounter, and examined the patient. There are no significant changes    Clinical Conditions Present on Arrival:  Clinically Significant Risk Factors Present on Admission           # Hyponatremia: Lowest Na = 135 mmol/L in last 2 days, will monitor as appropriate          # DMII: A1C = 6.7 % (Ref range: <5.7 %) within past 3 months  # Overweight: Estimated body mass index is 25.51 kg/m  as calculated from the following:    Height as of this encounter: 1.626 m (5' 4\").    Weight as of this encounter: 67.4 kg (148 lb 9.4 oz).       "

## 2022-12-30 ENCOUNTER — APPOINTMENT (OUTPATIENT)
Dept: CARDIOLOGY | Facility: CLINIC | Age: 67
End: 2022-12-30
Attending: STUDENT IN AN ORGANIZED HEALTH CARE EDUCATION/TRAINING PROGRAM
Payer: MEDICARE

## 2022-12-30 ENCOUNTER — ANCILLARY PROCEDURE (OUTPATIENT)
Dept: CARDIOLOGY | Facility: CLINIC | Age: 67
End: 2022-12-30
Attending: INTERNAL MEDICINE
Payer: MEDICARE

## 2022-12-30 VITALS
BODY MASS INDEX: 25.37 KG/M2 | OXYGEN SATURATION: 99 % | WEIGHT: 148.59 LBS | RESPIRATION RATE: 18 BRPM | HEIGHT: 64 IN | SYSTOLIC BLOOD PRESSURE: 106 MMHG | TEMPERATURE: 98.1 F | DIASTOLIC BLOOD PRESSURE: 67 MMHG | HEART RATE: 79 BPM

## 2022-12-30 LAB
ALBUMIN SERPL BCG-MCNC: 3.3 G/DL (ref 3.5–5.2)
ALP SERPL-CCNC: 94 U/L (ref 35–129)
ALT SERPL W P-5'-P-CCNC: 58 U/L (ref 10–50)
ANION GAP SERPL CALCULATED.3IONS-SCNC: 13 MMOL/L (ref 7–15)
AST SERPL W P-5'-P-CCNC: 129 U/L (ref 10–50)
ATRIAL RATE - MUSE: 80 BPM
BI-PLANE LVEF ECHO: NORMAL
BILIRUB SERPL-MCNC: 0.4 MG/DL
BUN SERPL-MCNC: 18.4 MG/DL (ref 8–23)
CALCIUM SERPL-MCNC: 8.6 MG/DL (ref 8.8–10.2)
CHLORIDE SERPL-SCNC: 107 MMOL/L (ref 98–107)
CREAT SERPL-MCNC: 1.12 MG/DL (ref 0.51–1.17)
DEPRECATED HCO3 PLAS-SCNC: 20 MMOL/L (ref 22–29)
DIASTOLIC BLOOD PRESSURE - MUSE: NORMAL MMHG
ERYTHROCYTE [DISTWIDTH] IN BLOOD BY AUTOMATED COUNT: 16.4 % (ref 10–15)
GFR SERPL CREATININE-BSD FRML MDRD: 72 ML/MIN/1.73M2
GLUCOSE BLDC GLUCOMTR-MCNC: 190 MG/DL (ref 70–99)
GLUCOSE BLDC GLUCOMTR-MCNC: 210 MG/DL (ref 70–99)
GLUCOSE BLDC GLUCOMTR-MCNC: 212 MG/DL (ref 70–99)
GLUCOSE SERPL-MCNC: 204 MG/DL (ref 70–99)
HCT VFR BLD AUTO: 30.7 % (ref 35–53)
HGB BLD-MCNC: 9.9 G/DL (ref 11.7–17.7)
INTERPRETATION ECG - MUSE: NORMAL
MAGNESIUM SERPL-MCNC: 1.6 MG/DL (ref 1.7–2.3)
MCH RBC QN AUTO: 29.6 PG (ref 26.5–33)
MCHC RBC AUTO-ENTMCNC: 32.2 G/DL (ref 31.5–36.5)
MCV RBC AUTO: 92 FL (ref 78–100)
P AXIS - MUSE: 60 DEGREES
PLATELET # BLD AUTO: 176 10E3/UL (ref 150–450)
POTASSIUM SERPL-SCNC: 4.2 MMOL/L (ref 3.4–5.3)
PR INTERVAL - MUSE: 208 MS
PROT SERPL-MCNC: 5.5 G/DL (ref 6.4–8.3)
QRS DURATION - MUSE: 154 MS
QT - MUSE: 476 MS
QTC - MUSE: 548 MS
R AXIS - MUSE: -67 DEGREES
RBC # BLD AUTO: 3.34 10E6/UL (ref 3.8–5.9)
SODIUM SERPL-SCNC: 140 MMOL/L (ref 136–145)
SYSTOLIC BLOOD PRESSURE - MUSE: NORMAL MMHG
T AXIS - MUSE: 87 DEGREES
VENTRICULAR RATE- MUSE: 80 BPM
WBC # BLD AUTO: 13.2 10E3/UL (ref 4–11)

## 2022-12-30 PROCEDURE — 83735 ASSAY OF MAGNESIUM: CPT | Performed by: INTERNAL MEDICINE

## 2022-12-30 PROCEDURE — 93283 PRGRMG EVAL IMPLANTABLE DFB: CPT | Mod: 26 | Performed by: INTERNAL MEDICINE

## 2022-12-30 PROCEDURE — 250N000013 HC RX MED GY IP 250 OP 250 PS 637: Performed by: INTERNAL MEDICINE

## 2022-12-30 PROCEDURE — 36415 COLL VENOUS BLD VENIPUNCTURE: CPT | Performed by: STUDENT IN AN ORGANIZED HEALTH CARE EDUCATION/TRAINING PROGRAM

## 2022-12-30 PROCEDURE — 93321 DOPPLER ECHO F-UP/LMTD STD: CPT | Mod: 26 | Performed by: INTERNAL MEDICINE

## 2022-12-30 PROCEDURE — 93308 TTE F-UP OR LMTD: CPT

## 2022-12-30 PROCEDURE — 96372 THER/PROPH/DIAG INJ SC/IM: CPT | Performed by: STUDENT IN AN ORGANIZED HEALTH CARE EDUCATION/TRAINING PROGRAM

## 2022-12-30 PROCEDURE — 250N000013 HC RX MED GY IP 250 OP 250 PS 637: Performed by: STUDENT IN AN ORGANIZED HEALTH CARE EDUCATION/TRAINING PROGRAM

## 2022-12-30 PROCEDURE — 85027 COMPLETE CBC AUTOMATED: CPT | Performed by: STUDENT IN AN ORGANIZED HEALTH CARE EDUCATION/TRAINING PROGRAM

## 2022-12-30 PROCEDURE — 250N000011 HC RX IP 250 OP 636: Performed by: INTERNAL MEDICINE

## 2022-12-30 PROCEDURE — 93308 TTE F-UP OR LMTD: CPT | Mod: 26 | Performed by: INTERNAL MEDICINE

## 2022-12-30 PROCEDURE — 250N000012 HC RX MED GY IP 250 OP 636 PS 637: Performed by: STUDENT IN AN ORGANIZED HEALTH CARE EDUCATION/TRAINING PROGRAM

## 2022-12-30 PROCEDURE — 93325 DOPPLER ECHO COLOR FLOW MAPG: CPT

## 2022-12-30 PROCEDURE — 93325 DOPPLER ECHO COLOR FLOW MAPG: CPT | Mod: 26 | Performed by: INTERNAL MEDICINE

## 2022-12-30 PROCEDURE — 80053 COMPREHEN METABOLIC PANEL: CPT | Performed by: STUDENT IN AN ORGANIZED HEALTH CARE EDUCATION/TRAINING PROGRAM

## 2022-12-30 PROCEDURE — 93283 PRGRMG EVAL IMPLANTABLE DFB: CPT

## 2022-12-30 PROCEDURE — 99214 OFFICE O/P EST MOD 30 MIN: CPT | Mod: FS | Performed by: NURSE PRACTITIONER

## 2022-12-30 RX ORDER — ACETAMINOPHEN 325 MG/1
650 TABLET ORAL EVERY 4 HOURS PRN
Status: DISCONTINUED | OUTPATIENT
Start: 2022-12-30 | End: 2022-12-30 | Stop reason: HOSPADM

## 2022-12-30 RX ORDER — MEXILETINE HYDROCHLORIDE 200 MG/1
200 CAPSULE ORAL 3 TIMES DAILY
Status: DISCONTINUED | OUTPATIENT
Start: 2022-12-30 | End: 2022-12-30

## 2022-12-30 RX ORDER — LIDOCAINE 40 MG/G
CREAM TOPICAL
Status: DISCONTINUED | OUTPATIENT
Start: 2022-12-30 | End: 2022-12-30 | Stop reason: HOSPADM

## 2022-12-30 RX ORDER — POTASSIUM CHLORIDE 1500 MG/1
TABLET, EXTENDED RELEASE ORAL
Qty: 30 TABLET | Refills: 1 | Status: ON HOLD | OUTPATIENT
Start: 2022-12-30 | End: 2023-04-27

## 2022-12-30 RX ORDER — CARVEDILOL 3.12 MG/1
3.12 TABLET ORAL 2 TIMES DAILY WITH MEALS
Status: DISCONTINUED | OUTPATIENT
Start: 2022-12-30 | End: 2022-12-30 | Stop reason: HOSPADM

## 2022-12-30 RX ORDER — AMIODARONE HYDROCHLORIDE 200 MG/1
200 TABLET ORAL 2 TIMES DAILY
Status: DISCONTINUED | OUTPATIENT
Start: 2022-12-30 | End: 2022-12-30 | Stop reason: HOSPADM

## 2022-12-30 RX ORDER — NICOTINE POLACRILEX 4 MG
15-30 LOZENGE BUCCAL
Status: DISCONTINUED | OUTPATIENT
Start: 2022-12-30 | End: 2022-12-30 | Stop reason: HOSPADM

## 2022-12-30 RX ORDER — MAGNESIUM HYDROXIDE/ALUMINUM HYDROXICE/SIMETHICONE 120; 1200; 1200 MG/30ML; MG/30ML; MG/30ML
30 SUSPENSION ORAL EVERY 4 HOURS PRN
Status: DISCONTINUED | OUTPATIENT
Start: 2022-12-30 | End: 2022-12-30 | Stop reason: HOSPADM

## 2022-12-30 RX ORDER — MAGNESIUM SULFATE HEPTAHYDRATE 40 MG/ML
2 INJECTION, SOLUTION INTRAVENOUS ONCE
Status: COMPLETED | OUTPATIENT
Start: 2022-12-30 | End: 2022-12-30

## 2022-12-30 RX ORDER — ATORVASTATIN CALCIUM 80 MG/1
80 TABLET, FILM COATED ORAL DAILY
Status: DISCONTINUED | OUTPATIENT
Start: 2022-12-30 | End: 2022-12-30 | Stop reason: HOSPADM

## 2022-12-30 RX ORDER — FUROSEMIDE 20 MG
20 TABLET ORAL DAILY
Qty: 90 TABLET | Refills: 3 | Status: CANCELLED | OUTPATIENT
Start: 2022-12-30

## 2022-12-30 RX ORDER — NALOXONE HYDROCHLORIDE 0.4 MG/ML
0.2 INJECTION, SOLUTION INTRAMUSCULAR; INTRAVENOUS; SUBCUTANEOUS
Status: DISCONTINUED | OUTPATIENT
Start: 2022-12-30 | End: 2022-12-30 | Stop reason: HOSPADM

## 2022-12-30 RX ORDER — AMIODARONE HYDROCHLORIDE 200 MG/1
200 TABLET ORAL 2 TIMES DAILY
Qty: 90 TABLET | Refills: 3 | Status: SHIPPED | OUTPATIENT
Start: 2022-12-30

## 2022-12-30 RX ORDER — NALOXONE HYDROCHLORIDE 0.4 MG/ML
0.4 INJECTION, SOLUTION INTRAMUSCULAR; INTRAVENOUS; SUBCUTANEOUS
Status: DISCONTINUED | OUTPATIENT
Start: 2022-12-30 | End: 2022-12-30 | Stop reason: HOSPADM

## 2022-12-30 RX ORDER — DEXTROSE MONOHYDRATE 25 G/50ML
25-50 INJECTION, SOLUTION INTRAVENOUS
Status: DISCONTINUED | OUTPATIENT
Start: 2022-12-30 | End: 2022-12-30 | Stop reason: HOSPADM

## 2022-12-30 RX ORDER — APIXABAN 5 MG/1
1 TABLET, FILM COATED ORAL
COMMUNITY
Start: 2022-11-29

## 2022-12-30 RX ORDER — TAMSULOSIN HYDROCHLORIDE 0.4 MG/1
0.4 CAPSULE ORAL EVERY EVENING
Status: DISCONTINUED | OUTPATIENT
Start: 2022-12-30 | End: 2022-12-30 | Stop reason: HOSPADM

## 2022-12-30 RX ORDER — FUROSEMIDE 20 MG
20 TABLET ORAL DAILY
Status: DISCONTINUED | OUTPATIENT
Start: 2022-12-30 | End: 2022-12-30

## 2022-12-30 RX ORDER — MAGNESIUM OXIDE 400 MG/1
400 TABLET ORAL EVERY 4 HOURS
Status: DISCONTINUED | OUTPATIENT
Start: 2022-12-30 | End: 2022-12-30 | Stop reason: HOSPADM

## 2022-12-30 RX ORDER — ACETAMINOPHEN 650 MG/1
650 SUPPOSITORY RECTAL EVERY 4 HOURS PRN
Status: DISCONTINUED | OUTPATIENT
Start: 2022-12-30 | End: 2022-12-30 | Stop reason: HOSPADM

## 2022-12-30 RX ORDER — AMIODARONE HYDROCHLORIDE 200 MG/1
200 TABLET ORAL DAILY
Status: DISCONTINUED | OUTPATIENT
Start: 2022-12-30 | End: 2022-12-30

## 2022-12-30 RX ORDER — MEXILETINE HYDROCHLORIDE 200 MG/1
200 CAPSULE ORAL EVERY 8 HOURS
Qty: 90 CAPSULE | Refills: 3 | Status: SHIPPED | OUTPATIENT
Start: 2022-12-30

## 2022-12-30 RX ORDER — POLYETHYLENE GLYCOL 3350 17 G/17G
17 POWDER, FOR SOLUTION ORAL DAILY PRN
Status: DISCONTINUED | OUTPATIENT
Start: 2022-12-30 | End: 2022-12-30 | Stop reason: HOSPADM

## 2022-12-30 RX ORDER — NITROGLYCERIN 0.4 MG/1
0.4 TABLET SUBLINGUAL EVERY 5 MIN PRN
Status: DISCONTINUED | OUTPATIENT
Start: 2022-12-30 | End: 2022-12-30 | Stop reason: HOSPADM

## 2022-12-30 RX ADMIN — Medication 400 MG: at 12:03

## 2022-12-30 RX ADMIN — CARVEDILOL 3.12 MG: 3.12 TABLET, FILM COATED ORAL at 08:19

## 2022-12-30 RX ADMIN — INSULIN ASPART 2 UNITS: 100 INJECTION, SOLUTION INTRAVENOUS; SUBCUTANEOUS at 11:16

## 2022-12-30 RX ADMIN — INSULIN ASPART 2 UNITS: 100 INJECTION, SOLUTION INTRAVENOUS; SUBCUTANEOUS at 08:07

## 2022-12-30 RX ADMIN — AMIODARONE HYDROCHLORIDE 200 MG: 200 TABLET ORAL at 08:19

## 2022-12-30 RX ADMIN — MEXILETINE HYDROCHLORIDE 200 MG: 200 CAPSULE ORAL at 15:25

## 2022-12-30 RX ADMIN — MAGNESIUM SULFATE IN WATER 2 G: 40 INJECTION, SOLUTION INTRAVENOUS at 13:34

## 2022-12-30 RX ADMIN — EMPAGLIFLOZIN 10 MG: 10 TABLET, FILM COATED ORAL at 11:16

## 2022-12-30 RX ADMIN — MEXILETINE HYDROCHLORIDE 200 MG: 200 CAPSULE ORAL at 08:08

## 2022-12-30 ASSESSMENT — ACTIVITIES OF DAILY LIVING (ADL)
ADLS_ACUITY_SCORE: 25
DEPENDENT_IADLS:: CLEANING;COOKING;LAUNDRY;SHOPPING;MEAL PREPARATION;MEDICATION MANAGEMENT;MONEY MANAGEMENT;TRANSPORTATION
ADLS_ACUITY_SCORE: 25
ADLS_ACUITY_SCORE: 23
ADLS_ACUITY_SCORE: 25

## 2022-12-30 NOTE — DISCHARGE INSTRUCTIONS
Going Home After Ablation  Patient Instructions:    Care of groin site:        Remove the Band-Aid after 24 hours. If there is minor oozing, apply another Band-aid and remove it after 12 hours.         Do NOT take a bath, use a hot tub, pool, or submerse in water for at least 3 days You may shower.         It is normal to have a small bruise or lump at the site.        Do not scrub the site.        Do not use lotion or powder near the puncture site for 3 days.        For the first 2 days: Do not stoop or squat. When you cough, sneeze or move your bowels, hold your hand over the puncture site and press gently.        Do not lift more than 10 pounds or exertional activity for 10 days.      If you start bleeding from the site in your groin:  Lie down flat and press firmly on the site.  Call your physician immediately, or, come to the emergency room.    Call 911 right away if you have bleeding that is heavy or does not stop.     Call your doctor/provider if:        You have a large or growing hard lump around the site.        The site is red, swollen, hot or tender.        Blood or fluid is draining from the site.        You have chills or a fever greater than 101 F (38 C).        Your leg or arm turns bluish, feels numb or cool.        You have hives, a rash or unusual itching.

## 2022-12-30 NOTE — PLAN OF CARE
"Neuro- A&O x4 delayed.   Cardiac- paced at 80. SBP . Amiodarone gtt running at 16.7mL/hr.   VS Blood pressure 92/64, pulse 79, temperature 98.3  F (36.8  C), temperature source Oral, resp. rate 15, height 1.626 m (5' 4\"), weight 67.4 kg (148 lb 9.4 oz), SpO2 97 %.  Pain-denied  O2-  room air  GI/-  aviles in place, did have a small BM upon arrival to unit, regular diet low fat.   Lines- PIV x2   Activity-  slept since gotten to unit.   Skin: bilateral groin sites have sutures and are SFD.   Small bruised/abrasion to medial right ankle.     No other concerns will continue to monitor and follow POC.     Jamee Hart RN on 12/30/2022 at 6:17 AM    "

## 2022-12-30 NOTE — UTILIZATION REVIEW
Admission Status; Secondary Review Determination   12/29/2022  4:59 AM    Under the authority of the Utilization Management Committee, the utilization review process indicated a secondary review on the above patient. The review outcome is based on review of the medical records, discussions with staff, and applying clinical experience noted on the date of the review.     (x) Outpatient Status with extended recovery is appropriate - This patient does not meet hospital inpatient criteria. If this patient's primary payer is Medicare and was admitted as an inpatient, Condition Code 44 should be used and patient status changed to outpatient recovery.    RATIONALE FOR DETERMINATION   67-year-old male with history of diabetes mellitus, hypertension, nonischemic cardiomyopathy, complicated by paroxysmal VT status post ICD with failed VT ablation, admitted after elective VT ablation on 12/29.  Plan for discharge to assisted living facility soon.  Procedure is listed as outpatient and patient is admitted to inpatient status, does not meet criteria for inpatient stay, recommend change to outpatient status, communicated to primary cardiology team.    Patient was admitted to the hospital after the procedure. Patient has Medicare and the procedure is not on the CMS inpatient list. No documented complications or unexpected recovery. Patient can be safely  monitored for complications and recover in outpatient/extended recovery setting.     The severity of illness, intensity of service provided, expected LOS and risk for adverse outcome doesn't meet inpatient hospital admission.       The information on this document is developed by the utilization review team in order for the business office to ensure compliance. This only denotes the appropriateness of proper admission status and does not reflect the quality of care rendered.   The definitions of Inpatient Status and Observation Status used in making the determination above are  those provided in the CMS Coverage Manual, Chapter 1 and Chapter 6, section 70.4.     Sincerely,     Loly Arce MD   Physician Advisor   Utilization Management   [facility].

## 2022-12-30 NOTE — ANESTHESIA POSTPROCEDURE EVALUATION
Patient: Rohit Garvey    Procedure: Procedure(s):  EP Ablation VT       Anesthesia Type:  General    Note:  Disposition: Admission   Postop Pain Control: Uneventful            Sign Out: Well controlled pain   PONV: No   Neuro/Psych: Uneventful            Sign Out: Acceptable/Baseline neuro status   Airway/Respiratory: Uneventful            Sign Out: Acceptable/Baseline resp. status   CV/Hemodynamics: Uneventful            Sign Out: Acceptable CV status; No obvious hypovolemia; No obvious fluid overload   Other NRE: NONE   DID A NON-ROUTINE EVENT OCCUR? No           Last vitals:  Vitals Value Taken Time   BP 93/58 12/29/22 2024   Temp 36.7  C (98  F) 12/29/22 2015   Pulse 79 12/29/22 2025   Resp 20 12/29/22 2015   SpO2 98 % 12/29/22 2025   Vitals shown include unvalidated device data.    Electronically Signed By: Renan Cantu MD  December 29, 2022  8:27 PM

## 2022-12-30 NOTE — PLAN OF CARE
DISCHARGE                         No discharge date for patient encounter.  ----------------------------------------------------------------------------  Discharged to: Home  Via: private transportation  Accompanied by: Family  Discharge Instructions: *diet, *activity, medications, follow up appointments, when to call the MD, aftercare instructions.  Prescriptions: medication list reviewed & sent with pt  Follow Up Appointments: information given  Belongings: All sent with pt (cellphone,  and glasses)  IV: d/c'd  Telemetry: d/c'd  Pt exhibits understanding of above discharge instructions; all questions answered.    Discharge Paperwork: Signed, copied, and sent home with patient.      Patient has no complaints of dizziness and no noted irregularities on ECG. Amiodarone was shifted back to oral, IV amiodarone discontinued. All contraptions were discontinued, tolerated. Magnesium was replace thru IV. Walked around the hallway and back to bed, uneventful. Unable to void freely post IFC removal, patient has no urge before discharge.

## 2022-12-30 NOTE — DISCHARGE SUMMARY
[address] [address]  p: [phone]    Discharge Summary: Cardiology Service    Rohit Garvey MRN# [id number]   YOB: 1955 Age: 67 year old       Admission Date: 12/29/2022  Discharge Date: 12/30/2022    Discharge Diagnoses:  # Chronic Systolic Heart Failure with mildly reduced EF (EF 44%)   # pVT s/p ablation   # HTN  # HLD  # T2DM  # BPH  # Hypomagnesia    Brief HPI:  Rohit Lemos is a 67-year-old male with a with past medical history of nonischemic cardiomyopathy (45%), paroxysmal VT status post ICD 6/22 with recurrent ICD shocks status post failed VT ablation 10/31, hypertension, hyperlipidemia, T2DM admitted for observation after elective VT ablation on 12/29.    Hospital Course by Diagnosis:  # Chronic Systolic Heart Failure with mildly reduced EF (EF 44%)   # pVT s/p ablation   #pAf  # HTN  # HLD  Patient admitted 12/29 after planned VT ablation. 9 areas of VT induced and ablated. Patient started on Amio bolus/gtt overnight.     - Echo in am with minimal optimal views, no obvious effusion noted  - Antiarrhythmics: s/p Amio gtt overnight, now resume PTA Amiodarone, start Mexiletine 200 mg TID per EP recs  - Volume: Euvolemic, continue PTA Lasix 20 mg daily  - Continue PTA Klor 20 mEq daily  - ACE-I/ARB/ARNi: Not on it PTA   - BB: resume PTA Coreg 3.125 BID  - SGLT2 : resume PTA Jardiance  - SCD prophylaxis ICD   - AC: PTA Eliquis   - Continue PTA Lipitor 80 mg daily    Chronic issues   - T2DM: Most recent A1C 6.7, ok to resume PTA Metformin tomorrow (48 hours post procedure), continue Jardiance as listed above  - BPH: Continue PTA Flomax  - Hypomagnesia: Continue PTA MagOx    Pertinent Procedures:  1. VT ablation 12/29/2022    Consults:  Electrophysiology    Medication Changes:  START Mexiletine 200 mg TID  STOP Ranexa    Discharge medications:   Current Discharge Medication List      START taking these medications     Details   mexiletine (MEXITIL) 200 MG capsule Take 1 capsule (200 mg) by mouth every 8 hours  Qty: 90 capsule, Refills: 3    Associated Diagnoses: Paroxysmal ventricular tachycardia         CONTINUE these medications which have CHANGED    Details   amiodarone (PACERONE) 200 MG tablet Take 1 tablet (200 mg) by mouth 2 times daily  Qty: 90 tablet, Refills: 3    Associated Diagnoses: Paroxysmal ventricular tachycardia      potassium chloride ER (KLOR-CON M) 20 MEQ CR tablet TAKE 1 TABLET BY MOUTH EVERY MORNING WITH BREAKFAST  Qty: 30 tablet, Refills: 1    Associated Diagnoses: Paroxysmal ventricular tachycardia         CONTINUE these medications which have NOT CHANGED    Details   atorvastatin (LIPITOR) 80 MG tablet Take 80 mg by mouth daily      carvedilol (COREG) 3.125 MG tablet Take 3.125 mg by mouth 2 times daily (with meals)      colchicine (COLCYRS) 0.6 MG tablet Take 0.5 tablets (0.3 mg) by mouth daily  Qty: 60 tablet, Refills: 0    Associated Diagnoses: Paroxysmal ventricular tachycardia      GNP NATURAL FIBER 28.3 % POWD Give 1-2 TABLESPOONFUL DAILY in a GLASS of JUICE      JARDIANCE 10 MG TABS tablet Take 10 mg by mouth daily      magnesium oxide (MAG-OX) 400 MG tablet Take 800 mg by mouth      metFORMIN (GLUCOPHAGE) 500 MG tablet Take 1,000 mg by mouth 2 times daily (with meals)      tamsulosin (FLOMAX) 0.4 MG capsule Take 0.4 mg by mouth every evening      ELIQUIS ANTICOAGULANT 5 MG tablet Take 1 tablet by mouth 2 times daily         STOP taking these medications       ranolazine (RANEXA) 500 MG 12 hr tablet Comments:   Reason for Stopping:               Follow-up:  - PCP 7-10 days for post hospitalization visit, med change eval  - EP 3-6 weeks for VT ablation follow up     Labs or imaging requiring follow-up after discharge:  Magnesium    Code status:  Full    Condition on discharge  Temp:  [97.8  F (36.6  C)-98.3  F (36.8  C)] 98.1  F (36.7  C)  Pulse:  [78-80] 79  Resp:  [15-20] 18  BP: ()/(59-70)  106/67  SpO2:  [94 %-99 %] 99 %  General: Alert, interactive, NAD  Eyes: sclera anicteric, EOMI  Neck: JVP not elevated, carotid 2+ bilaterally  Cardiovascular: regular rate and rhythm, normal S1 and S2, no murmurs, gallops, or rubs  Resp: clear to auscultation bilaterally, no rales, wheezes, or rhonchi  GI: Soft, nontender, nondistended. +BS.  No HSM or masses, no rebound or guarding.  Extremities: no edema, no cyanosis or clubbing, dorsalis pedis and posterior tibialis pulses 2+ bilaterally. Bilateral groin incisions CDI  Skin: Warm and dry, no jaundice or rash  Neuro: CN 2-12 intact, moves all extremities equally  Psych: Alert & oriented x 3    Imaging with results:  Echocardiogram 12/30/2022:  Interpretation Summary  Left ventricular size is normal.  Biplane LVEF is 46%.  No regional wall motion abnormalities are seen.  Right ventricular function, chamber size, wall motion, and thickness are  normal.  The inferior vena cava is normal.    Device Check 12/30/2022:  Device: [email or web address] [id number] Evera MRI XT DR  Normal Device Function.   Mode: DDD  bpm --> DDDR  bpm  AP: 98.8%  : 93.2%  Intrinsic rhythm: CHB - AS @ 58 bpm w/ no intrinsic R-waves at VVI 30  Short V-V intervals: 3  Thoracic Impedance: Near reference line.   Lead Trends Appear Stable: Yes; RA capture threshold measuring at 1 V @ 1 ms today with P-wave measuring 1.8 mV.   Estimated battery longevity to RRT = 1.8 years. Battery voltage = 2.86 V.  Atrial arrhythmia: None  AF burden: 0%  Anticoagulant: None  Ventricular Arrhythmia: 2 Treated VT episodes recorded last evening 12/29/22 at 20:04 PM and 20:10 PM. Episodes lasted 10-19 seconds at 133-136 bpm. Both episodes were successfully treated with one sequence of ATP each. No further recorded episodes, though VT Monitor zone was not turned back on post ablation, so no further episodes would be recorded if patient had a VT below the VT zone (118 bpm).   Setting changes: Mode changed from DDD to  DDDR. FVT zone turned OFF, per previous settings. VT Monitor zone turned ON, per previous settings. VT Initial Detection Interval extended from 16 beats to 28 beats, per Dr. Leon and Dr. Santillan.     Other imaging studies:  EKG 12 Lead 12/29/2022: AV Paced, HR 80      Patient Care Team:  Kevyn Salazar as PCP - Sky Mullins MD as Assigned Heart and Vascular Provider    LINDA Hodges, CNP  [facility]    Time Spent on this Encounter

## 2022-12-30 NOTE — PROGRESS NOTES
Pt has bilateral figure 8 sutures to bilateral groin sites.  Sites are benign and without drainage or swelling.

## 2022-12-30 NOTE — ANESTHESIA CARE TRANSFER NOTE
Patient: Rohit Garvey    Procedure: Procedure(s):  EP Ablation VT       Diagnosis: VT  Diagnosis Additional Information: No value filed.    Anesthesia Type:   General     Note:    Oropharynx: oropharynx clear of all foreign objects  Level of Consciousness: awake  Oxygen Supplementation: nasal cannula  Level of Supplemental Oxygen (L/min / FiO2): 3 LPM  Independent Airway: airway patency satisfactory and stable  Dentition: dentition unchanged  Vital Signs Stable: post-procedure vital signs reviewed and stable  Report to RN Given: handoff report given  Patient transferred to: PACU    Handoff Report: Identifed the Patient, Identified the Reponsible Provider, Reviewed the pertinent medical history, Discussed the surgical course, Reviewed Intra-OP anesthesia mangement and issues during anesthesia, Set expectations for post-procedure period and Allowed opportunity for questions and acknowledgement of understanding      Vitals:  Vitals Value Taken Time   /65 12/29/22 1823   Temp     Pulse 80 12/29/22 1823   Resp 18 12/29/22 1823   SpO2 98 % 12/29/22 1823       Electronically Signed By: LINDA Hines CRNA  December 29, 2022  6:23 PM

## 2022-12-30 NOTE — CONSULTS
[facility]  WOC Nurse Inpatient Assessment     Consulted for: right medial heel     Patient History (according to provider note(s):      67-year-old with past medical history of nonischemic cardiomyopathy (45%) complicated by paroxysmal VT status post ICD 6/22 with recurrent ICD shocks status post failed VT ablation 10/31, hypertension, hyperlipidemia, T2DM admitted for observation after elective VT ablation on 12/29.    Areas Assessed:      Areas visualized during today's visit: Focused: right foot    Pressure Injury Location: right medial     Last photo: 12/30  Wound type: Pressure Injury     Pressure Injury Stage: Deep Tissue Pressure Injury (DTPI), hospital acquired   Wound history/plan of care:   Found after cath lab procedure  Wound base: 100 % non-blanchable, maroon, purple and epidermis     Palpation of the wound bed: normal      Drainage: none     Description of drainage: none     Measurements (length x width x depth, in cm) 1.4  x 2.2  x  0 cm      Tunneling N/A     Undermining N/A  Periwound skin: Erythema- blanchable      Color: pink and red      Temperature: normal   Odor: none  Pain: no grimacing or signs of discomfort, none  Pain intervention prior to dressing change: N/A  Treatment goal: Heal  and Protection  STATUS: initial assessment  Supplies ordered: supplies stored on unit    Treatment Plan:     Right medial heel wound(s): Every 3 days cleanse with saline and pat dry. Paint mohsen wound skin with no sting. Conform mepilex over wound.      Orders: Written    RECOMMEND PRIMARY TEAM ORDER: None, at this time  Education provided: plan of care and wound progress  Discussed plan of care with: Patient, Family and Nurse  WOC nurse follow-up plan: twice weekly  Notify WOC if wound(s) deteriorate.  Nursing to notify the Provider(s) and re-consult the WOC Nurse if new skin concern.    DATA:     Current support surface: Standard  Standard gel/foam mattress  (IsoFlex, Atmos air, etc)  Containment of urine/stool: Incontinent pad in bed  BMI: Body mass index is 25.51 kg/m .   Active diet order: Orders Placed This Encounter      Low Saturated Fat Na <2400 mg     Output: I/O last 3 completed shifts:  In: 1751 [P.O.:150; I.V.:1601]  Out: 3610 [Urine:3600; Blood:10]     Labs: Recent Labs   Lab 12/30/22  0534   ALBUMIN 3.3*   HGB 9.9*   WBC 13.2*     Pressure injury risk assessment:   Sensory Perception: 4-->no impairment  Moisture: 4-->rarely moist  Activity: 3-->walks occasionally  Mobility: 3-->slightly limited  Nutrition: 3-->adequate  Friction and Shear: 3-->no apparent problem  Linwood Score: 20    Tanja Watson RN CWOCN   Dept. Pager: [phone]  Dept. Office Number: [phone]

## 2022-12-30 NOTE — PROGRESS NOTES
"Admission          Date:12/29/22 Time:2330  -----------------------------------------------------------  Reason for admission: ablation   Primary team notified of pt arrival.  Admitted from:PACU  Via: stretcher  Accompanied by: RN  Belongings: Placed in closet; valuables sent home with family  Admission Profile: complete  Teaching: orientation to unit and call light- call light within reach, call don't fall, use of console, meal times, when to call for the RN, and enforced importance of safety   Access:  2 PIVs  Telemetry:Placed on pt  Ht./Wt.: complete  Code Status verified on armband: yes  2 RN Skin Assessment Completed By: ABBY Mancuso and ABBY Curiel  Bed surface reassessed with algorithm and charted: yes  New bed surface ordered: no  Suction/Ambu bag/Flowmeter at bedside: yes  Is patient having diarrhea upon admission- if YES fill out testing algorithm : no        Pt status:    Blood pressure 92/61, pulse 79, temperature 97.9  F (36.6  C), temperature source Oral, resp. rate 16, height 1.626 m (5' 4\"), weight 67.4 kg (148 lb 9.4 oz), SpO2 97 %.    Jamee Hart RN on 12/30/2022 at 3:21 AM    "

## 2022-12-30 NOTE — CONSULTS
Care Management Initial Consult    General Information  Assessment completed with: Family, Brother and sister  Type of CM/SW Visit: Initial Assessment  Primary Care Provider verified and updated as needed: Yes   Readmission within the last 30 days: no previous admission in last 30 days   Reason for Consult: discharge planning  Advance Care Planning: Has ACP documents on file.      Communication Assessment  Patient's communication style: spoken language (English or Bilingual)    Hearing Difficulty or Deaf: no   Wear Glasses or Blind: yes    Cognitive  Cognitive/Neuro/Behavioral: WDL     Living Environment:   People in home: facility resident     Current living Arrangements: residential facility    Name of Facility: [facility]   Able to return to prior arrangements: yes       Family/Social Support:  Care provided by: other (see comments) (staff members)  Provides care for: no one, unable/limited ability to care for self  Marital Status: Single  Support System: Sibling(s), Facility resident(s)/Staff          Description of Support System: Supportive, Involved       Current Resources:   Patient receiving home care services: No  Community Resources: assisted living  Equipment currently used at home: walker, standard, shower chair, grab bar, tub/shower, grab bar, toilet  Supplies currently used at home: None.      Employment/Financial:  Employment Status: disabled       Functional Status:  Prior to admission patient needed assistance:   Dependent ADLs: Ambulation-walker  Dependent IADLs: Cleaning, Cooking, Laundry, Shopping, Meal Preparation, Medication Management, Money Management, Transportation    Additional Information:  Care management assessment completed at the bedside w/the patient and his brother and sister. Patient lives in [facility] in [place], [place]. Patient is independent w/most cares, has assistance w/meals, housekeeping, transportation and medication management. Patient's sister  notes that they are in need of a refill of the patient's potassium chloride, they have communicated this to the patient's provider during bedside rounds. Writer attempted to contact the [facility] nurseFernanda, message left at this time requesting call back. Family will provide transportation at discharge, they plan to return to [facility] via plane.    [facility] [place]  NurseFernanda: [phone]  Fax: [phone]     Care coordination will continue to follow for discharge planning.     1138 Addendum:  Call back received from [facility] Fernanda wilkins. Fernanda notes that the patient can return tomorrow, she will come in to go over his paperwork and medications. If there are any medication changes, patient should be sent with medications from the discharge pharmacy as the pharmacy the [facility] uses may not be able to provide the meds over the weekend/holiday. Writer confirmed w/pt's family, they have his medications for tonight/tomorrow morning if the patient is discharged today. Will need to fax discharge orders to the [facility] when complete.     Celia Chapa, RNCC, BSN    [facility]    [address]  [address] [place] [id number]    [email or web address]    Office: [phone] Pager: [phone]    To contact the weekend RNCC  [place] (0800 - 1630) Saturday and Sunday    Units: 4A, 4C, 4E, 5A and 5B- Pager 1: [phone]    Units: 6A, 6B, 6C, 6D- Pager 2: [phone]    Units: 7A, 7B, 7C, 7D, and 5C-Pager 3: [phone]

## 2022-12-30 NOTE — H&P
[facility]    Cardiology History and Physical - Cardiology    Date of Admission:  12/29/2022    Assessment & Plan: HVSL    67-year-old with past medical history of nonischemic cardiomyopathy (45%) complicated by paroxysmal VT status post ICD 6/22 with recurrent ICD shocks status post failed VT ablation 10/31, hypertension, hyperlipidemia, T2DM admitted for observation after elective VT ablation on 12/29.    HFrEF (EF 44%) secondary to nonischemic cardiomyopathy  pVT s/p ablation 10/31 c/b cardiogenic shock, 12/29  Ischemic Eval: Per EP notes, patient had Non obstructive CAD at OSH  Imaging: CMR: 44% with LGE (subepicardial and mid myocardial late from the base to the mid cavity and involving the anterior, anteroseptal, anterolateral, and lateral walls.)    Plan:   Volume: Euvolemic   ACE-I/ARB/ARNi: Not on it PTA   BB Coreg 3.125 BID, Restart in am per EP   Aldosterone antagonist no  Hydral/Nitrates: N/a  SGLT2 : Empa 10   SCD prophylaxis ICD   Antiarrhythmics: on amio gtt, plan to start PTA amio in am, PTA mexilitine 200 TID per EP  - TTE in am     Chronic issues   T2DM   - sliding scale insulin,fingetsticks     Diet: Low NA   DVT Prophylaxis: Pneumatic Compression Devices  Duque Catheter: PRESENT, indication: Strict 1-2 Hour I&O;Retention  Code Status: Full    Disposition Plan   Expected discharge: Tomorrow, recommended to prior living arrangement once stable post procedure     To be staffed in AM    Josef Lockett MD  Internal Medicine Resident ([id number])  [phone]  ____________________________________________________________________    Chief Complaint   Post VT ablation     History of Present Illness   67-year-old with past medical history of nonischemic cardiomyopathy (45%) complicated by paroxysmal VT status post ICD 6/22 with recurrent ICD shocks status post failed VT ablation 10/31, hypertension, hyperlipidemia, T2DM admitted for observation after elective VT  ablation on 12/29.    In terms of his VT history.  Patient had a cardiac MRI revealing a left ventricular ejection fraction of 44%, right ventricular ejection fraction of 35%, with subepicardial and mid myocardial late gadolinium enhancement extending from the base to the mid cavity and involving the anterior, anteroseptal, anterolateral, and lateral walls.  He was started on amiodarone     The patient's VT recurred after discharge with many episodes in September and October 2021, with ATP and shocks he followed up with his primary electrophysiologist, Dr James, in [place].   A repeat echocardiogram done on 1/26/2022 shows a stable LV ejection fraction of 45 to 50% with mild LV dilation and mild mitral and aortic and tricuspid regurgitation, with RVSP mildly elevated at 39 mmHg.  The patient was referred for consideration of VT ablation. He underwent a VT ablation on 10/31/22. He had some cardiogenic shock post ablation and required pressor. Still had episodes of VT s/p shock despite ( amio, mixilitine and ranolazine).     Patient is now SP ablation and was seen in the PACU.  Denies chest pain, shortness of breath.  Patient comfortably resting.    Review of Systems    Negative except above     Past Medical History    I have reviewed this patient's medical history and updated it with pertinent information if needed.   Past Medical History:   Diagnosis Date     Arrhythmia      Diabetes (H)      Hypertension      Pacemaker        Past Surgical History   I have reviewed this patient's surgical history and updated it with pertinent information if needed.  Past Surgical History:   Procedure Laterality Date     EP ABLATION VT/PVC N/A 10/31/2022    Procedure: EP Ablation VT;  Surgeon: Sky Leon MD;  Location:  HEART CARDIAC CATH LAB     EYE SURGERY       ORTHOPEDIC SURGERY       PICC DOUBLE LUMEN PLACEMENT Right 11/01/2022    5FR DL PICC, brachial medial vein. L-42cm.       Social History   I have reviewed this  patient's social history and updated it with pertinent information if needed.  Social History     Tobacco Use     Smoking status: Never     Smokeless tobacco: Never   Substance Use Topics     Alcohol use: Never     Drug use: Never     Family History   I have reviewed this patient's family history and updated it with pertinent information if needed.         Prior to Admission Medications   Prior to Admission Medications   Prescriptions Last Dose Informant Patient Reported? Taking?   GNP NATURAL FIBER 28.3 % POWD 12/28/2022  Yes Yes   Sig: Give 1-2 TABLESPOONFUL DAILY in a GLASS of JUICE   JARDIANCE 10 MG TABS tablet Unknown  Yes Yes   Sig: Take 10 mg by mouth daily   amiodarone (PACERONE) 200 MG tablet 12/26/22  No Yes   Sig: Take 1 tablet (200 mg) by mouth daily   atorvastatin (LIPITOR) 80 MG tablet 12/26/22  Yes Yes   Sig: Take 80 mg by mouth daily   carvedilol (COREG) 3.125 MG tablet 12/28/2022  Yes Yes   Sig: Take 3.125 mg by mouth 2 times daily (with meals)   colchicine (COLCYRS) 0.6 MG tablet 12/28/2022 at 0800  No Yes   Sig: Take 0.5 tablets (0.3 mg) by mouth daily   magnesium oxide (MAG-OX) 400 MG tablet 12/28/2022  Yes Yes   Sig: Take 800 mg by mouth   metFORMIN (GLUCOPHAGE) 500 MG tablet 12/28/2022  Yes Yes   Sig: Take 1,000 mg by mouth 2 times daily (with meals)   potassium chloride ER (KLOR-CON M) 20 MEQ CR tablet 12/28/2022  Yes Yes   Sig: TAKE 1 TABLET BY MOUTH EVERY MORNING WITH BREAKFAST   ranolazine (RANEXA) 500 MG 12 hr tablet 12/25/22  Yes No   Sig: Take 1,000 mg by mouth   tamsulosin (FLOMAX) 0.4 MG capsule 12/28/2022  Yes Yes   Sig: Take 0.4 mg by mouth every evening      Facility-Administered Medications: None     Allergies   No Known Allergies    Physical Exam   Vital Signs: Temp: 98  F (36.7  C) Temp src: Oral BP: 96/61 Pulse: 80   Resp: 16 SpO2: 97 % O2 Device: None (Room air) Oxygen Delivery: 2 LPM  Weight: 148 lbs 9.44 oz    Gen: NAD  CV: RRR, Ns1,S2. No JVD   Pulm: Clear B/l. No wheezing    Abd: Soft. Non-distended. Non-tender to palpation   Ext:  No Periferal edema   Neuro: Non-focal.     Data   BMPRecent Labs   Lab 12/29/22 2016 12/29/22 2008 12/29/22 1952 12/29/22 1928 12/29/22 0614 12/29/22 0607   NA  --   --  140  --   --  135*   POTASSIUM  --   --  3.9  --   --  4.0   CHLORIDE  --   --  107  --   --  104   CO2  --   --  19*  --   --  17*   BUN  --   --  15.4  --   --  16.0   CR  --   --  1.17  --   --  1.24*   ANIONGAP  --   --  14  --   --  14   * 241* 226* 206*   < > 165*   NEETA  --   --  8.3*  --   --  8.6*    < > = values in this interval not displayed.     CBC  Recent Labs   Lab 12/29/22  0607   WBC 6.4   HGB 10.1*   HCT 31.0*   MCV 91          Urinalysis  Recent Labs   Lab Test 11/01/22 2008   COLOR Yellow   APPEARANCE Slightly Cloudy*   URINEGLC 200*   URINEBILI Negative   URINEKETONE Trace*   SG 1.024   UBLD Moderate*   URINEPH 5.0   PROTEIN 30*   NITRITE Negative   LEUKEST Small*   RBCU 20*   WBCU 12*         amiodarone 1 mg/min (12/29/22 1959)     [START ON 12/30/2022] amiodarone

## 2023-01-03 LAB
ATRIAL RATE - MUSE: 80 BPM
DIASTOLIC BLOOD PRESSURE - MUSE: NORMAL MMHG
INTERPRETATION ECG - MUSE: NORMAL
P AXIS - MUSE: -27 DEGREES
PR INTERVAL - MUSE: 188 MS
QRS DURATION - MUSE: 176 MS
QT - MUSE: 526 MS
QTC - MUSE: 606 MS
R AXIS - MUSE: -68 DEGREES
SYSTOLIC BLOOD PRESSURE - MUSE: NORMAL MMHG
T AXIS - MUSE: 97 DEGREES
VENTRICULAR RATE- MUSE: 80 BPM

## 2023-01-07 LAB
MDC_IDC_EPISODE_DTM: NORMAL
MDC_IDC_EPISODE_DURATION: 0 S
MDC_IDC_EPISODE_DURATION: 0 S
MDC_IDC_EPISODE_DURATION: 1 S
MDC_IDC_EPISODE_DURATION: 10 S
MDC_IDC_EPISODE_DURATION: 11 S
MDC_IDC_EPISODE_DURATION: 116 S
MDC_IDC_EPISODE_DURATION: 12 S
MDC_IDC_EPISODE_DURATION: 14 S
MDC_IDC_EPISODE_DURATION: 14 S
MDC_IDC_EPISODE_DURATION: 15 S
MDC_IDC_EPISODE_DURATION: 16 S
MDC_IDC_EPISODE_DURATION: 17 S
MDC_IDC_EPISODE_DURATION: 17 S
MDC_IDC_EPISODE_DURATION: 18 S
MDC_IDC_EPISODE_DURATION: 19 S
MDC_IDC_EPISODE_DURATION: 19 S
MDC_IDC_EPISODE_DURATION: 2 S
MDC_IDC_EPISODE_DURATION: 2 S
MDC_IDC_EPISODE_DURATION: 20 S
MDC_IDC_EPISODE_DURATION: 21 S
MDC_IDC_EPISODE_DURATION: 214 S
MDC_IDC_EPISODE_DURATION: 22 S
MDC_IDC_EPISODE_DURATION: 23 S
MDC_IDC_EPISODE_DURATION: 23 S
MDC_IDC_EPISODE_DURATION: 24 S
MDC_IDC_EPISODE_DURATION: 3 S
MDC_IDC_EPISODE_DURATION: 32 S
MDC_IDC_EPISODE_DURATION: 34 S
MDC_IDC_EPISODE_DURATION: 39 S
MDC_IDC_EPISODE_DURATION: 45 S
MDC_IDC_EPISODE_DURATION: 5 S
MDC_IDC_EPISODE_DURATION: 50 S
MDC_IDC_EPISODE_DURATION: 51 S
MDC_IDC_EPISODE_DURATION: 5129 S
MDC_IDC_EPISODE_DURATION: 60 S
MDC_IDC_EPISODE_DURATION: 61 S
MDC_IDC_EPISODE_DURATION: 68 S
MDC_IDC_EPISODE_DURATION: 70 S
MDC_IDC_EPISODE_DURATION: 8 S
MDC_IDC_EPISODE_DURATION: 9 S
MDC_IDC_EPISODE_DURATION: NORMAL S
MDC_IDC_EPISODE_ID: 358
MDC_IDC_EPISODE_ID: 359
MDC_IDC_EPISODE_ID: 360
MDC_IDC_EPISODE_ID: 361
MDC_IDC_EPISODE_ID: 362
MDC_IDC_EPISODE_ID: 363
MDC_IDC_EPISODE_ID: 364
MDC_IDC_EPISODE_ID: 365
MDC_IDC_EPISODE_ID: 367
MDC_IDC_EPISODE_ID: 368
MDC_IDC_EPISODE_ID: 369
MDC_IDC_EPISODE_ID: 370
MDC_IDC_EPISODE_ID: 371
MDC_IDC_EPISODE_ID: 372
MDC_IDC_EPISODE_ID: 377
MDC_IDC_EPISODE_ID: 378
MDC_IDC_EPISODE_ID: 380
MDC_IDC_EPISODE_ID: 381
MDC_IDC_EPISODE_ID: 382
MDC_IDC_EPISODE_ID: 383
MDC_IDC_EPISODE_ID: 386
MDC_IDC_EPISODE_ID: 388
MDC_IDC_EPISODE_ID: 396
MDC_IDC_EPISODE_ID: 397
MDC_IDC_EPISODE_ID: 403
MDC_IDC_EPISODE_ID: 404
MDC_IDC_EPISODE_ID: 406
MDC_IDC_EPISODE_ID: 407
MDC_IDC_EPISODE_ID: 408
MDC_IDC_EPISODE_ID: 413
MDC_IDC_EPISODE_ID: 415
MDC_IDC_EPISODE_ID: 416
MDC_IDC_EPISODE_ID: 417
MDC_IDC_EPISODE_ID: 419
MDC_IDC_EPISODE_ID: 422
MDC_IDC_EPISODE_ID: 424
MDC_IDC_EPISODE_ID: 425
MDC_IDC_EPISODE_ID: 427
MDC_IDC_EPISODE_ID: 434
MDC_IDC_EPISODE_ID: 448
MDC_IDC_EPISODE_ID: 464
MDC_IDC_EPISODE_ID: 465
MDC_IDC_EPISODE_ID: 466
MDC_IDC_EPISODE_ID: 472
MDC_IDC_EPISODE_ID: 474
MDC_IDC_EPISODE_ID: 476
MDC_IDC_EPISODE_ID: 480
MDC_IDC_EPISODE_ID: 481
MDC_IDC_EPISODE_ID: 489
MDC_IDC_EPISODE_ID: 490
MDC_IDC_EPISODE_ID: 491
MDC_IDC_EPISODE_ID: 499
MDC_IDC_EPISODE_ID: 534
MDC_IDC_EPISODE_ID: 535
MDC_IDC_EPISODE_ID: 536
MDC_IDC_EPISODE_ID: 537
MDC_IDC_EPISODE_ID: 538
MDC_IDC_EPISODE_ID: 539
MDC_IDC_EPISODE_ID: 540
MDC_IDC_EPISODE_ID: 541
MDC_IDC_EPISODE_ID: 542
MDC_IDC_EPISODE_ID: 543
MDC_IDC_EPISODE_ID: 544
MDC_IDC_EPISODE_ID: 545
MDC_IDC_EPISODE_ID: 546
MDC_IDC_EPISODE_ID: 547
MDC_IDC_EPISODE_ID: 548
MDC_IDC_EPISODE_ID: 549
MDC_IDC_EPISODE_ID: 550
MDC_IDC_EPISODE_ID: 551
MDC_IDC_EPISODE_ID: 552
MDC_IDC_EPISODE_ID: 553
MDC_IDC_EPISODE_ID: 554
MDC_IDC_EPISODE_ID: 555
MDC_IDC_EPISODE_TYPE: NORMAL
MDC_IDC_LEAD_IMPLANT_DT: NORMAL
MDC_IDC_LEAD_LOCATION: NORMAL
MDC_IDC_LEAD_LOCATION_DETAIL_1: NORMAL
MDC_IDC_LEAD_MFG: NORMAL
MDC_IDC_LEAD_MODEL: NORMAL
MDC_IDC_LEAD_POLARITY_TYPE: NORMAL
MDC_IDC_LEAD_SERIAL: NORMAL
MDC_IDC_LEAD_SPECIAL_FUNCTION: NORMAL
MDC_IDC_MSMT_BATTERY_DTM: NORMAL
MDC_IDC_MSMT_BATTERY_DTM: NORMAL
MDC_IDC_MSMT_BATTERY_REMAINING_LONGEVITY: 22 MO
MDC_IDC_MSMT_BATTERY_REMAINING_LONGEVITY: 24 MO
MDC_IDC_MSMT_BATTERY_RRT_TRIGGER: 2.73
MDC_IDC_MSMT_BATTERY_RRT_TRIGGER: 2.73
MDC_IDC_MSMT_BATTERY_STATUS: NORMAL
MDC_IDC_MSMT_BATTERY_STATUS: NORMAL
MDC_IDC_MSMT_BATTERY_VOLTAGE: 2.85 V
MDC_IDC_MSMT_BATTERY_VOLTAGE: 2.92 V
MDC_IDC_MSMT_CAP_CHARGE_DTM: NORMAL
MDC_IDC_MSMT_CAP_CHARGE_ENERGY: 18 J
MDC_IDC_MSMT_CAP_CHARGE_ENERGY: 18 J
MDC_IDC_MSMT_CAP_CHARGE_ENERGY: 35 J
MDC_IDC_MSMT_CAP_CHARGE_ENERGY: 35 J
MDC_IDC_MSMT_CAP_CHARGE_TIME: 3.7
MDC_IDC_MSMT_CAP_CHARGE_TIME: 3.7
MDC_IDC_MSMT_CAP_CHARGE_TIME: 8.02
MDC_IDC_MSMT_CAP_CHARGE_TIME: 8.02
MDC_IDC_MSMT_CAP_CHARGE_TYPE: NORMAL
MDC_IDC_MSMT_LEADCHNL_RA_IMPEDANCE_VALUE: 342 OHM
MDC_IDC_MSMT_LEADCHNL_RA_IMPEDANCE_VALUE: 399 OHM
MDC_IDC_MSMT_LEADCHNL_RA_PACING_THRESHOLD_AMPLITUDE: 1 V
MDC_IDC_MSMT_LEADCHNL_RA_PACING_THRESHOLD_AMPLITUDE: 1.25 V
MDC_IDC_MSMT_LEADCHNL_RA_PACING_THRESHOLD_AMPLITUDE: 1.75 V
MDC_IDC_MSMT_LEADCHNL_RA_PACING_THRESHOLD_PULSEWIDTH: 0.4 MS
MDC_IDC_MSMT_LEADCHNL_RA_PACING_THRESHOLD_PULSEWIDTH: 1 MS
MDC_IDC_MSMT_LEADCHNL_RA_PACING_THRESHOLD_PULSEWIDTH: 1 MS
MDC_IDC_MSMT_LEADCHNL_RA_SENSING_INTR_AMPL: 1.12 MV
MDC_IDC_MSMT_LEADCHNL_RA_SENSING_INTR_AMPL: 1.8 MV
MDC_IDC_MSMT_LEADCHNL_RV_IMPEDANCE_VALUE: 266 OHM
MDC_IDC_MSMT_LEADCHNL_RV_IMPEDANCE_VALUE: 266 OHM
MDC_IDC_MSMT_LEADCHNL_RV_IMPEDANCE_VALUE: 323 OHM
MDC_IDC_MSMT_LEADCHNL_RV_IMPEDANCE_VALUE: 342 OHM
MDC_IDC_MSMT_LEADCHNL_RV_PACING_THRESHOLD_AMPLITUDE: 1.5 V
MDC_IDC_MSMT_LEADCHNL_RV_PACING_THRESHOLD_AMPLITUDE: 1.75 V
MDC_IDC_MSMT_LEADCHNL_RV_PACING_THRESHOLD_PULSEWIDTH: 0.4 MS
MDC_IDC_MSMT_LEADCHNL_RV_PACING_THRESHOLD_PULSEWIDTH: 0.4 MS
MDC_IDC_PG_IMPLANT_DTM: NORMAL
MDC_IDC_PG_IMPLANT_DTM: NORMAL
MDC_IDC_PG_MFG: NORMAL
MDC_IDC_PG_MFG: NORMAL
MDC_IDC_PG_MODEL: NORMAL
MDC_IDC_PG_MODEL: NORMAL
MDC_IDC_PG_SERIAL: NORMAL
MDC_IDC_PG_SERIAL: NORMAL
MDC_IDC_PG_TYPE: NORMAL
MDC_IDC_PG_TYPE: NORMAL
MDC_IDC_SESS_CLINIC_NAME: NORMAL
MDC_IDC_SESS_CLINIC_NAME: NORMAL
MDC_IDC_SESS_DTM: NORMAL
MDC_IDC_SESS_DTM: NORMAL
MDC_IDC_SESS_TYPE: NORMAL
MDC_IDC_SESS_TYPE: NORMAL
MDC_IDC_SET_BRADY_AT_MODE_SWITCH_RATE: 171 {BEATS}/MIN
MDC_IDC_SET_BRADY_HYSTRATE: NORMAL
MDC_IDC_SET_BRADY_HYSTRATE: NORMAL
MDC_IDC_SET_BRADY_LOWRATE: 80 {BEATS}/MIN
MDC_IDC_SET_BRADY_LOWRATE: 80 {BEATS}/MIN
MDC_IDC_SET_BRADY_MAX_SENSOR_RATE: 110 {BEATS}/MIN
MDC_IDC_SET_BRADY_MAX_TRACKING_RATE: 110 {BEATS}/MIN
MDC_IDC_SET_BRADY_MODE: NORMAL
MDC_IDC_SET_BRADY_MODE: NORMAL
MDC_IDC_SET_BRADY_PAV_DELAY_LOW: 180 MS
MDC_IDC_SET_BRADY_PAV_DELAY_LOW: 180 MS
MDC_IDC_SET_BRADY_SAV_DELAY_LOW: 150 MS
MDC_IDC_SET_LEADCHNL_RA_PACING_AMPLITUDE: 2 V
MDC_IDC_SET_LEADCHNL_RA_PACING_AMPLITUDE: 3 V
MDC_IDC_SET_LEADCHNL_RA_PACING_ANODE_ELECTRODE_1: NORMAL
MDC_IDC_SET_LEADCHNL_RA_PACING_ANODE_ELECTRODE_1: NORMAL
MDC_IDC_SET_LEADCHNL_RA_PACING_ANODE_LOCATION_1: NORMAL
MDC_IDC_SET_LEADCHNL_RA_PACING_ANODE_LOCATION_1: NORMAL
MDC_IDC_SET_LEADCHNL_RA_PACING_CAPTURE_MODE: NORMAL
MDC_IDC_SET_LEADCHNL_RA_PACING_CATHODE_ELECTRODE_1: NORMAL
MDC_IDC_SET_LEADCHNL_RA_PACING_CATHODE_ELECTRODE_1: NORMAL
MDC_IDC_SET_LEADCHNL_RA_PACING_CATHODE_LOCATION_1: NORMAL
MDC_IDC_SET_LEADCHNL_RA_PACING_CATHODE_LOCATION_1: NORMAL
MDC_IDC_SET_LEADCHNL_RA_PACING_POLARITY: NORMAL
MDC_IDC_SET_LEADCHNL_RA_PACING_POLARITY: NORMAL
MDC_IDC_SET_LEADCHNL_RA_PACING_PULSEWIDTH: 1 MS
MDC_IDC_SET_LEADCHNL_RA_PACING_PULSEWIDTH: 1 MS
MDC_IDC_SET_LEADCHNL_RA_SENSING_ANODE_ELECTRODE_1: NORMAL
MDC_IDC_SET_LEADCHNL_RA_SENSING_ANODE_ELECTRODE_1: NORMAL
MDC_IDC_SET_LEADCHNL_RA_SENSING_ANODE_LOCATION_1: NORMAL
MDC_IDC_SET_LEADCHNL_RA_SENSING_ANODE_LOCATION_1: NORMAL
MDC_IDC_SET_LEADCHNL_RA_SENSING_CATHODE_ELECTRODE_1: NORMAL
MDC_IDC_SET_LEADCHNL_RA_SENSING_CATHODE_ELECTRODE_1: NORMAL
MDC_IDC_SET_LEADCHNL_RA_SENSING_CATHODE_LOCATION_1: NORMAL
MDC_IDC_SET_LEADCHNL_RA_SENSING_CATHODE_LOCATION_1: NORMAL
MDC_IDC_SET_LEADCHNL_RA_SENSING_POLARITY: NORMAL
MDC_IDC_SET_LEADCHNL_RA_SENSING_POLARITY: NORMAL
MDC_IDC_SET_LEADCHNL_RA_SENSING_SENSITIVITY: 0.45 MV
MDC_IDC_SET_LEADCHNL_RV_PACING_AMPLITUDE: 3.5 V
MDC_IDC_SET_LEADCHNL_RV_PACING_AMPLITUDE: 3.5 V
MDC_IDC_SET_LEADCHNL_RV_PACING_ANODE_ELECTRODE_1: NORMAL
MDC_IDC_SET_LEADCHNL_RV_PACING_ANODE_ELECTRODE_1: NORMAL
MDC_IDC_SET_LEADCHNL_RV_PACING_ANODE_LOCATION_1: NORMAL
MDC_IDC_SET_LEADCHNL_RV_PACING_ANODE_LOCATION_1: NORMAL
MDC_IDC_SET_LEADCHNL_RV_PACING_CAPTURE_MODE: NORMAL
MDC_IDC_SET_LEADCHNL_RV_PACING_CATHODE_ELECTRODE_1: NORMAL
MDC_IDC_SET_LEADCHNL_RV_PACING_CATHODE_ELECTRODE_1: NORMAL
MDC_IDC_SET_LEADCHNL_RV_PACING_CATHODE_LOCATION_1: NORMAL
MDC_IDC_SET_LEADCHNL_RV_PACING_CATHODE_LOCATION_1: NORMAL
MDC_IDC_SET_LEADCHNL_RV_PACING_POLARITY: NORMAL
MDC_IDC_SET_LEADCHNL_RV_PACING_POLARITY: NORMAL
MDC_IDC_SET_LEADCHNL_RV_PACING_PULSEWIDTH: 0.4 MS
MDC_IDC_SET_LEADCHNL_RV_PACING_PULSEWIDTH: 0.4 MS
MDC_IDC_SET_LEADCHNL_RV_SENSING_ANODE_ELECTRODE_1: NORMAL
MDC_IDC_SET_LEADCHNL_RV_SENSING_ANODE_ELECTRODE_1: NORMAL
MDC_IDC_SET_LEADCHNL_RV_SENSING_ANODE_LOCATION_1: NORMAL
MDC_IDC_SET_LEADCHNL_RV_SENSING_ANODE_LOCATION_1: NORMAL
MDC_IDC_SET_LEADCHNL_RV_SENSING_CATHODE_ELECTRODE_1: NORMAL
MDC_IDC_SET_LEADCHNL_RV_SENSING_CATHODE_ELECTRODE_1: NORMAL
MDC_IDC_SET_LEADCHNL_RV_SENSING_CATHODE_LOCATION_1: NORMAL
MDC_IDC_SET_LEADCHNL_RV_SENSING_CATHODE_LOCATION_1: NORMAL
MDC_IDC_SET_LEADCHNL_RV_SENSING_POLARITY: NORMAL
MDC_IDC_SET_LEADCHNL_RV_SENSING_POLARITY: NORMAL
MDC_IDC_SET_LEADCHNL_RV_SENSING_SENSITIVITY: 0.3 MV
MDC_IDC_SET_ZONE_DETECTION_BEATS_DENOMINATOR: 40 {BEATS}
MDC_IDC_SET_ZONE_DETECTION_BEATS_DENOMINATOR: 40 {BEATS}
MDC_IDC_SET_ZONE_DETECTION_BEATS_NUMERATOR: 30 {BEATS}
MDC_IDC_SET_ZONE_DETECTION_BEATS_NUMERATOR: 30 {BEATS}
MDC_IDC_SET_ZONE_DETECTION_INTERVAL: 260 MS
MDC_IDC_SET_ZONE_DETECTION_INTERVAL: 300 MS
MDC_IDC_SET_ZONE_DETECTION_INTERVAL: 300 MS
MDC_IDC_SET_ZONE_DETECTION_INTERVAL: 350 MS
MDC_IDC_SET_ZONE_DETECTION_INTERVAL: 350 MS
MDC_IDC_SET_ZONE_DETECTION_INTERVAL: 510 MS
MDC_IDC_SET_ZONE_DETECTION_INTERVAL: 510 MS
MDC_IDC_SET_ZONE_DETECTION_INTERVAL: 540 MS
MDC_IDC_SET_ZONE_DETECTION_INTERVAL: 540 MS
MDC_IDC_SET_ZONE_DETECTION_INTERVAL: NORMAL
MDC_IDC_SET_ZONE_TYPE: NORMAL
MDC_IDC_STAT_AT_BURDEN_PERCENT: 0 %
MDC_IDC_STAT_AT_BURDEN_PERCENT: 0 %
MDC_IDC_STAT_AT_DTM_END: NORMAL
MDC_IDC_STAT_AT_DTM_END: NORMAL
MDC_IDC_STAT_AT_DTM_START: NORMAL
MDC_IDC_STAT_AT_DTM_START: NORMAL
MDC_IDC_STAT_BRADY_AP_VP_PERCENT: 98.6 %
MDC_IDC_STAT_BRADY_AP_VP_PERCENT: 99.47 %
MDC_IDC_STAT_BRADY_AP_VS_PERCENT: 0.18 %
MDC_IDC_STAT_BRADY_AP_VS_PERCENT: 0.31 %
MDC_IDC_STAT_BRADY_AS_VP_PERCENT: 0.34 %
MDC_IDC_STAT_BRADY_AS_VP_PERCENT: 0.47 %
MDC_IDC_STAT_BRADY_AS_VS_PERCENT: 0 %
MDC_IDC_STAT_BRADY_AS_VS_PERCENT: 0.61 %
MDC_IDC_STAT_BRADY_DTM_END: NORMAL
MDC_IDC_STAT_BRADY_DTM_END: NORMAL
MDC_IDC_STAT_BRADY_DTM_START: NORMAL
MDC_IDC_STAT_BRADY_DTM_START: NORMAL
MDC_IDC_STAT_BRADY_RA_PERCENT_PACED: 94.18 %
MDC_IDC_STAT_BRADY_RA_PERCENT_PACED: 99.63 %
MDC_IDC_STAT_BRADY_RV_PERCENT_PACED: 93.62 %
MDC_IDC_STAT_BRADY_RV_PERCENT_PACED: 99.78 %
MDC_IDC_STAT_EPISODE_RECENT_COUNT: 0
MDC_IDC_STAT_EPISODE_RECENT_COUNT: 2
MDC_IDC_STAT_EPISODE_RECENT_COUNT: 4
MDC_IDC_STAT_EPISODE_RECENT_COUNT: 6
MDC_IDC_STAT_EPISODE_RECENT_COUNT_DTM_END: NORMAL
MDC_IDC_STAT_EPISODE_RECENT_COUNT_DTM_START: NORMAL
MDC_IDC_STAT_EPISODE_TOTAL_COUNT: 0
MDC_IDC_STAT_EPISODE_TOTAL_COUNT: 13
MDC_IDC_STAT_EPISODE_TOTAL_COUNT: 13
MDC_IDC_STAT_EPISODE_TOTAL_COUNT: 2
MDC_IDC_STAT_EPISODE_TOTAL_COUNT: 2
MDC_IDC_STAT_EPISODE_TOTAL_COUNT: 447
MDC_IDC_STAT_EPISODE_TOTAL_COUNT: 447
MDC_IDC_STAT_EPISODE_TOTAL_COUNT: 91
MDC_IDC_STAT_EPISODE_TOTAL_COUNT: 93
MDC_IDC_STAT_EPISODE_TOTAL_COUNT_DTM_END: NORMAL
MDC_IDC_STAT_EPISODE_TOTAL_COUNT_DTM_START: NORMAL
MDC_IDC_STAT_EPISODE_TYPE: NORMAL
MDC_IDC_STAT_TACHYTHERAPY_ATP_DELIVERED_RECENT: 2
MDC_IDC_STAT_TACHYTHERAPY_ATP_DELIVERED_RECENT: 4
MDC_IDC_STAT_TACHYTHERAPY_ATP_DELIVERED_TOTAL: 62
MDC_IDC_STAT_TACHYTHERAPY_ATP_DELIVERED_TOTAL: 64
MDC_IDC_STAT_TACHYTHERAPY_RECENT_DTM_END: NORMAL
MDC_IDC_STAT_TACHYTHERAPY_RECENT_DTM_END: NORMAL
MDC_IDC_STAT_TACHYTHERAPY_RECENT_DTM_START: NORMAL
MDC_IDC_STAT_TACHYTHERAPY_RECENT_DTM_START: NORMAL
MDC_IDC_STAT_TACHYTHERAPY_SHOCKS_ABORTED_RECENT: 0
MDC_IDC_STAT_TACHYTHERAPY_SHOCKS_ABORTED_RECENT: 0
MDC_IDC_STAT_TACHYTHERAPY_SHOCKS_ABORTED_TOTAL: 6
MDC_IDC_STAT_TACHYTHERAPY_SHOCKS_ABORTED_TOTAL: 6
MDC_IDC_STAT_TACHYTHERAPY_SHOCKS_DELIVERED_RECENT: 0
MDC_IDC_STAT_TACHYTHERAPY_SHOCKS_DELIVERED_RECENT: 0
MDC_IDC_STAT_TACHYTHERAPY_SHOCKS_DELIVERED_TOTAL: 40
MDC_IDC_STAT_TACHYTHERAPY_SHOCKS_DELIVERED_TOTAL: 40
MDC_IDC_STAT_TACHYTHERAPY_TOTAL_DTM_END: NORMAL
MDC_IDC_STAT_TACHYTHERAPY_TOTAL_DTM_END: NORMAL
MDC_IDC_STAT_TACHYTHERAPY_TOTAL_DTM_START: NORMAL
MDC_IDC_STAT_TACHYTHERAPY_TOTAL_DTM_START: NORMAL

## 2023-02-12 ENCOUNTER — HEALTH MAINTENANCE LETTER (OUTPATIENT)
Age: 68
End: 2023-02-12

## 2023-03-31 ENCOUNTER — TELEPHONE (OUTPATIENT)
Dept: CARDIOLOGY | Facility: CLINIC | Age: 68
End: 2023-03-31
Payer: MEDICARE

## 2023-03-31 ENCOUNTER — HOSPITAL ENCOUNTER (OUTPATIENT)
Age: 68
End: 2023-03-31
Attending: INTERNAL MEDICINE
Payer: MEDICARE

## 2023-03-31 NOTE — TELEPHONE ENCOUNTER
----- Message from Tulio Chanel MD sent at 3/29/2023  7:35 AM CDT -----  Hey  Happy to see him, no worries. We can have the DD evaluated in more detail but that would not necessarily preclude transplant. VAD may be harder due to the constant attention need, but happy to look  Fran and Curtis, could you please help set up?  Thanks!  Tulio    ----- Message -----  From: Sandhya Mireles, LINDA CNP  Sent: 3/27/2023   7:15 PM CDT  To: Tulio Chanel MD, Sky Leon MD    Pt is having worsening HF with worsening Vts. Had ablation X2 but now appears this is all HF driven. He does have some developmental delay, but the referring EP MD is requesting advanced HF referral. Can you review. Sounds like he is decompensating and might need to be direct admit.   Thanks,  LVW

## 2023-03-31 NOTE — TELEPHONE ENCOUNTER
I called Rohit's sister, Wendy to get him scheduled with Dr. Chanel as a new heart failure patient. [facility] for there to call back and schedule a virtual appointment with Dr. Chanel. Holds placed on Dr. Chanel's schedule for 4/4.    Amaris Mcmahon RN

## 2023-04-03 ASSESSMENT — ENCOUNTER SYMPTOMS
HYPERTENSION: 0
MUSCLE CRAMPS: 0
PALPITATIONS: 1
HYPOTENSION: 1
ARTHRALGIAS: 0
SLEEP DISTURBANCES DUE TO BREATHING: 0
ORTHOPNEA: 0
SYNCOPE: 0
BACK PAIN: 0
EXERCISE INTOLERANCE: 0
STIFFNESS: 0
MUSCLE WEAKNESS: 1
MYALGIAS: 0
LEG PAIN: 0
BRUISES/BLEEDS EASILY: 0
LIGHT-HEADEDNESS: 0
JOINT SWELLING: 0
SWOLLEN GLANDS: 0
NECK PAIN: 0

## 2023-04-03 NOTE — PROGRESS NOTES
Video-Visit Details  Type of service:  Video Visit  Video start time: 12:14pm  Video end time: 12:43pm  Total video time: 19 min  Originating Location (pt. Location): Home  Distant Location (provider location):  [facility] HEART [facility]  Mode of Communication: Video Conference via [employer]      April 4 2023    Rohit Garvey  is a 67 year old year old male with a past medical history significant for nonischemic cardiomyopathy (45%) c/b paroxysmal VT s/p dual chamber ICD placement 6/20/2022 c/b recurrent ICD shocks s/p VT ablation 10/31/2022 and re-ablation 12/29/2022, paroxysmal Afib on apixaban, developmental delay, T2DM, hypertension, hyperlipidemia, who presents for initial evaluation in advanced heart failure clinic.     The pt's index heart failure event first occurred in 6/2022. The pt was brought to [facility] in [place] on 6/16/2022 with syncope and wide complex tachycardia. He was admitted to hospital, cardioverted and loaded with amiodarone, resulting in improve of his arrhythmia. An invasive coronary angiogram revealed normal coronary arteries. A cardiac MRI revealed biventricular systolic dysfunction, global hypokinesis, and delayed LV sub epicardial and mid myocardial late gadolinium enhancement, extending from the base to the mid cavity and involving the anterior, anteroseptal, anterolateral, and lateral walls. These findings were not suggestive of cardiac sarcoidosis, however. During that hospitalization, the pt underwent dual chamber ICD placement for secondary prevention. During the procedure, he experienced a recurrence of VT. Although this was promptly controlled in the EP lab, the pt experienced recurrent VT events and ICD shocks after his discharge from hospital. He was referred to [facility] in 10/2022 for a VT ablation, however this was unable to prevent additional VT episodes. He returned in 12/2022 for a repeat VT ablation. Despite this second ablation, the pt is still  experiencing recurrent VT episodes. He was last admitted to [facility] for an entire week on 3/5/2023 for a recurrent slow VT event.    Due to the pt's refractory VT and persistent LV dysfunction, he was referred to advanced heart failure for consideration of advanced therapies. Today he is being evaluated by a video visit. He just had another ICD shock 4 d ago. He was evaluated in the ED the next day, but because device interrogation confirmed that everything is working correctly and thus sent the pt home. He cannot identify a specific trigger for these ICD shocks.     The pt lives in an assisted living facility. He is currently wheelchair bound due to a recent mechanical fall c/b broken clavicle. Prior to this, he ambulated with a walker.     PAST MEDICAL HISTORY:  Past Medical History:   Diagnosis Date     Arrhythmia      Diabetes (H)      Hypertension      Pacemaker      FAMILY HISTORY:  No family history on file.  SOCIAL HISTORY:  Social History     Socioeconomic History     Marital status: Single   Tobacco Use     Smoking status: Never     Smokeless tobacco: Never   Substance and Sexual Activity     Alcohol use: Never     Drug use: Never     CURRENT MEDICATIONS:  Current Outpatient Medications   Medication     amiodarone (PACERONE) 200 MG tablet     atorvastatin (LIPITOR) 80 MG tablet     carvedilol (COREG) 3.125 MG tablet     colchicine (COLCYRS) 0.6 MG tablet     ELIQUIS ANTICOAGULANT 5 MG tablet     GNP NATURAL FIBER 28.3 % POWD     JARDIANCE 10 MG TABS tablet     magnesium oxide (MAG-OX) 400 MG tablet     metFORMIN (GLUCOPHAGE) 500 MG tablet     mexiletine (MEXITIL) 200 MG capsule     potassium chloride ER (KLOR-CON M) 20 MEQ CR tablet     tamsulosin (FLOMAX) 0.4 MG capsule     No current facility-administered medications for this visit.     ROS:   Constitutional: No fever, chills, or sweats. Weight is 0 lbs 0 oz  ENT: No visual disturbance, ear ache, epistaxis, sore throat.   Allergies/Immunologic:  Negative.   Respiratory: No cough, hemoptysis.   Cardiovascular: As per HPI.   GI: No nausea, vomiting, hematemesis, melena, or hematochezia.   : No urinary frequency, dysuria, or hematuria.   Integument: Negative.   Psychiatric: Pleasant, no major depression noted  Neuro: No focal neurological deficits noted  Endocrinology: Negative.   Musculoskeletal: As per HPI.      EXAM:  CONSITUTIONAL: no acute distress  HEENT: no icterus, sclerae white  CV: no visible edema of visualized upper extremities, no gross JVD  CHEST: respirations nonlabored, no audible wheezing  NEURO: AA&Ox3, speech fluent/appropriate, motor grossly nonfocal  PSYCH: cooperative, affect appropriate  DERM: no rashes on visualized face/neck/upper extremities     The rest of a comprehensive physical examination is deferred due to video visit restrictions.     Labs:       Lab Results   Component Value Date     12/30/2022    Lab Results   Component Value Date    CHLORIDE 107 12/30/2022    Lab Results   Component Value Date    BUN 18.4 12/30/2022      Lab Results   Component Value Date    POTASSIUM 4.2 12/30/2022    Lab Results   Component Value Date    CO2 20 12/30/2022    Lab Results   Component Value Date    CR 1.12 12/30/2022        Lab Results   Component Value Date    WBC 13.2 (H) 12/30/2022    HGB 9.9 (L) 12/30/2022    HCT 30.7 (L) 12/30/2022    MCV 92 12/30/2022     12/30/2022       Echocardiogram 12/30/2022:  Interpretation Summary  Left ventricular size is normal.  Biplane LVEF is 46%.  No regional wall motion abnormalities are seen.  Right ventricular function, chamber size, wall motion, and thickness are normal.  The inferior vena cava is normal.     Left heart catheterization with coronary angiogram 6/17/2022  Hemodynamic: Aorta 95/52, LVEDP 20     Coronary angiography:   Left main left main was normal in size and bifurcated giving rise   circumflex and LAD.  There is no appreciable disease.  Circumflex artery   was normal in  size gives rise to 2 obtuse marginal branches.  There is   minor luminal irregularities in the proximal portion of the circumflex   artery otherwise no appreciable disease.  The LAD was normal in size with   3 diagonal branches dissection which is larger in caliber.  There is   diffuse minor luminal irregularities noted.  The distal portion artery   wraps around the apex of the heart and up the inferior wall.  The right   coronary artery was smaller in caliber but dominant in nature there is   mild luminal irregularities noted.     Summary:   1.  Mild three-vessel coronary disease     Cardiac MRI 6/17/2022  ECTASIA OF THE MID ASCENDING THORACIC AORTA MEASURING 3.8 CM IN GREATEST   DIAMETER.     MILD AORTIC VALVE STENOSIS WITH ASSOCIATED CALCIFICATION OF THE AORTIC VALVE   LEAFLETS.     MILD MITRAL VALVE AND TRICUSPID VALVE INSUFFICIENCY.     DECREASED LEFT VENTRICULAR EJECTION FRACTION AT 43.91%.     DECREASED RIGHT VENTRICULAR EJECTION FRACTION AT 34.49%.     GLOBAL RIGHT AND LEFT VENTRICULAR HYPOKINESIA.     NO EVIDENCE OF LEFT VENTRICULAR HYPERTROPHY.     DELAYED LEFT VENTRICULAR SUB EPICARDIAL AND MID MYOCARDIAL ENHANCEMENT   EXTENDING FROM THE BASE TO THE MID CAVITY AND INVOLVING THE ANTERIOR,   ANTEROSEPTAL, ANTEROLATERAL, AND LATERAL WALLS.  THIS PATTERN OF DELAYED   CONTRAST ENHANCEMENT IS MOST COMPATIBLE WITH NON ISCHEMIC CARDIOMYOPATHY.     SIMILAR FINDINGS HAVE BEEN DESCRIBED WITH:  IDIOPATHIC DILATED CARDIOMYOPATHY,   MYOCARDITIS, OR OTHER INFILTRATIVE MYOCARDIAL DISEASE SUCH AS SARCOID (FELT   LESS LIKELY). GAEL-FABRY DISEASE COULD SHOW A SIMILAR PATTERN OF   ENHANCEMENT, HOWEVER I WOULD EXPECT A MORE MIDMYOCARDIAL LOCATION AND MORE   INVOLVEMENT OF THE INFERIOR AND INFEROLATERAL WALLS (ADDITIONALLY I WOULD   EXPECT LEFT VENTRICULAR HYPERTROPHY WITH THIS ETIOLOGY).     BILATERAL PLEURAL EFFUSIONS.        ASSESSMENT AND PLAN:  1. Chronic systolic heart failure with mildly reduced LVEF (46% on  12/30/2022)  2. Nonischemic cardiomyopathy  3. Refractory ventricular tachycardia s/p VT ablation 10/2022, 12/2022  4. S/p CRT-D  5. Paroxysmal atrial fibrillation on long term apixaban  6. T2DM    Chronic systolic heart failure/HFrEF (EF 46%) secondary to nonischemic cardiomyopathy  NYHA Symptom Class III, Stage D  Primary Cardiologist: Jamila James MD ([facility]); Last seen 3/20/2023  ACE-I/ARB/ARNi: not currently taking  BB: carvedilol 3.125 BID  Aldosterone antagonist: spironolactone   SCD prophylaxis: s/p CRT-D    This is a difficult case. The VT is refractory to multiple ablation attempts, it is also causing LV systolic dysfunction. A definitive solution remains undetermined. One possible route for definitive therapy would be a heart transplant. For this, the pt would require additional evaluation. We would start by defining the pt's hemodynamics with a CPX/right heart catheterization.     Due to the pt's geographic distance and recurrent VT, he likely required inpatient admission to expedite his evaluation for advanced therapies.     Follow-up: tbd after tomorrow's appt with cardiac EP    The patient was seen and discussed with attending physician, Dr. Tulio Chanel MD.     Chang Joe MD, PhD  Cardiology Fellow  Click to text page [phone]    I have seen and evaluated the patient and agree with the assessment and plan as above.     I appreciate the opportunity to participate in the care of Rohit Garvey. Please do not hesitate to contact me with any further questions.    Tulio Chanel MD  [facility]  Cardiovascular Division

## 2023-04-04 ENCOUNTER — VIRTUAL VISIT (OUTPATIENT)
Dept: CARDIOLOGY | Facility: CLINIC | Age: 68
End: 2023-04-04
Attending: INTERNAL MEDICINE
Payer: MEDICARE

## 2023-04-04 DIAGNOSIS — I47.29 PAROXYSMAL VENTRICULAR TACHYCARDIA (H): ICD-10-CM

## 2023-04-04 DIAGNOSIS — I10 BENIGN ESSENTIAL HYPERTENSION: Primary | ICD-10-CM

## 2023-04-04 DIAGNOSIS — I50.20 HEART FAILURE WITH REDUCED EJECTION FRACTION, NYHA CLASS III (H): ICD-10-CM

## 2023-04-04 PROCEDURE — 99213 OFFICE O/P EST LOW 20 MIN: CPT | Mod: VID | Performed by: INTERNAL MEDICINE

## 2023-04-04 RX ORDER — FUROSEMIDE 40 MG
40 TABLET ORAL 3 TIMES DAILY
Status: ON HOLD | COMMUNITY
Start: 2023-03-14 | End: 2023-04-27

## 2023-04-04 NOTE — LETTER
4/4/2023      RE: Rohit Garvey  [address]  [facility] [facility] [address]       Dear Colleague,    Thank you for the opportunity to participate in the care of your patient, Rohit Garvey, at the [facility] HEART [facility]. Please see a copy of my visit note below.    Video-Visit Details  Type of service:  Video Visit  Video start time: 12:14pm  Video end time: 12:43pm  Total video time: 19 min  Originating Location (pt. Location): Home  Distant Location (provider location):  [facility]  Mode of Communication: Video Conference via [employer]      April 4 2023    Rohit Garvey  is a 67 year old year old male with a past medical history significant for nonischemic cardiomyopathy (45%) c/b paroxysmal VT s/p dual chamber ICD placement 6/20/2022 c/b recurrent ICD shocks s/p VT ablation 10/31/2022 and re-ablation 12/29/2022, paroxysmal Afib on apixaban, developmental delay, T2DM, hypertension, hyperlipidemia, who presents for initial evaluation in advanced heart failure clinic.     The pt's index heart failure event first occurred in 6/2022. The pt was brought to [facility] in [place] on 6/16/2022 with syncope and wide complex tachycardia. He was admitted to hospital, cardioverted and loaded with amiodarone, resulting in improve of his arrhythmia. An invasive coronary angiogram revealed normal coronary arteries. A cardiac MRI revealed biventricular systolic dysfunction, global hypokinesis, and delayed LV sub epicardial and mid myocardial late gadolinium enhancement, extending from the base to the mid cavity and involving the anterior, anteroseptal, anterolateral, and lateral walls. These findings were not suggestive of cardiac sarcoidosis, however. During that hospitalization, the pt underwent dual chamber ICD placement for secondary prevention. During the procedure, he experienced a recurrence of VT.  Although this was promptly controlled in the EP lab, the pt experienced recurrent VT events and ICD shocks after his discharge from hospital. He was referred to [facility] in 10/2022 for a VT ablation, however this was unable to prevent additional VT episodes. He returned in 12/2022 for a repeat VT ablation. Despite this second ablation, the pt is still experiencing recurrent VT episodes. He was last admitted to [facility] for an entire week on 3/5/2023 for a recurrent slow VT event.    Due to the pt's refractory VT and persistent LV dysfunction, he was referred to advanced heart failure for consideration of advanced therapies. Today he is being evaluated by a video visit. He just had another ICD shock 4 d ago. He was evaluated in the ED the next day, but because device interrogation confirmed that everything is working correctly and thus sent the pt home. He cannot identify a specific trigger for these ICD shocks.     The pt lives in an assisted living facility. He is currently wheelchair bound due to a recent mechanical fall c/b broken clavicle. Prior to this, he ambulated with a walker.     PAST MEDICAL HISTORY:  Past Medical History:   Diagnosis Date    Arrhythmia     Diabetes (H)     Hypertension     Pacemaker      FAMILY HISTORY:  No family history on file.  SOCIAL HISTORY:  Social History     Socioeconomic History    Marital status: Single   Tobacco Use    Smoking status: Never    Smokeless tobacco: Never   Substance and Sexual Activity    Alcohol use: Never    Drug use: Never     CURRENT MEDICATIONS:  Current Outpatient Medications   Medication    amiodarone (PACERONE) 200 MG tablet    atorvastatin (LIPITOR) 80 MG tablet    carvedilol (COREG) 3.125 MG tablet    colchicine (COLCYRS) 0.6 MG tablet    ELIQUIS ANTICOAGULANT 5 MG tablet    GNP NATURAL FIBER 28.3 % POWD    JARDIANCE 10 MG TABS tablet    magnesium oxide (MAG-OX) 400 MG tablet    metFORMIN (GLUCOPHAGE) 500 MG tablet    mexiletine (MEXITIL) 200 MG  capsule    potassium chloride ER (KLOR-CON M) 20 MEQ CR tablet    tamsulosin (FLOMAX) 0.4 MG capsule     No current facility-administered medications for this visit.     ROS:   Constitutional: No fever, chills, or sweats. Weight is 0 lbs 0 oz  ENT: No visual disturbance, ear ache, epistaxis, sore throat.   Allergies/Immunologic: Negative.   Respiratory: No cough, hemoptysis.   Cardiovascular: As per HPI.   GI: No nausea, vomiting, hematemesis, melena, or hematochezia.   : No urinary frequency, dysuria, or hematuria.   Integument: Negative.   Psychiatric: Pleasant, no major depression noted  Neuro: No focal neurological deficits noted  Endocrinology: Negative.   Musculoskeletal: As per HPI.      EXAM:  CONSITUTIONAL: no acute distress  HEENT: no icterus, sclerae white  CV: no visible edema of visualized upper extremities, no gross JVD  CHEST: respirations nonlabored, no audible wheezing  NEURO: AA&Ox3, speech fluent/appropriate, motor grossly nonfocal  PSYCH: cooperative, affect appropriate  DERM: no rashes on visualized face/neck/upper extremities     The rest of a comprehensive physical examination is deferred due to video visit restrictions.     Labs:       Lab Results   Component Value Date     12/30/2022    Lab Results   Component Value Date    CHLORIDE 107 12/30/2022    Lab Results   Component Value Date    BUN 18.4 12/30/2022      Lab Results   Component Value Date    POTASSIUM 4.2 12/30/2022    Lab Results   Component Value Date    CO2 20 12/30/2022    Lab Results   Component Value Date    CR 1.12 12/30/2022        Lab Results   Component Value Date    WBC 13.2 (H) 12/30/2022    HGB 9.9 (L) 12/30/2022    HCT 30.7 (L) 12/30/2022    MCV 92 12/30/2022     12/30/2022       Echocardiogram 12/30/2022:  Interpretation Summary  Left ventricular size is normal.  Biplane LVEF is 46%.  No regional wall motion abnormalities are seen.  Right ventricular function, chamber size, wall motion, and thickness are  normal.  The inferior vena cava is normal.     Left heart catheterization with coronary angiogram 6/17/2022  Hemodynamic: Aorta 95/52, LVEDP 20     Coronary angiography:   Left main left main was normal in size and bifurcated giving rise   circumflex and LAD.  There is no appreciable disease.  Circumflex artery   was normal in size gives rise to 2 obtuse marginal branches.  There is   minor luminal irregularities in the proximal portion of the circumflex   artery otherwise no appreciable disease.  The LAD was normal in size with   3 diagonal branches dissection which is larger in caliber.  There is   diffuse minor luminal irregularities noted.  The distal portion artery   wraps around the apex of the heart and up the inferior wall.  The right   coronary artery was smaller in caliber but dominant in nature there is   mild luminal irregularities noted.     Summary:   1.  Mild three-vessel coronary disease     Cardiac MRI 6/17/2022  ECTASIA OF THE MID ASCENDING THORACIC AORTA MEASURING 3.8 CM IN GREATEST   DIAMETER.     MILD AORTIC VALVE STENOSIS WITH ASSOCIATED CALCIFICATION OF THE AORTIC VALVE   LEAFLETS.     MILD MITRAL VALVE AND TRICUSPID VALVE INSUFFICIENCY.     DECREASED LEFT VENTRICULAR EJECTION FRACTION AT 43.91%.     DECREASED RIGHT VENTRICULAR EJECTION FRACTION AT 34.49%.     GLOBAL RIGHT AND LEFT VENTRICULAR HYPOKINESIA.     NO EVIDENCE OF LEFT VENTRICULAR HYPERTROPHY.     DELAYED LEFT VENTRICULAR SUB EPICARDIAL AND MID MYOCARDIAL ENHANCEMENT   EXTENDING FROM THE BASE TO THE MID CAVITY AND INVOLVING THE ANTERIOR,   ANTEROSEPTAL, ANTEROLATERAL, AND LATERAL WALLS.  THIS PATTERN OF DELAYED   CONTRAST ENHANCEMENT IS MOST COMPATIBLE WITH NON ISCHEMIC CARDIOMYOPATHY.     SIMILAR FINDINGS HAVE BEEN DESCRIBED WITH:  IDIOPATHIC DILATED CARDIOMYOPATHY,   MYOCARDITIS, OR OTHER INFILTRATIVE MYOCARDIAL DISEASE SUCH AS SARCOID (FELT   LESS LIKELY). GAEL-FABRY DISEASE COULD SHOW A SIMILAR PATTERN OF   ENHANCEMENT,  HOWEVER I WOULD EXPECT A MORE MIDMYOCARDIAL LOCATION AND MORE   INVOLVEMENT OF THE INFERIOR AND INFEROLATERAL WALLS (ADDITIONALLY I WOULD   EXPECT LEFT VENTRICULAR HYPERTROPHY WITH THIS ETIOLOGY).     BILATERAL PLEURAL EFFUSIONS.        ASSESSMENT AND PLAN:  Chronic systolic heart failure with mildly reduced LVEF (46% on 12/30/2022)  Nonischemic cardiomyopathy  Refractory ventricular tachycardia s/p VT ablation 10/2022, 12/2022  S/p CRT-D  Paroxysmal atrial fibrillation on long term apixaban  T2DM    Chronic systolic heart failure/HFrEF (EF 46%) secondary to nonischemic cardiomyopathy  NYHA Symptom Class III, Stage D  Primary Cardiologist: Jamila James MD ([facility]); Last seen 3/20/2023  ACE-I/ARB/ARNi: not currently taking  BB: carvedilol 3.125 BID  Aldosterone antagonist: spironolactone   SCD prophylaxis: s/p CRT-D    This is a difficult case. The VT is refractory to multiple ablation attempts, it is also causing LV systolic dysfunction. A definitive solution remains undetermined. One possible route for definitive therapy would be a heart transplant. For this, the pt would require additional evaluation. We would start by defining the pt's hemodynamics with a CPX/right heart catheterization.     Due to the pt's geographic distance and recurrent VT, he likely required inpatient admission to expedite his evaluation for advanced therapies.     Follow-up: tbd after tomorrow's appt with cardiac EP    The patient was seen and discussed with attending physician, Dr. Tulio Chanel MD.     Chang Joe MD, PhD  Cardiology Fellow  Click to text page [phone]    I have seen and evaluated the patient and agree with the assessment and plan as above.     I appreciate the opportunity to participate in the care of Rohit Garvey. Please do not hesitate to contact me with any further questions.    Tulio Chanel MD  [facility]  Cardiovascular Division

## 2023-04-04 NOTE — PATIENT INSTRUCTIONS
Dr. Chanel recommends:    Follow up to be determined.    Thank you for your visit today.  Please call me with any questions or concerns.   Fran Chopra RN  Cardiology Care Coordinator  [phone]

## 2023-04-04 NOTE — NURSING NOTE
Chief Complaint   Patient presents with     Consult     Pts sister states Eliquis was stopped by ER doctor March 27th but unsure why     Is the patient currently in the state of [place]? NO    Visit mode:VIDEO    If the visit is dropped, the patient can be reconnected by: VIDEO VISIT: Send to e-mail at: [email or web address]    Will anyone else be joining the visit? Pts sister Citlali      How would you like to obtain your AVS? MyChart    Are changes needed to the allergy or medication list? YES: also uses compression stockings    Yovani Carter, MAITE/CMA

## 2023-04-05 ENCOUNTER — VIRTUAL VISIT (OUTPATIENT)
Dept: CARDIOLOGY | Facility: CLINIC | Age: 68
End: 2023-04-05
Attending: INTERNAL MEDICINE
Payer: MEDICARE

## 2023-04-05 ENCOUNTER — MYC MEDICAL ADVICE (OUTPATIENT)
Dept: CARDIOLOGY | Facility: CLINIC | Age: 68
End: 2023-04-05
Payer: MEDICARE

## 2023-04-05 DIAGNOSIS — I42.8 NONISCHEMIC CARDIOMYOPATHY (H): ICD-10-CM

## 2023-04-05 DIAGNOSIS — I47.29 PAROXYSMAL VENTRICULAR TACHYCARDIA (H): Primary | ICD-10-CM

## 2023-04-05 DIAGNOSIS — I50.20 HEART FAILURE WITH REDUCED EJECTION FRACTION, NYHA CLASS III (H): ICD-10-CM

## 2023-04-05 PROCEDURE — 99215 OFFICE O/P EST HI 40 MIN: CPT | Mod: 95 | Performed by: INTERNAL MEDICINE

## 2023-04-05 NOTE — PATIENT INSTRUCTIONS
Plan:    You will be admitted for a heart failure evaluation and management      Your Care Team:  EP Cardiology   Telephone Number     Zeinab Josue RN [phone]    After business hours: [phone], ask for cardiologist on-call   Non-procedure scheduling:    Chelo LAIRD   [phone]   Procedure scheduling:    Catrina Yeung   [phone]   Device Clinic (Pacemakers, ICDs, Loop Recorders)    During business hours: [phone]  After business hours:   [phone]- select option 4 and ask for job code [phone].       Cardiovascular Clinic:   [address] [place] [address]. [place] [address]      As always, thank you for trusting us with your health care needs!

## 2023-04-05 NOTE — NURSING NOTE
Chief Complaint   Patient presents with     Follow Up     Pt will not be present for todays virtual visit due to weather, Pts sister Citlali connected, vitals recorded at virtual visit yest. with pt present     Is the patient currently in the state of [place]? NO    Visit mode:VIDEO    If the visit is dropped, the patient can be reconnected by: VIDEO VISIT: Text to cell phone: [phone]    Will anyone else be joining the visit? Pts sister Citlali      How would you like to obtain your AVS? MyChart    Are changes needed to the allergy or medication list? YES: No longer taking Angelica Carter, VF/CMA

## 2023-04-05 NOTE — LETTER
Date:March 27, 2023      Provider requested that no letter be sent. Do not send.       [facility]

## 2023-04-05 NOTE — PROGRESS NOTES
"Virtual Visit Details    Type of service:  Video Visit   Video Start Time: {video visit start/end time for provider to select:710763}  Video End Time:{video visit start/end time for provider to select:088350}    Originating Location (pt. Location): {video visit patient location:273722::\"Home\"}  {PROVIDER LOCATION On-site should be selected for visits conducted from your clinic location or adjoining [facility] hospital, academic office, or other nearby [facility] building. Off-site should be selected for all other provider locations, including home:330256}  Distant Location (provider location):  {virtual location provider:649698}  Platform used for Video Visit: {Virtual Visit Platforms:678919::\"[employer]\"}          "

## 2023-04-05 NOTE — Clinical Note
4/5/2023      RE: Rohit W Mare  [address]  [facility] [address]       Dear Colleague,    Thank you for the opportunity to participate in the care of your patient, Rohit Garvey, at the [facility] HEART CLINIC [facility]. Please see a copy of my visit note below.    No notes on file    Please do not hesitate to contact me if you have any questions/concerns.     Sincerely,     Sky Leon MD

## 2023-04-07 NOTE — TELEPHONE ENCOUNTER
Date: 4/7/2023    Time of Call: 11:21 AM     Diagnosis:  HF/Transplant Workup      [ VORB ] Ordering provider: Dr. Chanel   Order: Directly admit pt to [facility] tomorrow 4/8/23 for transplant workup      Order received by: Adriana Barba RN       Follow-up/additional notes: [employer] message sent to pt.

## 2023-04-07 NOTE — PROGRESS NOTES
"    ELECTROPHYSIOLOGY VIRTUAL CLINIC VISIT    Rohit Garvey is a 67 year old adult who is being evaluated via a billable video visit.      The patient has been notified of following:     \"This video visit will be conducted via a call between you and your physician/provider. We have found that certain health care needs can be provided without the need for an in-person physical exam.  This service lets us provide the care you need with a video conversation.  If a prescription is necessary we can send it directly to your pharmacy.  If lab work is needed we can place an order for that and you can then stop by our lab to have the test done at a later time.    If during the course of the call the physician/provider feels a video visit is not appropriate, you will not be charged for this service.\"     Physician/provider has received verbal consent for a Video Visit from the patient? 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 On 12/16/2022 , patient had a second VT ablation on 12/29/2022, endocardial/epicardial ablation, 9 VT's, was different morphology ranging from left bundle to right bundle branch block, ablation was done mainly at the mid basal lateral and basal anterior septal of LV.  In today's: visit we we had an video encounter with his sister who is his POA, she explained that recently 03/25/2023-03/31/2023 the patient has received between 3-4 shots from his device (we sent for interrogation )  presented to the ER at [facility] on 03/27/2023 and on 03/31/2023 when he mentioned that he did have his device went off on him more than once.  When I evaluated in the ER the plan was to transfer him to [facility] " system for Dr. Leon to evaluate for VT ablation, however there was no beds in the hospital patient went home.  There has been communication between Dr. Urbina and Dr. Bueno for the transfer process and to be initially evaluated by advanced heart failure failure team and EP team for VT management.  Sister mentions that she is waiting on a phone call back on when exactly to transfer (at the time being there is a snowstorm up in [place], where the patient is).  According to the ED note patient is on amiodarone 200 mg twice daily.         Ventricular Tachycardia  NICM LVEF 45%, NYHA II:  1. ACEi/ARB: Not currently on due to LVEF 45%.  2. BB: Coreg was held due to hypotension  3. Aldosterone antagonist: Not currently required.  4. SCD prophylaxis: s/p secondary prevention ICD 6/20/22  5. Fluid status: appears euvolemic, continue lasix  6. VT: He was having a high burden of ventricular arrhythmias despite being on 2 antiarrhythmic medications, amiodarone and mexiletine.  The review of the prior device interrogation shows mostly a ventricular tachycardia at about 130 bpm, monomorphic on all available tracings with us in morphology.  ATP is sometimes successful however, can also accelerate the rhythm into a fast VT or VF resulting in a shock.He had VT ablation on 10/31/22. Unfortunately, he has continued to have ICD shocks. He has been on amiodarone and ranolazine was added. patient had a second VT ablation on 12/29/2022, endocardial/epicardial ablation, 9 VT's, was different morphology ranging from left bundle to right bundle branch block, ablation was done mainly at the mid basal lateral and basal anterior septal of LV. Recently 03/25/2023-03/31/2023 the patient has received between 3-4 shoks from his device (we sent for interrogation ) There has been communication between Dr. Urbina and Dr. Bueno for the transfer process (to [facility]) and to be initially evaluated by advanced heart  failure failure team and EP team for VT management.  Sister mentions that she is waiting on a phone call to know when exactly to transfer (at the time being there is a snowstorm up in [place], where the patient is).  According to the ED note patient is on amiodarone 200 mg twice daily.     VT procedure complications include but are not limited to vascular injury, excessive bleeding requiring blood transfusion, groin hematoma, aortic injury at the time of transseptal puncture, bleeding/injury to abdominal structures at time of epicardial access, cardiac tamponade requiring pericardiocentesis or open heart surgery, and thromboembolic complications or strokes. We also discussed that VT ablation can be limited by inducibility of VT at the time of EPS/Abaltion and/or the origin of VT. Efficacy of ablation also deceases if multiple foci are found.     We will follow-up with the patient for very possible VT ablation.     History of Present Illness/Subjective     Mr. Rohit Garvey is a 67 year old male who presents for VT management.     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      The information was obtained from his sister and his nurse in the assisted living who were both present during this visit.     EP Visit 10/14/22: Mr Garvey lives in a nursing home due to intellectual delay.  In June 2022, he was found to be very spacey and pale by the nursing staff.  On examination his heart rate was 130 bpm with a blood pressure of 80/55.  He was also fatigued.  The patient was transported to the ED and was awake all the time.  Upon arrival to the ED he was found to have a wide-complex tachycardia and he was reportedly given medications first for cardioversion.  After medications failed, he received a  cardioversion.  The details of whether he ever lost consciousness during that visit is unknown.  He has had a history of syncope in the bathroom a few weeks prior to his admission in June.  He reportedly had an evaluation as an inpatient at that time that was negative for ischemia, possible angiogram, and he was found to have a mildly reduced LVEF of 45 to 50% on an echocardiogram.  He had a dual-chamber ICD system implanted on 6/20/2022.  The patient also had a cardiac MRI revealing a left ventricular ejection fraction of 44%, right ventricular ejection fraction of 35%, with subepicardial and mid myocardial late gadolinium enhancement extending from the base to the mid cavity and involving the anterior, anteroseptal, anterolateral, and lateral walls.  He was started on amiodarone and was discharged.  The patient's VT recurred after discharge with many episodes in September and October, with ATP and shocks he followed up with his primary electrophysiologist, Dr James, in [place].  Device interrogation showed 16 therapy sessions between shocks and ATP's in the last 2 weeks.  The patient does feel the fast heartbeat sometimes accompanied with some weakness before the ICD shocks.  A repeat echocardiogram done on 1/26/2022 shows a stable LV ejection fraction of 45 to 50% with mild LV dilation and mild mitral and aortic and tricuspid regurgitation, with RVSP mildly elevated at 39 mmHg.  The patient was referred for consideration of VT ablation.  The patient is currently on amiodarone 200 twice daily and mexiletine 150 mg 3 times daily for rhythm control.     Rohit is following up today. He underwent a VT ablation on 10/31/22. He had some cardiogenic shock post ablation and required pressor. He has since recovered well and groin sites well healed. He has had recurrent VT after ablation with multiple ICD therapies. He was started on Ranolazine. He has continued to have device therapies. Several morphologies noted. He  reports feeling at baseline. Rohit denies chest discomfort, palpitations, abdominal fullness/bloating or peripheral edema, shortness of breath, paroxysmal nocturnal dyspnea, orthopnea, lightheadedness, dizziness, pre-syncope, or syncope.  Current cardiac medications include: amiodarone, ranolazine, Lipitor, lasix, and ASA.     EP visit on 04/05/2023: Since last visit 12/16/2022 , patient had a second VT ablation on 12/29/2022, endocardial/epicardial ablation, 9 VT's, was different morphology ranging from left bundle to right bundle branch block, ablation was done mainly at the mid basal lateral and basal anterior septal of LV.  In this visit we we had an video encounter with his sister who is his POA, she explained that recently 03/25/2023-03/31/2023 the patient has received between 3-4 shots from his device (we sent for interrogation )  presented to the ER at [facility] on 03/27/2023 and on 03/31/2023 when he mentioned that he did have his device went off on him more than once.  When I evaluated in the ER the plan was to transfer him to [facility] for Dr. Leon to evaluate for VT ablation, however there was no beds in the hospital patient went home.  There has been communication between Dr. Urbina and Dr. Bueno for the transfer process and to be initially evaluated by advanced heart failure failure team and EP team for VT management.  Sister mentions that she is waiting on a phone call back on when exactly to transfer (at the time being there is a snowstorm up in [place], where the patient is).  According to the ED note patient is on amiodarone 200 mg twice daily.      I have reviewed and updated the patient's Past Medical History, Social History, Family History and Medication List.     Cardiographics (Personally Reviewed) :   TTE June 2022 in September 2022 reviewed as above.  CMR June 17, 2022 reviewed as above.     11/1/22 Echo:  Interpretation  Summary  Left ventricular function is decreased. The ejection fraction is 35-40%  (moderately reduced). Moderate diffuse hypokinesis.  Global right ventricular function appears moderately reduced based on limited  views.  Small pericardial effusion, with the largest collection of fluid posterior to  the left ventricle at the apex (1.1cm).  There is no prior study for direct comparison       Physical Examination   There were no vitals taken for this visit.  Wt Readings from Last 3 Encounters:   12/29/22 67.4 kg (148 lb 9.4 oz)   11/05/22 73.8 kg (162 lb 11.2 oz)     The rest of a comprehensive physical examination is deferred due to nature of video visit restrictions.       Medications  Allergies   Current Outpatient Medications   Medication Sig Dispense Refill     amiodarone (PACERONE) 200 MG tablet Take 1 tablet (200 mg) by mouth 2 times daily 90 tablet 3     Artificial Tear Ointment (LACRI-LUBE OP) Lacri-Lube   to both eyes at hs       atorvastatin (LIPITOR) 80 MG tablet Take 80 mg by mouth daily       carvedilol (COREG) 3.125 MG tablet Take 3.125 mg by mouth 2 times daily (with meals)       colchicine (COLCYRS) 0.6 MG tablet Take 0.5 tablets (0.3 mg) by mouth daily 60 tablet 0     furosemide (LASIX) 40 MG tablet Take 40 mg by mouth       GNP NATURAL FIBER 28.3 % POWD Give 1-2 TABLESPOONFUL DAILY in a GLASS of JUICE       JARDIANCE 10 MG TABS tablet Take 10 mg by mouth daily       magnesium oxide (MAG-OX) 400 MG tablet Take 800 mg by mouth       metFORMIN (GLUCOPHAGE) 500 MG tablet Take 1,000 mg by mouth 2 times daily (with meals)       mexiletine (MEXITIL) 200 MG capsule Take 1 capsule (200 mg) by mouth every 8 hours 90 capsule 3     potassium chloride ER (KLOR-CON M) 20 MEQ CR tablet TAKE 1 TABLET BY MOUTH EVERY MORNING WITH BREAKFAST 30 tablet 1     tamsulosin (FLOMAX) 0.4 MG capsule Take 0.4 mg by mouth every evening       UNABLE TO FIND Artificial Tears   1 gtt both eyes up to 4 times per day       UNABLE TO  FIND Fish Oil   1 tablet tid       UNABLE TO FIND Take 1 tablet by mouth 2 times daily       ELIQUIS ANTICOAGULANT 5 MG tablet Take 1 tablet by mouth 2 times daily (Patient not taking: Reported on 4/4/2023)      No Known Allergies      Lab Results (Personally Reviewed)    Chemistry/lipid CBC Cardiac Enzymes/BNP/TSH/INR   Lab Results   Component Value Date    BUN 18.4 12/30/2022     12/30/2022    CO2 20 (L) 12/30/2022     Creatinine   Date Value Ref Range Status   12/30/2022 1.12 0.51 - 1.17 mg/dL Final     Comment:     Male and Female  0-2 Months    0.31-0.88 mg/dL  2-12 Months   0.16-0.39 mg/dL  1-2 Years     0.18-0.35 mg/dL  3-4 Years     0.26-0.42 mg/dL  5-6 Years     0.29-0.47 mg/dL  7-8 Years     0.34-0.53 mg/dL  9-10 Years    0.33-0.64 mg/dL  11-12 Years   0.44-0.68 mg/dL  13-14 Years   0.46-0.77 mg/dL    Female  15 Years and older  0.51-0.95 mg/dL    Male  15 Years and older  0.67-1.17 mg/dL           No results found for: CHOL, HDL, LDL, CHOLHDL   Lab Results   Component Value Date    WBC 13.2 (H) 12/30/2022    HGB 9.9 (L) 12/30/2022    HCT 30.7 (L) 12/30/2022    MCV 92 12/30/2022     12/30/2022    Lab Results   Component Value Date    INR 1.23 (H) 10/31/2022          Virtual Visit Details    Type of service:  Video Visit   Video Start Time: 8:30  Video End Time:9:00    Originating Location (pt. Location): Assisted Living    Distant Location (provider location):  On-site  Platform used for Video Visit: Kimberly Mcgovern MD  Cardiac electrophysiology fellow    EP STAFF NOTE  Patient seen and examined and management plan personally reviewed with the patient. I agree with the note above by the CV/EP fellow.  The patient has had 2 ablations for multiple VT is with a nonischemic cardiomyopathy substrate, mostly mid myocardial scar.  We had done both endocardial and epicardial access for both ablations.  After the last ablation, he has had no recurrences until about 2 months after the last  ablation where he started having to slow VT again.  The review of the morphology shows a VT relatively different compared to preablation's.  He has had a CRT upgrade recently and his heart failure has been fairly advanced.  He is being evaluated by the advanced heart failure team for further therapies, as a big part of his arrhythmias could be driven by decompensated heart failure.  Did just heart failure team mentioned that he is not a candidate for LVAD but he could be a candidate for heart transplantation.  The family is not keen on considering heart transplantation but they have just started discussion with the advanced heart failure team.  From EP standpoint, we are open to considering repeat ablation.  The interest heart failure team is working on the date for a planned admission to the hospital with a full evaluation.  If his heart failure is very decompensated, we will wait on the team to stabilize his heart failure before we consider an ablation.  If he is compensated again with calming of the ventricular arrhythmias, we would defer ablation.  If not, we would reconsider repeating ablation.  We will coordinate with advanced heart failure team to see when he is going for the planned admission to the hospital.  Sky Leon MD [facility]  Cardiology - Electrophysiology      Total time spent on patient visit, reviewing notes, imaging, labs, orders, and completing necessary documentation: 45 minutes.

## 2023-04-08 ENCOUNTER — HOSPITAL ENCOUNTER (INPATIENT)
Facility: CLINIC | Age: 68
LOS: 20 days | Discharge: HOME HEALTH | DRG: 287 | End: 2023-04-28
Attending: INTERNAL MEDICINE | Admitting: STUDENT IN AN ORGANIZED HEALTH CARE EDUCATION/TRAINING PROGRAM
Payer: MEDICARE

## 2023-04-08 DIAGNOSIS — I47.20 VT (VENTRICULAR TACHYCARDIA) (H): ICD-10-CM

## 2023-04-08 DIAGNOSIS — L30.4 INTERTRIGO: ICD-10-CM

## 2023-04-08 DIAGNOSIS — E11.621 DIABETIC ULCER OF RIGHT HEEL ASSOCIATED WITH TYPE 2 DIABETES MELLITUS, WITH FAT LAYER EXPOSED (H): ICD-10-CM

## 2023-04-08 DIAGNOSIS — I47.29 PAROXYSMAL VENTRICULAR TACHYCARDIA (H): Primary | ICD-10-CM

## 2023-04-08 DIAGNOSIS — I50.32 CHRONIC DIASTOLIC HEART FAILURE (H): ICD-10-CM

## 2023-04-08 DIAGNOSIS — S42.035S CLOSED NONDISPLACED FRACTURE OF ACROMIAL END OF LEFT CLAVICLE, SEQUELA: ICD-10-CM

## 2023-04-08 DIAGNOSIS — L97.412 DIABETIC ULCER OF RIGHT HEEL ASSOCIATED WITH TYPE 2 DIABETES MELLITUS, WITH FAT LAYER EXPOSED (H): ICD-10-CM

## 2023-04-08 LAB
ALBUMIN SERPL BCG-MCNC: 3.7 G/DL (ref 3.5–5.2)
ALP SERPL-CCNC: 349 U/L (ref 35–129)
ALT SERPL W P-5'-P-CCNC: 82 U/L (ref 10–50)
AMPHETAMINES UR QL SCN: ABNORMAL
ANION GAP SERPL CALCULATED.3IONS-SCNC: 11 MMOL/L (ref 7–15)
AST SERPL W P-5'-P-CCNC: 77 U/L (ref 10–50)
BARBITURATES UR QL SCN: ABNORMAL
BENZODIAZ UR QL SCN: ABNORMAL
BILIRUB DIRECT SERPL-MCNC: 0.29 MG/DL (ref 0–0.3)
BILIRUB SERPL-MCNC: 0.7 MG/DL
BUN SERPL-MCNC: 13.1 MG/DL (ref 8–23)
BZE UR QL SCN: ABNORMAL
CALCIUM SERPL-MCNC: 9.1 MG/DL (ref 8.8–10.2)
CANNABINOIDS UR QL SCN: ABNORMAL
CHLORIDE SERPL-SCNC: 97 MMOL/L (ref 98–107)
CREAT SERPL-MCNC: 1.28 MG/DL (ref 0.51–1.17)
DEPRECATED HCO3 PLAS-SCNC: 23 MMOL/L (ref 22–29)
ERYTHROCYTE [DISTWIDTH] IN BLOOD BY AUTOMATED COUNT: 18.6 % (ref 10–15)
ETHANOL UR QL SCN: ABNORMAL
GFR SERPL CREATININE-BSD FRML MDRD: 61 ML/MIN/1.73M2
GLUCOSE BLDC GLUCOMTR-MCNC: 106 MG/DL (ref 70–99)
GLUCOSE BLDC GLUCOMTR-MCNC: 134 MG/DL (ref 70–99)
GLUCOSE SERPL-MCNC: 110 MG/DL (ref 70–99)
HCT VFR BLD AUTO: 29.3 % (ref 35–53)
HGB BLD-MCNC: 8.7 G/DL (ref 11.7–17.7)
INR PPP: 1.46 (ref 0.85–1.15)
MAGNESIUM SERPL-MCNC: 1.6 MG/DL (ref 1.7–2.3)
MCH RBC QN AUTO: 24.6 PG (ref 26.5–33)
MCHC RBC AUTO-ENTMCNC: 29.7 G/DL (ref 31.5–36.5)
MCV RBC AUTO: 83 FL (ref 78–100)
OPIATES UR QL SCN: ABNORMAL
PCP QUAL URINE (ROCHE): ABNORMAL
PLATELET # BLD AUTO: 155 10E3/UL (ref 150–450)
POTASSIUM SERPL-SCNC: 4.3 MMOL/L (ref 3.4–5.3)
PROT SERPL-MCNC: 6.7 G/DL (ref 6.4–8.3)
RBC # BLD AUTO: 3.54 10E6/UL (ref 3.8–5.9)
SARS-COV-2 RNA RESP QL NAA+PROBE: NEGATIVE
SODIUM SERPL-SCNC: 131 MMOL/L (ref 136–145)
TSH SERPL DL<=0.005 MIU/L-ACNC: 3.11 UIU/ML (ref 0.3–4.2)
WBC # BLD AUTO: 6.9 10E3/UL (ref 4–11)

## 2023-04-08 PROCEDURE — 250N000011 HC RX IP 250 OP 636

## 2023-04-08 PROCEDURE — 999N000127 HC STATISTIC PERIPHERAL IV START W US GUIDANCE

## 2023-04-08 PROCEDURE — 36415 COLL VENOUS BLD VENIPUNCTURE: CPT

## 2023-04-08 PROCEDURE — 80307 DRUG TEST PRSMV CHEM ANLYZR: CPT

## 2023-04-08 PROCEDURE — 80321 ALCOHOLS BIOMARKERS 1OR 2: CPT

## 2023-04-08 PROCEDURE — U0005 INFEC AGEN DETEC AMPLI PROBE: HCPCS | Performed by: STUDENT IN AN ORGANIZED HEALTH CARE EDUCATION/TRAINING PROGRAM

## 2023-04-08 PROCEDURE — 84443 ASSAY THYROID STIM HORMONE: CPT

## 2023-04-08 PROCEDURE — 80323 ALKALOIDS NOS: CPT

## 2023-04-08 PROCEDURE — 214N000001 HC R&B CCU UMMC

## 2023-04-08 PROCEDURE — 85027 COMPLETE CBC AUTOMATED: CPT

## 2023-04-08 PROCEDURE — 250N000011 HC RX IP 250 OP 636: Performed by: STUDENT IN AN ORGANIZED HEALTH CARE EDUCATION/TRAINING PROGRAM

## 2023-04-08 PROCEDURE — 82248 BILIRUBIN DIRECT: CPT

## 2023-04-08 PROCEDURE — 250N000009 HC RX 250

## 2023-04-08 PROCEDURE — 250N000013 HC RX MED GY IP 250 OP 250 PS 637

## 2023-04-08 PROCEDURE — 83735 ASSAY OF MAGNESIUM: CPT | Performed by: STUDENT IN AN ORGANIZED HEALTH CARE EDUCATION/TRAINING PROGRAM

## 2023-04-08 PROCEDURE — 80053 COMPREHEN METABOLIC PANEL: CPT

## 2023-04-08 PROCEDURE — 85610 PROTHROMBIN TIME: CPT

## 2023-04-08 PROCEDURE — 99222 1ST HOSP IP/OBS MODERATE 55: CPT | Mod: 25 | Performed by: STUDENT IN AN ORGANIZED HEALTH CARE EDUCATION/TRAINING PROGRAM

## 2023-04-08 RX ORDER — POTASSIUM CHLORIDE 750 MG/1
20 TABLET, EXTENDED RELEASE ORAL DAILY
Status: DISCONTINUED | OUTPATIENT
Start: 2023-04-09 | End: 2023-04-20

## 2023-04-08 RX ORDER — LIDOCAINE 40 MG/G
CREAM TOPICAL
Status: DISCONTINUED | OUTPATIENT
Start: 2023-04-08 | End: 2023-04-28 | Stop reason: HOSPADM

## 2023-04-08 RX ORDER — ACETAMINOPHEN 650 MG/1
650 SUPPOSITORY RECTAL EVERY 4 HOURS PRN
Status: DISCONTINUED | OUTPATIENT
Start: 2023-04-08 | End: 2023-04-28 | Stop reason: HOSPADM

## 2023-04-08 RX ORDER — MAGNESIUM HYDROXIDE/ALUMINUM HYDROXICE/SIMETHICONE 120; 1200; 1200 MG/30ML; MG/30ML; MG/30ML
30 SUSPENSION ORAL EVERY 4 HOURS PRN
Status: DISCONTINUED | OUTPATIENT
Start: 2023-04-08 | End: 2023-04-28 | Stop reason: HOSPADM

## 2023-04-08 RX ORDER — MAGNESIUM SULFATE HEPTAHYDRATE 40 MG/ML
2 INJECTION, SOLUTION INTRAVENOUS ONCE
Status: COMPLETED | OUTPATIENT
Start: 2023-04-08 | End: 2023-04-08

## 2023-04-08 RX ORDER — CALCIUM CARBONATE/VITAMIN D3 600 MG-10
1 TABLET ORAL DAILY
Status: DISCONTINUED | OUTPATIENT
Start: 2023-04-09 | End: 2023-04-28 | Stop reason: HOSPADM

## 2023-04-08 RX ORDER — MAGNESIUM OXIDE 400 MG/1
800 TABLET ORAL DAILY
Status: DISCONTINUED | OUTPATIENT
Start: 2023-04-09 | End: 2023-04-19

## 2023-04-08 RX ORDER — FUROSEMIDE 40 MG
40 TABLET ORAL DAILY
Status: DISCONTINUED | OUTPATIENT
Start: 2023-04-09 | End: 2023-04-08

## 2023-04-08 RX ORDER — DEXTROSE MONOHYDRATE 25 G/50ML
25-50 INJECTION, SOLUTION INTRAVENOUS
Status: DISCONTINUED | OUTPATIENT
Start: 2023-04-08 | End: 2023-04-28 | Stop reason: HOSPADM

## 2023-04-08 RX ORDER — ATORVASTATIN CALCIUM 80 MG/1
80 TABLET, FILM COATED ORAL DAILY
Status: DISCONTINUED | OUTPATIENT
Start: 2023-04-09 | End: 2023-04-28 | Stop reason: HOSPADM

## 2023-04-08 RX ORDER — NITROGLYCERIN 0.4 MG/1
0.4 TABLET SUBLINGUAL EVERY 5 MIN PRN
Status: DISCONTINUED | OUTPATIENT
Start: 2023-04-08 | End: 2023-04-28 | Stop reason: HOSPADM

## 2023-04-08 RX ORDER — CARBOXYMETHYLCELLULOSE SODIUM 5 MG/ML
1 SOLUTION/ DROPS OPHTHALMIC 4 TIMES DAILY PRN
Status: DISCONTINUED | OUTPATIENT
Start: 2023-04-08 | End: 2023-04-28 | Stop reason: HOSPADM

## 2023-04-08 RX ORDER — FUROSEMIDE 10 MG/ML
80 INJECTION INTRAMUSCULAR; INTRAVENOUS ONCE
Status: COMPLETED | OUTPATIENT
Start: 2023-04-08 | End: 2023-04-08

## 2023-04-08 RX ORDER — ACETAMINOPHEN 325 MG/1
650 TABLET ORAL EVERY 4 HOURS PRN
Status: DISCONTINUED | OUTPATIENT
Start: 2023-04-08 | End: 2023-04-28 | Stop reason: HOSPADM

## 2023-04-08 RX ORDER — CARVEDILOL 3.12 MG/1
3.12 TABLET ORAL EVERY 12 HOURS
Status: DISCONTINUED | OUTPATIENT
Start: 2023-04-08 | End: 2023-04-15

## 2023-04-08 RX ORDER — MEXILETINE HYDROCHLORIDE 200 MG/1
200 CAPSULE ORAL EVERY 8 HOURS
Status: DISCONTINUED | OUTPATIENT
Start: 2023-04-08 | End: 2023-04-09

## 2023-04-08 RX ORDER — TAMSULOSIN HYDROCHLORIDE 0.4 MG/1
0.4 CAPSULE ORAL EVERY EVENING
Status: DISCONTINUED | OUTPATIENT
Start: 2023-04-08 | End: 2023-04-28 | Stop reason: HOSPADM

## 2023-04-08 RX ORDER — AMIODARONE HYDROCHLORIDE 200 MG/1
200 TABLET ORAL 2 TIMES DAILY
Status: DISCONTINUED | OUTPATIENT
Start: 2023-04-08 | End: 2023-04-28 | Stop reason: HOSPADM

## 2023-04-08 RX ORDER — NICOTINE POLACRILEX 4 MG
15-30 LOZENGE BUCCAL
Status: DISCONTINUED | OUTPATIENT
Start: 2023-04-08 | End: 2023-04-28 | Stop reason: HOSPADM

## 2023-04-08 RX ORDER — COLCHICINE 0.6 MG
0.3 TABLET ORAL DAILY
Status: DISCONTINUED | OUTPATIENT
Start: 2023-04-08 | End: 2023-04-08

## 2023-04-08 RX ORDER — CHLORAL HYDRATE 500 MG
1 CAPSULE ORAL 3 TIMES DAILY
COMMUNITY

## 2023-04-08 RX ADMIN — AMIODARONE HYDROCHLORIDE 200 MG: 200 TABLET ORAL at 19:58

## 2023-04-08 RX ADMIN — CARVEDILOL 3.12 MG: 3.12 TABLET, FILM COATED ORAL at 19:58

## 2023-04-08 RX ADMIN — APIXABAN 5 MG: 5 TABLET, FILM COATED ORAL at 19:59

## 2023-04-08 RX ADMIN — FUROSEMIDE 80 MG: 10 INJECTION, SOLUTION INTRAVENOUS at 19:57

## 2023-04-08 RX ADMIN — TAMSULOSIN HYDROCHLORIDE 0.4 MG: 0.4 CAPSULE ORAL at 19:59

## 2023-04-08 RX ADMIN — MEXILETINE HYDROCHLORIDE 200 MG: 200 CAPSULE ORAL at 19:06

## 2023-04-08 RX ADMIN — MAGNESIUM SULFATE IN WATER 2 G: 40 INJECTION, SOLUTION INTRAVENOUS at 22:09

## 2023-04-08 RX ADMIN — WHITE PETROLATUM 57.7 %-MINERAL OIL 31.9 % EYE OINTMENT: at 22:16

## 2023-04-08 ASSESSMENT — ACTIVITIES OF DAILY LIVING (ADL)
ADLS_ACUITY_SCORE: 40
ADLS_ACUITY_SCORE: 35
ADLS_ACUITY_SCORE: 36
ADLS_ACUITY_SCORE: 36

## 2023-04-08 NOTE — LETTER
[facility] [address] [address]  [phone]    FACSIMILE TRANSMITTAL SHEET    TO: Mamadou Aparicio  FAX NUMBER: [phone]     FROM: Flower MOONEY on behalf of the 6C   PHONE: SW Herb [phone]  DATE: 04/25/23      _____URGENT ___x__REVIEW ONLY _____PLEASE COMMENT___x_PLEASE REPLY    NOTES/COMMENTS: Please review for TCU referral. Patient is medically ready to discharge. Please contact the [facility] Herb at [phone]. Thank you for considering.                                      IF YOU DID NOT RECEIVE THE CORRECT NUMBER OF PAGES OR THE FAX DID NOT COME THROUGH CLEARLY, PLEASE CALL THE SENDER     CONFIDENTIALITY STATEMENT: Confidential information that may accompany this transmission contains protected health information under state and federal law and is legally privileged. This information is intended only for the use of the individual or entity named above and may be used only for carrying out treatment, payment or other healthcare operations. The recipient or person responsible for delivering this information is prohibited by law from disclosing this information without proper authorization to any other party, unless required to do so by law or regulation. If you are not the intended recipient, you are hereby notified that any review, dissemination, distribution, or copying of this message is strictly prohibited. If you have received this communication in error, please destroy the materials and contact us immediately by calling the number listed above. No response indicates that the information was received by the appropriate authorized party

## 2023-04-08 NOTE — LETTER
[facility] [address] [address]  [phone]    FACSIMILE TRANSMITTAL SHEET    TO: Mamadou Aparicio  FAX NUMBER: [phone]     FROM: Flower MOONEY on behalf of the 6C   PHONE: SW Herb [phone]  DATE: 04/25/23      _____URGENT ___x__REVIEW ONLY _____PLEASE COMMENT___x_PLEASE REPLY    NOTES/COMMENTS: Please review for TCU referral. Patient is medically ready to discharge. Please contact the [facility] Herb at [phone]. Thank you for considering.                                      IF YOU DID NOT RECEIVE THE CORRECT NUMBER OF PAGES OR THE FAX DID NOT COME THROUGH CLEARLY, PLEASE CALL THE SENDER     CONFIDENTIALITY STATEMENT: Confidential information that may accompany this transmission contains protected health information under state and federal law and is legally privileged. This information is intended only for the use of the individual or entity named above and may be used only for carrying out treatment, payment or other healthcare operations. The recipient or person responsible for delivering this information is prohibited by law from disclosing this information without proper authorization to any other party, unless required to do so by law or regulation. If you are not the intended recipient, you are hereby notified that any review, dissemination, distribution, or copying of this message is strictly prohibited. If you have received this communication in error, please destroy the materials and contact us immediately by calling the number listed above. No response indicates that the information was received by the appropriate authorized party

## 2023-04-08 NOTE — LETTER
[facility] [address] [address]  [phone]    FACSIMILE TRANSMITTAL SHEET    TO: Kaylin  FAX NUMBER: [phone]      FROM: [facility]  PHONE: [phone]  DATE: 04/28/23      _____URGENT _____REVIEW ONLY _____PLEASE COMMENT____PLEASE REPLY    NOTES/COMMENTS: Discharge orders for ZELDA Garvey.                                      IF YOU DID NOT RECEIVE THE CORRECT NUMBER OF PAGES OR THE FAX DID NOT COME THROUGH CLEARLY, PLEASE CALL THE SENDER     CONFIDENTIALITY STATEMENT: Confidential information that may accompany this transmission contains protected health information under state and federal law and is legally privileged. This information is intended only for the use of the individual or entity named above and may be used only for carrying out treatment, payment or other healthcare operations. The recipient or person responsible for delivering this information is prohibited by law from disclosing this information without proper authorization to any other party, unless required to do so by law or regulation. If you are not the intended recipient, you are hereby notified that any review, dissemination, distribution, or copying of this message is strictly prohibited. If you have received this communication in error, please destroy the materials and contact us immediately by calling the number listed above. No response indicates that the information was received by the appropriate authorized party

## 2023-04-08 NOTE — LETTER
[facility] [address] [place]  [address] [facility] [address]  [phone]    FACSIMILE TRANSMITTAL SHEET    TO: Kana  COMPANY: [employer]  FAX NUMBER: [phone]     FROM: [facility]  PHONE: [phone]  DATE: 04/21/23      _____URGENT _____REVIEW ONLY _____PLEASE COMMENT____PLEASE REPLY    NOTES/COMMENTS: Please review for TCU, Thank You. Anaya DESHAWN [phone]                                      IF YOU DID NOT RECEIVE THE CORRECT NUMBER OF PAGES OR THE FAX DID NOT COME THROUGH CLEARLY, PLEASE CALL THE SENDER     CONFIDENTIALITY STATEMENT: Confidential information that may accompany this transmission contains protected health information under state and federal law and is legally privileged. This information is intended only for the use of the individual or entity named above and may be used only for carrying out treatment, payment or other healthcare operations. The recipient or person responsible for delivering this information is prohibited by law from disclosing this information without proper authorization to any other party, unless required to do so by law or regulation. If you are not the intended recipient, you are hereby notified that any review, dissemination, distribution, or copying of this message is strictly prohibited. If you have received this communication in error, please destroy the materials and contact us immediately by calling the number listed above. No response indicates that the information was received by the appropriate authorized party

## 2023-04-08 NOTE — LETTER
[facility] [address] [address]  [phone]    FACSIMILE TRANSMITTAL SHEET    TO: Mamadou Aparicio  FAX NUMBER: [phone]     FROM: Flower MOONEY on behalf of the 6C   PHONE: SW Herb [phone]  DATE: 04/25/23      _____URGENT ___x__REVIEW ONLY _____PLEASE COMMENT___x_PLEASE REPLY    NOTES/COMMENTS: Please review for TCU referral. Patient is medically ready to discharge. Please contact the [facility] Herb at [phone]. Thank you for considering.                                      IF YOU DID NOT RECEIVE THE CORRECT NUMBER OF PAGES OR THE FAX DID NOT COME THROUGH CLEARLY, PLEASE CALL THE SENDER     CONFIDENTIALITY STATEMENT: Confidential information that may accompany this transmission contains protected health information under state and federal law and is legally privileged. This information is intended only for the use of the individual or entity named above and may be used only for carrying out treatment, payment or other healthcare operations. The recipient or person responsible for delivering this information is prohibited by law from disclosing this information without proper authorization to any other party, unless required to do so by law or regulation. If you are not the intended recipient, you are hereby notified that any review, dissemination, distribution, or copying of this message is strictly prohibited. If you have received this communication in error, please destroy the materials and contact us immediately by calling the number listed above. No response indicates that the information was received by the appropriate authorized party

## 2023-04-08 NOTE — Clinical Note
Potential access sites were evaluated for patency using ultrasound.   The right internal jugular vein was selected. Access was obtained under with Sonosite and Fluoroscopic guidance using a micropuncture 21 gauge needle with direct visualization of needle entry.

## 2023-04-08 NOTE — H&P
Cardiology Inpatient H&P  April 8, 2023      HPI:   Rohit Garvey is a 67 year old adult male with a PMH significant for NICM (EF of 45%) c/b paroxysmal VT s/p dual chamber ICD placement 6/20/2022 c/b recurrent ICD shocks s/p VT ablation 10/31/2022 and re-ablation 12/29/2022, paroxysmal Afib on apixaban, developmental delay (possibly related to viral illnesses during infancy), T2DM, hypertension, and hyperlipidemia who was admitted as a transfer from [place] per recommendations of his cardiologists Dr. Urbina and Dr. Chanel for evaluation by advanced heart failure failure team and EP team for VT management and consideration of advanced therapies.     Per most recent virtual visit notes from Dr. Chanel and Dr. Leon 4/4/23 and 4/5/23:  The pt's index heart failure event first occurred in 6/2022. The pt was brought to [facility] in [place] on 6/16/2022 with syncope and wide complex tachycardia. He was admitted to hospital, cardioverted and loaded with amiodarone, resulting in improve of his arrhythmia. An invasive coronary angiogram revealed normal coronary arteries. A cardiac MRI revealed biventricular systolic dysfunction, global hypokinesis, and delayed LV sub epicardial and mid myocardial late gadolinium enhancement, extending from the base to the mid cavity and involving the anterior, anteroseptal, anterolateral, and lateral walls. These findings were not suggestive of cardiac sarcoidosis, however. During that hospitalization, the pt underwent dual chamber ICD placement for secondary prevention. During the procedure, he experienced a recurrence of VT. Although this was promptly controlled in the EP lab, the pt experienced recurrent VT events and ICD shocks after his discharge from hospital. He was referred to [facility] in 10/2022 and underwent a VT ablation, however this was unable to prevent additional VT episodes. He returned in 12/2022 for a repeat VT ablation. Despite this second ablation, the  pt is still experiencing recurrent VT episodes. He was last admitted to [facility], [place], operated by [facility] for an entire week on 3/5/2023 for a recurrent slow VT event. The plan at the time was to transfer him to [facility] for Dr. Leon to evaluate for VT ablation, however there were no beds in the hospital so patient was sent home. Pt then evaluated in the [place] ED on 4/1 for concern for two additional shocks. Device was interrogated w/o observation of said shocks, so pt was discharged home.      Due to the pt's refractory VT and persistent LV dysfunction, he was referred to [facility] advanced heart failure for consideration of advanced therapies.     Spoke w/ sister Peg (patient's HCA and POA): pt has never been symptomatic w/ his VT. Only feels the shock from the defibrillator. No recent illness or medical issues besides his VT. DM is well controlled (last A1C was 6.2%).     At the time of interview, the patient denies chest pain, dyspnea at rest or with exertion, orthopnea, PND, palpitations, lightheadedness, or syncope. Feeling well. No complaints. Took all of his AM medications.     The pt lives in an assisted living facility. He is currently wheelchair bound due to a recent mechanical fall c/b broken clavicle. Prior to this, he ambulated with a walker.    Per Peg, pt is full code but would not want to be on a ventilator long term. Notably, at his living facility he is DNR per hte recommendation of management because reportedly they do not have an AED or staff available to provide CPR.     Review of Systems:    Complete review of systems was performed and negative except per HPI.    PMH:  Past Medical History:   Diagnosis Date     Arrhythmia      Diabetes (H)      Hypertension      Pacemaker      Active Problems:  Patient Active Problem List    Diagnosis Date Noted     VT (ventricular tachycardia) (H) 04/08/2023     Priority: Medium     Paroxysmal ventricular tachycardia (H) 11/01/2022     Priority:  Medium     Social History:  Social History     Tobacco Use     Smoking status: Never     Smokeless tobacco: Never   Substance Use Topics     Alcohol use: Never     Drug use: Never     Family History:  No family history on file.    Medications:  Medications Prior to Admission   Medication Sig Dispense Refill Last Dose     amiodarone (PACERONE) 200 MG tablet Take 1 tablet (200 mg) by mouth 2 times daily 90 tablet 3 4/8/2023 at AM     Artificial Tear Ointment (LACRI-LUBE OP) Place 1 Application into both eyes At Bedtime   4/7/2023 at HS     atorvastatin (LIPITOR) 80 MG tablet Take 80 mg by mouth daily   4/8/2023 at AM     CALCIUM-VITAMIN D PO Take 1 tablet by mouth 2 times daily Calcium 650 mg with 25 mcg vitamin D with zinc   4/8/2023 at AM     Carboxymethylcellulose Sodium (ARTIFICIAL TEARS OP) Place 1 drop into both eyes 4 times daily   4/8/2023 at AM     carvedilol (COREG) 3.125 MG tablet Take 3.125 mg by mouth 2 times daily (with meals)   4/8/2023 at AM     colchicine (COLCYRS) 0.6 MG tablet Take 0.5 tablets (0.3 mg) by mouth daily 60 tablet 0 4/8/2023 at AM     ELIQUIS ANTICOAGULANT 5 MG tablet Take 1 tablet by mouth 2 times daily   4/8/2023 at AM     fish oil-omega-3 fatty acids 1000 MG capsule Take 1 g by mouth 3 times daily   4/8/2023 at AM     furosemide (LASIX) 40 MG tablet Take 40 mg by mouth 3 times daily   4/8/2023 at AM     GNP NATURAL FIBER 28.3 % POWD Take 1-2 each by mouth daily Metamucil   4/8/2023 at AM     JARDIANCE 10 MG TABS tablet Take 10 mg by mouth daily   4/8/2023 at AM     magnesium oxide (MAG-OX) 400 MG tablet Take 800 mg by mouth daily   4/8/2023     metFORMIN (GLUCOPHAGE) 500 MG tablet Take 1,000 mg by mouth 2 times daily (with meals)   4/8/2023 at AM     mexiletine (MEXITIL) 200 MG capsule Take 1 capsule (200 mg) by mouth every 8 hours 90 capsule 3 4/8/2023 at AM     potassium chloride ER (KLOR-CON M) 20 MEQ CR tablet TAKE 1 TABLET BY MOUTH EVERY MORNING WITH BREAKFAST 30 tablet 1  4/8/2023 at AM     tamsulosin (FLOMAX) 0.4 MG capsule Take 0.4 mg by mouth every evening   4/7/2023 at HS     There were no vitals filed for this visit.    Physical Exam:  Temp:  [98.1  F (36.7  C)] 98.1  F (36.7  C)  Pulse:  [61] 61  Resp:  [16] 16  BP: (117)/(68) 117/68  SpO2:  [95 %] 95 %  No intake or output data in the 24 hours ending 04/08/23 1558  GEN: pleasant, no acute distress  HEENT: no icterus, no teeth.   CV: normal rate, regular rhythm, normal s1/s2, no murmurs/rubs/s3/s4. JVP to 12 cm H2O.   CHEST: clear to ausculation bilaterally, no rales or wheezing  ABD: soft, non-tender, non-distended  EXTR: 2+ pitting edema to level of knees   NEURO: alert, interactive, no facial asymmetry     Diagnostics:  All labs and imaging were reviewed, of note: None     EKG: Ordered    Transthoracic echocardiogram 12/29/2022:     Left ventricular size is normal.  Biplane LVEF is 46%.  No regional wall motion abnormalities are seen.  Right ventricular function, chamber size, wall motion, and thickness are  normal.  The inferior vena cava is normal.    ICD device interrogation 12/29/2022:    Device: Medtronic [id number] Evera MRI XT   Normal Device Function.   Mode: DDD  bpm --> DDDR  bpm  AP: 98.8%  : 93.2%  Intrinsic rhythm: CHB - AS @ 58 bpm w/ no intrinsic R-waves at VVI 30  Short V-V intervals: 3  Thoracic Impedance: Near reference line.   Lead Trends Appear Stable: Yes; RA capture threshold measuring at 1 V @ 1 ms today with P-wave measuring 1.8 mV.   Estimated battery longevity to RRT = 1.8 years. Battery voltage = 2.86 V.  Atrial arrhythmia: None  AF burden: 0%  Anticoagulant: None  Ventricular Arrhythmia: 2 Treated VT episodes recorded last evening 12/29/22 at 20:04 PM and 20:10 PM. Episodes lasted 10-19 seconds at 133-136 bpm. Both episodes were successfully treated with one sequence of ATP each. No further recorded episodes, though VT Monitor zone was not turned back on post ablation, so no further  episodes would be recorded if patient had a VT below the VT zone (118 bpm).   Setting changes: Mode changed from DDD to DDDR. FVT zone turned OFF, per previous settings. VT Monitor zone turned ON, per previous settings. VT Initial Detection Interval extended from 16 beats to 28 beats, per Dr. Leon and Dr. Santillan.     Cardiac MRI from 6/17/22: Reviewed     Assessment and Plan:    Rohit Garvey is a 67 year old adult male with a PMH significant for NICM (EF of 45%) c/b paroxysmal VT s/p dual chamber ICD placement 6/20/2022 c/b recurrent ICD shocks s/p VT ablation 10/31/2022 and re-ablation 12/29/2022, paroxysmal Afib on apixaban, developmental delay (possibly related to viral illnesses during infancy), T2DM, hypertension, and hyperlipidemia who was admitted as a transfer from [place] per recommendations of his cardiologists Dr. Urbina and Dr. Chanel for evaluation by advanced heart failure failure team and EP team for VT management and consideration of advanced therapies.    # Persistent Paroxysmal Ventricular Tachycardia s/p dual chamber ICD placement 6/2022 and VT ablation x 2  # Chronic HFrEF 2/2 NICM w/ LVEF 45%, NYHA II  High burden of ventricular arrhythmias despite being on 2 antiarrhythmic medications (amiodarone and mexiletine) and being s/p ICD and two attempted VT ablations. Transferred here for evaluation for advanced therapies. Pt not a candidate for LVAD as EF 45%. Possible heart transplant candidate if he meets criteria for VT storm.   - EP consulted, appreciate recs  - ECG, ECHO, BMP, CBC, TSH, INR ordered   - Nicotine screen, Utox screen, and peth ordered   - Continuous tele ordered  - Antiarrhythmics: PTA amiodarone 200 mg BID and mexiletine 200 mg q8h   - ACEi/ARB: Not currently on due to LVEF 45%.  - BB: PTA carvedilol 3.125 mg BID   - Aldosterone antagonist: None   - SGLT2i: PTA empagliflozin 10 mg daily   - SCD prophylaxis: S/p secondary prevention ICD 6/20/22  - Fluid status: appears  slightly hypervolemic (JVP 12 w/ 2+ lower extremity edema)    - IV lasix 80 mg once    - Hold PTA lasix 40 mg TID (recently increased from qday) while on IV diuresis    - continue PTA KCl supplement 20 meq daily    - continue PTA magnesium oxide 800 mg daily    - BMP daily w/ goal of K > 4 and Mg > 2   - Strict I/Os and daily weights   - Anticoagulation: PTA Eliquis 5 mg BID   - Statin therapy: PTA atorvastatin 80 mg daily     # Afib w/o RVR  - PTA carvedilol, amiodarone, and mexiletine   - PTA Eliquis 5 mg BID   - Tele     # Hx of anemia of chronic disease   - CBC ordered    # DM2   On metformin 1,000 mg BID PTA.   - hold PTA metformin   - LDSS  - Hypoglycemia protocol   - Consider adding glargine pending glucose checks over next 24 hours     # Hx of hyponatremia  - BMP ordered    # BPH  - PTA tamulosin  - CTM w/ bladder scans and PVRs PRN    # Recent clavicular fracture  At baseline uses walker for ambulation. Had recent clavicle fracture now requiring him to be wheelchair bound. Supposed to be non weight bearing on LUE for another 4 weeks   - Fall precautions  - NWB LUE precautions provided   - PT/OT consulted     # Developmental delay   Lives at assisted living. Sister Citlali is his HCA and POA.   - Delirium precautions     # Constipation   - PTA metamucil       FEN: Cardiac   PPx: PTA Eliquis   Code: Full Code       Disposition Plan   Expected discharge in 5 days to prior living arrangement once medically optimized.     Entered: Prachi Call MD 04/08/2023, 6:09 PM       I have discussed the above with Dr. Jose Carlos MD who agrees with the above assessment and plan    Prachi Call DO  Internal Medicine, PGY-I

## 2023-04-08 NOTE — PROGRESS NOTES
Admission   Diagnosis: ventricular tachycardia  Admitted from: home   Via: wheelchair   Accompanied by: sister   Belongings: Placed in closet; valuables sent home with family   Admission paperwork: complete   Teaching: Call don't fall, use of console, meal times, visiting hours, orientation to unit, when to call for the RN (angina/sob/dizzyness, etc.), and the importance of safety.   Access: awaiting PIV placement  Telemetry:Placed on pt   Ht./Wt.: complete     Two nurse head-to-toe skin assessment performed by Haily Farrell and Olga Lidia Figueroa.  Skin issues noted: open sore on medial right heel.  See PCS for assessment and treatment of wounds and surgical sites.

## 2023-04-08 NOTE — Clinical Note
The ECG shows a paced rhythm and a sinus rhythm. Pacer inserted. The patient has permanent pacemaker. ECG rate  = 65 bpm.

## 2023-04-08 NOTE — PHARMACY-ADMISSION MEDICATION HISTORY
Pharmacist Admission Medication History    Admission medication history is complete. The information provided in this note is only as accurate as the sources available at the time of the update.    Medication reconciliation/reorder completed by provider prior to medication history? Yes    Information Source(s): Patient, Family member and Facility (TCU/NH/) medication list/MAR via in-person    Pertinent Information: Patient sister reports furosemide was increased to 40 mg three times daily due to recent rapid weight gain. Per notes on 4/6, looks like plan was to increase to 40 mg twice daily. Do not have updated facility list since change was made.     Changes made to PTA medication list:    Added: None    Deleted: None    Changed:   o Updated furosemide dosing    Allergies reviewed with patient and updates made in EHR: no    Medication History Completed By: Fely Byrd, PharmD 4/8/2023 6:02 PM    PTA Med List   Medication Sig Last Dose     amiodarone (PACERONE) 200 MG tablet Take 1 tablet (200 mg) by mouth 2 times daily 4/8/2023 at AM     Artificial Tear Ointment (LACRI-LUBE OP) Place 1 Application into both eyes At Bedtime 4/7/2023 at HS     atorvastatin (LIPITOR) 80 MG tablet Take 80 mg by mouth daily 4/8/2023 at AM     CALCIUM-VITAMIN D PO Take 1 tablet by mouth 2 times daily Calcium 650 mg with 25 mcg vitamin D with zinc 4/8/2023 at AM     Carboxymethylcellulose Sodium (ARTIFICIAL TEARS OP) Place 1 drop into both eyes 4 times daily 4/8/2023 at AM     carvedilol (COREG) 3.125 MG tablet Take 3.125 mg by mouth 2 times daily (with meals) 4/8/2023 at AM     colchicine (COLCYRS) 0.6 MG tablet Take 0.5 tablets (0.3 mg) by mouth daily 4/8/2023 at AM     ELIQUIS ANTICOAGULANT 5 MG tablet Take 1 tablet by mouth 2 times daily 4/8/2023 at AM     fish oil-omega-3 fatty acids 1000 MG capsule Take 1 g by mouth 3 times daily 4/8/2023 at AM     furosemide (LASIX) 40 MG tablet Take 40 mg by mouth 3 times daily 4/8/2023  at AM     GNP NATURAL FIBER 28.3 % POWD Take 1-2 each by mouth daily Metamucil 4/8/2023 at AM     JARDIANCE 10 MG TABS tablet Take 10 mg by mouth daily 4/8/2023 at AM     magnesium oxide (MAG-OX) 400 MG tablet Take 800 mg by mouth daily 4/8/2023     metFORMIN (GLUCOPHAGE) 500 MG tablet Take 1,000 mg by mouth 2 times daily (with meals) 4/8/2023 at AM     mexiletine (MEXITIL) 200 MG capsule Take 1 capsule (200 mg) by mouth every 8 hours 4/8/2023 at AM     potassium chloride ER (KLOR-CON M) 20 MEQ CR tablet TAKE 1 TABLET BY MOUTH EVERY MORNING WITH BREAKFAST 4/8/2023 at AM     tamsulosin (FLOMAX) 0.4 MG capsule Take 0.4 mg by mouth every evening 4/7/2023 at HS

## 2023-04-08 NOTE — LETTER
[facility] [address]  [address] [address]  [phone]    FACSIMILE TRANSMITTAL SHEET    TO: [facility]  COMPANY: [facility]  FAX NUMBER: [phone]     FROM: [facility]  PHONE: [phone]  DATE: 04/21/23      _____URGENT _____REVIEW ONLY _____PLEASE COMMENT____PLEASE REPLY    NOTES/COMMENTS: Please review for TCU. Thank You. Anaya HESS [phone]                                      IF YOU DID NOT RECEIVE THE CORRECT NUMBER OF PAGES OR THE FAX DID NOT COME THROUGH CLEARLY, PLEASE CALL THE SENDER     CONFIDENTIALITY STATEMENT: Confidential information that may accompany this transmission contains protected health information under state and federal law and is legally privileged. This information is intended only for the use of the individual or entity named above and may be used only for carrying out treatment, payment or other healthcare operations. The recipient or person responsible for delivering this information is prohibited by law from disclosing this information without proper authorization to any other party, unless required to do so by law or regulation. If you are not the intended recipient, you are hereby notified that any review, dissemination, distribution, or copying of this message is strictly prohibited. If you have received this communication in error, please destroy the materials and contact us immediately by calling the number listed above. No response indicates that the information was received by the appropriate authorized party

## 2023-04-08 NOTE — LETTER
Transition Communication Hand-off for Care Transitions to Next Level of Care Provider    Name: Rohit Garvey  : 1955  MRN #: [id number]  Primary Care Provider: Kevyn Salazar     Primary Clinic: [facility] [address] [place] [id number]     Reason for Hospitalization:  VT (ventricular tachycardia) (H) [I47.20]  Admit Date/Time: 2023  3:08 PM  Discharge Date: 23  Payor Source: Payor: MEDICARE / Plan: MEDICARE / Product Type: Medicare /     Reason for Communication Hand-off Referral: Other Continuation of Care    Discharge Plan: Discharge to [facility] with     Concern for non-adherence with plan of care:   Y/N No  Discharge Needs Assessment:  Needs      Flowsheet Row Most Recent Value   Equipment Currently Used at Home walker, rolling, cane, straight          Follow-up specialty is recommended: No    Follow-up plan:  No future appointments.    Any outstanding tests or procedures:        Radiology & Cardiology Orders       Future Labs/Procedures Complete By Expires    X-ray lt Clavicle  2023 (Approximate) 2023          Referrals       Future Labs/Procedures    Home Care Referral     Comments:    Your provider has ordered home health services. If you have not been contacted within 2 days of your discharge please call the selected Home Care agency listed on your Discharge document.  If a Home Care agency is NOT listed, please call [phone].              Jain Recommendations:      TALI Balderas    AVS/Discharge Summary is the source of truth; this is a helpful guide for improved communication of patient story

## 2023-04-08 NOTE — LETTER
[facility] [address] [address]  [phone]    FACSIMILE TRANSMITTAL SHEET    TO: Sindhu PINA: Keegan Thakkar  FAX NUMBER: [phone]     FROM: Anaya [facility]  PHONE: [phone]  DATE: 04/27/23    _____URGENT _____REVIEW ONLY _____PLEASE COMMENT____PLEASE REPLY    NOTES/COMMENTS: Please review for TCU. Thank you. Anaya [phone]                                      IF YOU DID NOT RECEIVE THE CORRECT NUMBER OF PAGES OR THE FAX DID NOT COME THROUGH CLEARLY, PLEASE CALL THE SENDER     CONFIDENTIALITY STATEMENT: Confidential information that may accompany this transmission contains protected health information under state and federal law and is legally privileged. This information is intended only for the use of the individual or entity named above and may be used only for carrying out treatment, payment or other healthcare operations. The recipient or person responsible for delivering this information is prohibited by law from disclosing this information without proper authorization to any other party, unless required to do so by law or regulation. If you are not the intended recipient, you are hereby notified that any review, dissemination, distribution, or copying of this message is strictly prohibited. If you have received this communication in error, please destroy the materials and contact us immediately by calling the number listed above. No response indicates that the information was received by the appropriate authorized party

## 2023-04-09 ENCOUNTER — APPOINTMENT (OUTPATIENT)
Dept: CARDIOLOGY | Facility: CLINIC | Age: 68
DRG: 287 | End: 2023-04-09
Attending: STUDENT IN AN ORGANIZED HEALTH CARE EDUCATION/TRAINING PROGRAM
Payer: MEDICARE

## 2023-04-09 LAB
ALBUMIN SERPL BCG-MCNC: 3.1 G/DL (ref 3.5–5.2)
ALBUMIN UR-MCNC: NEGATIVE MG/DL
ALP SERPL-CCNC: 289 U/L (ref 35–129)
ALT SERPL W P-5'-P-CCNC: 71 U/L (ref 10–50)
ANION GAP SERPL CALCULATED.3IONS-SCNC: 10 MMOL/L (ref 7–15)
ANION GAP SERPL CALCULATED.3IONS-SCNC: 12 MMOL/L (ref 7–15)
APPEARANCE UR: CLEAR
AST SERPL W P-5'-P-CCNC: 64 U/L (ref 10–50)
BILIRUB SERPL-MCNC: 0.6 MG/DL
BILIRUB UR QL STRIP: NEGATIVE
BUN SERPL-MCNC: 15.7 MG/DL (ref 8–23)
BUN SERPL-MCNC: 17.7 MG/DL (ref 8–23)
CALCIUM SERPL-MCNC: 8.3 MG/DL (ref 8.8–10.2)
CALCIUM SERPL-MCNC: 8.9 MG/DL (ref 8.8–10.2)
CHLORIDE SERPL-SCNC: 97 MMOL/L (ref 98–107)
CHLORIDE SERPL-SCNC: 98 MMOL/L (ref 98–107)
COLOR UR AUTO: ABNORMAL
CREAT SERPL-MCNC: 1.44 MG/DL (ref 0.51–1.17)
CREAT SERPL-MCNC: 1.51 MG/DL (ref 0.51–1.17)
DEPRECATED HCO3 PLAS-SCNC: 24 MMOL/L (ref 22–29)
DEPRECATED HCO3 PLAS-SCNC: 25 MMOL/L (ref 22–29)
ERYTHROCYTE [DISTWIDTH] IN BLOOD BY AUTOMATED COUNT: 18.7 % (ref 10–15)
FERRITIN SERPL-MCNC: 90 NG/ML (ref 11–409)
GFR SERPL CREATININE-BSD FRML MDRD: 50 ML/MIN/1.73M2
GFR SERPL CREATININE-BSD FRML MDRD: 53 ML/MIN/1.73M2
GLUCOSE BLDC GLUCOMTR-MCNC: 114 MG/DL (ref 70–99)
GLUCOSE BLDC GLUCOMTR-MCNC: 133 MG/DL (ref 70–99)
GLUCOSE BLDC GLUCOMTR-MCNC: 203 MG/DL (ref 70–99)
GLUCOSE BLDC GLUCOMTR-MCNC: 216 MG/DL (ref 70–99)
GLUCOSE SERPL-MCNC: 141 MG/DL (ref 70–99)
GLUCOSE SERPL-MCNC: 178 MG/DL (ref 70–99)
GLUCOSE UR STRIP-MCNC: >=1000 MG/DL
HCT VFR BLD AUTO: 26.2 % (ref 35–53)
HGB BLD-MCNC: 8 G/DL (ref 11.7–17.7)
HGB UR QL STRIP: NEGATIVE
IRON BINDING CAPACITY (ROCHE): 277 UG/DL (ref 240–430)
IRON SATN MFR SERPL: 8 % (ref 15–46)
IRON SERPL-MCNC: 23 UG/DL (ref 37–157)
KETONES UR STRIP-MCNC: NEGATIVE MG/DL
LEUKOCYTE ESTERASE UR QL STRIP: NEGATIVE
LVEF ECHO: NORMAL
MAGNESIUM SERPL-MCNC: 2 MG/DL (ref 1.7–2.3)
MAGNESIUM SERPL-MCNC: 2.7 MG/DL (ref 1.7–2.3)
MCH RBC QN AUTO: 24.8 PG (ref 26.5–33)
MCHC RBC AUTO-ENTMCNC: 30.5 G/DL (ref 31.5–36.5)
MCV RBC AUTO: 81 FL (ref 78–100)
NITRATE UR QL: NEGATIVE
PH UR STRIP: 7.5 [PH] (ref 5–7)
PLATELET # BLD AUTO: 152 10E3/UL (ref 150–450)
POTASSIUM SERPL-SCNC: 4.1 MMOL/L (ref 3.4–5.3)
POTASSIUM SERPL-SCNC: 4.3 MMOL/L (ref 3.4–5.3)
PROT SERPL-MCNC: 5.9 G/DL (ref 6.4–8.3)
RBC # BLD AUTO: 3.23 10E6/UL (ref 3.8–5.9)
RBC URINE: 0 /HPF
SODIUM SERPL-SCNC: 132 MMOL/L (ref 136–145)
SODIUM SERPL-SCNC: 134 MMOL/L (ref 136–145)
SP GR UR STRIP: 1.01 (ref 1–1.03)
UROBILINOGEN UR STRIP-MCNC: NORMAL MG/DL
WBC # BLD AUTO: 5.5 10E3/UL (ref 4–11)
WBC URINE: <1 /HPF

## 2023-04-09 PROCEDURE — 250N000011 HC RX IP 250 OP 636

## 2023-04-09 PROCEDURE — 99232 SBSQ HOSP IP/OBS MODERATE 35: CPT | Mod: 25 | Performed by: STUDENT IN AN ORGANIZED HEALTH CARE EDUCATION/TRAINING PROGRAM

## 2023-04-09 PROCEDURE — 93321 DOPPLER ECHO F-UP/LMTD STD: CPT | Mod: 26 | Performed by: INTERNAL MEDICINE

## 2023-04-09 PROCEDURE — 214N000001 HC R&B CCU UMMC

## 2023-04-09 PROCEDURE — 250N000011 HC RX IP 250 OP 636: Performed by: STUDENT IN AN ORGANIZED HEALTH CARE EDUCATION/TRAINING PROGRAM

## 2023-04-09 PROCEDURE — 36415 COLL VENOUS BLD VENIPUNCTURE: CPT | Performed by: STUDENT IN AN ORGANIZED HEALTH CARE EDUCATION/TRAINING PROGRAM

## 2023-04-09 PROCEDURE — 83550 IRON BINDING TEST: CPT

## 2023-04-09 PROCEDURE — C8924 2D TTE W OR W/O FOL W/CON,FU: HCPCS

## 2023-04-09 PROCEDURE — 85027 COMPLETE CBC AUTOMATED: CPT | Performed by: STUDENT IN AN ORGANIZED HEALTH CARE EDUCATION/TRAINING PROGRAM

## 2023-04-09 PROCEDURE — 93325 DOPPLER ECHO COLOR FLOW MAPG: CPT

## 2023-04-09 PROCEDURE — 80053 COMPREHEN METABOLIC PANEL: CPT | Performed by: STUDENT IN AN ORGANIZED HEALTH CARE EDUCATION/TRAINING PROGRAM

## 2023-04-09 PROCEDURE — 258N000003 HC RX IP 258 OP 636: Performed by: STUDENT IN AN ORGANIZED HEALTH CARE EDUCATION/TRAINING PROGRAM

## 2023-04-09 PROCEDURE — 83735 ASSAY OF MAGNESIUM: CPT | Performed by: STUDENT IN AN ORGANIZED HEALTH CARE EDUCATION/TRAINING PROGRAM

## 2023-04-09 PROCEDURE — 36415 COLL VENOUS BLD VENIPUNCTURE: CPT

## 2023-04-09 PROCEDURE — 80325 AMPHETAMINES 3OR 4: CPT

## 2023-04-09 PROCEDURE — 83735 ASSAY OF MAGNESIUM: CPT

## 2023-04-09 PROCEDURE — 93308 TTE F-UP OR LMTD: CPT | Mod: 26 | Performed by: INTERNAL MEDICINE

## 2023-04-09 PROCEDURE — 82728 ASSAY OF FERRITIN: CPT

## 2023-04-09 PROCEDURE — 93325 DOPPLER ECHO COLOR FLOW MAPG: CPT | Mod: 26 | Performed by: INTERNAL MEDICINE

## 2023-04-09 PROCEDURE — 81001 URINALYSIS AUTO W/SCOPE: CPT

## 2023-04-09 PROCEDURE — 250N000013 HC RX MED GY IP 250 OP 250 PS 637

## 2023-04-09 PROCEDURE — 255N000002 HC RX 255 OP 636: Performed by: INTERNAL MEDICINE

## 2023-04-09 RX ORDER — FUROSEMIDE 10 MG/ML
80 INJECTION INTRAMUSCULAR; INTRAVENOUS ONCE
Status: COMPLETED | OUTPATIENT
Start: 2023-04-09 | End: 2023-04-09

## 2023-04-09 RX ORDER — MAGNESIUM SULFATE HEPTAHYDRATE 40 MG/ML
2 INJECTION, SOLUTION INTRAVENOUS ONCE
Status: COMPLETED | OUTPATIENT
Start: 2023-04-09 | End: 2023-04-09

## 2023-04-09 RX ORDER — MEXILETINE HYDROCHLORIDE 200 MG/1
200 CAPSULE ORAL 3 TIMES DAILY
Status: DISCONTINUED | OUTPATIENT
Start: 2023-04-10 | End: 2023-04-28 | Stop reason: HOSPADM

## 2023-04-09 RX ORDER — DIPHENHYDRAMINE HYDROCHLORIDE 50 MG/ML
50 INJECTION INTRAMUSCULAR; INTRAVENOUS
Status: DISCONTINUED | OUTPATIENT
Start: 2023-04-09 | End: 2023-04-14

## 2023-04-09 RX ORDER — METHYLPREDNISOLONE SODIUM SUCCINATE 125 MG/2ML
125 INJECTION, POWDER, LYOPHILIZED, FOR SOLUTION INTRAMUSCULAR; INTRAVENOUS
Status: DISCONTINUED | OUTPATIENT
Start: 2023-04-09 | End: 2023-04-14

## 2023-04-09 RX ADMIN — MAGNESIUM OXIDE TAB 400 MG (241.3 MG ELEMENTAL MG) 800 MG: 400 (241.3 MG) TAB at 08:14

## 2023-04-09 RX ADMIN — APIXABAN 5 MG: 5 TABLET, FILM COATED ORAL at 08:14

## 2023-04-09 RX ADMIN — AMIODARONE HYDROCHLORIDE 200 MG: 200 TABLET ORAL at 08:14

## 2023-04-09 RX ADMIN — Medication 1 CAPSULE: at 08:14

## 2023-04-09 RX ADMIN — EMPAGLIFLOZIN 10 MG: 10 TABLET, FILM COATED ORAL at 08:14

## 2023-04-09 RX ADMIN — CARVEDILOL 3.12 MG: 3.12 TABLET, FILM COATED ORAL at 20:19

## 2023-04-09 RX ADMIN — MEXILETINE HYDROCHLORIDE 200 MG: 200 CAPSULE ORAL at 10:09

## 2023-04-09 RX ADMIN — AMIODARONE HYDROCHLORIDE 200 MG: 200 TABLET ORAL at 20:19

## 2023-04-09 RX ADMIN — CALCIUM CARBONATE 600 MG (1,500 MG)-VITAMIN D3 400 UNIT TABLET 1 TABLET: at 08:14

## 2023-04-09 RX ADMIN — MEXILETINE HYDROCHLORIDE 200 MG: 200 CAPSULE ORAL at 20:19

## 2023-04-09 RX ADMIN — POTASSIUM CHLORIDE 20 MEQ: 750 TABLET, EXTENDED RELEASE ORAL at 08:14

## 2023-04-09 RX ADMIN — WHITE PETROLATUM 57.7 %-MINERAL OIL 31.9 % EYE OINTMENT: at 22:19

## 2023-04-09 RX ADMIN — FUROSEMIDE 80 MG: 10 INJECTION, SOLUTION INTRAVENOUS at 13:44

## 2023-04-09 RX ADMIN — CARVEDILOL 3.12 MG: 3.12 TABLET, FILM COATED ORAL at 08:15

## 2023-04-09 RX ADMIN — ATORVASTATIN CALCIUM 80 MG: 80 TABLET, FILM COATED ORAL at 08:15

## 2023-04-09 RX ADMIN — MICONAZOLE NITRATE: 20 POWDER TOPICAL at 20:19

## 2023-04-09 RX ADMIN — MAGNESIUM SULFATE IN WATER 2 G: 40 INJECTION, SOLUTION INTRAVENOUS at 13:44

## 2023-04-09 RX ADMIN — MEXILETINE HYDROCHLORIDE 200 MG: 200 CAPSULE ORAL at 02:12

## 2023-04-09 RX ADMIN — HUMAN ALBUMIN MICROSPHERES AND PERFLUTREN 5 ML: 10; .22 INJECTION, SOLUTION INTRAVENOUS at 09:14

## 2023-04-09 RX ADMIN — IRON SUCROSE 200 MG: 20 INJECTION, SOLUTION INTRAVENOUS at 18:32

## 2023-04-09 RX ADMIN — FUROSEMIDE 80 MG: 10 INJECTION, SOLUTION INTRAVENOUS at 17:10

## 2023-04-09 RX ADMIN — TAMSULOSIN HYDROCHLORIDE 0.4 MG: 0.4 CAPSULE ORAL at 20:19

## 2023-04-09 ASSESSMENT — ACTIVITIES OF DAILY LIVING (ADL)
ADLS_ACUITY_SCORE: 40

## 2023-04-09 NOTE — PLAN OF CARE
Goal Outcome Evaluation:      Plan of Care Reviewed With: patient, sibling    Overall Patient Progress: improvingOverall Patient Progress: improving    Outcome Evaluation: testing continues, good urine output    Patient admitted for repeated ICD shocks after VT episodes, will start testing for advanced heart therapies.  PMH of NICM with EF 45%, paroxysmal VT resulting in ICD placement and multiple ablations, paroxysmal afib, developmental delay, DM2, HTN, and HLD.     Neuro: A&O x4, denied pain and dizziness.  Respiratory:  Denied shortness of breath and dyspnea on exertion, lung sounds clear to auscultation.  Cardiac: Heart murmur noted.  Had 1-2+ edema in legs and feet, was given IV furosemide.    GI: Denied nausea, bowel sounds normoactive.  Had a bowel movement during shift.  : Had good urine output, see I&O flowsheet.  Skin: See PCS for assessment and treatment of wounds and surgical incisions.   VS: In V-paced rhythm with frequent PVCs, HR 60s, SBP 90s-100s, SaO2 91-98% on room air.    Drips:  None.    Electrolytes: Magnesium replaced per protocol.  Pain: Denied.  Tests/procedures: Echocardiogram performed during shift, see Chart Review for results.    Mobility: Ambulated to bathroom with 1-person assist and use of walker.  Social: Sister visited during shift.    Plan:  Continue to monitor pain, VS, heart rhythm, skin integrity, fluid status, bowel status, cardiac and respiratory status.  Notify care team of changes in patient condition or other concerns.  Expected to have right heart cath and stress test tomorrow, will be NPO at midnight.

## 2023-04-09 NOTE — PROGRESS NOTES
[facility]  CARDIOLOGY HEART FAILURE SERVICE (CARDS II) PROGRESS NOTE    Patient Name: Rohit Garvey    Medical Record Number: [id number]    YOB: 1955  PCP: Kevyn Salazar    Admit Date/Time: 4/8/2023  3:08 PM   1    Assessment and Plan:  Rohit Garvey is a 67 year old adult male with a PMH significant for NICM (EF of 45%) c/b paroxysmal VT s/p dual chamber ICD placement 6/20/2022 c/b recurrent ICD shocks s/p VT ablation 10/31/2022 and re-ablation 12/29/2022, paroxysmal Afib on apixaban, developmental delay (possibly related to viral illnesses during infancy), T2DM, hypertension, and hyperlipidemia who was admitted as a transfer from [place] per recommendations of his cardiologists Dr. Urbina and Dr. Chanel for evaluation by advanced heart failure failure team and EP team for VT management and consideration of advanced therapies.     # Persistent Paroxysmal Ventricular Tachycardia s/p dual chamber ICD placement 6/2022 and VT ablation x 2  # Chronic HFrEF 2/2 NICM w/ LVEF 45%, NYHA II  High burden of ventricular arrhythmias despite being on 2 antiarrhythmic medications (amiodarone and mexiletine) and being s/p ICD and two attempted VT ablations. Transferred here for evaluation for advanced therapies. Pt not a candidate for LVAD as EF 45%. Possible heart transplant candidate if he meets criteria for VT storm.   - EP consulted, appreciate recs   - ICD device interrogation ordered    - Plan to hold off on ablation consideration pending advanced therapies workup   - Continue tele     Advanced therapies evaluation workup:  - RHC tomorrow   - Advanced therapy  consult placed   - Neuropsych testing ordered  - Financial consult already placed  - ECHO completed 4/9, results pending   - Utox screen positive for amphetamines. Quant screen ordered  - Nicotine screen and PEth pending     GDMTs:  - Antiarrhythmics: PTA amiodarone 200 mg BID and mexiletine 200 mg q8h    - ACEi/ARB: Not currently on due to LVEF 45%.  - BB: PTA carvedilol 3.125 mg BID   - Aldosterone antagonist: None   - SGLT2i: PTA empagliflozin 10 mg daily   - SCD prophylaxis: S/p secondary prevention ICD 6/20/22  - Fluid status: improving hypervolemia (JVP 12-->8 today)                - Give additional IV lasix 80 mg today                - Hold PTA lasix 40 mg TID (recently increased from qday) while on IV diuresis                - continue PTA KCl supplement 20 meq daily                - continue PTA magnesium oxide 800 mg daily                - BMP BID w/ goal of K > 4 and Mg > 2               - Strict I/Os and daily weights   - Anticoagulation: PTA Eliquis 5 mg BID   - Statin therapy: PTA atorvastatin 80 mg daily     # Positive U tox for amphetamines  Pt and sister deny any illegal or legal stimulant use. No obvious PTA drugs on inspection that could be causing a false positive.  - Urine amphetamine quantitative screen ordered      # Recent clavicular fracture  At baseline uses walker for ambulation. Had recent clavicle fracture now requiring him to be wheelchair bound. Supposed to be non weight bearing on LUE for another 4 weeks   - Fall precautions  - NWB LUE precautions provided   - PT/OT consulted     # Afib w/o RVR  - PTA carvedilol, amiodarone, and mexiletine   - PTA Eliquis 5 mg BID   - Tele     # Concern for ROSALINO  Slowly rising Cr. Pt appears hypervolemic w/ concern for cardiorenal syndrome.  - diuretics per above  - UA ordered   - BID BMP     # Anemia of chronic disease   BL Hgb ~9-10. Downtrending. Tbili normal. No evidence for acute bleed at this time.   - Iron panel and TIBC add on  - ferritin add on   - AM CBC     # Chronic hyponatremia   BL sodium ~130-133. Suspect hypervolemic hyponatremia in setting of CHF. At baseline.   - daily BMP    # DM2   On metformin 1,000 mg BID PTA.   - hold PTA metformin   - LDSS  - Hypoglycemia protocol   - Consider adding glargine pending glucose checks over next 24  hours      # BPH  - PTA tamulosin  - CTM w/ bladder scans and PVRs PRN     # Developmental delay   Lives at assisted living where he receives assistance w/ medications, meals, etc. Independent in basic ADLs (feeding, clothing, bathing) at his baseline. Sister Citlali is his HCA and POA.   - Delirium precautions      # Constipation   - PTA metamucil         FEN: Soft bite sized (due to lack of dentition)    PPx: PTA Eliquis   Code: Full Code     Pt was discussed and evaluated with Dr. Branch, attending physician, who agrees with the assessment and plan above.     Prachi Call, DO  Internal Medicine, PGY-I    Interval Changes in Past 24 Hours:   No overnight events. Doing well. No complaints. Sister at bedside, also had no concerns or questions.     Pt and sister deny any stimulant use (adderall, etc.). Deny any drug use.     Review Of Systems  A 4-point ROS was negative aside from those listed above.    OBJECTIVE FINDINGS:    Temp:  [97.5  F (36.4  C)-98.2  F (36.8  C)] 98.1  F (36.7  C)  Pulse:  [55-66] 55  Resp:  [16-18] 16  BP: ()/(53-68) 97/59  SpO2:  [94 %-98 %] 97 %    MAPs dipped overnight from 90-->63-->73 (97/59)  HR in 60s  Sating well on RA, afebrile     I/Os: Not reliable. Net neg -450 ml yesterday and -1.1 L today so far but had 3 unmeasured UOPs.     Vitals:    04/08/23 1751 04/09/23 0400   Weight: 72.3 kg (159 lb 4.8 oz) 70.4 kg (155 lb 3.2 oz)     Gen: Patient is awake and alert, NAD. Appears comfortable.    HEENT: PERRLA, EOMI, MMM  Neck: JVP estimated at 8 cm H2O  Resp: clear to auscultation bilaterally, no crackles or wheezing   CV: Regular rate, regular rhythm, systolic murmur.   Abd: soft, NT, ND, no hepatosplenomegaly, normal BS  Ext: warm and well perfused, 2+ lower extremity edema to level of knees     Current medications   Current Facility-Administered Medications   Medication     acetaminophen (TYLENOL) Suppository 650 mg     acetaminophen (TYLENOL) tablet 650 mg     alum & mag  hydroxide-simethicone (MAALOX) suspension 30 mL     amiodarone (PACERONE) tablet 200 mg     apixaban ANTICOAGULANT (ELIQUIS) tablet 5 mg     artificial tears ophthalmic ointment     atorvastatin (LIPITOR) tablet 80 mg     calcium carbonate-vitamin D (CALTRATE) 600-10 MG-MCG per tablet 1 tablet     carboxymethylcellulose PF (REFRESH PLUS) 0.5 % ophthalmic solution 1 drop     carvedilol (COREG) tablet 3.125 mg     glucose gel 15-30 g    Or     dextrose 50 % injection 25-50 mL    Or     glucagon injection 1 mg     empagliflozin (JARDIANCE) tablet 10 mg     insulin aspart (NovoLOG) injection (RAPID ACTING)     insulin aspart (NovoLOG) injection (RAPID ACTING)     lidocaine (LMX4) cream     lidocaine 1 % 0.1-1 mL     magnesium oxide (MAG-OX) tablet 800 mg     medication instruction     mexiletine (MEXITIL) capsule 200 mg     nitroGLYcerin (NITROSTAT) sublingual tablet 0.4 mg     potassium chloride ER (KLOR-CON M) CR tablet 20 mEq     psyllium (METAMUCIL/KONSYL) capsule 1 capsule     sodium chloride (PF) 0.9% PF flush 3 mL     sodium chloride (PF) 0.9% PF flush 3 mL     tamsulosin (FLOMAX) capsule 0.4 mg       LABS Reviewed    ECG, ECHO still pending     IMAGES Reviewed: No new imaging

## 2023-04-09 NOTE — CONSULTS
Electrophysiology Consultation Note   EP Attending: KAYLEIGH Peraza   Reason for consultation: Device shocks in the sitting of VT Storm.   Date of Service: 4/9/2023      HPI:   Mr. Rohit Garvey is a 67 year old male who presents for VT management.     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      The information was obtained from his sister and his nurse in the assisted living who were both present during this visit.     EP Visit 10/14/22: Mr Garvey lives in a nursing home due to intellectual delay.  In June 2022, he was found to be very spacey and pale by the nursing staff.  On examination his heart rate was 130 bpm with a blood pressure of 80/55.  He was also fatigued.  The patient was transported to the ED and was awake all the time.  Upon arrival to the ED he was found to have a wide-complex tachycardia and he was reportedly given medications first for cardioversion.  After medications failed, he received a cardioversion.  The details of whether he ever lost consciousness during that visit is unknown.  He has had a history of syncope in the bathroom a few weeks prior to his admission in June.  He reportedly had an evaluation as an inpatient at that time that was negative for ischemia, possible angiogram, and he was found to have a mildly reduced LVEF of 45 to 50% on an echocardiogram.  He had a dual-chamber ICD system implanted on 6/20/2022.  The patient also had a cardiac MRI revealing a left ventricular ejection fraction of 44%, right ventricular ejection fraction of 35%, with subepicardial and mid myocardial late gadolinium enhancement extending from the base to the mid cavity and involving the anterior, anteroseptal, anterolateral, and lateral walls.  He was started on amiodarone and was  discharged.  The patient's VT recurred after discharge with many episodes in September and October, with ATP and shocks he followed up with his primary electrophysiologist, Dr James, in [place].  Device interrogation showed 16 therapy sessions between shocks and ATP's in the last 2 weeks. He underwent a VT ablation on 10/31/22. He had some cardiogenic shock post ablation and required pressor. He has since recovered well and groin sites well healed. He has had recurrent VT after ablation with multiple ICD therapies. He was started on Ranolazine. He has continued to have device therapies. Several morphologies noted. He reports feeling at baseline. Patient had a second VT ablation on 12/29/2022, endocardial/epicardial ablation, 9 VT's, was different morphology ranging from left bundle to right bundle branch block, ablation was done mainly at the mid basal lateral and basal anterior septal of LV.  Patient had a video encounter in clinic on 04/05/2023 was with his sister who is his POA, she explained that recently 03/25/2023-03/31/2023 the patient has received between 3-4 shots from his device (we sent for interrogation )  presented to the ER at [facility] on 03/27/2023 and on 03/31/2023 when he mentioned that he did have his device went off on him more than once.  When I evaluated in the ER the plan was to transfer him to [facility] for Dr. Leon to evaluate for VT ablation, however there was no beds in the hospital patient went home.  There has been communication between Dr. Urbina and Dr. Bueno for the transfer process and to be initially evaluated by advanced heart failure failure team and EP team for VT management.  Sister mentions that she is waiting on a phone call back on when exactly to transfer (at the time being there is a snowstorm up in [place], where the patient is).  According to the ED note patient is on amiodarone 200 mg twice daily.      Patient got admitted yesterday to [facility] for further management by the heart failure team and EP team.    Past Medical History:   Past Medical History:   Diagnosis Date    Arrhythmia     Diabetes (H)     Hypertension     Pacemaker      Past Surgical History:   Past Surgical History:   Procedure Laterality Date    EP ABLATION VT/PVC N/A 10/31/2022    Procedure: EP Ablation VT;  Surgeon: Sky Leon MD;  Location:  HEART CARDIAC CATH LAB    EP ABLATION VT/PVC N/A 12/29/2022    Procedure: EP Ablation VT;  Surgeon: Sky Loen MD;  Location:  HEART CARDIAC CATH LAB    EYE SURGERY      ORTHOPEDIC SURGERY      PICC DOUBLE LUMEN PLACEMENT Right 11/01/2022    5FR DL PICC, brachial medial vein. L-42cm.     Allergies: Per MAR   No Known Allergies  Medications:   Per MAR current outpatient cardiovascular medications include:   Medications Prior to Admission   Medication Sig Dispense Refill Last Dose    amiodarone (PACERONE) 200 MG tablet Take 1 tablet (200 mg) by mouth 2 times daily 90 tablet 3 4/8/2023 at AM    Artificial Tear Ointment (LACRI-LUBE OP) Place 1 Application into both eyes At Bedtime   4/7/2023 at HS    atorvastatin (LIPITOR) 80 MG tablet Take 80 mg by mouth daily   4/8/2023 at AM    CALCIUM-VITAMIN D PO Take 1 tablet by mouth 2 times daily Calcium 650 mg with 25 mcg vitamin D with zinc   4/8/2023 at AM    Carboxymethylcellulose Sodium (ARTIFICIAL TEARS OP) Place 1 drop into both eyes 4 times daily   4/8/2023 at AM    carvedilol (COREG) 3.125 MG tablet Take 3.125 mg by mouth 2 times daily (with meals)   4/8/2023 at AM    colchicine (COLCYRS) 0.6 MG tablet Take 0.5 tablets (0.3 mg) by mouth daily 60 tablet 0 4/8/2023 at AM    ELIQUIS ANTICOAGULANT 5 MG tablet Take 1 tablet by mouth 2 times daily   4/8/2023 at AM    fish oil-omega-3 fatty acids 1000 MG capsule Take 1 g by mouth 3 times daily   4/8/2023 at AM    furosemide (LASIX) 40 MG tablet Take 40 mg by mouth 3 times daily    "4/8/2023 at AM    GNP NATURAL FIBER 28.3 % POWD Take 1-2 each by mouth daily Metamucil   4/8/2023 at AM    JARDIANCE 10 MG TABS tablet Take 10 mg by mouth daily   4/8/2023 at AM    magnesium oxide (MAG-OX) 400 MG tablet Take 800 mg by mouth daily   4/8/2023    metFORMIN (GLUCOPHAGE) 500 MG tablet Take 1,000 mg by mouth 2 times daily (with meals)   4/8/2023 at AM    mexiletine (MEXITIL) 200 MG capsule Take 1 capsule (200 mg) by mouth every 8 hours 90 capsule 3 4/8/2023 at AM    potassium chloride ER (KLOR-CON M) 20 MEQ CR tablet TAKE 1 TABLET BY MOUTH EVERY MORNING WITH BREAKFAST 30 tablet 1 4/8/2023 at AM    tamsulosin (FLOMAX) 0.4 MG capsule Take 0.4 mg by mouth every evening   4/7/2023 at HS     No current outpatient medications on file.     Current Facility-Administered Medications   Medication Dose Route Frequency    amiodarone  200 mg Oral BID    apixaban ANTICOAGULANT  5 mg Oral BID    artificial tears   Both Eyes At Bedtime    atorvastatin  80 mg Oral Daily    calcium carbonate-vitamin D  1 tablet Oral Daily    carvedilol  3.125 mg Oral Q12H    empagliflozin  10 mg Oral Daily    insulin aspart  1-3 Units Subcutaneous TID AC    insulin aspart  1-3 Units Subcutaneous At Bedtime    magnesium oxide  800 mg Oral Daily    mexiletine  200 mg Oral Q8H    potassium chloride ER  20 mEq Oral Daily    psyllium  1 capsule Oral Daily    sodium chloride (PF)  3 mL Intracatheter Q8H    tamsulosin  0.4 mg Oral QPM     Family History:   No family history on file.  Social History:   Social History     Tobacco Use    Smoking status: Never    Smokeless tobacco: Never   Vaping Use    Vaping status: Not on file   Substance Use Topics    Alcohol use: Never       ROS:   A comprehensive 10 point ROS was negative other than as mentioned in HPI.    Physical Examination:   VITALS: /59 (BP Location: Right arm)   Pulse 66   Temp 98.2  F (36.8  C) (Oral)   Resp 18   Ht 1.626 m (5' 4\")   Wt 72.3 kg (159 lb 4.8 oz)   SpO2 94%   " BMI 27.34 kg/m    GENERAL APPEARANCE: AxO, NAD   HEENT: NCAT, EOMI, MMM.   NECK: Supple. No JVD or bruit. Good carotid upstroke.   CHEST: CTAB   CARDIOVASCULAR: paced rhythm at 60s  ABDOMEN: BS+, soft, No pulsatile masses or bruits.   EXTREMITIES: No pedal edema. Distal pulses intact.   NEURO: Grossly nonfocal.   PSYCH: Normal affect.  SKIN: Warm and dry.   Data:   Labs:  BMP  Recent Labs   Lab 04/08/23  2222 04/08/23  1903 04/08/23  1800   NA  --   --  131*   POTASSIUM  --   --  4.3   CHLORIDE  --   --  97*   NEETA  --   --  9.1   CO2  --   --  23   BUN  --   --  13.1   CR  --   --  1.28*   * 106* 110*     CBC  Recent Labs   Lab 04/08/23  1800   WBC 6.9   RBC 3.54*   HGB 8.7*   HCT 29.3*   MCV 83   MCH 24.6*   MCHC 29.7*   RDW 18.6*        INR  Recent Labs   Lab 04/08/23  1800   INR 1.46*     No results found for: CKTOTAL, CKMB, TROPN  No results found for: CHOL, CHOLHDLRATIO, HDL, LDL  EKG: CRT-D pacing  Tele: paced, no VT episodes  ECHO: Pending    Assessment:   Mr Garvey is a 67 year old male patient with PMHx significant for NICM with EF 45% and VT s/p secondary prevention ICD 6/20/2022, s/p VT ablation 10/31/22, HTN, HPL, type 2 DM, intellectual delay (possibly related to viral illnesses during infancy). On 12/16/2022 , patient had a second VT ablation on 12/29/2022, endocardial/epicardial ablation, 9 VT's, was different morphology ranging from left bundle to right bundle branch block, ablation was done mainly at the mid basal lateral and basal anterior septal of LV.  In today's: visit we we had an video encounter with his sister who is his POA, she explained that recently 03/25/2023-03/31/2023 the patient has received between 3-4 shots from his device (we sent for interrogation )  presented to the ER at [facility] on 03/27/2023 and on 03/31/2023 when he mentioned that he did have his device went off on him more than once.  When evaluated in the ER the plan was to  transfer him to [facility] for Dr. Leon to evaluate for VT ablation, however there was no beds in the hospital patient went home.  There has been communication between Dr. Urbina and Dr. Chanel for the transfer process and to be initially evaluated by advanced heart failure failure team and EP team for VT management.  Sister mentions that she is waiting on a phone call back on when exactly to transfer (at the time being there is a snowstorm up in [place], where the patient is).  According to the ED note patient is on amiodarone 200 mg twice daily.     Ventricular Tachycardia/ VT Storm  NICM LVEF 45%, NYHA II:  - Pt is being evaluated for advanced heart failure , will get a right heart cath tomorrow.  - on EP side, patient was seen and examined he was stable.  No more shocks according to the history after he he was encountering last week, telemetry was reviewed no more arrhythmias, will need a device interrogation, repeat echo is pending, continue amiodarone and mexiletine as of today's doses.  Then will see if heart failure optimization and evaluation will help to reduce his VT's burden for us to proceed with the possible need of VT ablation.  The patient states understanding and is agreeable with plan.   Thank you for allowing us to participate in the care of this patient.     The patient was discussed w/ KARLO Marx  The above note reflects our joint plan.      Electrophysiology Consult Service  Pager: [phone]      I have reviewed the laboratory tests, imaging, and other investigations. I agree with the assessment and plan in this resident/fellow/nurse practitioner's note. In addition, changes in the assessment and plan have been incorporated into the note by myself, as to make it a single cohesive document. Plan was communicated to referring team/physician.    Kelsie Odonnell MD, MS  Cardiology/Cardiac EP Attending Staff

## 2023-04-09 NOTE — PROGRESS NOTES
Nutrition Brief - Consulted for Heart Failure - Dietitian to instruct patient on 2 gram sodium diet    Diet:   Soft and bite sized diet ( level 6) with thin liquids + FR 2 liters     Interventions:  Visited with patient today. Patient sound asleep. Patient's sister at bedside reporting, patient lives in an assisted living and all his meals are prepared. Briefly reviewed low salt diet guidelines. Left education handout at bedside. Patient's sister will share the low salt handout with the assisted living facility.       Kiran Fan RD/ISMAEL  6C RD pager: [phone]  Weekend/Holiday RD pager: [phone]

## 2023-04-09 NOTE — PLAN OF CARE
Status: Pt admitted for runs of VT and pacemaker malfunction. Hx of NICM (eF 45%), ablation 10/2022 & 12/2022, A.fib, T2DM, HTN, HLD, CKD, BPH, intellectual delay, osteoporosis, and recent fall at assisted living facility resulting in left clavicle fracture.    Neuro: A/Ox4 w/ generalized weakness.  Cardiac: AV paced however pacer not capturing resulting in irregular rhythm. Pitting edema in lower extremities. Afebrile. Denies chest pain.  Resp: Room air. Lungs sound clear. Denies shortness of breath.  GI/: BMx2 tonight. Urine tox screen completed.  Diet/appetite: Soft & bite sized diet (level 6) & thin liquids (level 0).  Activity: Non weight bearing L arm. Assist of one and walker.  Pain: Denies pain.  Skin: R heel wound. Dressed w/ dry gauze.  LDA: L PIV.    Plan: Diureses w/ lasix overnight.

## 2023-04-09 NOTE — PROGRESS NOTES
SPIRITUAL HEALTH SERVICES Progress Note  [facility] ([place]) 6C    Met with Rohit and introduced self and oriented to [facility]. Rohit did not speak much but welcomed visit and specifically prayer. I offered a prayer and shared that [facility] remains available should he have any further needs.     Sujata Martin  Chaplain Resident  Pager [phone]    * [facility] remains available 24/7 for emergent requests/referrals, either by having the switchboard page the on-call  or by entering an ASAP/STAT consult in Epic (this will also page the on-call ). Routine Epic consults receive an initial response within 24 hours.*

## 2023-04-09 NOTE — PLAN OF CARE
Goal Outcome Evaluation:      Plan of Care Reviewed With: patient, sibling    Overall Patient Progress: improvingOverall Patient Progress: improving    Outcome Evaluation: admitted to hospital, testing started

## 2023-04-10 ENCOUNTER — APPOINTMENT (OUTPATIENT)
Dept: OCCUPATIONAL THERAPY | Facility: CLINIC | Age: 68
DRG: 287 | End: 2023-04-10
Attending: STUDENT IN AN ORGANIZED HEALTH CARE EDUCATION/TRAINING PROGRAM
Payer: MEDICARE

## 2023-04-10 ENCOUNTER — ANCILLARY PROCEDURE (OUTPATIENT)
Dept: CARDIOLOGY | Facility: CLINIC | Age: 68
DRG: 287 | End: 2023-04-10
Attending: INTERNAL MEDICINE
Payer: MEDICARE

## 2023-04-10 LAB
ANION GAP SERPL CALCULATED.3IONS-SCNC: 12 MMOL/L (ref 7–15)
ANION GAP SERPL CALCULATED.3IONS-SCNC: 14 MMOL/L (ref 7–15)
BUN SERPL-MCNC: 18.4 MG/DL (ref 8–23)
BUN SERPL-MCNC: 18.4 MG/DL (ref 8–23)
CALCIUM SERPL-MCNC: 8.6 MG/DL (ref 8.8–10.2)
CALCIUM SERPL-MCNC: 9 MG/DL (ref 8.8–10.2)
CHLORIDE SERPL-SCNC: 100 MMOL/L (ref 98–107)
CHLORIDE SERPL-SCNC: 99 MMOL/L (ref 98–107)
CREAT SERPL-MCNC: 1.48 MG/DL (ref 0.51–1.17)
CREAT SERPL-MCNC: 1.51 MG/DL (ref 0.51–1.17)
DEPRECATED HCO3 PLAS-SCNC: 23 MMOL/L (ref 22–29)
DEPRECATED HCO3 PLAS-SCNC: 25 MMOL/L (ref 22–29)
ERYTHROCYTE [DISTWIDTH] IN BLOOD BY AUTOMATED COUNT: 19 % (ref 10–15)
GFR SERPL CREATININE-BSD FRML MDRD: 50 ML/MIN/1.73M2
GFR SERPL CREATININE-BSD FRML MDRD: 52 ML/MIN/1.73M2
GLUCOSE BLDC GLUCOMTR-MCNC: 120 MG/DL (ref 70–99)
GLUCOSE BLDC GLUCOMTR-MCNC: 132 MG/DL (ref 70–99)
GLUCOSE BLDC GLUCOMTR-MCNC: 140 MG/DL (ref 70–99)
GLUCOSE BLDC GLUCOMTR-MCNC: 172 MG/DL (ref 70–99)
GLUCOSE SERPL-MCNC: 126 MG/DL (ref 70–99)
GLUCOSE SERPL-MCNC: 175 MG/DL (ref 70–99)
HCT VFR BLD AUTO: 27.9 % (ref 35–53)
HGB BLD-MCNC: 8.4 G/DL (ref 11.7–17.7)
MAGNESIUM SERPL-MCNC: 2.2 MG/DL (ref 1.7–2.3)
MAGNESIUM SERPL-MCNC: 2.2 MG/DL (ref 1.7–2.3)
MCH RBC QN AUTO: 24.7 PG (ref 26.5–33)
MCHC RBC AUTO-ENTMCNC: 30.1 G/DL (ref 31.5–36.5)
MCV RBC AUTO: 82 FL (ref 78–100)
PLATELET # BLD AUTO: 167 10E3/UL (ref 150–450)
PLPETH BLD-MCNC: <10 NG/ML
POPETH BLD-MCNC: <10 NG/ML
POTASSIUM SERPL-SCNC: 3.4 MMOL/L (ref 3.4–5.3)
POTASSIUM SERPL-SCNC: 4.5 MMOL/L (ref 3.4–5.3)
RBC # BLD AUTO: 3.4 10E6/UL (ref 3.8–5.9)
SODIUM SERPL-SCNC: 136 MMOL/L (ref 136–145)
SODIUM SERPL-SCNC: 137 MMOL/L (ref 136–145)
WBC # BLD AUTO: 5.5 10E3/UL (ref 4–11)

## 2023-04-10 PROCEDURE — 214N000001 HC R&B CCU UMMC

## 2023-04-10 PROCEDURE — 258N000003 HC RX IP 258 OP 636: Performed by: STUDENT IN AN ORGANIZED HEALTH CARE EDUCATION/TRAINING PROGRAM

## 2023-04-10 PROCEDURE — 83735 ASSAY OF MAGNESIUM: CPT

## 2023-04-10 PROCEDURE — 250N000013 HC RX MED GY IP 250 OP 250 PS 637

## 2023-04-10 PROCEDURE — 93284 PRGRMG EVAL IMPLANTABLE DFB: CPT | Mod: 26 | Performed by: INTERNAL MEDICINE

## 2023-04-10 PROCEDURE — 250N000011 HC RX IP 250 OP 636

## 2023-04-10 PROCEDURE — 99232 SBSQ HOSP IP/OBS MODERATE 35: CPT | Mod: GC | Performed by: STUDENT IN AN ORGANIZED HEALTH CARE EDUCATION/TRAINING PROGRAM

## 2023-04-10 PROCEDURE — 250N000011 HC RX IP 250 OP 636: Performed by: STUDENT IN AN ORGANIZED HEALTH CARE EDUCATION/TRAINING PROGRAM

## 2023-04-10 PROCEDURE — 85027 COMPLETE CBC AUTOMATED: CPT

## 2023-04-10 PROCEDURE — 250N000013 HC RX MED GY IP 250 OP 250 PS 637: Performed by: STUDENT IN AN ORGANIZED HEALTH CARE EDUCATION/TRAINING PROGRAM

## 2023-04-10 PROCEDURE — 80048 BASIC METABOLIC PNL TOTAL CA: CPT

## 2023-04-10 PROCEDURE — 97530 THERAPEUTIC ACTIVITIES: CPT | Mod: GO

## 2023-04-10 PROCEDURE — 36415 COLL VENOUS BLD VENIPUNCTURE: CPT

## 2023-04-10 PROCEDURE — 93284 PRGRMG EVAL IMPLANTABLE DFB: CPT

## 2023-04-10 PROCEDURE — 97165 OT EVAL LOW COMPLEX 30 MIN: CPT | Mod: GO

## 2023-04-10 PROCEDURE — 97535 SELF CARE MNGMENT TRAINING: CPT | Mod: GO

## 2023-04-10 PROCEDURE — 250N000012 HC RX MED GY IP 250 OP 636 PS 637

## 2023-04-10 RX ORDER — FUROSEMIDE 10 MG/ML
100 INJECTION INTRAMUSCULAR; INTRAVENOUS ONCE
Status: COMPLETED | OUTPATIENT
Start: 2023-04-10 | End: 2023-04-10

## 2023-04-10 RX ORDER — POTASSIUM CHLORIDE 1.5 G/1.58G
60 POWDER, FOR SOLUTION ORAL ONCE
Status: COMPLETED | OUTPATIENT
Start: 2023-04-10 | End: 2023-04-10

## 2023-04-10 RX ADMIN — POTASSIUM CHLORIDE 20 MEQ: 750 TABLET, EXTENDED RELEASE ORAL at 07:48

## 2023-04-10 RX ADMIN — MICONAZOLE NITRATE: 20 POWDER TOPICAL at 19:34

## 2023-04-10 RX ADMIN — POTASSIUM CHLORIDE 60 MEQ: 1.5 POWDER, FOR SOLUTION ORAL at 09:11

## 2023-04-10 RX ADMIN — WHITE PETROLATUM 57.7 %-MINERAL OIL 31.9 % EYE OINTMENT: at 21:28

## 2023-04-10 RX ADMIN — EMPAGLIFLOZIN 10 MG: 10 TABLET, FILM COATED ORAL at 07:48

## 2023-04-10 RX ADMIN — MEXILETINE HYDROCHLORIDE 200 MG: 200 CAPSULE ORAL at 13:06

## 2023-04-10 RX ADMIN — MEXILETINE HYDROCHLORIDE 200 MG: 200 CAPSULE ORAL at 21:28

## 2023-04-10 RX ADMIN — CARVEDILOL 3.12 MG: 3.12 TABLET, FILM COATED ORAL at 19:35

## 2023-04-10 RX ADMIN — INSULIN ASPART 1 UNITS: 100 INJECTION, SOLUTION INTRAVENOUS; SUBCUTANEOUS at 16:35

## 2023-04-10 RX ADMIN — MAGNESIUM OXIDE TAB 400 MG (241.3 MG ELEMENTAL MG) 800 MG: 400 (241.3 MG) TAB at 07:48

## 2023-04-10 RX ADMIN — CARVEDILOL 3.12 MG: 3.12 TABLET, FILM COATED ORAL at 07:48

## 2023-04-10 RX ADMIN — AMIODARONE HYDROCHLORIDE 200 MG: 200 TABLET ORAL at 19:35

## 2023-04-10 RX ADMIN — TAMSULOSIN HYDROCHLORIDE 0.4 MG: 0.4 CAPSULE ORAL at 19:35

## 2023-04-10 RX ADMIN — ATORVASTATIN CALCIUM 80 MG: 80 TABLET, FILM COATED ORAL at 07:48

## 2023-04-10 RX ADMIN — AMIODARONE HYDROCHLORIDE 200 MG: 200 TABLET ORAL at 07:49

## 2023-04-10 RX ADMIN — MEXILETINE HYDROCHLORIDE 200 MG: 200 CAPSULE ORAL at 06:56

## 2023-04-10 RX ADMIN — CALCIUM CARBONATE 600 MG (1,500 MG)-VITAMIN D3 400 UNIT TABLET 1 TABLET: at 07:48

## 2023-04-10 RX ADMIN — Medication 1 CAPSULE: at 07:48

## 2023-04-10 RX ADMIN — FUROSEMIDE 100 MG: 10 INJECTION, SOLUTION INTRAVENOUS at 14:19

## 2023-04-10 RX ADMIN — MICONAZOLE NITRATE: 20 POWDER TOPICAL at 07:49

## 2023-04-10 RX ADMIN — IRON SUCROSE 200 MG: 20 INJECTION, SOLUTION INTRAVENOUS at 16:20

## 2023-04-10 ASSESSMENT — ACTIVITIES OF DAILY LIVING (ADL)
ADLS_ACUITY_SCORE: 40

## 2023-04-10 NOTE — PROGRESS NOTES
"   04/10/23 1500   Appointment Info   Signing Clinician's Name / Credentials (OT) Vivien Ford, OTD, OTR/L   Rehab Comments (OT) L clavicle fracture, NWB per chart review   Living Environment   People in Home alone   Current Living Arrangements assisted living   Home Accessibility wheelchair accessible   Transportation Anticipated family or friend will provide   Living Environment Comments pt lives in [facility], recieves asssist with BADLs since clavicular fx. pt has WIS with chair, currently uses w/ch to get to dining melara but pre-fx would ambulate with 4WW or cane.   Self-Care   Usual Activity Tolerance moderate   Current Activity Tolerance moderate   Equipment Currently Used at Home walker, rolling;shower chair;grab bar, toilet;grab bar, tub/shower   Fall history within last six months yes   Number of times patient has fallen within last six months 1   Activity/Exercise/Self-Care Comment pt uses 4WW or cane at baseline, IND with I/ADLs prior to fx, recieving assist post fx.   Instrumental Activities of Daily Living (IADL)   IADL Comments [facility] completes all IADLS   General Information   Onset of Illness/Injury or Date of Surgery 04/08/23   Referring Physician Prachi Call MD   Additional Occupational Profile Info/Pertinent History of Current Problem \"Rohit Garvey is a 67 year old adult male with a PMH significant for NICM (EF of 40-45%) c/b paroxysmal VT s/p dual chamber ICD placement 6/20/2022 c/b recurrent ICD shocks s/p VT ablation 10/31/2022 and re-ablation 12/29/2022, paroxysmal Afib on apixaban, developmental delay (possibly related to viral illnesses during infancy), T2DM, hypertension, and hyperlipidemia who was admitted as a transfer from [place] per recommendations of his cardiologists Dr. Urbina and Dr. Chanel for evaluation by advanced heart failure failure team and EP team for VT management and consideration of advanced therapies.\"   Existing Precautions/Restrictions fall;weight bearing   Left Upper " Extremity (Weight-bearing Status) non weight-bearing (NWB)   Cognitive Status Examination   Orientation Status orientation to person, place and time   Affect/Mental Status (Cognitive) WFL   Follows Commands WFL   Cognitive Status Comments baseline developmental delay   Visual Perception   Visual Impairment/Limitations corrective lenses for reading   Impact of Vision Impairment on Function (Vision) macular degeneration in L eye, has had cataracts in both eyes   Sensory   Sensory Quick Adds sensation intact   Pain Assessment   Patient Currently in Pain No   Range of Motion Comprehensive   General Range of Motion no range of motion deficits identified   Strength Comprehensive (MMT)   Comment, General Manual Muscle Testing (MMT) Assessment NFT 2/2 fracture, appears grossly weak   Coordination   Upper Extremity Coordination No deficits were identified   Bed Mobility   Bed Mobility supine-sit   Supine-Sit [place] (Bed Mobility) minimum assist (75% patient effort)   Transfers   Transfers sit-stand transfer   Sit-Stand Transfer   Sit-Stand [place] (Transfers) contact guard;modified independence   Clinical Impression   Criteria for Skilled Therapeutic Interventions Met (OT) Yes, treatment indicated   OT Diagnosis dec IND with I/ADLs   OT Problem List-Impairments impacting ADL problems related to;activity tolerance impaired;balance;mobility;strength   Assessment of Occupational Performance 3-5 Performance Deficits   Identified Performance Deficits bathing, dressing, transfers, toileting, safety   Planned Therapy Interventions (OT) ADL retraining;IADL retraining;balance training;bed mobility training;strengthening;stretching;transfer training;home program guidelines;progressive activity/exercise;risk factor education   Clinical Decision Making Complexity (OT) low complexity   Risk & Benefits of therapy have been explained evaluation/treatment results reviewed;care plan/treatment goals reviewed;risks/benefits  reviewed;current/potential barriers reviewed;participants voiced agreement with care plan;participants included;patient;sibling   OT Total Evaluation Time   OT Eval, Low Complexity Minutes (58188) 5   OT Goals   Therapy Frequency (OT) 4 times/wk   OT Predicted Duration/Target Date for Goal Attainment 04/24/23   OT Goals Upper Body Dressing;Lower Body Dressing;Bed Mobility;Toilet Transfer/Toileting   OT: Upper Body Dressing Modified independent   OT: Lower Body Dressing Modified independent   OT: Bed Mobility Modified independent   OT: Toilet Transfer/Toileting Modified independent   OT Discharge Planning   OT Plan FBD, toilet transfers, WB precaution carryover   OT Discharge Recommendation (DC Rec) home with assist;home with home care occupational therapy   OT Rationale for DC Rec pt has good home support and assist as needed for all I/ADLs, recommend d/c home with assist & HHOT/PT services to address higher level balance and IND with I/ADLs   OT Brief overview of current status CGA-SBA   Total Session Time   Total Session Time (sum of timed and untimed services) 5

## 2023-04-10 NOTE — PLAN OF CARE
Rohit Garvey is a 67 year old adult male with a PMH significant for NICM (EF of 45%) c/b paroxysmal VT s/p dual chamber ICD placement 6/20/2022 c/b recurrent ICD shocks s/p VT ablation 10/31/2022 and re-ablation 12/29/2022, paroxysmal Afib on apixaban, developmental delay (possibly related to viral illnesses during infancy), T2DM, hypertension, and hyperlipidemia who was admitted as a transfer from [place] per recommendations of his cardiologists Dr. Urbina and Dr. Chanel for evaluation by advanced heart failure failure team and EP team for VT management and consideration of advanced therapies.     Temp: 97.6  F (36.4  C) Temp src: Oral BP: 98/61 Pulse: 60   Resp: 16 SpO2: 94 % O2 Device: None (Room air)       Neuro: Aox4, developmental delay  Cardiac: 100 % AV paced  Resp: RA  GI/: Voids spontaneously to bedside urinal, 2 BM prior shift  Activity:Assistx1 w/walker and gait belt  Diet: NPO for procedure, otherwise 2L FR and soft bite size  LDA's: L PIV SL  Pain: Denies  Changes: nothing to note  Plan: Prepare pt for right heart cath and streass test today and continue to monitor and contact Cards 2 with any changes/concerns

## 2023-04-10 NOTE — PROGRESS NOTES
[facility]  CARDIOLOGY HEART FAILURE SERVICE (CARDS II) PROGRESS NOTE    Patient Name: Rohit Garvey    Medical Record Number: [id number]    YOB: 1955  PCP: Kevyn Salazar    Admit Date/Time: 4/8/2023  3:08 PM   2    Assessment and Plan:  Rohit Garvey is a 67 year old adult male with a PMH significant for NICM (EF of 40-45%) c/b paroxysmal VT s/p dual chamber ICD placement 6/20/2022 c/b recurrent ICD shocks s/p VT ablation 10/31/2022 and re-ablation 12/29/2022, paroxysmal Afib on apixaban, developmental delay (possibly related to viral illnesses during infancy), T2DM, hypertension, and hyperlipidemia who was admitted as a transfer from [place] per recommendations of his cardiologists Dr. Urbina and Dr. Chanel for evaluation by advanced heart failure failure team and EP team for VT management and consideration of advanced therapies.    Today's updates:  - RHC pushed to 4/11. Consented 4/10  - NPO at midnight, continue to hold apixaban until post-RHC  - Neuropsych testing scheduled for 4/11 at 1500   - Scheduled for cardiopulmonary stress test 8 AM on 4/13  - IV diuresis again today      # Persistent Paroxysmal Ventricular Tachycardia s/p dual chamber ICD placement 6/2022 and VT ablation x 2  # Chronic HFrEF 2/2 NICM w/ LVEF 40-45%, NYHA II  High burden of ventricular arrhythmias despite being on 2 antiarrhythmic medications (amiodarone and mexiletine) and being s/p ICD and two attempted VT ablations. Transferred here for evaluation for advanced therapies. ECHO 4/9 showed mildly reduced EF from 46% (Dec 2022) to 40-45%, however still does not qualify him for LVAD (EF < 25%). Possible heart transplant candidate if he meets criteria for VT storm.   - EP consulted, appreciate recs   - ICD device interrogation ordered    - Recommend holding off on ablation consideration until advanced therapies workup completed    - Continue tele     Advanced therapies evaluation  workup:  - ECHO 4/9 w/ mildly reduced LV function (EF reduction from 46% to 40-45%) and mild aortic valve calcification   - RHC pushed to 4/11   - NPO at midnight, continue to hold apixaban   - Cardiopulmonary stress test scheduled for 8 AM on 4/13   - Advanced therapy  consulted  - Neuropsych testing scheduled for 4/11 at 1500   - Financial consult already placed  - Utox screen positive for amphetamines. Quant screen ordered  - Nicotine screen and PEth pending     GDMTs:  - Antiarrhythmics: PTA amiodarone 200 mg BID and mexiletine 200 mg q8h   - ACEi/ARB: Not currently on due to LVEF of 45%.  - BB: PTA carvedilol 3.125 mg BID   - Aldosterone antagonist: None   - SGLT2i: PTA empagliflozin 10 mg daily   - SCD prophylaxis: S/p secondary prevention ICD 6/20/22  - Volume status: Still slightly hypervolemic                - S/p IV lasix 80 mg x 2 on 4/9. Additional IV lasix given today                - Hold PTA lasix 40 mg TID (recently increased from qday) while on IV diuresis                - continue PTA KCl supplement 20 meq daily                - continue PTA magnesium oxide 800 mg daily                - BMP BID w/ goal of K > 4 and Mg > 2               - Strict I/Os and daily weights   - Anticoagulation: PTA Eliquis 5 mg BID (held currently for RHC)  - Statin therapy: PTA atorvastatin 80 mg daily     # Positive U tox for amphetamines  Pt and sister deny any illegal or legal stimulant use. No obvious PTA drugs on inspection that could be causing a false positive.  - Urine amphetamine quantitative screen ordered      # Recent clavicular fracture  At baseline uses walker for ambulation. Had recent clavicle fracture now requiring him to be wheelchair bound. Supposed to be non weight bearing on LUE for another 4 weeks   - Fall precautions  - NWB LUE precautions provided   - PT/OT consulted   - Walk trial w/ RN today to see if pt could reasonably tolerate cardiopulmonary stress testing on treadmill     #  Afib w/o RVR  - PTA carvedilol, amiodarone, and mexiletine   - PTA Eliquis 5 mg BID (held for RHC)  - Tele     # ROSALINO on CKD, worsening   Uptrending Cr above baseline Cr of 1.3. No observed mention of etiology of pt's CKD in chart. UA w/o protein, RBCs, or casts. Concern for baseline cardiorenal syndrome  - diuresis per above   - BID BMP     # Acute on chronic normocytic anemia concerning for mixed picture of iron deficiency and inflammatory   # Iron deficiency   # Hx of anemia of chronic disease   Hgb 8.7 on arrival and downtrending. Baseline Hgb ~9-10 reportedly 2/2 anemia of chronic disease. Tbili normal. No source of active bleeding. Workup revealed low iron level, normal TIBC, and low end-of-normal ferritin concerning for iron deficiency anemia.   - IV Iron sucrose 200 mg qday x 5 days   - AM CBCs     # Chronic hyponatremia, improved   BL sodium ~130-133. Suspect hypervolemic hyponatremia in setting of CHF. Improved after IV diuresis   - daily BMP    # DM2   On metformin 1,000 mg BID PTA.   - hold PTA metformin   - LDSS  - Hypoglycemia protocol   - Consider adding glargine pending glucose checks over next 24 hours      # BPH  - PTA tamulosin  - CTM w/ bladder scans and PVRs PRN     # Developmental delay   Lives at assisted living where he receives assistance w/ medications, meals, etc. Independent in basic ADLs (feeding, clothing, bathing) at his baseline. Sister Peg is his HCA and POA but pt currently has capacity   - Delirium precautions      # Constipation   - PTA metamucil         FEN: Soft bite sized (due to lack of dentition) --> NPO at midnight   PPx: PTA Eliquis --> held for RHC  Code: Full Code     Pt was discussed and evaluated with Dr. Branch, attending physician, who agrees with the assessment and plan above.     Prachi Call, DO  Internal Medicine, PGY-I    Interval Changes in Past 24 Hours:   Yesterday evening; iron and ferritin returned low. Started on IV iron 200 mg qday x 5 days. Not at I/Os goal  in afternoon so gave additional 80 mg IV lasix (total of 160 mg IV lasix 4/9).     No overnight events. Doing well. No complaints. Sister at bedside.    Review Of Systems  A 4-point ROS was negative aside from those listed above.    OBJECTIVE FINDINGS:    Temp:  [97.6  F (36.4  C)-98.3  F (36.8  C)] 97.6  F (36.4  C)  Pulse:  [60-68] 60  Resp:  [16-18] 16  BP: ()/(50-61) 98/61  Cuff Mean (mmHg):  [75] 75  SpO2:  [94 %-98 %] 94 %    MAPs 72-->67--> 75   HR in 60s  Sating well on RA, afebrile     I/Os: Net negative -1.9 L yesterday (although 5 unmeasured voids). Net negative -225 ml since midnight.     Vitals:    04/08/23 1751 04/09/23 0400 04/10/23 0410   Weight: 72.3 kg (159 lb 4.8 oz) 70.4 kg (155 lb 3.2 oz) 67.9 kg (149 lb 9.6 oz)     Gen: Patient is awake and alert, NAD. Appears comfortable.    HEENT: PERRLA, EOMI, MMM  Neck: JVP estimated at 8 cm H2O  Resp: clear to auscultation bilaterally, no crackles or wheezing   CV: Irregular rhythm, regular rate  Abd: soft, NT, ND, no hepatosplenomegaly, normal BS  Ext: warm and well perfused, 2+ lower extremity edema to level of knees     Current medications   Current Facility-Administered Medications   Medication     acetaminophen (TYLENOL) Suppository 650 mg     acetaminophen (TYLENOL) tablet 650 mg     alum & mag hydroxide-simethicone (MAALOX) suspension 30 mL     amiodarone (PACERONE) tablet 200 mg     [Held by provider] apixaban ANTICOAGULANT (ELIQUIS) tablet 5 mg     artificial tears ophthalmic ointment     atorvastatin (LIPITOR) tablet 80 mg     calcium carbonate-vitamin D (CALTRATE) 600-10 MG-MCG per tablet 1 tablet     carboxymethylcellulose PF (REFRESH PLUS) 0.5 % ophthalmic solution 1 drop     carvedilol (COREG) tablet 3.125 mg     glucose gel 15-30 g    Or     dextrose 50 % injection 25-50 mL    Or     glucagon injection 1 mg     diphenhydrAMINE (BENADRYL) injection 50 mg     empagliflozin (JARDIANCE) tablet 10 mg     EPINEPHrine (ADRENALIN) kit 0.3 mg      famotidine (PEPCID) injection 20 mg     insulin aspart (NovoLOG) injection (RAPID ACTING)     insulin aspart (NovoLOG) injection (RAPID ACTING)     iron sucrose (VENOFER) 200 mg in sodium chloride 0.9 % 120 mL intermittent infusion     lidocaine (LMX4) cream     lidocaine 1 % 0.1-1 mL     magnesium oxide (MAG-OX) tablet 800 mg     medication instruction     methylPREDNISolone sodium succinate (solu-MEDROL) injection 125 mg     mexiletine (MEXITIL) capsule 200 mg     miconazole (MICATIN) 2 % powder     nitroGLYcerin (NITROSTAT) sublingual tablet 0.4 mg     potassium chloride ER (KLOR-CON M) CR tablet 20 mEq     psyllium (METAMUCIL/KONSYL) capsule 1 capsule     sodium chloride (PF) 0.9% PF flush 3 mL     sodium chloride (PF) 0.9% PF flush 3 mL     tamsulosin (FLOMAX) capsule 0.4 mg       LABS Reviewed    Na 136, K 3.4--> repleted (4.3), Mg 2.2, Cr 1.51 (1.44 yesterday)   WBC wnl, Hgb 8.4 (8.0), plt 167     IMAGES Reviewed:    ECHO 4/9/23 Interpretation Summary:  Left ventricular function is decreased. The ejection fraction is 40-45%  (mildly reduced compared to 46% back in Dec 2022).   LV size and wall thickness is normal. Abnormal septal wall motion consistent w/ pacemaker.   Global right ventricular function is normal.  Mild aortic valve calcification is present. The mean gradient across the  aortic valve is 8 mmHg.  Pulmonary artery systolic pressure is normal.  IVC diameter and respiratory changes fall into an intermediate range  suggesting an RA pressure of 8 mmHg.  No pericardial effusion is present.

## 2023-04-10 NOTE — PLAN OF CARE
Major Shift Events: A+Ox4; VSS; unable to do heart cath/stress test, rescheduled to Thursday (also needing to determine capability of doing test); BS+, passing gas/stool; voiding adequately post lasix; on RA; able to make needs known    Plan: continue cardiac workup; goals of care transplant vs other treatment    For vital signs and complete assessments, please see documentation flowsheets.      Abhijeet Brush RN, BSN

## 2023-04-10 NOTE — CONSULTS
Patient of Dr. Chanel seen as an inpatient for psychosocial assessment.   90 minutes spent with patient and his sister, Citlali. 100% of visit consisted of counseling related to issues surrounding Heart Transplant.    Psychosocial Assessment   Name: Rohit Garvey     MRN:  [id number]        Patient Name / Age / Race: Rohit Garvey 67 year old    Source of Information: Patient and pt's POA and sister, Citlali   : [facility]   Interview Date: April 10, 2023   Reason for Referral: Heart Transplant     Current Living Situation   Location ([facility]State): [address] [place] [address] [address]  [facility] [address]   With Whom: Living arrangements - the patient lives in an assisted living facility.   Type of Home: assisted living   Working Phone? Yes    Three Pronged Outlet? Yes    Distance from Hospital: 430 miles/6.5hrs   Need to Relocate Post Surgery? Yes    Discussed? Yes : Discussed local lodging options: hotels and [place] [address].     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    ? No   Income: [place] jail and help from [place] MA to pay for Assisted Living    Insurance: Medicare and [place] [email or web address] Assistance   Name of Private Insurance: N/A    Medication Coverage: Pt has medication coverage with minimal copays.     In current situation, pt. can afford medication and supply costs:  Yes    Other Financial Concerns/Issues: Pt's sister reports they would have financial concerns with temporary relocation, and ongoing f/u post-transplant. Writer has asked pt's sister to check with [place] MA on benefits for lodging, meals and transportation.      Family/Social Support   Marital Status: single (never )   Partner Name: N/a    Length of Time  ": n/a   Partner s Employment: n/a   Relationship: other: n/a    Children: 0   Parents:    Siblings: 4 Brothers and 1 Sister  Sister reports brothers are \"loosly involved\" and she has been the one to accompany patient to medical appts locally and here. 3 brothers live in different parts of the [place]. 1 brother is local but he is unable to provide any support.    Other Family or Friends Close by: Assisted living staff   Support System: available, occasional   Primary Support Person: Pt and sister unable to identify a primary caregiver. Pt's sister expressed concerns about her ability to move down here and attend to all of pt's post-transplant f/u.    Issues: No current caregiver plan identified.      Activities/Functional Ability   Current Level: Prior to fall (2023) pt was ambulatory with use of a cane or walker. Post-fall pt has been using a w/c to get around in his facility. Currently, pt requires assistance with dressing and showers. Prior to fall did not require assistance with dressing or showering. At baseline, pt requires assistance with all medication management, meals and finances.     PTA pt was participating in a home PT program through [facility] ([phone]) twice a week.    Frailty Score: 6     Daily Activities: Pt reports he still tries to remain active at his assisted living and participates in structured activities throughout the day.    Transportation: Family or assisted living staff     Medical Status   Patient s understanding of need for surgical intervention: Writer does not feel pt understands all of the information presented about transplant, risks associated with transplant and how this is is going to impact him and his family in the short and long-term.    Advanced Directive? Yes     Details: Scanned into EMR   Adherence History: Good-with the help of his sister to get him to and from medical appts.    Ability to Adhere to Complex Medical Regime: " Sister expressed concern with pt and assisted living being able to manage pt's care post-transplant. Writer shares in sister's concerns. Sister provided writer with contact information to pt's [facility]--Contact Kaylin Samuels, [phone] to reach out to Assisted Living to inquire about the level of care they are able to provide at their facility.     Behavioral   Chemical Dependency Issues: No: Pt reports rare alcohol use. Last had a beer in August 2022.   Denies hx of any drug use. Noted that drug screen positive for amphetamines.    Smoker? No: Pt reports he has never smoked or used vaping products.    Psychiatric impairment: No Pt denies any hx of mental health concerns.     Pt's sister reports a dx of Developmental Delay since age 3. Pt's sister reports pt does not have a guardian and has been his own decision maker, but does seek her assistance in making health care decisions. Pt's sister is pt's POA and helps him manage finances and is his rep payee.    Coping Style/Strategies: Making products out of plastic canvas and selling the items, puzzles    Recent Legal History: No   Teaching Completed During Assessment Related To Transplant: 1. Housing and relocation needs post surgery.  2. Caregiver needs post surgery.  3. Financial issues related to surgery.  4. Risks of alcohol use post surgery.  5. Common psychosocial stressors pre/post surgery.  6. Support group availability.   Psychosocial Risks Reviewed Related To Transplant: 1. Increased stress related to your emotional, family, social, employment, or financial situation.  2. Affect on work and/or disability benefits.  3. Affect on future health and life insurance.  4. Outcome expectations may not be met.  5. Mental Health risks: anxiety, depression, PTSD, guilt, grief and chronic fatigue.     Observed Behavior   Well informed? No Angry? No   Process info well? Yes  Hostile? No   Evasive? No Oriented X3? Yes    Cautious/Suspicious? No Motivated? Yes     Appropriate Behavior? Yes  Depressed? No   Appropriate Affect? Yes  Anxious? No   Interview Observations: Patient was pleasant and engaged. Pt's sister provided most of the information for assessment. Pt does not appear to understand transplant information.      Selection Criteria Met   Plan for Support No   Chemical Dependence Yes    Smoking Yes    Mental Health Yes    Adequate Finances No     Risk Issues:  -Frailty    -Care that Assisted Living will be able to provide post-transplant.     -Pt's sister expressed concerns with short and long-term caregiver needs post-transplant    -Pt's sister expressed financial concerns for outpatient f/u.     -Positive drug screen for amphetamines    Final Evaluation/Assessment:     Social Work evaluation as part of heart transplant evaluation. Pt lives in [place], [place], at [facility]. Pt moved to [place] about 1.5 yrs ago to be closer to family. Pt sustained a fall in February (2023) in his Assisted Living, and sustained a clavicle fracture. Pt has required more assistance from facility staff since the fall. Pt requires assistance with dressing and bathing. Pt has always required assistance with medication management and facility provides all of his meals. Pt's sister is his POA and rep payee and manages all of pt's finances. Pt can ambulate with a walker, but for to get to meals, in his assisted living, has been using a w/c since fall. Sister reports that prior to fall pt was very active in his assisted living and was frequently participating in activities outside of his room. Pt has a HCD and this is scanned into pt's medical record. Unclear to writer how much pt actually understands after our conversation today about transplant.     Discussed caregiver support plan. Patient's sister expressed many concerns as to whether or not family will be able to provide short-term or long-term caregiver support. Due to pt's vulnerability, would need someone to  accompany him to all f/u appts. Also, concerns as to whether or not pt's assisted living will be able to provide the post-transplant care he would need once he returns home.     Pt denies drug or alcohol use, but drug screen is positive for amphetamines. Pt gets all medications administered to him by staff at AL. Pt is unable to drive and medications delivered to his assisted living.     Writer has no concerns in areas of mental health or smoking. Awaiting neuropsych evaluation.     Pt's sister expressed concerns with finances with temporary relocation and outpatient f/u. Asked her to f/u with [facility] to inquire on benefits for lodging, gas and food whenever pt is required to be here.     From a psychosocial perspective, pt is a conditional candidate for transplant. Pt will need more caregiver support then most heart transplant patients in the short-term and long-term post-transplant. Unclear if pt's family or assisted living will be able to provide that level of caregiver support. Concerns with pt's frailty.        Patient understands risks and benefits of Heart Transplant: No-needs further education     Recommendation:    conditional    [facility]     Title: Licensed Independent Clinical                Interview Date: April 10, 2023

## 2023-04-11 ENCOUNTER — APPOINTMENT (OUTPATIENT)
Dept: PHYSICAL THERAPY | Facility: CLINIC | Age: 68
DRG: 287 | End: 2023-04-11
Attending: STUDENT IN AN ORGANIZED HEALTH CARE EDUCATION/TRAINING PROGRAM
Payer: MEDICARE

## 2023-04-11 LAB
ANION GAP SERPL CALCULATED.3IONS-SCNC: 13 MMOL/L (ref 7–15)
ANION GAP SERPL CALCULATED.3IONS-SCNC: 16 MMOL/L (ref 7–15)
APTT PPP: 37 SECONDS (ref 22–38)
BUN SERPL-MCNC: 19.6 MG/DL (ref 8–23)
BUN SERPL-MCNC: 20.7 MG/DL (ref 8–23)
CALCIUM SERPL-MCNC: 8.8 MG/DL (ref 8.8–10.2)
CALCIUM SERPL-MCNC: 8.9 MG/DL (ref 8.8–10.2)
CHLORIDE SERPL-SCNC: 100 MMOL/L (ref 98–107)
CHLORIDE SERPL-SCNC: 98 MMOL/L (ref 98–107)
COTININE SERPL-MCNC: <5 NG/ML
CREAT SERPL-MCNC: 1.54 MG/DL (ref 0.51–1.17)
CREAT SERPL-MCNC: 1.57 MG/DL (ref 0.51–1.17)
DEPRECATED HCO3 PLAS-SCNC: 24 MMOL/L (ref 22–29)
DEPRECATED HCO3 PLAS-SCNC: 25 MMOL/L (ref 22–29)
ERYTHROCYTE [DISTWIDTH] IN BLOOD BY AUTOMATED COUNT: 19 % (ref 10–15)
GFR SERPL CREATININE-BSD FRML MDRD: 48 ML/MIN/1.73M2
GFR SERPL CREATININE-BSD FRML MDRD: 49 ML/MIN/1.73M2
GLUCOSE BLDC GLUCOMTR-MCNC: 103 MG/DL (ref 70–99)
GLUCOSE BLDC GLUCOMTR-MCNC: 162 MG/DL (ref 70–99)
GLUCOSE BLDC GLUCOMTR-MCNC: 225 MG/DL (ref 70–99)
GLUCOSE SERPL-MCNC: 128 MG/DL (ref 70–99)
GLUCOSE SERPL-MCNC: 194 MG/DL (ref 70–99)
HCT VFR BLD AUTO: 28.8 % (ref 35–53)
HGB BLD-MCNC: 8.8 G/DL (ref 11.7–17.7)
HGB BLD-MCNC: 8.9 G/DL (ref 11.7–17.7)
INR PPP: 1.28 (ref 0.85–1.15)
MAGNESIUM SERPL-MCNC: 2 MG/DL (ref 1.7–2.3)
MAGNESIUM SERPL-MCNC: 2.1 MG/DL (ref 1.7–2.3)
MCH RBC QN AUTO: 25.1 PG (ref 26.5–33)
MCHC RBC AUTO-ENTMCNC: 30.6 G/DL (ref 31.5–36.5)
MCV RBC AUTO: 82 FL (ref 78–100)
NICOTINE SERPL-MCNC: <5 NG/ML
OXYHGB MFR BLDV: 50 % (ref 92–100)
PLATELET # BLD AUTO: 175 10E3/UL (ref 150–450)
POTASSIUM SERPL-SCNC: 4.1 MMOL/L (ref 3.4–5.3)
POTASSIUM SERPL-SCNC: 4.2 MMOL/L (ref 3.4–5.3)
RBC # BLD AUTO: 3.51 10E6/UL (ref 3.8–5.9)
SODIUM SERPL-SCNC: 138 MMOL/L (ref 136–145)
SODIUM SERPL-SCNC: 138 MMOL/L (ref 136–145)
WBC # BLD AUTO: 6.4 10E3/UL (ref 4–11)

## 2023-04-11 PROCEDURE — 250N000013 HC RX MED GY IP 250 OP 250 PS 637: Performed by: STUDENT IN AN ORGANIZED HEALTH CARE EDUCATION/TRAINING PROGRAM

## 2023-04-11 PROCEDURE — 85018 HEMOGLOBIN: CPT

## 2023-04-11 PROCEDURE — 83735 ASSAY OF MAGNESIUM: CPT

## 2023-04-11 PROCEDURE — 250N000011 HC RX IP 250 OP 636: Performed by: STUDENT IN AN ORGANIZED HEALTH CARE EDUCATION/TRAINING PROGRAM

## 2023-04-11 PROCEDURE — 85730 THROMBOPLASTIN TIME PARTIAL: CPT

## 2023-04-11 PROCEDURE — 80048 BASIC METABOLIC PNL TOTAL CA: CPT

## 2023-04-11 PROCEDURE — 214N000001 HC R&B CCU UMMC

## 2023-04-11 PROCEDURE — 272N000001 HC OR GENERAL SUPPLY STERILE: Performed by: INTERNAL MEDICINE

## 2023-04-11 PROCEDURE — 99232 SBSQ HOSP IP/OBS MODERATE 35: CPT | Mod: GC | Performed by: STUDENT IN AN ORGANIZED HEALTH CARE EDUCATION/TRAINING PROGRAM

## 2023-04-11 PROCEDURE — 97162 PT EVAL MOD COMPLEX 30 MIN: CPT | Mod: GP

## 2023-04-11 PROCEDURE — 85610 PROTHROMBIN TIME: CPT

## 2023-04-11 PROCEDURE — 97530 THERAPEUTIC ACTIVITIES: CPT | Mod: GP

## 2023-04-11 PROCEDURE — 250N000013 HC RX MED GY IP 250 OP 250 PS 637

## 2023-04-11 PROCEDURE — 97116 GAIT TRAINING THERAPY: CPT | Mod: GP

## 2023-04-11 PROCEDURE — 4A023N6 MEASUREMENT OF CARDIAC SAMPLING AND PRESSURE, RIGHT HEART, PERCUTANEOUS APPROACH: ICD-10-PCS | Performed by: INTERNAL MEDICINE

## 2023-04-11 PROCEDURE — 258N000003 HC RX IP 258 OP 636: Performed by: STUDENT IN AN ORGANIZED HEALTH CARE EDUCATION/TRAINING PROGRAM

## 2023-04-11 PROCEDURE — 93451 RIGHT HEART CATH: CPT | Mod: 26 | Performed by: INTERNAL MEDICINE

## 2023-04-11 PROCEDURE — 93451 RIGHT HEART CATH: CPT | Performed by: INTERNAL MEDICINE

## 2023-04-11 PROCEDURE — 250N000011 HC RX IP 250 OP 636

## 2023-04-11 PROCEDURE — 82810 BLOOD GASES O2 SAT ONLY: CPT

## 2023-04-11 PROCEDURE — 250N000009 HC RX 250: Performed by: INTERNAL MEDICINE

## 2023-04-11 PROCEDURE — 85027 COMPLETE CBC AUTOMATED: CPT

## 2023-04-11 PROCEDURE — C1894 INTRO/SHEATH, NON-LASER: HCPCS | Performed by: INTERNAL MEDICINE

## 2023-04-11 PROCEDURE — 36415 COLL VENOUS BLD VENIPUNCTURE: CPT

## 2023-04-11 RX ORDER — FUROSEMIDE 10 MG/ML
100 INJECTION INTRAMUSCULAR; INTRAVENOUS ONCE
Status: COMPLETED | OUTPATIENT
Start: 2023-04-11 | End: 2023-04-11

## 2023-04-11 RX ADMIN — Medication 1 CAPSULE: at 09:14

## 2023-04-11 RX ADMIN — CALCIUM CARBONATE 600 MG (1,500 MG)-VITAMIN D3 400 UNIT TABLET 1 TABLET: at 09:14

## 2023-04-11 RX ADMIN — INSULIN ASPART 1 UNITS: 100 INJECTION, SOLUTION INTRAVENOUS; SUBCUTANEOUS at 17:59

## 2023-04-11 RX ADMIN — ATORVASTATIN CALCIUM 80 MG: 80 TABLET, FILM COATED ORAL at 09:14

## 2023-04-11 RX ADMIN — CARVEDILOL 3.12 MG: 3.12 TABLET, FILM COATED ORAL at 09:14

## 2023-04-11 RX ADMIN — MEXILETINE HYDROCHLORIDE 200 MG: 200 CAPSULE ORAL at 05:57

## 2023-04-11 RX ADMIN — MICONAZOLE NITRATE: 20 POWDER TOPICAL at 20:53

## 2023-04-11 RX ADMIN — MEXILETINE HYDROCHLORIDE 200 MG: 200 CAPSULE ORAL at 13:06

## 2023-04-11 RX ADMIN — CARVEDILOL 3.12 MG: 3.12 TABLET, FILM COATED ORAL at 20:51

## 2023-04-11 RX ADMIN — POTASSIUM CHLORIDE 20 MEQ: 750 TABLET, EXTENDED RELEASE ORAL at 09:14

## 2023-04-11 RX ADMIN — AMIODARONE HYDROCHLORIDE 200 MG: 200 TABLET ORAL at 09:14

## 2023-04-11 RX ADMIN — EMPAGLIFLOZIN 10 MG: 10 TABLET, FILM COATED ORAL at 09:14

## 2023-04-11 RX ADMIN — APIXABAN 5 MG: 5 TABLET, FILM COATED ORAL at 20:50

## 2023-04-11 RX ADMIN — MAGNESIUM OXIDE TAB 400 MG (241.3 MG ELEMENTAL MG) 800 MG: 400 (241.3 MG) TAB at 09:18

## 2023-04-11 RX ADMIN — AMIODARONE HYDROCHLORIDE 200 MG: 200 TABLET ORAL at 20:51

## 2023-04-11 RX ADMIN — WHITE PETROLATUM 57.7 %-MINERAL OIL 31.9 % EYE OINTMENT: at 22:20

## 2023-04-11 RX ADMIN — IRON SUCROSE 200 MG: 20 INJECTION, SOLUTION INTRAVENOUS at 17:58

## 2023-04-11 RX ADMIN — TAMSULOSIN HYDROCHLORIDE 0.4 MG: 0.4 CAPSULE ORAL at 20:51

## 2023-04-11 RX ADMIN — MEXILETINE HYDROCHLORIDE 200 MG: 200 CAPSULE ORAL at 22:20

## 2023-04-11 RX ADMIN — FUROSEMIDE 100 MG: 10 INJECTION, SOLUTION INTRAVENOUS at 12:59

## 2023-04-11 ASSESSMENT — ACTIVITIES OF DAILY LIVING (ADL)
ADLS_ACUITY_SCORE: 40

## 2023-04-11 NOTE — PROGRESS NOTES
"   04/11/23 0828   Appointment Info   Signing Clinician's Name / Credentials (PT) SHAKEEL Banerjee   Student Supervision Direct Patient Contact Provided   Rehab Comments (PT) Non WB L UE   Living Environment   People in Home alone   Current Living Arrangements assisted living   Home Accessibility wheelchair accessible   Transportation Anticipated family or friend will provide   Living Environment Comments pt lives in [facility], recieves asssist with BAD[facility]s since clavicular fx. pt has WIS with chair, currently uses w/ch to get to dining melara but pre-fx would ambulate with 4WW or cane.   Self-Care   Usual Activity Tolerance moderate   Current Activity Tolerance fair   Equipment Currently Used at Home walker, rolling;cane, straight   Fall history within last six months yes   Number of times patient has fallen within last six months 1   Activity/Exercise/Self-Care Comment Per OT: \"pt uses 4WW or cane at baseline, IND with I/ADLs prior to fx, recieving assist post fx.\"   General Information   Onset of Illness/Injury or Date of Surgery 04/08/23   Referring Physician Maddie Branch MD   Patient/Family Therapy Goals Statement (PT) return to [facility]   Pertinent History of Current Problem (include personal factors and/or comorbidities that impact the POC) Per EMR: \"67 year old adult male with a PMH significant for NICM (EF of 40-45%) c/b paroxysmal VT s/p dual chamber ICD placement 6/20/2022 c/b recurrent ICD shocks s/p VT ablation 10/31/2022 and re-ablation 12/29/2022, paroxysmal Afib on apixaban, developmental delay (possibly related to viral illnesses during infancy), T2DM, hypertension, and hyperlipidemia who was admitted as a transfer from [place] per recommendations of his cardiologists Dr. Urbina and Dr. Chanel for evaluation by advanced heart failure failure team and EP team for VT management and consideration of advanced therapies.\"   Existing Precautions/Restrictions fall;weight bearing  (Non WB L UE due to L " clavicular fx)   Weight-Bearing Status - LUE nonweight-bearing   Cognition   Cognitive Status Comments Developmental delay, AOx4, able to appropriately respond to questions and commands. Short commands are better.   Pain Assessment   Patient Currently in Pain Yes, see Vital Sign flowsheet   Posture    Posture Comments forward head, protracted shoulders, forward flexed posture and slight L lateral lean. L UE held gingerly anteriorly with flexed elbow.   Range of Motion (ROM)   ROM Comment ROM WFL with R UE, limited due to claviular fx on L side.   Strength (Manual Muscle Testing)   Strength (Manual Muscle Testing) Deficits observed during functional mobility   Strength Comments generalized weakness   Bed Mobility   Comment, (Bed Mobility) Mod I with supine>sit HOB elevated, difficultly with anterior scooting,   Transfers   Comment, (Transfers) STS Aileen x 1, reduced anterior weight shift, L lateral lean, tendency to reach out to AD to assist with standing   Gait/Stairs (Locomotion)   Comment, (Gait/Stairs) Gait: CGA with hemiwalker/SBQC in R UE. Very slow gait speed, flexed posture, L lateral lean, and asymmetrical step to pattern   Balance   Balance Comments Good sitting balance, standing balance with increased sway and CGA   Sensory Examination   Sensory Perception patient reports no sensory changes   Clinical Impression   Criteria for Skilled Therapeutic Intervention Yes, treatment indicated   PT Diagnosis (PT) Impaired functional mobility   Influenced by the following impairments strength, balance, activity tolerance, safety   Functional limitations due to impairments Gait, bed mobility, transfers   Clinical Presentation (PT Evaluation Complexity) Evolving/Changing   Clinical Presentation Rationale clinical judgement   Clinical Decision Making (Complexity) moderate complexity   Planned Therapy Interventions (PT) balance training;bed mobility training;gait training;home exercise program;patient/family  education;strengthening;transfer training;progressive activity/exercise;risk factor education;home program guidelines   Risk & Benefits of therapy have been explained evaluation/treatment results reviewed;care plan/treatment goals reviewed   PT Total Evaluation Time   PT Eval, Moderate Complexity Minutes (94530) 6   Physical Therapy Goals   PT Frequency 6x/week   PT Predicted Duration/Target Date for Goal Attainment 05/11/23   PT Goals Transfers;Bed Mobility;Gait   PT: Bed Mobility Independent   PT: Transfers Sit to/from stand;Independent   PT: Gait Supervision/stand-by assist;P[place]enix walker;150 feet;Quad cane;Within precautions   Interventions   Interventions Quick Adds Gait Training;Therapeutic Activity   Therapeutic Activity   Therapeutic Activities: dynamic activities to improve functional performance Minutes (91875) 16   Treatment Detail/Skilled Intervention Pt greeted supine in bed, agreeable to PT. RN edson mobililty. To progress functional independence facilitated STS x 4 up to phoenix walker and Aileen x 1 (given on R side due to L clavicular fx). Constant VCs for increased anterior weight shift, wider Edis, and upright posture throughout. Demonstrated improved STS with practice, but continued to need VCs. WC to chair transfer with Phoenix walker on R side and CGA. VCs to keep phoenix walker closer to body and to take many steps to turn to position in front of chair. Edu on difference between AD. Pt asked to list off NWB precautions for L clavicale, needed reminders of precautions, but able to list them off at end of session. Returned to sitting in chair with call light in reach, all needs met.   Gait Training   Gait Training Minutes (18657) 24   Treatment Detail/Skilled Intervention To improve activity tolerance, gait pattern and trial different single arm AD, pt amb ~ 75' in 3 bouts with phoenix walker and quad cane and CGA. With phoenix walker demonstrated slower gait speed, shorter step length and fatigued quicker compared  to quad cane. Pt demonstrated slight tremor in R UE with use of quad cane and less control (base of quad cane wabbling on floor), could be due to fatigue levels. VCs throughout for upright posture, larger steps and placement of AD (closer to body). Pt's preference is jerry walker, but will continue to use quad cane with therapy. Pt fatigues quickly and required 2 prolonged seated rest breaks, denied adverse effects. Pt noted fear of falling causing him to be more careful with gait.   PT Discharge Planning   PT Plan progress gait with jerry walker vs quad cane, STS, continue assessing DC, possible home w/assist and HH PT, consider caregiver training   PT Discharge Recommendation (DC Rec) Transitional Care Facility   PT Rationale for DC Rec Pt below his baseline, requiring Ax1 for all mobility with regular cuing for WB precautions. Currently recommend TCU to maximize functional strenght and endurance, however pending progress pt may be appropriate to DC home with assist with HH PT. Pt reports having assist at [facility].   PT Brief overview of current status Ax1 with FWW (non WB through L UE)   Total Session Time   Timed Code Treatment Minutes 40   Total Session Time (sum of timed and untimed services) 46

## 2023-04-11 NOTE — PLAN OF CARE
Changes this shift: (11p-7a) A/Ox4, VSS, A/V paced. RA. No complaints of pain throughout the night. NPO @ midnight except for sips with meds    Plan: Right heart cath today, continue to POC.

## 2023-04-11 NOTE — PROGRESS NOTES
Neuro: A&Ox4.   Cardiac: A/V-paced 60. VSS.   Respiratory: Sating adequate on RA.  GI/: Adequate urine output. BM X1  Diet/appetite: Tolerating cardiacdiet. Eating well.  Activity:  Assist of one, up to bathroom.  Pain: At acceptable level on current regimen.   Skin: No new deficits noted.  LDA's:pivx1    Plan: Continue with POC., NPO after midnight right heart cath in am. Notify primary team with changes.

## 2023-04-11 NOTE — PROGRESS NOTES
Heart Transplant Referral  Date:  04/07/23 - planning to admit pt over the weekend    Type of Evaluation:  Heart only due to developmental delay  Lead Coordinator for Evaluation:  Nichole    Patient referred for Advanced Therapies by Dr. Chanel , as patient meets criteria for heart transplant evaluation.  Cardiac Diagnosis: NICM,  paroxysmal VT    Age: 67 year old  ABO: A NEG     Chart reviewed and the following barriers for transplant were noted in the chart:   BMI: 25   Substance Use History: none  Other Known Barriers to Transplant: developmental delay, The pt lives in an assisted living facility. He is currently wheelchair bound due to a recent mechanical fall c/b broken clavicle. Prior to this, he ambulated with a walker. Sister is caregiver  COVID Vaccination Status: Completed x3    Plan: Planning to admit pt over the weekend, for transplant eval and heart failure management

## 2023-04-11 NOTE — PROGRESS NOTES
[facility]  CARDIOLOGY HEART FAILURE SERVICE (CARDS II) PROGRESS NOTE    Patient Name: Rohit Garvey    Medical Record Number: [id number]    YOB: 1955  PCP: Kevyn Salazar    Admit Date/Time: 4/8/2023  3:08 PM   3    Assessment and Plan:  Rohit Garvey is a 67 year old adult male with a PMH significant for NICM (EF of 40-45%) c/b paroxysmal VT s/p dual chamber ICD placement 6/20/2022 c/b recurrent ICD shocks s/p VT ablation 10/31/2022 and re-ablation 12/29/2022, paroxysmal Afib on apixaban, developmental delay (possibly related to viral illnesses during infancy), T2DM, hypertension, and hyperlipidemia who was admitted as a transfer from [place] per recommendations of his cardiologists Dr. Urbina and Dr. Chanel for evaluation by advanced heart failure failure team and EP team for VT management and consideration of advanced therapies.    Today's updates:  - RHC today with mildly elevated R/L sided filling pressures, pulmonary hypertension  - Neuropsych testing today at 1500   - Scheduled for cardiopulmonary stress test 8 AM on 4/13.  - Repeat lasix  today. Will consider switching to PO tomorrow   - Resume Apixaban tonight      # Persistent Paroxysmal Ventricular Tachycardia s/p dual chamber ICD placement 6/2022 and VT ablation x 2  # Chronic HFrEF 2/2 NICM w/ LVEF 40-45%, NYHA II  High burden of ventricular arrhythmias despite being on 2 antiarrhythmic medications (amiodarone and mexiletine) and being s/p ICD and two attempted VT ablations. Transferred here for evaluation for advanced therapies. ECHO 4/9 showed mildly reduced EF from 46% (Dec 2022) to 40-45%, however still does not qualify him for LVAD (EF < 25%). Possible heart transplant candidate if he meets criteria for VT storm.   - EP consulted, appreciate recs   - ICD device interrogation ordered - 1 VT episode on 4/9/23 lasting 25 seconds   - Recommend holding off on ablation consideration until  advanced therapies workup completed    - Continue tele     Advanced therapies evaluation workup:  - ECHO 4/9 w/ mildly reduced LV function (EF reduction from 46% to 40-45%) and mild aortic valve calcification   - RHC 4/11:   RA mean 11mmHg. RV 38/4. RVEDP 13. PA 39/16/25. PCWP 16  TD CO/CI: 3.85 L/min / 2.25 L/min/m2  Shena CO/CI: 3.37 L/min / 1.97 L/min/m2    - Cardiopulmonary stress test scheduled for 8 AM on 4/13   - Advanced therapy  consulted  - Neuropsych testing scheduled for 4/11 at 1500   - Financial consult already placed  - Utox screen positive for amphetamines. Quant screen ordered  - Nicotine screen pending. PEth negative    GDMTs:  - Antiarrhythmics: PTA amiodarone 200 mg BID and mexiletine 200 mg q8h   - ACEi/ARB: Not currently on due to LVEF of 45%.  - BB: PTA carvedilol 3.125 mg BID   - Aldosterone antagonist: None   - SGLT2i: PTA empagliflozin 10 mg daily   - SCD prophylaxis: S/p secondary prevention ICD 6/20/22  - Volume status: Still slightly hypervolemic                - S/p IV lasix 80 mg x 2 on 4/9. IV lasix 100mg 4/10, 4/11               - Hold PTA lasix 40 mg TID (recently increased from qday) while on IV diuresis                - continue PTA KCl supplement 20 meq daily                - continue PTA magnesium oxide 800 mg daily                - BMP BID w/ goal of K > 4 and Mg > 2               - Strict I/Os and daily weights   - Anticoagulation: PTA Eliquis 5 mg BID (held currently for RHC). Resume today post cath?   - Statin therapy: PTA atorvastatin 80 mg daily     # Positive U tox for amphetamines  Pt and sister deny any illegal or legal stimulant use. No obvious PTA drugs on inspection that could be causing a false positive.  - Urine amphetamine quantitative screen ordered pending      # Recent clavicular fracture  At baseline uses walker for ambulation. Had recent clavicle fracture now requiring him to be wheelchair bound. Supposed to be non weight bearing on LUE for  another 4 weeks   - Fall precautions  - NWB LUE precautions provided   - PT/OT consulted     # Afib w/o RVR  - PTA carvedilol, amiodarone, and mexiletine   - PTA Eliquis 5 mg BID. Resume tonight   - Tele     # ROSALINO on CKD, worsening   Uptrending Cr above baseline Cr of 1.3. No observed mention of etiology of pt's CKD in chart. UA w/o protein, RBCs, or casts. Concern for baseline cardiorenal syndrome  - diuresis per above   - BID BMP     # Acute on chronic normocytic anemia concerning for mixed picture of iron deficiency and inflammatory   # Iron deficiency   # Hx of anemia of chronic disease   Hgb 8.7 on arrival and downtrending. Baseline Hgb ~9-10 reportedly 2/2 anemia of chronic disease. Tbili normal. No source of active bleeding. Workup revealed low iron level, normal TIBC, and low end-of-normal ferritin concerning for iron deficiency anemia.   - IV Iron sucrose 200 mg qday x 5 days (starting 4/9)  - AM CBCs     # Chronic hyponatremia, improved   BL sodium ~130-133. Suspect hypervolemic hyponatremia in setting of CHF. Improved after IV diuresis   - daily BMP    # DM2   On metformin 1,000 mg BID PTA.   - hold PTA metformin   - Continue empagliflozin as above   - LDSS  - Hypoglycemia protocol      # BPH  - PTA tamulosin  - CTM w/ bladder scans and PVRs PRN     # Developmental delay   Lives at assisted living where he receives assistance w/ medications, meals, etc. Independent in basic ADLs (feeding, clothing, bathing) at his baseline. Sister Peg is his HCA and POA but pt currently has capacity   - Delirium precautions      # Constipation   - PTA metamucil         FEN: Soft bite sized (due to lack of dentition) --> NPO at midnight   PPx: PTA Eliquis --> held for [facility]  Code: Full Code     Pt was discussed and evaluated with Dr. Branch, attending physician, who agrees with the assessment and plan above.     Lorenzo Ascencio MD  Internal Medicine PGY-2    Interval Changes in Past 24 Hours:   No acute events overnight. Saw  this morning walking with PT. Doing well, no complaints of pain, SOB.     Review Of Systems  A 4-point ROS was negative aside from those listed above.    OBJECTIVE FINDINGS:    Temp:  [97.5  F (36.4  C)-98.5  F (36.9  C)] 97.8  F (36.6  C)  Pulse:  [60-77] 68  Resp:  [16-18] 18  BP: ()/(54-70) 111/59  Cuff Mean (mmHg):  [74] 74  SpO2:  [92 %-98 %] 98 %    Vitals:    04/09/23 0400 04/10/23 0410 04/11/23 0400   Weight: 70.4 kg (155 lb 3.2 oz) 67.9 kg (149 lb 9.6 oz) 66.8 kg (147 lb 4.8 oz)     Gen: Patient is up and walking with PT  Resp: clear to auscultation bilaterally, no crackles or wheezing   CV: Irregular rhythm, regular rate. 1-2+ pitting edema in extremities   Abd: soft, NT, ND  Ext: warm and well perfused, 2+ lower extremity edema to level of knees     Current medications   Current Facility-Administered Medications   Medication     acetaminophen (TYLENOL) Suppository 650 mg     acetaminophen (TYLENOL) tablet 650 mg     alum & mag hydroxide-simethicone (MAALOX) suspension 30 mL     amiodarone (PACERONE) tablet 200 mg     [Held by provider] apixaban ANTICOAGULANT (ELIQUIS) tablet 5 mg     artificial tears ophthalmic ointment     atorvastatin (LIPITOR) tablet 80 mg     calcium carbonate-vitamin D (CALTRATE) 600-10 MG-MCG per tablet 1 tablet     carboxymethylcellulose PF (REFRESH PLUS) 0.5 % ophthalmic solution 1 drop     carvedilol (COREG) tablet 3.125 mg     glucose gel 15-30 g    Or     dextrose 50 % injection 25-50 mL    Or     glucagon injection 1 mg     diphenhydrAMINE (BENADRYL) injection 50 mg     empagliflozin (JARDIANCE) tablet 10 mg     EPINEPHrine (ADRENALIN) kit 0.3 mg     famotidine (PEPCID) injection 20 mg     insulin aspart (NovoLOG) injection (RAPID ACTING)     insulin aspart (NovoLOG) injection (RAPID ACTING)     iron sucrose (VENOFER) 200 mg in sodium chloride 0.9 % 120 mL intermittent infusion     lidocaine (LMX4) cream     lidocaine 1 % 0.1-1 mL     magnesium oxide (MAG-OX) tablet 800  mg     medication instruction     methylPREDNISolone sodium succinate (solu-MEDROL) injection 125 mg     mexiletine (MEXITIL) capsule 200 mg     miconazole (MICATIN) 2 % powder     nitroGLYcerin (NITROSTAT) sublingual tablet 0.4 mg     potassium chloride ER (KLOR-CON M) CR tablet 20 mEq     psyllium (METAMUCIL/KONSYL) capsule 1 capsule     sodium chloride (PF) 0.9% PF flush 3 mL     sodium chloride (PF) 0.9% PF flush 3 mL     tamsulosin (FLOMAX) capsule 0.4 mg       LABS Reviewed    Na 136, K 3.4--> repleted (4.3), Mg 2.2, Cr 1.51 (1.44 yesterday)   WBC wnl, Hgb 8.4 (8.0), plt 167     IMAGES Reviewed:    ECHO 4/9/23 Interpretation Summary:  Left ventricular function is decreased. The ejection fraction is 40-45%  (mildly reduced compared to 46% back in Dec 2022).   LV size and wall thickness is normal. Abnormal septal wall motion consistent w/ pacemaker.   Global right ventricular function is normal.  Mild aortic valve calcification is present. The mean gradient across the  aortic valve is 8 mmHg.  Pulmonary artery systolic pressure is normal.  IVC diameter and respiratory changes fall into an intermediate range  suggesting an RA pressure of 8 mmHg.  No pericardial effusion is present.

## 2023-04-12 ENCOUNTER — APPOINTMENT (OUTPATIENT)
Dept: PHYSICAL THERAPY | Facility: CLINIC | Age: 68
DRG: 287 | End: 2023-04-12
Attending: INTERNAL MEDICINE
Payer: MEDICARE

## 2023-04-12 ENCOUNTER — HOSPITAL (OUTPATIENT)
Dept: NEUROLOGY | Facility: CLINIC | Age: 68
End: 2023-04-12
Payer: MEDICARE

## 2023-04-12 LAB
ANION GAP SERPL CALCULATED.3IONS-SCNC: 13 MMOL/L (ref 7–15)
ANION GAP SERPL CALCULATED.3IONS-SCNC: 15 MMOL/L (ref 7–15)
BUN SERPL-MCNC: 23.8 MG/DL (ref 8–23)
BUN SERPL-MCNC: 24.3 MG/DL (ref 8–23)
CALCIUM SERPL-MCNC: 8.7 MG/DL (ref 8.8–10.2)
CALCIUM SERPL-MCNC: 9.1 MG/DL (ref 8.8–10.2)
CHLORIDE SERPL-SCNC: 98 MMOL/L (ref 98–107)
CHLORIDE SERPL-SCNC: 99 MMOL/L (ref 98–107)
CREAT SERPL-MCNC: 1.59 MG/DL (ref 0.51–1.17)
CREAT SERPL-MCNC: 1.6 MG/DL (ref 0.51–1.17)
DEPRECATED HCO3 PLAS-SCNC: 24 MMOL/L (ref 22–29)
DEPRECATED HCO3 PLAS-SCNC: 24 MMOL/L (ref 22–29)
ERYTHROCYTE [DISTWIDTH] IN BLOOD BY AUTOMATED COUNT: 18.9 % (ref 10–15)
GFR SERPL CREATININE-BSD FRML MDRD: 47 ML/MIN/1.73M2
GFR SERPL CREATININE-BSD FRML MDRD: 47 ML/MIN/1.73M2
GLUCOSE BLDC GLUCOMTR-MCNC: 124 MG/DL (ref 70–99)
GLUCOSE BLDC GLUCOMTR-MCNC: 148 MG/DL (ref 70–99)
GLUCOSE BLDC GLUCOMTR-MCNC: 159 MG/DL (ref 70–99)
GLUCOSE BLDC GLUCOMTR-MCNC: 172 MG/DL (ref 70–99)
GLUCOSE BLDC GLUCOMTR-MCNC: 229 MG/DL (ref 70–99)
GLUCOSE SERPL-MCNC: 130 MG/DL (ref 70–99)
GLUCOSE SERPL-MCNC: 160 MG/DL (ref 70–99)
HCT VFR BLD AUTO: 27.9 % (ref 35–53)
HGB BLD-MCNC: 8.4 G/DL (ref 11.7–17.7)
HOLD SPECIMEN: NORMAL
MAGNESIUM SERPL-MCNC: 1.9 MG/DL (ref 1.7–2.3)
MAGNESIUM SERPL-MCNC: 2.1 MG/DL (ref 1.7–2.3)
MCH RBC QN AUTO: 24.9 PG (ref 26.5–33)
MCHC RBC AUTO-ENTMCNC: 30.1 G/DL (ref 31.5–36.5)
MCV RBC AUTO: 83 FL (ref 78–100)
PLATELET # BLD AUTO: 185 10E3/UL (ref 150–450)
POTASSIUM SERPL-SCNC: 4.2 MMOL/L (ref 3.4–5.3)
POTASSIUM SERPL-SCNC: 4.7 MMOL/L (ref 3.4–5.3)
RBC # BLD AUTO: 3.38 10E6/UL (ref 3.8–5.9)
SODIUM SERPL-SCNC: 136 MMOL/L (ref 136–145)
SODIUM SERPL-SCNC: 137 MMOL/L (ref 136–145)
WBC # BLD AUTO: 6.1 10E3/UL (ref 4–11)

## 2023-04-12 PROCEDURE — 250N000013 HC RX MED GY IP 250 OP 250 PS 637: Performed by: STUDENT IN AN ORGANIZED HEALTH CARE EDUCATION/TRAINING PROGRAM

## 2023-04-12 PROCEDURE — 80048 BASIC METABOLIC PNL TOTAL CA: CPT

## 2023-04-12 PROCEDURE — 99231 SBSQ HOSP IP/OBS SF/LOW 25: CPT | Mod: GC | Performed by: STUDENT IN AN ORGANIZED HEALTH CARE EDUCATION/TRAINING PROGRAM

## 2023-04-12 PROCEDURE — 97530 THERAPEUTIC ACTIVITIES: CPT | Mod: GP

## 2023-04-12 PROCEDURE — 250N000011 HC RX IP 250 OP 636: Performed by: STUDENT IN AN ORGANIZED HEALTH CARE EDUCATION/TRAINING PROGRAM

## 2023-04-12 PROCEDURE — 83735 ASSAY OF MAGNESIUM: CPT

## 2023-04-12 PROCEDURE — 214N000001 HC R&B CCU UMMC

## 2023-04-12 PROCEDURE — 96132 NRPSYC TST EVAL PHYS/QHP 1ST: CPT

## 2023-04-12 PROCEDURE — 36415 COLL VENOUS BLD VENIPUNCTURE: CPT

## 2023-04-12 PROCEDURE — 97116 GAIT TRAINING THERAPY: CPT | Mod: GP

## 2023-04-12 PROCEDURE — 96133 NRPSYC TST EVAL PHYS/QHP EA: CPT

## 2023-04-12 PROCEDURE — 90791 PSYCH DIAGNOSTIC EVALUATION: CPT

## 2023-04-12 PROCEDURE — 250N000013 HC RX MED GY IP 250 OP 250 PS 637

## 2023-04-12 PROCEDURE — 258N000003 HC RX IP 258 OP 636: Performed by: STUDENT IN AN ORGANIZED HEALTH CARE EDUCATION/TRAINING PROGRAM

## 2023-04-12 PROCEDURE — 85027 COMPLETE CBC AUTOMATED: CPT

## 2023-04-12 RX ORDER — TORSEMIDE 20 MG/1
40 TABLET ORAL DAILY
Status: DISCONTINUED | OUTPATIENT
Start: 2023-04-12 | End: 2023-04-13

## 2023-04-12 RX ORDER — MAGNESIUM OXIDE 400 MG/1
400 TABLET ORAL EVERY 4 HOURS
Status: COMPLETED | OUTPATIENT
Start: 2023-04-12 | End: 2023-04-12

## 2023-04-12 RX ADMIN — Medication 400 MG: at 12:16

## 2023-04-12 RX ADMIN — TAMSULOSIN HYDROCHLORIDE 0.4 MG: 0.4 CAPSULE ORAL at 20:56

## 2023-04-12 RX ADMIN — AMIODARONE HYDROCHLORIDE 200 MG: 200 TABLET ORAL at 08:10

## 2023-04-12 RX ADMIN — APIXABAN 5 MG: 5 TABLET, FILM COATED ORAL at 08:10

## 2023-04-12 RX ADMIN — MEXILETINE HYDROCHLORIDE 200 MG: 200 CAPSULE ORAL at 15:48

## 2023-04-12 RX ADMIN — WHITE PETROLATUM 57.7 %-MINERAL OIL 31.9 % EYE OINTMENT: at 22:42

## 2023-04-12 RX ADMIN — MAGNESIUM OXIDE TAB 400 MG (241.3 MG ELEMENTAL MG) 800 MG: 400 (241.3 MG) TAB at 08:11

## 2023-04-12 RX ADMIN — AMIODARONE HYDROCHLORIDE 200 MG: 200 TABLET ORAL at 20:56

## 2023-04-12 RX ADMIN — EMPAGLIFLOZIN 10 MG: 10 TABLET, FILM COATED ORAL at 08:09

## 2023-04-12 RX ADMIN — POTASSIUM CHLORIDE 20 MEQ: 750 TABLET, EXTENDED RELEASE ORAL at 08:09

## 2023-04-12 RX ADMIN — CARVEDILOL 3.12 MG: 3.12 TABLET, FILM COATED ORAL at 20:56

## 2023-04-12 RX ADMIN — ATORVASTATIN CALCIUM 80 MG: 80 TABLET, FILM COATED ORAL at 08:09

## 2023-04-12 RX ADMIN — Medication 1 CAPSULE: at 08:09

## 2023-04-12 RX ADMIN — MICONAZOLE NITRATE: 20 POWDER TOPICAL at 12:19

## 2023-04-12 RX ADMIN — MEXILETINE HYDROCHLORIDE 200 MG: 200 CAPSULE ORAL at 06:37

## 2023-04-12 RX ADMIN — MEXILETINE HYDROCHLORIDE 200 MG: 200 CAPSULE ORAL at 22:42

## 2023-04-12 RX ADMIN — TORSEMIDE 40 MG: 20 TABLET ORAL at 12:16

## 2023-04-12 RX ADMIN — Medication 400 MG: at 08:09

## 2023-04-12 RX ADMIN — INSULIN ASPART 1 UNITS: 100 INJECTION, SOLUTION INTRAVENOUS; SUBCUTANEOUS at 12:16

## 2023-04-12 RX ADMIN — APIXABAN 5 MG: 5 TABLET, FILM COATED ORAL at 20:56

## 2023-04-12 RX ADMIN — CALCIUM CARBONATE 600 MG (1,500 MG)-VITAMIN D3 400 UNIT TABLET 1 TABLET: at 08:09

## 2023-04-12 RX ADMIN — IRON SUCROSE 200 MG: 20 INJECTION, SOLUTION INTRAVENOUS at 17:26

## 2023-04-12 RX ADMIN — INSULIN ASPART 1 UNITS: 100 INJECTION, SOLUTION INTRAVENOUS; SUBCUTANEOUS at 17:26

## 2023-04-12 RX ADMIN — MICONAZOLE NITRATE: 20 POWDER TOPICAL at 20:56

## 2023-04-12 RX ADMIN — CARVEDILOL 3.12 MG: 3.12 TABLET, FILM COATED ORAL at 08:09

## 2023-04-12 ASSESSMENT — ACTIVITIES OF DAILY LIVING (ADL)
ADLS_ACUITY_SCORE: 40

## 2023-04-12 NOTE — PROGRESS NOTES
Pt was seen for neuropsychological evaluation at the request of Dr. Josh Whelan for the purposes of diagnostic clarification and treatment planning. 201 minutes of test administration and scoring were provided by this writer. Please see Dr. Kim Baird's report for a full interpretation of the findings.    Vipin Gomez MA, CSP

## 2023-04-12 NOTE — PROGRESS NOTES
Care Coordinator  D/I: Pt being worked up for advanced therapies: heart transplant vs IV Inotropes.  P: Per chart: pt lives in an assisted living and radha Alegria is to find out if they will allow the IV Inotropes: per email from Antonina Alegria today: The Assisted living stated that they would not be able to manage IV medications. I talked with someone there on Tuesday           Pt has Dev Delay--need to assess teaching and learning_____I have sent referral to [facility] to check home coverage____: Per email:  Hello,    The patient meets Medicare criteria for coverage for IV Inotrope therapy.  Between both Medicare and [place] Medicaid the patient should be covered at 100%    Thank you,    Torie Villalta (AJ)? Intake/   [facility]   [facility]   [address] [place] [id number]    [email or web address]? [email or web address]    Office:[phone]   Fax: [phone]    Will follow.

## 2023-04-12 NOTE — PROGRESS NOTES
[facility]  CARDIOLOGY HEART FAILURE SERVICE (CARDS II) PROGRESS NOTE    Patient Name: Rohit Garvey    Medical Record Number: [id number]    YOB: 1955  PCP: Kevyn Salzaar    Admit Date/Time: 4/8/2023  3:08 PM   4    Assessment and Plan:  Rohit Garvey is a 67 year old adult male with a PMH significant for NICM (EF of 40-45%) c/b paroxysmal VT s/p dual chamber ICD placement 6/20/2022 c/b recurrent ICD shocks s/p VT ablation 10/31/2022 and re-ablation 12/29/2022, paroxysmal Afib on apixaban, developmental delay (possibly related to viral illnesses during infancy), T2DM, hypertension, and hyperlipidemia who was admitted as a transfer from [place] per recommendations of his cardiologists Dr. Urbina and Dr. Chanel for evaluation by advanced heart failure failure team and EP team for VT management and consideration of advanced therapies.    Today's updates:  - Neuropsych testing this AM   - Scheduled for cardiopulmonary stress test 8 AM on 4/13.  - Transitioned from IV to PO diuresis today: Start torsemide 40 mg qday  - Pt leaning against heart transplant. Reached out to EP to see pt today to evaluate for arrhythmia interventions  - To discuss pt at upcoming heart transplant committee meeting 4/14     # Persistent Paroxysmal Ventricular Tachycardia s/p dual chamber ICD placement 6/2022 and VT ablation x 2  # Chronic HFrEF 2/2 NICM w/ LVEF 40-45%, NYHA II  High burden of ventricular arrhythmias despite being on 2 antiarrhythmic medications (amiodarone and mexiletine) and being s/p ICD and two attempted VT ablations. Transferred here for evaluation for advanced therapies. ECHO 4/9 showed mildly reduced EF from 46% (Dec 2022) to 40-45%, however still does not qualify him for LVAD (EF < 25%). Possible heart transplant candidate if he meets criteria for VT storm.   - EP consulted, appreciate recs   - ICD device interrogation ordered - 1 VT episode on 4/9/23 lasting 25  seconds   - Was previously holding off on evaluation for arrhythmia interventions while working up for advanced therapies. Pt reported 4/12 he is  leaning against heart transplant but would be interested in possible arrhythmia interventions. Reached out to EP for assessment   - Continue tele     Advanced therapies evaluation workup:  - ECHO 4/9 w/ mildly reduced LV function (EF reduction from 46% to 40-45%) and mild aortic valve calcification   - RHC 4/11:   RA mean 11mmHg. RV 38/4. RVEDP 13. PA 39/16/25. PCWP 16  TD CO/CI: 3.85 L/min / 2.25 L/min/m2  Shena CO/CI: 3.37 L/min / 1.97 L/min/m2    - Cardiopulmonary stress test scheduled for 8 AM on 4/13   - Advanced therapy  consulted  - Neuropsych testing 4/11 and 4/12  - Financial consult already placed  - Utox screen positive for amphetamines. Quant screen still pending   - Nicotine and cotinine screen negative. PEth negative  - To discuss pt at upcoming heart transplant committee meeting 4/14     GDMTs:  - Antiarrhythmics: PTA amiodarone 200 mg BID and mexiletine 200 mg q8h   - ACEi/ARB: Not currently on due to LVEF of 45%.  - BB: PTA carvedilol 3.125 mg BID   - Aldosterone antagonist: None   - SGLT2i: PTA empagliflozin 10 mg daily   - SCD prophylaxis: S/p secondary prevention ICD 6/20/22  - Volume status: Still slightly hypervolemic                - S/p IV lasix 80 mg x 2 on 4/9 and IV lasix 100mg 4/10, 4/11. Start torsemide 40 mg qday                - Hold PTA lasix 40 mg TID (recently increased from qday) while on IV diuresis                - continue PTA KCl supplement 20 meq daily                - continue PTA magnesium oxide 800 mg daily                - BMP BID w/ goal of K > 4 and Mg > 2               - Strict I/Os and daily weights   - Anticoagulation: PTA Eliquis 5 mg BID   - Statin therapy: PTA atorvastatin 80 mg daily     # Positive U tox for amphetamines  Pt and sister deny any illegal or legal stimulant use. No obvious PTA drugs on  inspection that could be causing a false positive.  - Urine amphetamine quantitative screen ordered pending      # Recent clavicular fracture  At baseline uses walker for ambulation. Had recent clavicle fracture now requiring him to be wheelchair bound. Supposed to be non weight bearing on LUE for another 4 weeks   - Fall precautions  - NWB LUE precautions provided   - PT/OT consulted     # Afib w/o RVR  - PTA carvedilol, amiodarone, and mexiletine   - PTA Eliquis 5 mg BID  - Tele     # ROSALINO on CKD, stable  Uptrending Cr above baseline Cr of 1.3. No observed mention of etiology of pt's CKD in chart. UA w/o protein, RBCs, or casts. Concern for baseline cardiorenal syndrome  - diuresis per above   - BID BMP     # Acute on chronic normocytic anemia concerning for mixed picture of iron deficiency and inflammatory   # Iron deficiency   # Hx of anemia of chronic disease   Hgb 8.7 on arrival and downtrending. Baseline Hgb ~9-10 reportedly 2/2 anemia of chronic disease. Tbili normal. No source of active bleeding. Workup revealed low iron level, normal TIBC, and low end-of-normal ferritin concerning for iron deficiency anemia.   - IV Iron sucrose 200 mg qday x 5 days (starting 4/9)  - AM CBCs     # Chronic hyponatremia, improved   BL sodium ~130-133. Suspect hypervolemic hyponatremia in setting of CHF. Improved after IV diuresis   - daily BMP    # DM2   On metformin 1,000 mg BID PTA.   - hold PTA metformin   - Continue empagliflozin as above   - LDSS  - Hypoglycemia protocol      # BPH  - PTA tamulosin  - CTM w/ bladder scans and PVRs PRN     # Developmental delay   Lives at assisted living where he receives assistance w/ medications, meals, etc. Independent in basic ADLs (feeding, clothing, bathing) at his baseline. Sister Peg is his HCA and POA but pt currently has capacity   - Delirium precautions      # Constipation   - PTA metamucil      FEN: Soft bite sized (due to lack of dentition), 2 L fluid restriction   PPx: PTA  Eliquis   Code: Full Code     Pt was discussed and evaluated with Dr. Branch, attending physician, who agrees with the assessment and plan above.     Prachi Call, DO  Internal Medicine, PGY-I    Interval Changes in Past 24 Hours:   No acute events overnight. Doing well, no complaints of pain, SOB. Leaning against heart transplant at this time. Wondering about any further interventions for his VT, however.     Review Of Systems  A 4-point ROS was negative aside from those listed above.    OBJECTIVE FINDINGS:    Temp:  [97.5  F (36.4  C)-98.7  F (37.1  C)] 97.7  F (36.5  C)  Pulse:  [60-79] 79  Resp:  [16-20] 18  BP: ()/(50-63) 92/53  SpO2:  [90 %-99 %] 90 %     MAPs: 70-84. 78 this AM     I/Os: net -185 ml yesterday. Net +10 ml since midnight     Vitals:    04/10/23 0410 04/11/23 0400 04/12/23 0600   Weight: 67.9 kg (149 lb 9.6 oz) 66.8 kg (147 lb 4.8 oz) 64.3 kg (141 lb 11.2 oz)     Gen: Sitting in chair in no acute distress   Resp: clear to auscultation bilaterally, no crackles or wheezing   CV: Irregular rhythm, regular rate.   Abd: soft, NT, ND  Ext: warm and well perfused, 2+ lower extremity edema to level of knees     Current medications   Current Facility-Administered Medications   Medication     acetaminophen (TYLENOL) Suppository 650 mg     acetaminophen (TYLENOL) tablet 650 mg     alum & mag hydroxide-simethicone (MAALOX) suspension 30 mL     amiodarone (PACERONE) tablet 200 mg     apixaban ANTICOAGULANT (ELIQUIS) tablet 5 mg     artificial tears ophthalmic ointment     atorvastatin (LIPITOR) tablet 80 mg     calcium carbonate-vitamin D (CALTRATE) 600-10 MG-MCG per tablet 1 tablet     carboxymethylcellulose PF (REFRESH PLUS) 0.5 % ophthalmic solution 1 drop     carvedilol (COREG) tablet 3.125 mg     glucose gel 15-30 g    Or     dextrose 50 % injection 25-50 mL    Or     glucagon injection 1 mg     diphenhydrAMINE (BENADRYL) injection 50 mg     empagliflozin (JARDIANCE) tablet 10 mg     EPINEPHrine  (ADRENALIN) kit 0.3 mg     famotidine (PEPCID) injection 20 mg     insulin aspart (NovoLOG) injection (RAPID ACTING)     insulin aspart (NovoLOG) injection (RAPID ACTING)     iron sucrose (VENOFER) 200 mg in sodium chloride 0.9 % 120 mL intermittent infusion     lidocaine (LMX4) cream     lidocaine 1 % 0.1-1 mL     magnesium oxide (MAG-OX) tablet 400 mg     magnesium oxide (MAG-OX) tablet 800 mg     medication instruction     methylPREDNISolone sodium succinate (solu-MEDROL) injection 125 mg     mexiletine (MEXITIL) capsule 200 mg     miconazole (MICATIN) 2 % powder     nitroGLYcerin (NITROSTAT) sublingual tablet 0.4 mg     potassium chloride ER (KLOR-CON M) CR tablet 20 mEq     psyllium (METAMUCIL/KONSYL) capsule 1 capsule     sodium chloride (PF) 0.9% PF flush 3 mL     sodium chloride (PF) 0.9% PF flush 3 mL     tamsulosin (FLOMAX) capsule 0.4 mg       LABS Reviewed    Na 136, K 4.7, Mg 1.9, Cr 1.59 (1.54 12 hours ago, 1.57 24 hours ago) w/ BUN of 23 (20)  Cell lines stable   Nicotine screen negative. Amphetamine screen still pending    IMAGES Reviewed:    ECHO 4/9/23 Interpretation Summary:  Left ventricular function is decreased. The ejection fraction is 40-45%  (mildly reduced compared to 46% back in Dec 2022).   LV size and wall thickness is normal. Abnormal septal wall motion consistent w/ pacemaker.   Global right ventricular function is normal.  Mild aortic valve calcification is present. The mean gradient across the  aortic valve is 8 mmHg.  Pulmonary artery systolic pressure is normal.  IVC diameter and respiratory changes fall into an intermediate range  suggesting an RA pressure of 8 mmHg.  No pericardial effusion is present.    ICD interrogation 4/10/23:  Device: Abbott [id number] Mozelle HF  Normal Device Function.   Mode: DDDR 60-90 bpm  AP: 49%  BVP: 91%  Intrinsic rhythm: CHB w/ AS @ 60 bpm; no intrinsic R-waves at VVI 30 bpm  Lead Trends Appear Stable: Yes  Estimated battery longevity to RRT = 5.9  years. Remaining Capacity to OSCAR = 92%.  Atrial arrhythmia: None  AF burden: 0%  Anticoagulant: Eliquis  Ventricular Arrhythmia: 1 VT episode recorded on 4/9/23 at 9:43 AM, lasting ~25 seconds at 114 bpm. EGM reveals a slow VT which is converted with one sequence of ATP.  Setting changes: None    RHC 4/11/23:  RA mean 11mmHg  RV 40/11  PA 39/16/25  PCWP 16    Pa Sat 49.9%  Art Sat 99%    TD CO/CI: 3.85 L/min / 2.25 L/min/m2  Shena CO/CI: 3.37 L/min / 1.97 L/min/m2    PVR 2.3 (TD)  SVR  1538 (TD) Right sided filling pressures are mildly elevated. Left sided filling pressures are mildly elevated. Mild elevated pulmonary hypertension. Reduced cardiac output level. Hemodynamic data has been modified in Epic per physician review.

## 2023-04-12 NOTE — PLAN OF CARE
DX: heart transplant workup  PMH:   NICM (EF of 40-45%) c/b paroxysmal VT s/p dual chamber ICD placement 6/20/2022 c/b recurrent ICD shocks s/p VT ablation 10/31/2022 and re-ablation 12/29/2022, paroxysmal Afib on apixaban, developmental delay (possibly related to viral illnesses during infancy), T2DM, hypertension, and hyperlipidemia     Code Status:  full Team: cards 2    Cardiac: 100% paced, VSS  Respiratory: diminished LS in bases   Neuro: AxO x4,   Pain: denies  GI/: urinates frequently, has regular Bms   Diet: cardiac  Skin: +2 edema in BLE, L clavicle broken, do not use L arm   Activity: Ax1 w/ walker and gait belt     Gtts/fluid: intermittent diuretics     Plan: continue to follow POC

## 2023-04-12 NOTE — PLAN OF CARE
Admitted for HF evaluation, VT management and consideration for advanced HF therapies. PMHx of  NICM (EF of 40-45%) c/b paroxysmal VT s/p dual chamber ICD placement 6/20/2022 c/b recurrent ICD shocks s/p VT ablation 10/31/2022 and re-ablation 12/29/2022, paroxysmal Afib on apixaban, developmental delay (possibly related to viral illnesses during infancy), T2DM, hypertension, and hyperlipidemia     Code status: Full code   Team: Cards 2     Neuro: AOx4, Calm and cooperative, no neuro deficits noted   Cardiac/Tele/VS: AV-Paced, HR 60's, BP soft (SBP in the 90's), afebrile, no VT, chest pain or palpitations. Afebrile, other VSS  Respiratory: Room air, No SOB noted. Lung sounds dim at the bases. O2 sats > 92%.  GI/: Voids spontaneously. BS audible. LBM 4/11  Diet/Appetite: Cardiac diet with 2L FR  Skin: Rash in the perineum, No other overt skin deficits  Endocrine: BG checks ACHS with sliding scale coverage  LDAs: PIV x1 saline locked  Activity: Up assist of 1 and a walker. Broken Left clavicle (Non weight bearing on L. arm)  Pain: Denies pain     Plan: Continue POC, notify team of concerns

## 2023-04-12 NOTE — PLAN OF CARE
D: Admitted 4/8 as a transfer from [place] per recommendations of his cardiologists Dr. Urbina and Dr. Chanel for evaluation by advanced heart failure failure team and EP team for VT management and consideration of advanced therapies.    I: Monitored vitals and assessed pt status.   Tele: AV paced, HR 60-70's  Mobility: Ax1 w/ GB and walker, must wear sling to L arm while up ambulating     A: AOx4. VSS. Afebrile. Urinating adequately. LBM today. Denies any pain.     P: Continue to monitor Pt status and report changes to Cards 2.

## 2023-04-13 ENCOUNTER — HOME INFUSION (PRE-WILLOW HOME INFUSION) (OUTPATIENT)
Dept: PHARMACY | Facility: CLINIC | Age: 68
End: 2023-04-13
Payer: MEDICARE

## 2023-04-13 ENCOUNTER — APPOINTMENT (OUTPATIENT)
Dept: OCCUPATIONAL THERAPY | Facility: CLINIC | Age: 68
DRG: 287 | End: 2023-04-13
Attending: INTERNAL MEDICINE
Payer: MEDICARE

## 2023-04-13 LAB
AMPHET UR-MCNC: <50 NG/ML
ANION GAP SERPL CALCULATED.3IONS-SCNC: 12 MMOL/L (ref 7–15)
ANION GAP SERPL CALCULATED.3IONS-SCNC: 15 MMOL/L (ref 7–15)
BUN SERPL-MCNC: 27.1 MG/DL (ref 8–23)
BUN SERPL-MCNC: 29 MG/DL (ref 8–23)
CALCIUM SERPL-MCNC: 8.9 MG/DL (ref 8.8–10.2)
CALCIUM SERPL-MCNC: 9.1 MG/DL (ref 8.8–10.2)
CHLORIDE SERPL-SCNC: 96 MMOL/L (ref 98–107)
CHLORIDE SERPL-SCNC: 99 MMOL/L (ref 98–107)
CREAT SERPL-MCNC: 1.85 MG/DL (ref 0.51–1.17)
CREAT SERPL-MCNC: 1.93 MG/DL (ref 0.51–1.17)
DEPRECATED HCO3 PLAS-SCNC: 26 MMOL/L (ref 22–29)
DEPRECATED HCO3 PLAS-SCNC: 27 MMOL/L (ref 22–29)
ERYTHROCYTE [DISTWIDTH] IN BLOOD BY AUTOMATED COUNT: 19.7 % (ref 10–15)
GFR SERPL CREATININE-BSD FRML MDRD: 37 ML/MIN/1.73M2
GFR SERPL CREATININE-BSD FRML MDRD: 39 ML/MIN/1.73M2
GLUCOSE BLDC GLUCOMTR-MCNC: 167 MG/DL (ref 70–99)
GLUCOSE BLDC GLUCOMTR-MCNC: 170 MG/DL (ref 70–99)
GLUCOSE BLDC GLUCOMTR-MCNC: 177 MG/DL (ref 70–99)
GLUCOSE BLDC GLUCOMTR-MCNC: 181 MG/DL (ref 70–99)
GLUCOSE SERPL-MCNC: 142 MG/DL (ref 70–99)
GLUCOSE SERPL-MCNC: 208 MG/DL (ref 70–99)
HCT VFR BLD AUTO: 28.9 % (ref 35–53)
HGB BLD-MCNC: 8.9 G/DL (ref 11.7–17.7)
MAGNESIUM SERPL-MCNC: 1.9 MG/DL (ref 1.7–2.3)
MAGNESIUM SERPL-MCNC: 2 MG/DL (ref 1.7–2.3)
MCH RBC QN AUTO: 25.1 PG (ref 26.5–33)
MCHC RBC AUTO-ENTMCNC: 30.8 G/DL (ref 31.5–36.5)
MCV RBC AUTO: 81 FL (ref 78–100)
MDA UR-MCNC: <200 NG/ML
MDC_IDC_LEAD_IMPLANT_DT: NORMAL
MDC_IDC_LEAD_LOCATION: NORMAL
MDC_IDC_LEAD_LOCATION_DETAIL_1: NORMAL
MDC_IDC_LEAD_MFG: NORMAL
MDC_IDC_LEAD_MODEL: NORMAL
MDC_IDC_LEAD_POLARITY_TYPE: NORMAL
MDC_IDC_LEAD_SERIAL: NORMAL
MDC_IDC_LEAD_SPECIAL_FUNCTION: NORMAL
MDC_IDC_MSMT_BATTERY_DTM: NORMAL
MDC_IDC_MSMT_BATTERY_REMAINING_LONGEVITY: 70 MO
MDC_IDC_MSMT_BATTERY_REMAINING_PERCENTAGE: 92 %
MDC_IDC_MSMT_BATTERY_STATUS: NORMAL
MDC_IDC_MSMT_CAP_CHARGE_DTM: NORMAL
MDC_IDC_MSMT_CAP_CHARGE_TYPE: NORMAL
MDC_IDC_MSMT_LEADCHNL_LV_IMPEDANCE_VALUE: 337.5 OHM
MDC_IDC_MSMT_LEADCHNL_LV_PACING_THRESHOLD_AMPLITUDE: 0.75 V
MDC_IDC_MSMT_LEADCHNL_LV_PACING_THRESHOLD_AMPLITUDE: 1.75 V
MDC_IDC_MSMT_LEADCHNL_LV_PACING_THRESHOLD_PULSEWIDTH: 0.5 MS
MDC_IDC_MSMT_LEADCHNL_LV_PACING_THRESHOLD_PULSEWIDTH: 0.5 MS
MDC_IDC_MSMT_LEADCHNL_RA_IMPEDANCE_VALUE: 325 OHM
MDC_IDC_MSMT_LEADCHNL_RA_PACING_THRESHOLD_AMPLITUDE: 1.25 V
MDC_IDC_MSMT_LEADCHNL_RA_PACING_THRESHOLD_PULSEWIDTH: 0.5 MS
MDC_IDC_MSMT_LEADCHNL_RA_SENSING_INTR_AMPL: 0.9 MV
MDC_IDC_MSMT_LEADCHNL_RV_IMPEDANCE_VALUE: 287.5 OHM
MDC_IDC_MSMT_LEADCHNL_RV_PACING_THRESHOLD_AMPLITUDE: 1.5 V
MDC_IDC_MSMT_LEADCHNL_RV_PACING_THRESHOLD_PULSEWIDTH: 0.5 MS
MDC_IDC_PG_IMPLANT_DTM: NORMAL
MDC_IDC_PG_MFG: NORMAL
MDC_IDC_PG_MODEL: NORMAL
MDC_IDC_PG_SERIAL: NORMAL
MDC_IDC_PG_TYPE: NORMAL
MDC_IDC_SESS_CLINIC_NAME: NORMAL
MDC_IDC_SESS_DTM: NORMAL
MDC_IDC_SESS_TYPE: NORMAL
MDC_IDC_SET_BRADY_AT_MODE_SWITCH_MODE: NORMAL
MDC_IDC_SET_BRADY_AT_MODE_SWITCH_RATE: 180 {BEATS}/MIN
MDC_IDC_SET_BRADY_HYSTRATE: NORMAL
MDC_IDC_SET_BRADY_LOWRATE: 60 {BEATS}/MIN
MDC_IDC_SET_BRADY_MAX_SENSOR_RATE: 90 {BEATS}/MIN
MDC_IDC_SET_BRADY_MAX_TRACKING_RATE: 90 {BEATS}/MIN
MDC_IDC_SET_BRADY_MODE: NORMAL
MDC_IDC_SET_BRADY_NIGHT_RATE: NORMAL
MDC_IDC_SET_BRADY_PAV_DELAY_HIGH: 100 MS
MDC_IDC_SET_BRADY_PAV_DELAY_LOW: 180 MS
MDC_IDC_SET_BRADY_SAV_DELAY_HIGH: 100 MS
MDC_IDC_SET_BRADY_SAV_DELAY_LOW: 150 MS
MDC_IDC_SET_CRT_LVRV_DELAY: 20 MS
MDC_IDC_SET_CRT_PACED_CHAMBERS: NORMAL
MDC_IDC_SET_LEADCHNL_LV_PACING_AMPLITUDE: 2.62
MDC_IDC_SET_LEADCHNL_LV_PACING_ANODE_ELECTRODE_1: NORMAL
MDC_IDC_SET_LEADCHNL_LV_PACING_ANODE_LOCATION_1: NORMAL
MDC_IDC_SET_LEADCHNL_LV_PACING_CAPTURE_MODE: NORMAL
MDC_IDC_SET_LEADCHNL_LV_PACING_CATHODE_ELECTRODE_1: NORMAL
MDC_IDC_SET_LEADCHNL_LV_PACING_CATHODE_LOCATION_1: NORMAL
MDC_IDC_SET_LEADCHNL_LV_PACING_POLARITY: NORMAL
MDC_IDC_SET_LEADCHNL_LV_PACING_PULSEWIDTH: 0.5 MS
MDC_IDC_SET_LEADCHNL_RA_PACING_AMPLITUDE: 2.5 V
MDC_IDC_SET_LEADCHNL_RA_PACING_CAPTURE_MODE: NORMAL
MDC_IDC_SET_LEADCHNL_RA_PACING_POLARITY: NORMAL
MDC_IDC_SET_LEADCHNL_RA_PACING_PULSEWIDTH: 0.5 MS
MDC_IDC_SET_LEADCHNL_RA_SENSING_ADAPTATION_MODE: NORMAL
MDC_IDC_SET_LEADCHNL_RA_SENSING_POLARITY: NORMAL
MDC_IDC_SET_LEADCHNL_RV_PACING_AMPLITUDE: 2.38
MDC_IDC_SET_LEADCHNL_RV_PACING_CAPTURE_MODE: NORMAL
MDC_IDC_SET_LEADCHNL_RV_PACING_POLARITY: NORMAL
MDC_IDC_SET_LEADCHNL_RV_PACING_PULSEWIDTH: 0.5 MS
MDC_IDC_SET_LEADCHNL_RV_SENSING_POLARITY: NORMAL
MDC_IDC_SET_LEADCHNL_RV_SENSING_SENSITIVITY: 0.3 MV
MDC_IDC_SET_ZONE_DETECTION_INTERVAL: 270 MS
MDC_IDC_SET_ZONE_DETECTION_INTERVAL: 300 MS
MDC_IDC_SET_ZONE_DETECTION_INTERVAL: 590 MS
MDC_IDC_SET_ZONE_TYPE: NORMAL
MDC_IDC_STAT_AT_DTM_END: NORMAL
MDC_IDC_STAT_AT_DTM_START: NORMAL
MDC_IDC_STAT_AT_MODE_SW_COUNT: 0
MDC_IDC_STAT_BRADY_DTM_END: NORMAL
MDC_IDC_STAT_BRADY_DTM_START: NORMAL
MDC_IDC_STAT_BRADY_RA_PERCENT_PACED: 49 %
MDC_IDC_STAT_BRADY_RV_PERCENT_PACED: 91 %
MDC_IDC_STAT_EPISODE_RECENT_COUNT: 1
MDC_IDC_STAT_EPISODE_RECENT_COUNT_DTM_END: NORMAL
MDC_IDC_STAT_EPISODE_RECENT_COUNT_DTM_START: NORMAL
MDC_IDC_STAT_EPISODE_TYPE: NORMAL
MDC_IDC_STAT_HEART_RATE_DTM_END: NORMAL
MDC_IDC_STAT_HEART_RATE_DTM_START: NORMAL
MDC_IDC_STAT_TACHYTHERAPY_ATP_DELIVERED_RECENT: 1
MDC_IDC_STAT_TACHYTHERAPY_RECENT_DTM_END: NORMAL
MDC_IDC_STAT_TACHYTHERAPY_RECENT_DTM_START: NORMAL
MDC_IDC_STAT_TACHYTHERAPY_SHOCKS_ABORTED_RECENT: 0
MDC_IDC_STAT_TACHYTHERAPY_SHOCKS_DELIVERED_RECENT: 0
MDEA UR-MCNC: <200 NG/ML
MDMA UR-MCNC: <200 NG/ML
METHAMPHET UR-MCNC: <200 NG/ML
PHENTERMINE UR CFM-MCNC: <200 NG/ML
PLATELET # BLD AUTO: 178 10E3/UL (ref 150–450)
POTASSIUM SERPL-SCNC: 3.8 MMOL/L (ref 3.4–5.3)
POTASSIUM SERPL-SCNC: 4.4 MMOL/L (ref 3.4–5.3)
RBC # BLD AUTO: 3.55 10E6/UL (ref 3.8–5.9)
SODIUM SERPL-SCNC: 135 MMOL/L (ref 136–145)
SODIUM SERPL-SCNC: 140 MMOL/L (ref 136–145)
WBC # BLD AUTO: 13.4 10E3/UL (ref 4–11)

## 2023-04-13 PROCEDURE — 83735 ASSAY OF MAGNESIUM: CPT

## 2023-04-13 PROCEDURE — 99232 SBSQ HOSP IP/OBS MODERATE 35: CPT | Mod: GC | Performed by: STUDENT IN AN ORGANIZED HEALTH CARE EDUCATION/TRAINING PROGRAM

## 2023-04-13 PROCEDURE — 80048 BASIC METABOLIC PNL TOTAL CA: CPT

## 2023-04-13 PROCEDURE — 250N000013 HC RX MED GY IP 250 OP 250 PS 637

## 2023-04-13 PROCEDURE — 36415 COLL VENOUS BLD VENIPUNCTURE: CPT

## 2023-04-13 PROCEDURE — 97530 THERAPEUTIC ACTIVITIES: CPT | Mod: GO

## 2023-04-13 PROCEDURE — 258N000003 HC RX IP 258 OP 636: Performed by: STUDENT IN AN ORGANIZED HEALTH CARE EDUCATION/TRAINING PROGRAM

## 2023-04-13 PROCEDURE — 214N000001 HC R&B CCU UMMC

## 2023-04-13 PROCEDURE — 85027 COMPLETE CBC AUTOMATED: CPT

## 2023-04-13 PROCEDURE — 250N000013 HC RX MED GY IP 250 OP 250 PS 637: Performed by: STUDENT IN AN ORGANIZED HEALTH CARE EDUCATION/TRAINING PROGRAM

## 2023-04-13 PROCEDURE — 99233 SBSQ HOSP IP/OBS HIGH 50: CPT | Mod: 25 | Performed by: NURSE PRACTITIONER

## 2023-04-13 PROCEDURE — 250N000011 HC RX IP 250 OP 636: Performed by: STUDENT IN AN ORGANIZED HEALTH CARE EDUCATION/TRAINING PROGRAM

## 2023-04-13 PROCEDURE — 97535 SELF CARE MNGMENT TRAINING: CPT | Mod: GO

## 2023-04-13 RX ORDER — MAGNESIUM OXIDE 400 MG/1
400 TABLET ORAL EVERY 4 HOURS
Status: COMPLETED | OUTPATIENT
Start: 2023-04-13 | End: 2023-04-13

## 2023-04-13 RX ORDER — TORSEMIDE 20 MG/1
40 TABLET ORAL
Status: DISCONTINUED | OUTPATIENT
Start: 2023-04-13 | End: 2023-04-19

## 2023-04-13 RX ORDER — POTASSIUM CHLORIDE 750 MG/1
20 TABLET, EXTENDED RELEASE ORAL ONCE
Status: COMPLETED | OUTPATIENT
Start: 2023-04-13 | End: 2023-04-13

## 2023-04-13 RX ADMIN — MAGNESIUM OXIDE TAB 400 MG (241.3 MG ELEMENTAL MG) 400 MG: 400 (241.3 MG) TAB at 09:49

## 2023-04-13 RX ADMIN — MAGNESIUM OXIDE TAB 400 MG (241.3 MG ELEMENTAL MG) 400 MG: 400 (241.3 MG) TAB at 14:43

## 2023-04-13 RX ADMIN — WHITE PETROLATUM 57.7 %-MINERAL OIL 31.9 % EYE OINTMENT: at 21:51

## 2023-04-13 RX ADMIN — TORSEMIDE 40 MG: 20 TABLET ORAL at 17:58

## 2023-04-13 RX ADMIN — TAMSULOSIN HYDROCHLORIDE 0.4 MG: 0.4 CAPSULE ORAL at 21:48

## 2023-04-13 RX ADMIN — MEXILETINE HYDROCHLORIDE 200 MG: 200 CAPSULE ORAL at 14:44

## 2023-04-13 RX ADMIN — POTASSIUM CHLORIDE 20 MEQ: 750 TABLET, EXTENDED RELEASE ORAL at 09:37

## 2023-04-13 RX ADMIN — Medication 1 CAPSULE: at 09:37

## 2023-04-13 RX ADMIN — CALCIUM CARBONATE 600 MG (1,500 MG)-VITAMIN D3 400 UNIT TABLET 1 TABLET: at 09:42

## 2023-04-13 RX ADMIN — MEXILETINE HYDROCHLORIDE 200 MG: 200 CAPSULE ORAL at 06:30

## 2023-04-13 RX ADMIN — INSULIN ASPART 1 UNITS: 100 INJECTION, SOLUTION INTRAVENOUS; SUBCUTANEOUS at 17:58

## 2023-04-13 RX ADMIN — CARVEDILOL 3.12 MG: 3.12 TABLET, FILM COATED ORAL at 09:42

## 2023-04-13 RX ADMIN — EMPAGLIFLOZIN 10 MG: 10 TABLET, FILM COATED ORAL at 09:42

## 2023-04-13 RX ADMIN — MICONAZOLE NITRATE: 20 POWDER TOPICAL at 09:43

## 2023-04-13 RX ADMIN — MEXILETINE HYDROCHLORIDE 200 MG: 200 CAPSULE ORAL at 21:47

## 2023-04-13 RX ADMIN — MAGNESIUM OXIDE TAB 400 MG (241.3 MG ELEMENTAL MG) 800 MG: 400 (241.3 MG) TAB at 09:42

## 2023-04-13 RX ADMIN — AMIODARONE HYDROCHLORIDE 200 MG: 200 TABLET ORAL at 09:42

## 2023-04-13 RX ADMIN — AMIODARONE HYDROCHLORIDE 200 MG: 200 TABLET ORAL at 21:48

## 2023-04-13 RX ADMIN — APIXABAN 5 MG: 5 TABLET, FILM COATED ORAL at 21:47

## 2023-04-13 RX ADMIN — TORSEMIDE 40 MG: 20 TABLET ORAL at 09:37

## 2023-04-13 RX ADMIN — IRON SUCROSE 200 MG: 20 INJECTION, SOLUTION INTRAVENOUS at 17:57

## 2023-04-13 RX ADMIN — CARVEDILOL 3.12 MG: 3.12 TABLET, FILM COATED ORAL at 21:48

## 2023-04-13 RX ADMIN — APIXABAN 5 MG: 5 TABLET, FILM COATED ORAL at 09:37

## 2023-04-13 RX ADMIN — MICONAZOLE NITRATE: 20 POWDER TOPICAL at 21:48

## 2023-04-13 RX ADMIN — POTASSIUM CHLORIDE 20 MEQ: 750 TABLET, EXTENDED RELEASE ORAL at 09:49

## 2023-04-13 RX ADMIN — INSULIN ASPART 1 UNITS: 100 INJECTION, SOLUTION INTRAVENOUS; SUBCUTANEOUS at 07:12

## 2023-04-13 RX ADMIN — ATORVASTATIN CALCIUM 80 MG: 80 TABLET, FILM COATED ORAL at 09:38

## 2023-04-13 RX ADMIN — INSULIN ASPART 1 UNITS: 100 INJECTION, SOLUTION INTRAVENOUS; SUBCUTANEOUS at 13:08

## 2023-04-13 ASSESSMENT — ACTIVITIES OF DAILY LIVING (ADL)
ADLS_ACUITY_SCORE: 43
ADLS_ACUITY_SCORE: 44
ADLS_ACUITY_SCORE: 43
ADLS_ACUITY_SCORE: 43
ADLS_ACUITY_SCORE: 44
ADLS_ACUITY_SCORE: 44
ADLS_ACUITY_SCORE: 43
ADLS_ACUITY_SCORE: 40

## 2023-04-13 NOTE — CONSULTS
NAME  Rohit Garvey    MRN  [id number]      [birth year]     AGE  67     SEX  Male     HANDEDNESS Right     EDUCATION 12 (SPED)     COPELAND  2023     PROVIDER  CE     TECH  SH     STATION  OP            ORIENTATION      Time  -6     Place      Personal Info. 3 /4    Presidents                           WRAT   5     SS %ile GE   Word Reading 75 5 4.7          WASI-II       FSIQ: 57        Raw T    Vocabulary  15 26    Matrix Reasoning 7 31           WAIS IV DIGIT SPAN        Raw ss=    Forward  5 4    Backward  7 9    Sequence  5 7    Total  17 6           RDS  6             BOSTON NAMING TEST     Raw 51 /60     ss 8      T 45              COWAT   FAS   Raw 12      ss 2      T 23             TRAIL MAKING TEST       Time Errors ss T   A 124 2 1 17   B 183 0 5 35          [facility]      Raw 23      T <20      Interp. LOW              RBANS   A     Raw ss/%ile    List Learning 15 3    Story Memory 7 2    Figure Copy 7 1    Line Orientation 10 3-9    Picture Naming 9 17-25    Semantic Fluency 7 2    Digit Span  4 2    Coding  16 2    List Recall  0 <2    List Recognition 19 26-50    Story Recall 4 4    Story Recognition 7 5    Figure Recall 7 5             Index     Immediate Mem. 53     Visuospat./Constr. 60     Language  78     Attention  46     Delayed Mem. 30     Total Scale 48            GDS       Raw 9      Interp. MINIMAL             GAS        Raw      Somatic 5      Cognitive 5      Affective 2      Total 12

## 2023-04-13 NOTE — PROGRESS NOTES
Care Management Follow Up    Length of Stay (days): 5    Expected Discharge Date: 04/14/2023     Concerns to be Addressed:       Patient plan of care discussed at interdisciplinary rounds: Yes    Anticipated Discharge Disposition: TCU     Anticipated Discharge Services:    Anticipated Discharge DME:      Patient/family educated on Medicare website which has current facility and service quality ratings:    Education Provided on the Discharge Plan:    Patient/Family in Agreement with the Plan:      Referrals Placed by CM/SW:    Private pay costs discussed: Not applicable    Additional Information:  Heart Transplant supportive visit. Pt and sister, Peg, both verbalized that pt does not want to proceed with heart transplant consideration. They indicate the caregiver burden and need for more frequent medical f/u at our facility and the challenges this would pose. Pt and sister's goal is for patient to go to a TCU for rehab and then returning to [place]. Sister and pt expressed understand that pt's life expectancy may be limited without further interventions and want to focus on quality of life vs more time when that time comes. Explained the role of hospice and to discuss with his primary care team back in [place] when they notice decline in quality of life.     Writer updated unit  that pt and sister want TCU referrals made in [place]. Pt and sister took a commercial flight here and plan to do the same to get back home.       MICHELL FigueredoSW

## 2023-04-13 NOTE — PROGRESS NOTES
[facility]  CARDIOLOGY HEART FAILURE SERVICE (CARDS II) PROGRESS NOTE    Patient Name: Rohit Garvey    Medical Record Number: [id number]    YOB: 1955  PCP: Kevyn Salazar    Admit Date/Time: 4/8/2023  3:08 PM   5    Assessment and Plan:  Rohit Garvey is a 67 year old adult male with a PMH significant for NICM (EF of 40-45%) c/b paroxysmal VT s/p dual chamber ICD placement 6/20/2022 c/b recurrent ICD shocks s/p VT ablation 10/31/2022 and re-ablation 12/29/2022, paroxysmal Afib on apixaban, developmental delay (possibly related to viral illnesses during infancy), T2DM, hypertension, and hyperlipidemia who was admitted as a transfer from [place] per recommendations of his cardiologists Dr. Urbina and Dr. Chanel for evaluation by advanced heart failure failure team and EP team for VT management and consideration of advanced therapies.    Today's updates:  - Increase torsemide to 40 mg BID  - Cardiopulmonary testing cancelled due to concern pt would not be able to physically perform test    - EP to evaluate pt for possible arrhythmia interventions, recs pending    - To discuss pt at upcoming heart transplant committee meeting 4/14. If not a candidate, anticipate discharge home 4/14     # Persistent Paroxysmal Ventricular Tachycardia s/p dual chamber ICD placement 6/2022 and VT ablation x 2  # Chronic HFrEF 2/2 NICM w/ LVEF 40-45%, NYHA II  High burden of ventricular arrhythmias despite being on 2 antiarrhythmic medications (amiodarone and mexiletine) and being s/p ICD and two attempted VT ablations. Transferred here for evaluation for advanced therapies. ECHO 4/9 showed mildly reduced EF from 46% (Dec 2022) to 40-45%, however still does not qualify him for LVAD (EF < 25%). Possible heart transplant candidate if he meets criteria for VT storm.   - EP consulted, appreciate recs   - ICD device interrogation ordered - 1 VT episode on 4/9/23 lasting 25 seconds   - Was  previously holding off on evaluation for arrhythmia interventions while working up for advanced therapies. Pt reported 4/12 he  is leaning against heart transplant but would be interested in possible arrhythmia interventions. Reached out to EP for assessment,  recs pending   - Continue tele     Advanced therapies evaluation workup:  - ECHO 4/9 w/ mildly reduced LV function (EF reduction from 46% to 40-45%) and mild aortic valve calcification   - RHC 4/11:   RA mean 11mmHg. RV 38/4. RVEDP 13. PA 39/16/25. PCWP 16  TD CO/CI: 3.85 L/min / 2.25 L/min/m2  Shena CO/CI: 3.37 L/min / 1.97 L/min/m2    - Cardiopulmonary stress test cancelled as pt deemed not physically able to perform test per RN  - Advanced therapy  consulted  - Neuropsych testing 4/11 and 4/12  - Financial consult already placed  - Utox screen positive for amphetamines. Quant screen still pending   - Nicotine and cotinine screen negative. PEth negative  - To discuss pt at upcoming heart transplant committee meeting 4/14     GDMTs:  - Antiarrhythmics: PTA amiodarone 200 mg BID and mexiletine 200 mg q8h   - ACEi/ARB: Not currently on due to LVEF of 45%.  - BB: PTA carvedilol 3.125 mg BID   - Aldosterone antagonist: None   - SGLT2i: PTA empagliflozin 10 mg daily   - SCD prophylaxis: S/p secondary prevention ICD 6/20/22  - Volume status: Still hypervolemic (Cr worse)               - S/p IV lasix 80 mg x 2 on 4/9 and IV lasix 100mg 4/10, 4/11. Increased torsemide to 40 mg BID               - Hold PTA lasix 40 mg TID (recently increased from qday) while on IV diuresis                - continue PTA KCl supplement 20 meq daily                - continue PTA magnesium oxide 800 mg daily                - BMP BID w/ goal of K > 4 and Mg > 2               - Strict I/Os and daily weights   - Anticoagulation: PTA Eliquis 5 mg BID   - Statin therapy: PTA atorvastatin 80 mg daily     # Positive U tox for amphetamines  Pt and sister deny any illegal or legal  stimulant use. No obvious PTA drugs on inspection that could be causing a false positive.  - Urine amphetamine quantitative screen ordered pending      # Recent clavicular fracture  At baseline uses walker for ambulation. Had recent clavicle fracture now requiring him to be wheelchair bound. Supposed to be non weight bearing on LUE for another 4 weeks   - Fall precautions  - NWB LUE precautions provided   - PT/OT following     # Afib w/o RVR  - PTA carvedilol, amiodarone, and mexiletine   - PTA Eliquis 5 mg BID  - Tele     # ROSALINO on CKD, stable  Uptrending Cr above baseline Cr of 1.3. No observed mention of etiology of pt's CKD in chart. UA w/o protein, RBCs, or casts. Concern for baseline cardiorenal syndrome  - diuresis per above   - BID BMP     # Acute on chronic normocytic anemia concerning for mixed picture of iron deficiency and inflammatory   # Iron deficiency   # Hx of anemia of chronic disease   Hgb 8.7 on arrival and downtrending. Baseline Hgb ~9-10 reportedly 2/2 anemia of chronic disease. Tbili normal. No source of active bleeding. Workup revealed low iron level, normal TIBC, and low end-of-normal ferritin concerning for iron deficiency anemia.   - IV Iron sucrose 200 mg qday x 5 days (starting 4/9)  - AM CBCs     # Chronic hyponatremia, resolved  BL sodium ~130-133. Suspect hypervolemic hyponatremia in setting of CHF. Improved after IV diuresis   - daily BMP    # DM2   On metformin 1,000 mg BID PTA.   - hold PTA metformin   - Continue empagliflozin as above   - LDSS  - Hypoglycemia protocol      # BPH  - PTA tamulosin  - CTM w/ bladder scans and PVRs PRN     # Developmental delay   Lives at assisted living where he receives assistance w/ medications, meals, etc. Independent in basic ADLs (feeding, clothing, bathing) at his baseline. Sister Peg is his HCA and POA but pt currently has capacity   - Delirium precautions      # Constipation   - PTA metamucil      FEN: Soft bite sized (due to lack of  dentition), 2 L fluid restriction   PPx: PTA Eliquis   Code: Full Code     Pt was discussed and evaluated with Dr. Branch, attending physician, who agrees with the assessment and plan above.     Prachi Call DO  Internal Medicine, PGY-I    Interval Changes in Past 24 Hours:   No acute events overnight. Doing well, no complaints of pain, SOB, fevers, dysuria, abd pain, chest pain, cough. Did have some chills last night     Review Of Systems  A 4-point ROS was negative aside from those listed above.    OBJECTIVE FINDINGS:    Temp:  [97.5  F (36.4  C)-101  F (38.3  C)] 98.7  F (37.1  C)  Pulse:  [] 176  Resp:  [16-19] 16  BP: ()/(39-95) 127/95  SpO2:  [94 %-99 %] 95 %     127/95 w/ HR of 176 this AM, when I was in the room not rechecked but it sounded regular on auscultation     I/Os: net +295 yesterday net neg -450 ml since midnight     Vitals:    04/11/23 0400 04/12/23 0600 04/13/23 0600   Weight: 66.8 kg (147 lb 4.8 oz) 64.3 kg (141 lb 11.2 oz) 62.5 kg (137 lb 11.2 oz)     Gen: Sitting in chair in no acute distress   Neck: JVP ~8, unchanged from yesterday  Resp: clear to auscultation bilaterally, no crackles or wheezing   CV: Irregular rhythm, regular rate.   Abd: soft, NT, ND  Ext: warm and well perfused, 2+ lower extremity edema to level of knees     Current medications   Current Facility-Administered Medications   Medication     acetaminophen (TYLENOL) Suppository 650 mg     acetaminophen (TYLENOL) tablet 650 mg     alum & mag hydroxide-simethicone (MAALOX) suspension 30 mL     amiodarone (PACERONE) tablet 200 mg     apixaban ANTICOAGULANT (ELIQUIS) tablet 5 mg     artificial tears ophthalmic ointment     atorvastatin (LIPITOR) tablet 80 mg     calcium carbonate-vitamin D (CALTRATE) 600-10 MG-MCG per tablet 1 tablet     carboxymethylcellulose PF (REFRESH PLUS) 0.5 % ophthalmic solution 1 drop     carvedilol (COREG) tablet 3.125 mg     glucose gel 15-30 g    Or     dextrose 50 % injection 25-50 mL     Or     glucagon injection 1 mg     diphenhydrAMINE (BENADRYL) injection 50 mg     empagliflozin (JARDIANCE) tablet 10 mg     EPINEPHrine (ADRENALIN) kit 0.3 mg     famotidine (PEPCID) injection 20 mg     insulin aspart (NovoLOG) injection (RAPID ACTING)     insulin aspart (NovoLOG) injection (RAPID ACTING)     iron sucrose (VENOFER) 200 mg in sodium chloride 0.9 % 120 mL intermittent infusion     lidocaine (LMX4) cream     lidocaine 1 % 0.1-1 mL     magnesium oxide (MAG-OX) tablet 800 mg     medication instruction     methylPREDNISolone sodium succinate (solu-MEDROL) injection 125 mg     mexiletine (MEXITIL) capsule 200 mg     miconazole (MICATIN) 2 % powder     nitroGLYcerin (NITROSTAT) sublingual tablet 0.4 mg     potassium chloride ER (KLOR-CON M) CR tablet 20 mEq     psyllium (METAMUCIL/KONSYL) capsule 1 capsule     sodium chloride (PF) 0.9% PF flush 3 mL     sodium chloride (PF) 0.9% PF flush 3 mL     tamsulosin (FLOMAX) capsule 0.4 mg     torsemide (DEMADEX) tablet 40 mg       LABS Reviewed    K 3.8  Mg 1.9  Cr 1.85 (1.59 and 1.60 yesterday)   White count 6.1 to 13.4 today     Nicotine screen negative. Amphetamine screen still pending    IMAGES Reviewed:    ECHO 4/9/23 Interpretation Summary:  Left ventricular function is decreased. The ejection fraction is 40-45%  (mildly reduced compared to 46% back in Dec 2022).   LV size and wall thickness is normal. Abnormal septal wall motion consistent w/ pacemaker.   Global right ventricular function is normal.  Mild aortic valve calcification is present. The mean gradient across the  aortic valve is 8 mmHg.  Pulmonary artery systolic pressure is normal.  IVC diameter and respiratory changes fall into an intermediate range  suggesting an RA pressure of 8 mmHg.  No pericardial effusion is present.    ICD interrogation 4/10/23:  Device: Abbott [id number] Dale HF  Normal Device Function.   Mode: DDDR 60-90 bpm  AP: 49%  BVP: 91%  Intrinsic rhythm: CHB w/ AS @ 60 bpm; no  intrinsic R-waves at VVI 30 bpm  Lead Trends Appear Stable: Yes  Estimated battery longevity to RRT = 5.9 years. Remaining Capacity to OSCAR = 92%.  Atrial arrhythmia: None  AF burden: 0%  Anticoagulant: Eliquis  Ventricular Arrhythmia: 1 VT episode recorded on 4/9/23 at 9:43 AM, lasting ~25 seconds at 114 bpm. EGM reveals a slow VT which is converted with one sequence of ATP.  Setting changes: None    RHC 4/11/23:  RA mean 11mmHg  RV 40/11  PA 39/16/25  PCWP 16    Pa Sat 49.9%  Art Sat 99%    TD CO/CI: 3.85 L/min / 2.25 L/min/m2  Shena CO/CI: 3.37 L/min / 1.97 L/min/m2    PVR 2.3 (TD)  SVR  1538 (TD) Right sided filling pressures are mildly elevated. Left sided filling pressures are mildly elevated. Mild elevated pulmonary hypertension. Reduced cardiac output level. Hemodynamic data has been modified in Epic per physician review.

## 2023-04-13 NOTE — PROGRESS NOTES
Care Management Follow Up    Length of Stay (days): 5    Expected Discharge Date: 04/14/2023     Concerns to be Addressed: discharge planning       Patient plan of care discussed at interdisciplinary rounds: Yes    Anticipated Discharge Disposition: Home     Anticipated Discharge Services:    Anticipated Discharge DME:      Patient/family educated on Medicare website which has current facility and service quality ratings:    Education Provided on the Discharge Plan:    Patient/Family in Agreement with the Plan:      Referrals Placed by CM/SW:      DESHAWN left a  with the following TCUs requesting the fax number to send a referral----  [facility]  [address], [place] [id number] [phone]    [facility]  [address], [place] [id number] [phone]    [facility]  [address] [place], [place] [id number] [phone]    DESHAWN received the fax number from the admissions director Malena (Phone: [phone], Fax: [phone]) and DESHAWN sent the faxed TCU referral at 3:56pm for the following facilities---  [facility]  [address], [place] [id number] [phone]    [facility]  [address] [place] [address] [phone]        Private pay costs discussed: Not applicable    Additional Information:  SW is following for discharge planning.    With the pt having a developmental delay, once the SW fills out the Pre Admission Screening for TCU placement, it'll trigger a Level 2 screening.     See above for updates.    ___________________    ARTI Valdez, RAJATSW  6C   [facility]  Phone: [phone]  Pager: [phone]

## 2023-04-13 NOTE — PLAN OF CARE
Admitted for HF evaluation, VT management and consideration for advanced HF therapies. PMHx of  NICM (EF of 40-45%) c/b paroxysmal VT s/p dual chamber ICD placement 6/20/2022 c/b recurrent ICD shocks s/p VT ablation 10/31/2022 and re-ablation 12/29/2022, paroxysmal Afib on apixaban, developmental delay (possibly related to viral illnesses during infancy), T2DM, hypertension, and hyperlipidemia      Code status: Full code   Team: Cards 2     Neuro: AOx4, Calm and cooperative, no neuro deficits noted   Cardiac/Tele/VS: AV-Paced, HR 80's-60's, normotensive, afebrile, some VT runs this AM (Longest run at 54 sec), no chest pain or palpitations. Afebrile, other VSS  Respiratory: Room air, No SOB noted. Lung sounds dim at the bases. O2 sats > 92%.  GI/: Voids spontaneously. Monitor for incontinence. BS audible. LBM 4/12  Diet/Appetite: Cardiac diet with 2L FR  Skin: Rash in the perineum, Scab at L. upper chest (ICD site). Mepilex to the sacrum/coccyx. No other overt skin deficits  Endocrine: BG checks ACHS with sliding scale coverage  LDAs: PIV x1 saline locked  Activity: Up assist of 1 and a walker. Broken Left clavicle (Non weight bearing on L. arm)  Pain: Denies pain    Plan: Continue POC, notify team of concerns

## 2023-04-13 NOTE — CONSULTS
INPATIENT NEUROPSYCHOLOGICAL EVALUATION  **CONFIDENTIAL**    Name: Rohit Garvey Education: 12 years ([employer])    (age): 1955 (67 years) COP[place]AND:  2023 & 2023   Referral: Josh Whelan MD  Department of Internal Medicine Handedness:  MRN (Epic): Right  [id number]     IDENTIFYING INFORMATION AND REASON FOR REFERRAL   Mr. Garvey is a 67-year-old, right-handed, White male presenting with a medical history including nonischemic cardiomyopathy and intellectual disability. This evaluation was requested to provide an assessment of his current neuropsychological status as part of a comprehensive workup for advanced heart therapies.    SUMMARY, IMPRESSIONS, AND RECOMMENDATIONS  See below for relevant background information and detailed test results. See separate abstract for supporting documentation including a list of neuropsychological measures and test scores.    In the context of estimated below average to exceptionally low intellectual abilities (FSIQ estimate = 57) and a documented history of intellectual disability, Mr. Garvey s current neuropsychological profile revealed performance within these expected limits, and generally in the below average to exceptionally low range, across nearly all cognitive tests administered. Specifically, his performance on tests of learning and memory, basic auditory attention, vocabulary, verbal fluency, processing speed, visuospatial skills, and knowledge of health and safety were significantly impaired. On tests of executive functioning (abstract reasoning, mental flexibility) he demonstrated mild to moderate impairments. He demonstrated relative strengths on tests of working memory, confrontation naming, and recognition memory, which were in the average range. Assessment of current emotional status revealed subclinical symptoms of anxiety, which appears appropriate given his current health status, and the absence of clinically significant depressive mood  symptoms.    With regard to transplant candidacy, Mr. Garvey has conceptualized the acute transplant period as being  sick all the time  and appeared reluctant to relocate to [place] to be closer to his treatment team. For this reason, he stated that he does not want to undergo transplant at this time. Rather, he noted that he would prefer to go back to the way he had been. He demonstrated limited knowledge of his medical condition and was unable to identify any specific risks or benefits of advanced heart therapies. He appears to have strong psychosocial support from his sister, but she also has significant concerns about his quality of life post-transplant, finances, and the hardship of relocating and assisting Mr. Garvey in the acute post-transplant period. Mr. Garvey currently resides in assisted living and receives significant functional assistance with regard to medication and financial management, meal preparation, and transportation. He would continue to need this level of functional support if he decided to proceed with transplant. There are no substance use concerns nor are there concerns for significant emotional distress or psychopathology.    Without a prior cognitive baseline, it is challenging to draw conclusions about cognitive decline and/or the presence of additional underlying neuropathology given report of longstanding impairments in these cognitive domains. Overall, Mr. Garvey is likely functioning at his neuropsychological baseline, which is consistent with a diagnosis of intellectual disability at least of mild severity. While he is able to retain simple information taught to him with repetition, his ability to process and reason through complex information is impaired. While he has sufficient capacity for medical decision making, it is appropriate to have his family members intimately involved in all decisions of consequence.     If he decides to pursue advanced heart therapies, he is  going to need much more assistance, simplification, and repetition than the average patient. Jargon should be avoided. He is not going to benefit from complex written material, though that could be provided to his family members, who may help in explaining it to him. His comprehension of critical medical information will need to be checked and re-checked multiple times, with significant care. He is generally agreeable in his demeanor and likely to indicate that he understands or agrees with others without fully understanding all of the implications. He will require continued assistance from his family and providers to ensure he understands the transplant procedure, risks, and post-operative care. He may benefit from the use of visual aids, concrete examples, repetition, reminders, and frequent check-ins, and he will require ongoing support and monitoring from his family, providers, and staff at his assisted living facility.    DIAGNOSTIC IMPRESSIONS  ? Intellectual Disability, at least mild severity    Thank you for the opportunity to participate in Mr. Garvey s care. If you have any questions regarding this evaluation, please do not hesitate to contact us.       Lisa oN, Ph.D.  Neuropsychology Fellow      Kim Baird, Ph.D.,   Clinical Neuropsychologist    RELEVANT BACKGROUND INFORMATION AND SUPPORTING DOCUMENTATION  Gathered from a clinical interview with the patient and his sister (who is his power of ) and reviews of electronic medical record. At the time of the evaluation, Mr. Garvey was admitted in the hospital. The clinical interview was completed over secure video due to the COVID-19 pandemic and neuropsychological testing was completed in-person.    History of Presenting Problem(s)  Mr. Garvey presented with a history of nonischemic cardiomyopathy and intellectual disability. His sister stated that his birth was normal. He reportedly spent 1 week in the hospital following his birth. At  age 6 weeks or 6 months, he reportedly contracted mumps, measles, and chicken pox infections consecutively and developed a fever of 103, which  damaged cells in his brain.  He underwent an evaluation when he entered the 1st grade and was diagnosed with mental retardation (now termed intellectual disability). His sister noted that while he met early developmental milestones on time (e.g., walking and talking), he was a  slow learner  and he had a speech impediment. She stated that he repeated the first grade and was placed in a self-contained special education classroom. She suspected that he also received speech therapy services. He was transitioned to mainstream classes from 6th - 12th grade with additional supports ( working with him in the room). He stated that he graduated from high school and worked as a cook and  in a restaurant, delivered newspapers, and worked for [employer]. His sister noted that he was awarded social security disability in the early to mid 1990s in the context of hip injuries, as he had previously been denied for social security disability on the basis of intellectual disability.    Both he and his sister denied noticing changes to his cognitive functioning. He lives alone in an assisted living facility, where his medications are managed and his meals are prepared. However, he has managed his medications and prepared meals independently in the past without known errors. He overdrew his savings account after his parents passed away when he was managing his finances independently. He has since had a representative payee. He stopped driving approximately 2 years ago after he relocated to a new city. He has been receiving assistance with bathing and dressing since he broke his clavicle. However, he was previously independent in his basic self-cares. Physically, he reported using a walker due to poor balance and a history of falls. He stated that his last  fall was approximately 6 months ago. He described having a bilateral resting tremor, but noted that the tremor comes and goes. His sister stated that the tremor was evaluated by a neurologist, who diagnosed him with essential tremor. Emotionally, he reported no significant mental health history or treatment. He and his sister denied concerns for depression or anxiety.    Mr. Garvey was unable to express a basic lay understanding of his health condition, stating only  getting zapped  and  this morning I did something, and they said I was okay . He reported that he is not interested in pursuing heart transplant currently. He stated that he preferred to  go back to the way [he had] been.  He was not able to identify any benefits of proceeding with heart transplant. He described concerns of being  sick all the time  after the transplant, seeming to refer to needing to relocate to [place] following the transplant to be closer to his doctors and increased follow-up with his treatment team. His sister also expressed concerns about his quality of life post-transplant, financial liability for the surgery and associated costs, and the hardship on her and her  to take him to appointments and be his primary caregivers. Mr. Garvey was not able to independently identify risks of the transplant procedure. However, he recognized that risks include bleeding, stroke, and rejection when cued. His sister stated that the risks were discussed with them the day before this clinical interview. He reported receiving support from his sister and a brother. However, his sister noted that the post-transplant cares would fall on her and her , which would be disruptive to their lives. However, she noted that if Mr. Garvey wanted to proceed with the transplant, she would be able and willing to assist him.    Neurodiagnostic Findings   ? Head CT w/o contrast (6/16/2022): IMPRESSION: No acute finding. Normal CT of the  brain.    Medical History (per EMR and patient report)  ? Nonischemic cardiomyopathy  ? Paroxysmal ventricular tachycardia  ? Supraventricular tachycardia  ? Dual chamber implantable cardioverter defibrillator (placed 6/20/2022)  ? Ventricular tachycardia ablation (10/31/2022; 12/29/2022)  ? Recurrent cardiogenic shock  ? Paroxysmal atrial fibrillation  ? Developmental delay  ? Diabetes mellitus, Type 2  ? Hypertension  ? Hyperlipidemia  ? Chronic kidney disease (stage 3a)  ? Hyponatremia  ? Benign prostatic hyperplasia  ? Syncope and collapse  ? Elevated troponin  ? Acute kidney injury  ? No history of traumatic brain injury, seizure, or stroke  ? COVID-19 infection in 2022; did not require hospitalization; denied persistent symptoms    Health Behaviors  ? Sleep: Pretty good; feels rested in the morning; sometimes naps; no history of sleep apnea.  ? Energy: Good  ? Pain: Denied  ? Senses: Has hearing aids but does not wear them; wears reading glasses    Psychiatric History  ? Mr. Garvey reported no history of mental health diagnoses or treatments.  ? Mood: Happy; denied concerns for depression or anxiety.  ? He denied alteration of sensory perceptual processes or disordered thought.  ? Suicidality/ Self-harm: He denied any suicidal ideation or past suicide attempts.     Substance Use History  ? Alcohol: Very minimal use; sister reported last use was one beer in August; PEth test (4/8/2023) was negative.  ? Nicotine: Denied  ? Cannabis: Denied  ? Problematic Substance Use: Denied    Current Medications (per EMR)  amiodarone  Artificial tear ointment  atorvastatin  calcium-vitamin D  carboxymethylcellulose sodium  carvedilol  colchicine  Eliquis  fish oil-omega 3 fatty acids  furosemide  Jardiance  magnesium oxide  metformin  mexiletine  potassium chloride  tamsulosin    Developmental, Educational, & Occupational History  ? Childhood behavioral / emotional / academic problems: Repeated 1st grade; special education  from 1st -6th grade then integrated into mainstream classes with his  in the room helping him.  ? Native Language: English  ? Education: Graduated high school; participated in adult education program for mathematics  ? Occupation: Disabled due to hip injuries; on social security    Psychosocial History  ? Marital: Single  ? Children: None  ? Housing: Lives alone in assisted living facility  ? Psychosocial support: Sister (Citlali) and brother (Marciano)    Family History  ? Dementia (mother)  ? Essential Tremor (maternal uncle)    RESULTS  See separate abstract for list of neuropsychological measures and test scores.    INTELLECUTAL FUNCTIONING: Intellectual functioning was estimated to fall in the exceptionally low range based on his performance on an abbreviated measure of intelligence (FSIQ = 57).   GENERAL COGNITIVE STATUS: Orientation was mildly below expectations for basic personal information as he did not know his current street address. He correctly stated his age, date of birth, and the town in which he resides. Orientation to time was mildly below expectation, as he was off by 1 month and 1 day when stating the date. He was fully oriented to place. He was able to name the current [place] president and one of the five most recent past presidents. Within the practical domain, performance was exceptionally low on a measure of conceptual understanding of issues pertaining to health and safety. His responses were concrete in nature and notable for poor health literacy (e.g.,  I would be happy  if he unintentionally lost 10 pounds in 4 weeks;  it will say so on the bottle  for how he would remember to take medication if he was supposed to take it 3 times per day;  I don t know  for why is it important to know about the side effects of the medicine you are taking). His score was consistent with individuals functioning at a low level of independence in the community.  ATTENTION/EXECUTIVE FUNCTIONS:  Immediate auditory attention performances ranged from below average to exceptionally low. Working memory performances ranged from average to low average. Visual reasoning through pattern identification was below average. Copy of a complex geometric figure was exceptionally low for his age and notable for a rushed drawing style, poor planning, and inattention to figural details. Performance on a visuomotor test of set-shifting/cognitive flexibility and sequencing was below average but without error. Psychomotor processing speed was exceptionally low.  LANGUAGE: Basic reading and pronunciation skills were below average and equivalent to a 4th grade reading level. Demonstrating vocabulary knowledge was exceptionally low. Confrontation naming performances ranged from low average to high average. Letter-cue verbal fluency performance was exceptionally low. Semantic verbal fluency performances ranged from low average to exceptionally low.   VISUOSPATIAL PROCESSING: Performance on a spatial perception task requiring judgement of angled lines was below average. There was no evidence of significant visuospatial distortions in his copy of a complex geometric figure.   LEARNING AND MEMORY: Initial encoding of a word list over multiple learning trials was exceptionally low. He benefitted minimally from repetition. He did not recall any of the list words after a delay, with performance in the exceptionally low range. Recognition of the word list was average. Initial encoding of contextualized verbal information in the form of a short story was exceptionally low, as was delayed retrieval. Recognition of story details was average. Delayed recall of a previously copied complex figure was below average. However, he was able to accurately recognize the figure when it was presented amongst 5 similarly looking complex figures.  PSYCHOLOGICAL AND BEHAVIORAL: He endorsed minimal symptoms of depression and mild symptoms of anxiety on  self-report questionnaires.  PERFORMANCE VALIDITY: Performance on neuropsychological tests is dependent upon a number of factors, including sufficient engagement and motivation, to reliably establish an examinee s level of cognitive functioning.  Based upon observations made throughout the evaluation, the patient did not appear to deliberately perform in a suboptimal manner and demonstrated good frustration tolerance on cognitively challenging tasks. His score on an imbedded index of performance validity was in the valid range for individuals with significant cognitive impairment. Overall, test results are believed to accurately represent the patient s current neurocognitive status.    BEHAVIORAL OBSERVATIONS  ? As noted above, the clinical interview was completed over secure video from the patient s hospital room. Neuropsychological testing was completed in person.  ? He was alert and oriented to self, place, and circumstance; somewhat distractible while undergoing testing due to occasional interruptions from hospital staff attending to his roommate.  ? Appearance: Adequately groomed; dressed in hospital gown  ? Gait/Posture: Gait was slow; he appeared to shuffle his feet; he ambulated with the aid of a walker; no tremor or postural abnormalities were observed.  ? Sensorimotor: Not assessed  ? Behavior: Cooperative, pleasant, no behavioral abnormalities noted  ? Speech: Mild pronunciation and word finding difficulty; otherwise fluent, normal rate, prosody, and volume  ? Thought process: [facility]; instructions had to be repeated at times  ? Thought content: Logical, appropriate  ? Affect: Broad, responsive, consistent with reported mood; good eye contact  ? Mood: Euthymic  ? Insight and Judgment: Limited  ? Approach to testing: Mildly slowed; good frustration on cognitively demanding tasks  ? Rapport: Easily established and maintained        Activities included in this evaluation: CPT Code #Units Time (min)    Psychiatric diagnostic interview 94221 1 --   Test evaluation services by professional; first hour. [id number] 1 100   Test evaluation services by professional, additional hour (+) [id number] 1    Test administration and scoring by technician, first 30 mins [id number] 1 201   Test administration and scoring by technician, additional 30 mins (+) [id number]    Dx: F79; Z01.818

## 2023-04-13 NOTE — PROGRESS NOTES
Electrophysiology Consult Service  Follow up Note   EP Attending:    Date of Service: 4/13/2023     S: Patient and his sister report that they have likely decided against heart transplant, but report that there is transplant meeting tomorrow (4/14/23) and they will make final decision after this discussion. He otherwise reports he is feeling well. He is deconditioned and trying to recover from his clavicle fracture. VSS.  HPI:  Mr Garvey is a 67 year old male patient with PMHx significant for NICM with EF 45% and VT s/p secondary prevention ICD 6/20/2022, s/p VT ablation 10/31/22, HTN, HPL, type 2 DM, intellectual delay (possibly related to viral illnesses during infancy).    Mr Garvey lives in a nursing home due to intellectual delay.  In June 2022, he was found to be very spacey and pale by the nursing staff.  On examination his heart rate was 130 bpm with a blood pressure of 80/55.  He was also fatigued.  The patient was transported to the ED and was awake all the time.  Upon arrival to the ED he was found to have a wide-complex tachycardia and he was reportedly given medications first for cardioversion.  After medications failed, he received a cardioversion.  The details of whether he ever lost consciousness during that visit is unknown.  He has had a history of syncope in the bathroom a few weeks prior to his admission in June.  He reportedly had an evaluation as an inpatient at that time that was negative for ischemia, possible angiogram, and he was found to have a mildly reduced LVEF of 45 to 50% on an echocardiogram.  He had a dual-chamber ICD system implanted on 6/20/2022.  The patient also had a cardiac MRI revealing a left ventricular ejection fraction of 44%, right ventricular ejection fraction of 35%, with subepicardial and mid myocardial late gadolinium enhancement extending from the base to the mid cavity and involving the anterior, anteroseptal, anterolateral, and lateral walls.  He was  started on amiodarone and was discharged.  The patient's VT recurred after discharge with many episodes in September and October, with ATP and shocks he followed up with his primary electrophysiologist, Dr James, in [place].  Device interrogation showed 16 therapy sessions between shocks and ATP's in the last 2 weeks.  The patient does feel the fast heartbeat sometimes accompanied with some weakness before the ICD shocks.  A repeat echocardiogram done on 1/26/2022 shows a stable LV ejection fraction of 45 to 50% with mild LV dilation and mild mitral and aortic and tricuspid regurgitation, with RVSP mildly elevated at 39 mmHg.  The patient was referred for consideration of VT ablation.  The patient is currently on amiodarone 200 twice daily and mexiletine 150 mg 3 times daily for rhythm control. He underwent a VT ablation on 10/31/22. He had some cardiogenic shock post ablation and required pressor. He has since recovered well and groin sites well healed. He has had recurrent VT after ablation with multiple ICD therapies. He was started on Ranolazine. Several morphologies noted. He had a second VT ablation on 12/29/2022, endocardial/epicardial ablation, 9 VT's, was different morphology ranging from left bundle to right bundle branch block, ablation was done mainly at the mid basal lateral and basal anterior septal of LV. He recently followed up with EP and has had recurrent ICD shocks. Between 03/25/2023-03/31/2023 the patient has received between 3-4 shots from his device (we sent for interrogation )  presented to [facility] ER at [facility] on 03/27/2023 and on 03/31/2023 when he mentioned that he did have his device went off on him more than once.  He has been on amiodarone 200 mg BID.  He is now admitted with recurrent device therapies and advanced therapies evaluation.   O:   Vitals: BP (!) 127/95 (BP Location: Right arm)   Pulse (!) 176   Temp 98.7  F (37.1  C) (Oral)   Resp 16   Ht  "1.626 m (5' 4\")   Wt 62.5 kg (137 lb 11.2 oz)   SpO2 95%   BMI 23.64 kg/m    Gen:  AxO, NAD   Lungs:  CTAB   CV:  S1S2,Reg, no M/R/G noted. No JVD.   Abd: Nt, ND, Soft   Ext:  No pedal edema    Data:  Labs:  BMP  Recent Labs   Lab 04/13/23  0650 04/13/23  0639 04/12/23  2244 04/12/23  1618 04/12/23  1542 04/12/23  0700 04/12/23  0546 04/11/23  1650 04/11/23  1644   NA  --  140  --   --  137  --  136  --  138   POTASSIUM  --  3.8  --   --  4.2  --  4.7  --  4.1   CHLORIDE  --  99  --   --  98  --  99  --  98   NEETA  --  8.9  --   --  9.1  --  8.7*  --  8.8   CO2  --  26  --   --  24  --  24  --  24   BUN  --  27.1*  --   --  24.3*  --  23.8*  --  20.7   CR  --  1.85*  --   --  1.60*  --  1.59*  --  1.54*   * 142* 172* 148* 160*   < > 130*   < > 194*    < > = values in this interval not displayed.     CBC  Recent Labs   Lab 04/13/23  0639 04/12/23  0546 04/11/23  1054 04/11/23  0737 04/10/23  0655   WBC 13.4* 6.1  --  6.4 5.5   RBC 3.55* 3.38*  --  3.51* 3.40*   HGB 8.9* 8.4* 8.9* 8.8* 8.4*   HCT 28.9* 27.9*  --  28.8* 27.9*   MCV 81 83  --  82 82   MCH 25.1* 24.9*  --  25.1* 24.7*   MCHC 30.8* 30.1*  --  30.6* 30.1*   RDW 19.7* 18.9*  --  19.0* 19.0*    185  --  175 167     INR  Recent Labs   Lab 04/11/23  0737 04/08/23  1800   INR 1.28* 1.46*     Meds per EPIC EMR:  Current Facility-Administered Medications   Medication    acetaminophen (TYLENOL) Suppository 650 mg    acetaminophen (TYLENOL) tablet 650 mg    alum & mag hydroxide-simethicone (MAALOX) suspension 30 mL    amiodarone (PACERONE) tablet 200 mg    apixaban ANTICOAGULANT (ELIQUIS) tablet 5 mg    artificial tears ophthalmic ointment    atorvastatin (LIPITOR) tablet 80 mg    calcium carbonate-vitamin D (CALTRATE) 600-10 MG-MCG per tablet 1 tablet    carboxymethylcellulose PF (REFRESH PLUS) 0.5 % ophthalmic solution 1 drop    carvedilol (COREG) tablet 3.125 mg    glucose gel 15-30 g    Or    dextrose 50 % injection 25-50 mL    Or    glucagon " injection 1 mg    diphenhydrAMINE (BENADRYL) injection 50 mg    empagliflozin (JARDIANCE) tablet 10 mg    EPINEPHrine (ADRENALIN) kit 0.3 mg    famotidine (PEPCID) injection 20 mg    insulin aspart (NovoLOG) injection (RAPID ACTING)    insulin aspart (NovoLOG) injection (RAPID ACTING)    iron sucrose (VENOFER) 200 mg in sodium chloride 0.9 % 120 mL intermittent infusion    lidocaine (LMX4) cream    lidocaine 1 % 0.1-1 mL    magnesium oxide (MAG-OX) tablet 400 mg    magnesium oxide (MAG-OX) tablet 800 mg    medication instruction    methylPREDNISolone sodium succinate (solu-MEDROL) injection 125 mg    mexiletine (MEXITIL) capsule 200 mg    miconazole (MICATIN) 2 % powder    nitroGLYcerin (NITROSTAT) sublingual tablet 0.4 mg    potassium chloride ER (KLOR-CON M) CR tablet 20 mEq    psyllium (METAMUCIL/KONSYL) capsule 1 capsule    sodium chloride (PF) 0.9% PF flush 3 mL    sodium chloride (PF) 0.9% PF flush 3 mL    tamsulosin (FLOMAX) capsule 0.4 mg    torsemide (DEMADEX) tablet 40 mg     4/9/23 Echo:  Interpretation Summary  Left ventricular function is decreased. The ejection fraction is 40-45%  (mildly reduced).  Global right ventricular function is normal.  Mild aortic valve calcification is present. The mean gradient across the  aortic valve is 8 mmHg.  Pulmonary artery systolic pressure is normal.  IVC diameter and respiratory changes fall into an intermediate range  suggesting an RA pressure of 8 mmHg.  No pericardial effusion is present.  A:   Mr Garvey is a 67 year old male patient with PMHx significant for NICM with EF 45% and VT s/p secondary prevention ICD 6/20/2022, s/p VT ablation 10/31/22, HTN, HPL, type 2 DM, intellectual delay (possibly related to viral illnesses during infancy).   EP recommendations:   Ventricular Tachycardia  NICM LVEF 40-45%, NYHA II:  1. ACEi/ARB: Not currently on due to LVEF 45%.  2. BB: Continue Coreg, can switch to Toprol XL for arrhythmia management.   3. Aldosterone  antagonist: Not currently required.  4. SCD prophylaxis: s/p secondary prevention ICD 6/20/22  5. Fluid status: continue torsemide.   6. VT: He was having a high burden of ventricular arrhythmias despite being on 2 antiarrhythmic medications, amiodarone and mexiletine.  The review of the prior device interrogation shows mostly a ventricular tachycardia at about 130 bpm, monomorphic on all available tracings with us in morphology.  ATP is sometimes successful however, can also accelerate the rhythm into a fast VT or VF resulting in a shock.He had VT ablation on 10/31/22. Unfortunately, he has continued to have ICD shocks. He has been on amiodarone and ranolazine was added. patient had a second VT ablation on 12/29/2022, endocardial/epicardial ablation, 9 VT's, was different morphology ranging from left bundle to right bundle branch block, ablation was done mainly at the mid basal lateral and basal anterior septal of LV. Recently 03/25/2023-03/31/2023 the patient has received between 3-4 shoks from his device (we sent for interrogation ) Amiodarone was increased to 200 mg BID. Discussed that we can consider another ablation if aligned with goals of care. Discussed that given multiple morphologies and NICM substrate are limiting factors to success of procedure.  Him and his sister would like to take time to think about this and will let us know.   The patient states understanding and is agreeable with plan.   Thank you much for allowing us to participate in the care of this pleasant patient.   This patient was discussed with  and the note above reflects our joint assessment and plan.   LINDA Hendricks CNP  Electrophysiology Consult Service  Pager: [phone]  AZAEL Total time spent on patient visit, reviewing notes, imaging, labs, orders, and completing necessary documentation: 45 minutes.  >50% of visit spent on counseling patient and/or coordination of care.    EP STAFF NOTE  I have seen and examined the patient  as part of a shared visit with EZIO Hendricks NP.  I agree with the note above. I reviewed today's vital signs and medications. I have reviewed and discussed with the advanced practice provider their physical examination, assessment, and plan   Briefly, NICM, advanced H, slow VTs, previous Vt ablation limited by multiple morphologies and mid myocardial isthmus  My key history/exam findings are: RRR.   The key management decisions made by me: HF team evaluation and diuresis, reconditioning, considering repeat ablation but obvious limitations, family will discuss.  Total time spent on patient visit, reviewing notes, imaging, labs, orders, and completing necessary documentation: 45 minutes.  >50% of visit spent on counseling patient and/or coordination of care.     Sky Leon MD [facility]  Cardiology - Electrophysiology

## 2023-04-13 NOTE — PROGRESS NOTES
Therapy: Iv Inotrope  Insurance: Medicare/ [place] Medicaid    The patient meets Medicare criteria for coverage for Iv Inotrope therapy.  Medicare will cover 80% and [place] Medicaid will cover the remaining 20% resulting in the patient being covered at 100 between both plans.     Please contact Intake with any questions, [phone] or In Basket pool, [facility] ([phone]).

## 2023-04-13 NOTE — PLAN OF CARE
D: Admitted 4/8 as a transfer from [place] per recommendations of his cardiologists Dr. Urbina and Dr. Chanel for evaluation by advanced heart failure failure team and EP team for VT management and consideration of advanced therapies.     I: Monitored vitals and assessed pt status.   Tele: AV paced, HR 60's  Mobility: Ax1 w/ GB and walker, must wear sling to L arm while up ambulating    A: AOx4, dev delay. VSS. Afebrile. Urinating adequately with primofit. No BM during shift. Denies any pain.     P: Continue to monitor Pt status and report changes to Cards 2.

## 2023-04-14 ENCOUNTER — APPOINTMENT (OUTPATIENT)
Dept: GENERAL RADIOLOGY | Facility: CLINIC | Age: 68
DRG: 287 | End: 2023-04-14
Attending: INTERNAL MEDICINE
Payer: MEDICARE

## 2023-04-14 ENCOUNTER — APPOINTMENT (OUTPATIENT)
Dept: PHYSICAL THERAPY | Facility: CLINIC | Age: 68
DRG: 287 | End: 2023-04-14
Attending: INTERNAL MEDICINE
Payer: MEDICARE

## 2023-04-14 ENCOUNTER — APPOINTMENT (OUTPATIENT)
Dept: OCCUPATIONAL THERAPY | Facility: CLINIC | Age: 68
DRG: 287 | End: 2023-04-14
Attending: INTERNAL MEDICINE
Payer: MEDICARE

## 2023-04-14 ENCOUNTER — ANCILLARY PROCEDURE (OUTPATIENT)
Dept: CARDIOLOGY | Facility: CLINIC | Age: 68
DRG: 287 | End: 2023-04-14
Attending: INTERNAL MEDICINE
Payer: MEDICARE

## 2023-04-14 DIAGNOSIS — Z45.02 ENCOUNTER FOR ADJUSTMENT AND MANAGEMENT OF AUTOMATIC IMPLANTABLE CARDIAC DEFIBRILLATOR: ICD-10-CM

## 2023-04-14 DIAGNOSIS — I47.29 PAROXYSMAL VENTRICULAR TACHYCARDIA (H): Primary | ICD-10-CM

## 2023-04-14 DIAGNOSIS — I47.29 PAROXYSMAL VENTRICULAR TACHYCARDIA (H): ICD-10-CM

## 2023-04-14 LAB
ALBUMIN SERPL BCG-MCNC: 3.2 G/DL (ref 3.5–5.2)
ALP SERPL-CCNC: 323 U/L (ref 35–129)
ALT SERPL W P-5'-P-CCNC: 150 U/L (ref 10–50)
ANION GAP SERPL CALCULATED.3IONS-SCNC: 13 MMOL/L (ref 7–15)
ANION GAP SERPL CALCULATED.3IONS-SCNC: 14 MMOL/L (ref 7–15)
AST SERPL W P-5'-P-CCNC: 167 U/L (ref 10–50)
BILIRUB DIRECT SERPL-MCNC: 0.54 MG/DL (ref 0–0.3)
BILIRUB SERPL-MCNC: 1 MG/DL
BUN SERPL-MCNC: 31.5 MG/DL (ref 8–23)
BUN SERPL-MCNC: 34.4 MG/DL (ref 8–23)
CALCIUM SERPL-MCNC: 8.6 MG/DL (ref 8.8–10.2)
CALCIUM SERPL-MCNC: 8.7 MG/DL (ref 8.8–10.2)
CHLORIDE SERPL-SCNC: 97 MMOL/L (ref 98–107)
CHLORIDE SERPL-SCNC: 98 MMOL/L (ref 98–107)
CREAT SERPL-MCNC: 1.97 MG/DL (ref 0.51–1.17)
CREAT SERPL-MCNC: 2.04 MG/DL (ref 0.51–1.17)
DEPRECATED HCO3 PLAS-SCNC: 26 MMOL/L (ref 22–29)
DEPRECATED HCO3 PLAS-SCNC: 27 MMOL/L (ref 22–29)
ERYTHROCYTE [DISTWIDTH] IN BLOOD BY AUTOMATED COUNT: 20.3 % (ref 10–15)
GFR SERPL CREATININE-BSD FRML MDRD: 35 ML/MIN/1.73M2
GFR SERPL CREATININE-BSD FRML MDRD: 37 ML/MIN/1.73M2
GLUCOSE BLDC GLUCOMTR-MCNC: 130 MG/DL (ref 70–99)
GLUCOSE BLDC GLUCOMTR-MCNC: 199 MG/DL (ref 70–99)
GLUCOSE BLDC GLUCOMTR-MCNC: 247 MG/DL (ref 70–99)
GLUCOSE BLDC GLUCOMTR-MCNC: 262 MG/DL (ref 70–99)
GLUCOSE SERPL-MCNC: 124 MG/DL (ref 70–99)
GLUCOSE SERPL-MCNC: 237 MG/DL (ref 70–99)
HCT VFR BLD AUTO: 26.3 % (ref 35–53)
HGB BLD-MCNC: 8.1 G/DL (ref 11.7–17.7)
LACTATE SERPL-SCNC: 1.1 MMOL/L (ref 0.7–2)
MAGNESIUM SERPL-MCNC: 2 MG/DL (ref 1.7–2.3)
MAGNESIUM SERPL-MCNC: 2.8 MG/DL (ref 1.7–2.3)
MCH RBC QN AUTO: 25.2 PG (ref 26.5–33)
MCHC RBC AUTO-ENTMCNC: 30.8 G/DL (ref 31.5–36.5)
MCV RBC AUTO: 82 FL (ref 78–100)
MRSA DNA SPEC QL NAA+PROBE: NEGATIVE
PLATELET # BLD AUTO: 172 10E3/UL (ref 150–450)
POTASSIUM SERPL-SCNC: 3.7 MMOL/L (ref 3.4–5.3)
POTASSIUM SERPL-SCNC: 4 MMOL/L (ref 3.4–5.3)
PROCALCITONIN SERPL IA-MCNC: 1.8 NG/ML
PROT SERPL-MCNC: 6.2 G/DL (ref 6.4–8.3)
RBC # BLD AUTO: 3.21 10E6/UL (ref 3.8–5.9)
SA TARGET DNA: NEGATIVE
SODIUM SERPL-SCNC: 137 MMOL/L (ref 136–145)
SODIUM SERPL-SCNC: 138 MMOL/L (ref 136–145)
WBC # BLD AUTO: 11 10E3/UL (ref 4–11)

## 2023-04-14 PROCEDURE — 250N000013 HC RX MED GY IP 250 OP 250 PS 637: Performed by: STUDENT IN AN ORGANIZED HEALTH CARE EDUCATION/TRAINING PROGRAM

## 2023-04-14 PROCEDURE — 214N000001 HC R&B CCU UMMC

## 2023-04-14 PROCEDURE — 84145 PROCALCITONIN (PCT): CPT | Performed by: STUDENT IN AN ORGANIZED HEALTH CARE EDUCATION/TRAINING PROGRAM

## 2023-04-14 PROCEDURE — 97140 MANUAL THERAPY 1/> REGIONS: CPT | Mod: GO | Performed by: OCCUPATIONAL THERAPIST

## 2023-04-14 PROCEDURE — 87040 BLOOD CULTURE FOR BACTERIA: CPT | Performed by: STUDENT IN AN ORGANIZED HEALTH CARE EDUCATION/TRAINING PROGRAM

## 2023-04-14 PROCEDURE — 36415 COLL VENOUS BLD VENIPUNCTURE: CPT

## 2023-04-14 PROCEDURE — 97116 GAIT TRAINING THERAPY: CPT | Mod: GP

## 2023-04-14 PROCEDURE — 83735 ASSAY OF MAGNESIUM: CPT

## 2023-04-14 PROCEDURE — 71045 X-RAY EXAM CHEST 1 VIEW: CPT | Mod: 26 | Performed by: RADIOLOGY

## 2023-04-14 PROCEDURE — 250N000013 HC RX MED GY IP 250 OP 250 PS 637

## 2023-04-14 PROCEDURE — 36415 COLL VENOUS BLD VENIPUNCTURE: CPT | Performed by: STUDENT IN AN ORGANIZED HEALTH CARE EDUCATION/TRAINING PROGRAM

## 2023-04-14 PROCEDURE — 85027 COMPLETE CBC AUTOMATED: CPT

## 2023-04-14 PROCEDURE — 250N000011 HC RX IP 250 OP 636: Performed by: STUDENT IN AN ORGANIZED HEALTH CARE EDUCATION/TRAINING PROGRAM

## 2023-04-14 PROCEDURE — 82248 BILIRUBIN DIRECT: CPT | Performed by: STUDENT IN AN ORGANIZED HEALTH CARE EDUCATION/TRAINING PROGRAM

## 2023-04-14 PROCEDURE — 97530 THERAPEUTIC ACTIVITIES: CPT | Mod: GP

## 2023-04-14 PROCEDURE — 80053 COMPREHEN METABOLIC PANEL: CPT

## 2023-04-14 PROCEDURE — 83605 ASSAY OF LACTIC ACID: CPT | Performed by: STUDENT IN AN ORGANIZED HEALTH CARE EDUCATION/TRAINING PROGRAM

## 2023-04-14 PROCEDURE — 258N000003 HC RX IP 258 OP 636: Performed by: STUDENT IN AN ORGANIZED HEALTH CARE EDUCATION/TRAINING PROGRAM

## 2023-04-14 PROCEDURE — 87641 MR-STAPH DNA AMP PROBE: CPT | Performed by: STUDENT IN AN ORGANIZED HEALTH CARE EDUCATION/TRAINING PROGRAM

## 2023-04-14 PROCEDURE — 71045 X-RAY EXAM CHEST 1 VIEW: CPT

## 2023-04-14 PROCEDURE — 97530 THERAPEUTIC ACTIVITIES: CPT | Mod: GO | Performed by: OCCUPATIONAL THERAPIST

## 2023-04-14 RX ORDER — MAGNESIUM SULFATE HEPTAHYDRATE 40 MG/ML
2 INJECTION, SOLUTION INTRAVENOUS ONCE
Status: COMPLETED | OUTPATIENT
Start: 2023-04-14 | End: 2023-04-14

## 2023-04-14 RX ORDER — POTASSIUM CHLORIDE 750 MG/1
20 TABLET, EXTENDED RELEASE ORAL ONCE
Status: COMPLETED | OUTPATIENT
Start: 2023-04-14 | End: 2023-04-14

## 2023-04-14 RX ADMIN — ATORVASTATIN CALCIUM 80 MG: 80 TABLET, FILM COATED ORAL at 08:28

## 2023-04-14 RX ADMIN — TAMSULOSIN HYDROCHLORIDE 0.4 MG: 0.4 CAPSULE ORAL at 20:06

## 2023-04-14 RX ADMIN — Medication 1 CAPSULE: at 08:28

## 2023-04-14 RX ADMIN — POTASSIUM CHLORIDE 20 MEQ: 750 TABLET, EXTENDED RELEASE ORAL at 08:28

## 2023-04-14 RX ADMIN — MICONAZOLE NITRATE: 20 POWDER TOPICAL at 20:07

## 2023-04-14 RX ADMIN — MAGNESIUM SULFATE IN WATER 2 G: 40 INJECTION, SOLUTION INTRAVENOUS at 08:28

## 2023-04-14 RX ADMIN — APIXABAN 5 MG: 5 TABLET, FILM COATED ORAL at 20:06

## 2023-04-14 RX ADMIN — AMIODARONE HYDROCHLORIDE 200 MG: 200 TABLET ORAL at 08:28

## 2023-04-14 RX ADMIN — SODIUM CHLORIDE, POTASSIUM CHLORIDE, SODIUM LACTATE AND CALCIUM CHLORIDE 500 ML: 600; 310; 30; 20 INJECTION, SOLUTION INTRAVENOUS at 04:36

## 2023-04-14 RX ADMIN — MEXILETINE HYDROCHLORIDE 200 MG: 200 CAPSULE ORAL at 13:51

## 2023-04-14 RX ADMIN — WHITE PETROLATUM 57.7 %-MINERAL OIL 31.9 % EYE OINTMENT: at 22:00

## 2023-04-14 RX ADMIN — APIXABAN 5 MG: 5 TABLET, FILM COATED ORAL at 08:28

## 2023-04-14 RX ADMIN — CALCIUM CARBONATE 600 MG (1,500 MG)-VITAMIN D3 400 UNIT TABLET 1 TABLET: at 08:28

## 2023-04-14 RX ADMIN — INSULIN ASPART 2 UNITS: 100 INJECTION, SOLUTION INTRAVENOUS; SUBCUTANEOUS at 11:46

## 2023-04-14 RX ADMIN — INSULIN ASPART 2 UNITS: 100 INJECTION, SOLUTION INTRAVENOUS; SUBCUTANEOUS at 17:34

## 2023-04-14 RX ADMIN — MICONAZOLE NITRATE: 20 POWDER TOPICAL at 08:30

## 2023-04-14 RX ADMIN — MEXILETINE HYDROCHLORIDE 200 MG: 200 CAPSULE ORAL at 06:06

## 2023-04-14 RX ADMIN — AMIODARONE HYDROCHLORIDE 200 MG: 200 TABLET ORAL at 20:06

## 2023-04-14 RX ADMIN — MEXILETINE HYDROCHLORIDE 200 MG: 200 CAPSULE ORAL at 21:55

## 2023-04-14 RX ADMIN — MAGNESIUM OXIDE TAB 400 MG (241.3 MG ELEMENTAL MG) 800 MG: 400 (241.3 MG) TAB at 08:28

## 2023-04-14 RX ADMIN — CEFEPIME HYDROCHLORIDE 2 G: 2 INJECTION, POWDER, FOR SOLUTION INTRAVENOUS at 06:05

## 2023-04-14 ASSESSMENT — ACTIVITIES OF DAILY LIVING (ADL)
ADLS_ACUITY_SCORE: 40
ADLS_ACUITY_SCORE: 40
ADLS_ACUITY_SCORE: 42
ADLS_ACUITY_SCORE: 40
ADLS_ACUITY_SCORE: 42
ADLS_ACUITY_SCORE: 42

## 2023-04-14 NOTE — PROGRESS NOTES
[facility]  CARDIOLOGY HEART FAILURE SERVICE (CARDS II) PROGRESS NOTE    Patient Name: Rohit Garvey    Medical Record Number: [id number]    YOB: 1955  PCP: Kevyn Salazar    Admit Date/Time: 4/8/2023  3:08 PM   6    Assessment and Plan:  Rohit Garvey is a 67 year old adult male with a PMH significant for NICM (EF of 40-45%) c/b paroxysmal VT s/p dual chamber ICD placement 6/20/2022 c/b recurrent ICD shocks s/p VT ablation 10/31/2022 and re-ablation 12/29/2022, paroxysmal Afib on apixaban, developmental delay (possibly related to viral illnesses during infancy), T2DM, hypertension, and hyperlipidemia who was admitted as a transfer from [place] per recommendations of his cardiologists Dr. Urbina and Dr. Chanel for evaluation by advanced heart failure failure team and EP team for VT management and consideration of advanced therapies.    Today's updates:  - Will touch base regarding discussion of VT management   - Hold diuresis today. Increasing Cr despite net negative fluid balance     # Persistent Paroxysmal Ventricular Tachycardia s/p dual chamber ICD placement 6/2022 and VT ablation x 2  # Chronic HFrEF 2/2 NICM w/ LVEF 40-45%, NYHA II  High burden of ventricular arrhythmias despite being on 2 antiarrhythmic medications (amiodarone and mexiletine) and being s/p ICD and two attempted VT ablations. Transferred here for evaluation for advanced therapies. ECHO 4/9 showed mildly reduced EF from 46% (Dec 2022) to 40-45%, however still does not qualify him for LVAD (EF < 25%). Possible heart transplant candidate if he meets criteria for VT storm.   VT episode at 0700 on 4/14. Required ATP and ICD shock.   - EP consulted, appreciate recs   - ICD device interrogation ordered - 1 VT episode on 4/9/23 lasting 25 seconds   - Was previously holding off on evaluation for arrhythmia interventions while working up for advanced therapies. Pt reported 4/12 he is leaning against  heart transplant but would be interested in possible arrhythmia interventions. Reached out to EP for assessment, possible ICD adjustment vs. VT ablation this admission. Following up discussion today   - Continue tele    Advanced therapies evaluation workup:  - ECHO 4/9 w/ mildly reduced LV function (EF reduction from 46% to 40-45%) and mild aortic valve calcification   - RHC 4/11:   RA mean 11mmHg. RV 38/4. RVEDP 13. PA 39/16/25. PCWP 16  TD CO/CI: 3.85 L/min / 2.25 L/min/m2  Shena CO/CI: 3.37 L/min / 1.97 L/min/m2    - Cardiopulmonary stress test cancelled as pt deemed not physically able to perform test per RN  - Advanced therapy  consulted  - Neuropsych testing 4/11 and 4/12  - Financial consult already placed  - Utox screen positive for amphetamines. Quant screen still pending   - Nicotine and cotinine screen negative. PEth negative  - To discuss pt at upcoming heart transplant committee meeting 4/14     GDMTs:  - Antiarrhythmics: PTA amiodarone 200 mg BID and mexiletine 200 mg q8h   - ACEi/ARB: Not currently on due to LVEF of 45%.  - BB: PTA carvedilol 3.125 mg BID   - Aldosterone antagonist: None   - SGLT2i: PTA empagliflozin 10 mg daily   - SCD prophylaxis: S/p secondary prevention ICD 6/20/22  - Volume status: Still hypervolemic (Cr worse)               - S/p IV lasix 80 mg x 2 on 4/9 and IV lasix 100mg 4/10, 4/11. Increased torsemide to 40 mg BID               - Hold PTA lasix 40 mg TID (recently increased from qday) while on IV diuresis                - continue PTA KCl supplement 20 meq daily                - continue PTA magnesium oxide 800 mg daily                - BMP BID w/ goal of K > 4 and Mg > 2               - Strict I/Os and daily weights   - Anticoagulation: PTA Eliquis 5 mg BID   - Statin therapy: PTA atorvastatin 80 mg daily     # Positive U tox for amphetamines  Pt and sister deny any illegal or legal stimulant use. No obvious PTA drugs on inspection that could be causing a false  positive.  - Urine amphetamine quantitative screen ordered pending      # Recent clavicular fracture  At baseline uses walker for ambulation. Had recent clavicle fracture now requiring him to be wheelchair bound. Supposed to be non weight bearing on LUE for another 4 weeks   - Fall precautions  - NWB LUE precautions provided   - PT/OT following     # Afib w/o RVR  - PTA carvedilol, amiodarone, and mexiletine   - PTA Eliquis 5 mg BID  - Tele     # ROSALINO on CKD, stable  Uptrending Cr above baseline Cr of 1.3. No observed mention of etiology of pt's CKD in chart. UA w/o protein, RBCs, or casts. Concern for baseline cardiorenal syndrome  - diuresis per above   - BID BMP     # Acute on chronic normocytic anemia concerning for mixed picture of iron deficiency and inflammatory   # Iron deficiency   # Hx of anemia of chronic disease   Hgb 8.7 on arrival and downtrending. Baseline Hgb ~9-10 reportedly 2/2 anemia of chronic disease. Tbili normal. No source of active bleeding. Workup revealed low iron level, normal TIBC, and low end-of-normal ferritin concerning for iron deficiency anemia.   - IV Iron sucrose 200 mg qday x 5 days (starting 4/9)  - AM CBCs     # Chronic hyponatremia, resolved  BL sodium ~130-133. Suspect hypervolemic hyponatremia in setting of CHF. Improved after IV diuresis   - daily BMP    # DM2   On metformin 1,000 mg BID PTA.   - hold PTA metformin   - Continue empagliflozin as above   - LDSS  - Hypoglycemia protocol      # BPH  - PTA tamulosin  - CTM w/ bladder scans and PVRs PRN     # Developmental delay   Lives at assisted living where he receives assistance w/ medications, meals, etc. Independent in basic ADLs (feeding, clothing, bathing) at his baseline. Sister Peg is his HCA and POA but pt currently has capacity   - Delirium precautions      # Constipation   - PTA metamucil      FEN: Soft bite sized (due to lack of dentition), 2 L fluid restriction   PPx: PTA Eliquis   Code: Full Code     Pt was  discussed and evaluated with Dr. Salter, attending physician, who agrees with the assessment and plan above.     Mayur Navas, PGY-4  Cardiovascular Disease Fellow    Interval Changes in Past 24 Hours:   Patient with lower MAPs overnight. Given 500 ml bolus and briefly started on abx though these were stopped in the AM. LA was normal. Diuresis held. Additionally with episode of sustained VT around 7AM. Tele appears to have attempted ATP followed by shock. This AM, the patient is feeling fine from a cardiopulmonary standpoint. No further VT on tele. BP improved this AM, no dizziness. No fever or chills.     Review Of Systems  A 4-point ROS was negative aside from those listed above.    OBJECTIVE FINDINGS:    Temp:  [97.9  F (36.6  C)-99.3  F (37.4  C)] 97.9  F (36.6  C)  Pulse:  [54-93] 60  Resp:  [16-18] 16  BP: ()/(39-64) 84/47  SpO2:  [89 %-97 %] 95 %     Intake/Output Summary (Last 24 hours) at 4/14/2023 1005  Last data filed at 4/14/2023 0900  Gross per 24 hour   Intake 780 ml   Output 2200 ml   Net -1420 ml     Vitals:    04/11/23 0400 04/12/23 0600 04/13/23 0600   Weight: 66.8 kg (147 lb 4.8 oz) 64.3 kg (141 lb 11.2 oz) 62.5 kg (137 lb 11.2 oz)     Gen: Laying in bed, no acute distress  Neck: JVP ~8, unchanged from yesterday  Resp: clear to auscultation bilaterally, no crackles or wheezing   CV: Irregular rhythm, regular rate.   Abd: soft, NT, ND  Ext: warm and well perfused, 1+-2+ lower extremity edema to level of knees     Current medications   Current Facility-Administered Medications   Medication     acetaminophen (TYLENOL) Suppository 650 mg     acetaminophen (TYLENOL) tablet 650 mg     alum & mag hydroxide-simethicone (MAALOX) suspension 30 mL     amiodarone (PACERONE) tablet 200 mg     apixaban ANTICOAGULANT (ELIQUIS) tablet 5 mg     artificial tears ophthalmic ointment     atorvastatin (LIPITOR) tablet 80 mg     calcium carbonate-vitamin D (CALTRATE) 600-10 MG-MCG per tablet 1 tablet      carboxymethylcellulose PF (REFRESH PLUS) 0.5 % ophthalmic solution 1 drop     [Held by provider] carvedilol (COREG) tablet 3.125 mg     glucose gel 15-30 g    Or     dextrose 50 % injection 25-50 mL    Or     glucagon injection 1 mg     diphenhydrAMINE (BENADRYL) injection 50 mg     [Held by provider] empagliflozin (JARDIANCE) tablet 10 mg     EPINEPHrine (ADRENALIN) kit 0.3 mg     famotidine (PEPCID) injection 20 mg     insulin aspart (NovoLOG) injection (RAPID ACTING)     insulin aspart (NovoLOG) injection (RAPID ACTING)     lidocaine (LMX4) cream     lidocaine 1 % 0.1-1 mL     magnesium oxide (MAG-OX) tablet 800 mg     magnesium sulfate 2 g in 50 mL sterile water intermittent infusion     medication instruction     methylPREDNISolone sodium succinate (solu-MEDROL) injection 125 mg     mexiletine (MEXITIL) capsule 200 mg     miconazole (MICATIN) 2 % powder     nitroGLYcerin (NITROSTAT) sublingual tablet 0.4 mg     potassium chloride ER (KLOR-CON M) CR tablet 20 mEq     potassium chloride ER (KLOR-CON M) CR tablet 20 mEq     psyllium (METAMUCIL/KONSYL) capsule 1 capsule     sodium chloride (PF) 0.9% PF flush 3 mL     sodium chloride (PF) 0.9% PF flush 3 mL     tamsulosin (FLOMAX) capsule 0.4 mg     [Held by provider] torsemide (DEMADEX) tablet 40 mg       LABS Reviewed    IMAGES Reviewed:    ECHO 4/9/23 Interpretation Summary:  Left ventricular function is decreased. The ejection fraction is 40-45%  (mildly reduced compared to 46% back in Dec 2022).   LV size and wall thickness is normal. Abnormal septal wall motion consistent w/ pacemaker.   Global right ventricular function is normal.  Mild aortic valve calcification is present. The mean gradient across the  aortic valve is 8 mmHg.  Pulmonary artery systolic pressure is normal.  IVC diameter and respiratory changes fall into an intermediate range  suggesting an RA pressure of 8 mmHg.  No pericardial effusion is present.    ICD interrogation 4/10/23:  Device:  Dubizzle [id number] [place] HF  Normal Device Function.   Mode: DDDR 60-90 bpm  AP: 49%  BVP: 91%  Intrinsic rhythm: CHB w/ AS @ 60 bpm; no intrinsic R-waves at VVI 30 bpm  Lead Trends Appear Stable: Yes  Estimated battery longevity to RRT = 5.9 years. Remaining Capacity to OSCAR = 92%.  Atrial arrhythmia: None  AF burden: 0%  Anticoagulant: Eliquis  Ventricular Arrhythmia: 1 VT episode recorded on 4/9/23 at 9:43 AM, lasting ~25 seconds at 114 bpm. EGM reveals a slow VT which is converted with one sequence of ATP.  Setting changes: None    RHC 4/11/23:  RA mean 11mmHg  RV 40/11  PA 39/16/25  PCWP 16    Pa Sat 49.9%  Art Sat 99%    TD CO/CI: 3.85 L/min / 2.25 L/min/m2  Shean CO/CI: 3.37 L/min / 1.97 L/min/m2    PVR 2.3 (TD)  SVR  1538 (TD) Right sided filling pressures are mildly elevated. Left sided filling pressures are mildly elevated. Mild elevated pulmonary hypertension. Reduced cardiac output level. Hemodynamic data has been modified in Epic per physician review.    I have reviewed today's vital signs, notes, medications, labs and imaging.  I have also seen and examined the patient and agree with the findings and plan as outlined above.  Pt with spouse at bedside.  Hx of EF 45% with VT and with repeated ICD discharges now with RA 11, PCW 16 and awaiting EP consult regarding lead adjustment or VT ablation.    Lorenzo Salter MD, PhD  Professor, Heart Failure and Cardiac Transplantation  [facility]

## 2023-04-14 NOTE — PROGRESS NOTES
ICU End of Shift Summary. See flowsheets for vital signs and detailed assessment.    Changes this shift: A/Ox4, denying pain. Appeared to sleep well over shift, despite frequent staff interventions. See MD Notifications below. AV paced @ 60, CMS intact. Hypotensive, Sys<90 & MAPs <65, see below. On RA for most of night, placed on 2 L NC while sleeping. No BM, having good output w/ external cath.     Plan: Continue to monitor hemodynamic status. Will start antibiotics this AM.  _______  MD Notification    Notified Person: Dr. Zoe Ludwig, [facility] Resident    Notification Date/Time: 4/14/2023 3:30 AM     Purpose of Notification: FYI, SBP<90 since 0000. MAPs high 60s. Last MAP<65. Denies SOB/chest pain/dizziness. A/Ox4.     Orders Received: Torsemide held, liver panel added for AM labs.  _______  MD Notification    Notified Person: Dr. LOUIE Ludwig    Notification Date/Time: 4/14/2023 4:21 AM     Purpose of Notification: FYI, BP 80/45 w/ MAP 59. Recheck 78/39 w/ MAP 55. Still A/Ox4, on RA/denies pain/SOB/etc.    Orders Received: Provider to bedside for assessment, 500 mL bolus, STAT lab draws, BCs, UA. Coreg & jardiance held.

## 2023-04-14 NOTE — PROGRESS NOTES
"BP 92/57 (BP Location: Right arm)   Pulse 77   Temp 97.6  F (36.4  C) (Oral)   Resp 18   Ht 1.626 m (5' 4\")   Wt 62.5 kg (137 lb 11.2 oz)   SpO2 97%   BMI 23.64 kg/m      Shift: 3488-5809  Reason for Admission: Paroxysmal VT. Here for evaluation by advanced heart failure team and EP team for VT management and consideration of advanced therapies.  VS/Tele: VSS on RA- soft BPs, AV paced  Neuros: A/Ox4, able to make needs known. Garbled speech.   Respiratory: Sats >95% on RA.  GI/: 1 BM this shift. Voiding via primofit, good output  Nutrition: Tolerating low sat fat with 2L FR  Drains/Lines: L. PIV SL  Activity: A2 + GB + walker. Worked with PT and OT today.   Pain/Nausea: Denies both  Skin: +3 pitting edema in BLE- lymph wraps placed today  Labs: K+ and Mg+ replaced, AM recheck. BG ACHS  Social: Sister at bedside  Plan of care: Will continue to monitor and follow POC.     "

## 2023-04-14 NOTE — PROGRESS NOTES
Care Management Follow Up    Length of Stay (days): 6    Expected Discharge Date: 04/14/2023     Concerns to be Addressed:       Patient plan of care discussed at interdisciplinary rounds: Yes    Anticipated Discharge Disposition: Home     Anticipated Discharge Services:    Anticipated Discharge DME:      Patient/family educated on Medicare website which has current facility and service quality ratings:    Education Provided on the Discharge Plan:    Patient/Family in Agreement with the Plan:      Referrals Placed by CM/SW:      SW left a VM with the following TCUs requesting the fax number to send a referral----  [facility]  [address], [place] [id number] [phone]     [facility]  [address] [place] [address]  [phone]     SW received the fax number from the admissions director Malena (Phone: [phone], Fax: [phone]) and SW sent the faxed TCU referral at 3:56pm for the following facilities---  [facility]  [address], [place] [id number] [phone]     [facility]  [address], [place] [id number] [phone]    [facility]  [address], [place] [id number] [phone]     Private pay costs discussed: insurance costs out of pocket expenses    Additional Information:  SW left a voicemail for [facility] to follow up on referral sent yesterday afternoon. SW also attempted to call [facility] and [facility] to receive their fax numbers for referrals. They did not answer and there was not a voicemail service.     DESHAWN called [facility] and was able to get a fax number. SW sent a referral for TCU placement.       Katherine Wilburn DESHAWN  ICU   Phone: [phone]  Pager: [phone]

## 2023-04-14 NOTE — PHARMACY-VANCOMYCIN DOSING SERVICE
Pharmacy Vancomycin Initial Note  Date of Service 2023  Patient's  1955  67 year old, adult    Indication: Sepsis    Current estimated CrCl = Estimated Creatinine Clearance (based on SCr of 1.93 mg/dL (H))  Female: 27.9 mL/min (A)  Male: 32.8 mL/min (A)    Creatinine for last 3 days  2023:  7:37 AM Creatinine 1.57 mg/dL;  4:44 PM Creatinine 1.54 mg/dL  2023:  5:46 AM Creatinine 1.59 mg/dL;  3:42 PM Creatinine 1.60 mg/dL  2023:  6:39 AM Creatinine 1.85 mg/dL;  3:41 PM Creatinine 1.93 mg/dL    Recent Vancomycin Level(s) for last 3 days  No results found for requested labs within last 3 days.      Vancomycin IV Administrations (past 72 hours)      No vancomycin orders with administrations in past 72 hours.                Nephrotoxins and other renal medications (From now, onward)    Start     Dose/Rate Route Frequency Ordered Stop    23 0600  vancomycin (VANCOCIN) 1,250 mg in 0.9% NaCl 250 mL intermittent infusion         1,250 mg  over 90 Minutes Intravenous ONCE 23 0546      23 0546  vancomycin place blanca - receiving intermittent dosing         1 each Intravenous SEE ADMIN INSTRUCTIONS 23 0547      23 1600  [Held by provider]  torsemide (DEMADEX) tablet 40 mg        (Held by provider since 2023 at 0335 by Zoe Ludwig MD.Hold Reason: Other)    40 mg Oral 2 TIMES DAILY (Diuretics and Nitrates) 23 1041      23 0800  [Held by provider]  empagliflozin (JARDIANCE) tablet 10 mg        (Held by provider since 2023 at 0446 by Zoe Ludwig MD.Hold Reason: Change in Vitals)    10 mg Oral DAILY 23 1723            Contrast Orders - past 72 hours (72h ago, onward)    None          Intermittent dosing, ROSALINO        Plan:  1. Start vancomycin  1250 mg IV once now, additional doses to be determined by future levels / renal function.   2. Vancomycin monitoring method: Trough (Method 2 = manual dose calculation)  3. Vancomycin  therapeutic monitoring goal: 15-20 mg/L  4. Pharmacy will check vancomycin levels as appropriate in 1-3 Days.    5. Serum creatinine levels will be ordered daily for the first week of therapy and at least twice weekly for subsequent weeks.      Iam Rainey, [facility]

## 2023-04-15 ENCOUNTER — APPOINTMENT (OUTPATIENT)
Dept: OCCUPATIONAL THERAPY | Facility: CLINIC | Age: 68
DRG: 287 | End: 2023-04-15
Attending: INTERNAL MEDICINE
Payer: MEDICARE

## 2023-04-15 LAB
ALBUMIN UR-MCNC: 30 MG/DL
ANION GAP SERPL CALCULATED.3IONS-SCNC: 12 MMOL/L (ref 7–15)
ANION GAP SERPL CALCULATED.3IONS-SCNC: 12 MMOL/L (ref 7–15)
APPEARANCE UR: ABNORMAL
BACTERIA #/AREA URNS HPF: ABNORMAL /HPF
BILIRUB UR QL STRIP: NEGATIVE
BUN SERPL-MCNC: 30.1 MG/DL (ref 8–23)
BUN SERPL-MCNC: 31.3 MG/DL (ref 8–23)
CALCIUM SERPL-MCNC: 8.6 MG/DL (ref 8.8–10.2)
CALCIUM SERPL-MCNC: 8.8 MG/DL (ref 8.8–10.2)
CHLORIDE SERPL-SCNC: 98 MMOL/L (ref 98–107)
CHLORIDE SERPL-SCNC: 99 MMOL/L (ref 98–107)
COLOR UR AUTO: YELLOW
CREAT SERPL-MCNC: 1.77 MG/DL (ref 0.51–1.17)
CREAT SERPL-MCNC: 1.93 MG/DL (ref 0.51–1.17)
DEPRECATED HCO3 PLAS-SCNC: 25 MMOL/L (ref 22–29)
DEPRECATED HCO3 PLAS-SCNC: 27 MMOL/L (ref 22–29)
ERYTHROCYTE [DISTWIDTH] IN BLOOD BY AUTOMATED COUNT: 20.5 % (ref 10–15)
GFR SERPL CREATININE-BSD FRML MDRD: 37 ML/MIN/1.73M2
GFR SERPL CREATININE-BSD FRML MDRD: 42 ML/MIN/1.73M2
GLUCOSE BLDC GLUCOMTR-MCNC: 141 MG/DL (ref 70–99)
GLUCOSE BLDC GLUCOMTR-MCNC: 167 MG/DL (ref 70–99)
GLUCOSE BLDC GLUCOMTR-MCNC: 177 MG/DL (ref 70–99)
GLUCOSE BLDC GLUCOMTR-MCNC: 180 MG/DL (ref 70–99)
GLUCOSE BLDC GLUCOMTR-MCNC: 250 MG/DL (ref 70–99)
GLUCOSE SERPL-MCNC: 162 MG/DL (ref 70–99)
GLUCOSE SERPL-MCNC: 202 MG/DL (ref 70–99)
GLUCOSE UR STRIP-MCNC: >=1000 MG/DL
HCT VFR BLD AUTO: 27.5 % (ref 35–53)
HGB BLD-MCNC: 8.4 G/DL (ref 11.7–17.7)
HGB UR QL STRIP: ABNORMAL
KETONES UR STRIP-MCNC: NEGATIVE MG/DL
LEUKOCYTE ESTERASE UR QL STRIP: ABNORMAL
MAGNESIUM SERPL-MCNC: 2.5 MG/DL (ref 1.7–2.3)
MAGNESIUM SERPL-MCNC: 2.5 MG/DL (ref 1.7–2.3)
MCH RBC QN AUTO: 24.9 PG (ref 26.5–33)
MCHC RBC AUTO-ENTMCNC: 30.5 G/DL (ref 31.5–36.5)
MCV RBC AUTO: 81 FL (ref 78–100)
MUCOUS THREADS #/AREA URNS LPF: PRESENT /LPF
NITRATE UR QL: NEGATIVE
PH UR STRIP: 5 [PH] (ref 5–7)
PLATELET # BLD AUTO: 175 10E3/UL (ref 150–450)
POTASSIUM SERPL-SCNC: 4 MMOL/L (ref 3.4–5.3)
POTASSIUM SERPL-SCNC: 4.4 MMOL/L (ref 3.4–5.3)
RBC # BLD AUTO: 3.38 10E6/UL (ref 3.8–5.9)
RBC URINE: 4 /HPF
SODIUM SERPL-SCNC: 136 MMOL/L (ref 136–145)
SODIUM SERPL-SCNC: 137 MMOL/L (ref 136–145)
SP GR UR STRIP: 1.02 (ref 1–1.03)
SQUAMOUS EPITHELIAL: <1 /HPF
UROBILINOGEN UR STRIP-MCNC: NORMAL MG/DL
WBC # BLD AUTO: 7.4 10E3/UL (ref 4–11)
WBC URINE: 131 /HPF

## 2023-04-15 PROCEDURE — 97140 MANUAL THERAPY 1/> REGIONS: CPT | Mod: GO

## 2023-04-15 PROCEDURE — 83735 ASSAY OF MAGNESIUM: CPT

## 2023-04-15 PROCEDURE — 214N000001 HC R&B CCU UMMC

## 2023-04-15 PROCEDURE — 87086 URINE CULTURE/COLONY COUNT: CPT | Performed by: STUDENT IN AN ORGANIZED HEALTH CARE EDUCATION/TRAINING PROGRAM

## 2023-04-15 PROCEDURE — G0463 HOSPITAL OUTPT CLINIC VISIT: HCPCS | Mod: 25

## 2023-04-15 PROCEDURE — 250N000011 HC RX IP 250 OP 636

## 2023-04-15 PROCEDURE — 99232 SBSQ HOSP IP/OBS MODERATE 35: CPT | Mod: GC | Performed by: INTERNAL MEDICINE

## 2023-04-15 PROCEDURE — 80048 BASIC METABOLIC PNL TOTAL CA: CPT

## 2023-04-15 PROCEDURE — 82310 ASSAY OF CALCIUM: CPT

## 2023-04-15 PROCEDURE — 250N000013 HC RX MED GY IP 250 OP 250 PS 637: Performed by: STUDENT IN AN ORGANIZED HEALTH CARE EDUCATION/TRAINING PROGRAM

## 2023-04-15 PROCEDURE — 97602 WOUND(S) CARE NON-SELECTIVE: CPT

## 2023-04-15 PROCEDURE — 36415 COLL VENOUS BLD VENIPUNCTURE: CPT

## 2023-04-15 PROCEDURE — 85027 COMPLETE CBC AUTOMATED: CPT

## 2023-04-15 PROCEDURE — 81003 URINALYSIS AUTO W/O SCOPE: CPT | Performed by: STUDENT IN AN ORGANIZED HEALTH CARE EDUCATION/TRAINING PROGRAM

## 2023-04-15 PROCEDURE — 250N000013 HC RX MED GY IP 250 OP 250 PS 637

## 2023-04-15 RX ORDER — CEFTRIAXONE 1 G/1
1 INJECTION, POWDER, FOR SOLUTION INTRAMUSCULAR; INTRAVENOUS EVERY 24 HOURS
Status: DISCONTINUED | OUTPATIENT
Start: 2023-04-15 | End: 2023-04-17

## 2023-04-15 RX ADMIN — ATORVASTATIN CALCIUM 80 MG: 80 TABLET, FILM COATED ORAL at 08:01

## 2023-04-15 RX ADMIN — AMIODARONE HYDROCHLORIDE 200 MG: 200 TABLET ORAL at 20:40

## 2023-04-15 RX ADMIN — CALCIUM CARBONATE 600 MG (1,500 MG)-VITAMIN D3 400 UNIT TABLET 1 TABLET: at 08:02

## 2023-04-15 RX ADMIN — INSULIN ASPART 1 UNITS: 100 INJECTION, SOLUTION INTRAVENOUS; SUBCUTANEOUS at 08:02

## 2023-04-15 RX ADMIN — Medication 12.5 MG: at 13:42

## 2023-04-15 RX ADMIN — AMIODARONE HYDROCHLORIDE 200 MG: 200 TABLET ORAL at 08:00

## 2023-04-15 RX ADMIN — Medication 1 CAPSULE: at 08:01

## 2023-04-15 RX ADMIN — INSULIN ASPART 2 UNITS: 100 INJECTION, SOLUTION INTRAVENOUS; SUBCUTANEOUS at 11:28

## 2023-04-15 RX ADMIN — MEXILETINE HYDROCHLORIDE 200 MG: 200 CAPSULE ORAL at 13:42

## 2023-04-15 RX ADMIN — INSULIN ASPART 1 UNITS: 100 INJECTION, SOLUTION INTRAVENOUS; SUBCUTANEOUS at 17:44

## 2023-04-15 RX ADMIN — WHITE PETROLATUM 57.7 %-MINERAL OIL 31.9 % EYE OINTMENT: at 22:18

## 2023-04-15 RX ADMIN — MICONAZOLE NITRATE: 20 POWDER TOPICAL at 08:18

## 2023-04-15 RX ADMIN — MAGNESIUM OXIDE TAB 400 MG (241.3 MG ELEMENTAL MG) 800 MG: 400 (241.3 MG) TAB at 08:01

## 2023-04-15 RX ADMIN — MICONAZOLE NITRATE: 20 POWDER TOPICAL at 20:41

## 2023-04-15 RX ADMIN — TAMSULOSIN HYDROCHLORIDE 0.4 MG: 0.4 CAPSULE ORAL at 20:40

## 2023-04-15 RX ADMIN — POTASSIUM CHLORIDE 20 MEQ: 750 TABLET, EXTENDED RELEASE ORAL at 08:01

## 2023-04-15 RX ADMIN — APIXABAN 5 MG: 5 TABLET, FILM COATED ORAL at 08:01

## 2023-04-15 RX ADMIN — MEXILETINE HYDROCHLORIDE 200 MG: 200 CAPSULE ORAL at 22:18

## 2023-04-15 RX ADMIN — CEFTRIAXONE SODIUM 1 G: 1 INJECTION, POWDER, FOR SOLUTION INTRAMUSCULAR; INTRAVENOUS at 14:43

## 2023-04-15 RX ADMIN — MEXILETINE HYDROCHLORIDE 200 MG: 200 CAPSULE ORAL at 06:46

## 2023-04-15 RX ADMIN — APIXABAN 5 MG: 5 TABLET, FILM COATED ORAL at 20:40

## 2023-04-15 ASSESSMENT — ACTIVITIES OF DAILY LIVING (ADL)
ADLS_ACUITY_SCORE: 42
ADLS_ACUITY_SCORE: 42
ADLS_ACUITY_SCORE: 40
ADLS_ACUITY_SCORE: 42

## 2023-04-15 NOTE — PROGRESS NOTES
Care Management Follow Up    Length of Stay (days): 7    Expected Discharge Date: 04/17/2023     Concerns to be Addressed:  Discharge Planning   Patient plan of care discussed at interdisciplinary rounds: No    Anticipated Discharge Disposition: TCU     Anticipated Discharge Services:    Anticipated Discharge DME:      Patient/family educated on Medicare website which has current facility and service quality ratings:    Education Provided on the Discharge Plan:    Patient/Family in Agreement with the Plan:      Referrals Placed by CM/SW:    Private pay costs discussed: Not applicable    Additional Information:  Bedside RN asked SW to see pt and sister, per sister's request re: discharge planning. Met w/ pt and sister and pt's sister reports she has received no updates about discharge planning. Informed her that TCU referrals were made late Thursday and late Friday and we have not heard back about status of referrals.     Sister also did ask clarifying questions about hospice, as writer had discussed this as something that may be beneficial down the line as pt not wanting to pursue advanced therapies. Confirmed with pt and sister that their current goal continues to be to go to inpatient short-term rehab. Goal would be for pt to return to his AL, but pt will need to be Independent. Discussed that TCU and/or AL would be able to help with transition to hospice. Discussed with sister that she will need to speak to AL to inquire how much support pt could receive if he did transition to hospice at AL. If AL unable to provide the care pt would require (up to 24hr care/supervision), while on hospice, then would need to look at a LTC. Pt's sister expressed understanding.     Pt's sister will need a day to arrange commercial flights back to [place]. Pt and sister have a family member traveling towards [place], via car, so this could be an option. This would be an 8hr drive and concern how pt would be able to go out and get  to bathroom. Sister's preference is to fly.       Princess Valadez, LICSW

## 2023-04-15 NOTE — DISCHARGE INSTRUCTIONS
. Right medial heel every other day  Paint mohsen-wound skin with No sting barrier film (931984)  Apply Iodosorb Gel ([id number]) to open wound bed 3mm thick to a small piece of adaptic   Place adaptic gel side down to the wound bed                    Cover with mepilex flex 4x4 (# [id number])  Keep heels elevated off bed.  Apply compression on in the am and off at night

## 2023-04-15 NOTE — PLAN OF CARE
VSS. Denies pain. Alert and oriented with baseline developmental delay. WOC consult placed for existing right heel wound. Up to chair for much of day with weight shifting. Pulsate mattress ordered for reddened coccyx. Attempted walking with walker, gait belt and max assist of 2. Patient only able to walk to end of bed and back. Plan to continue to encourage activity. Social work visited with patient and sister today. Discharge to TCU when stable. Continue to monitor patient. Notify Cards 2 with changes or concerns.

## 2023-04-15 NOTE — PLAN OF CARE
D AVSS with sat's 95% on room air. Heart regular AV paced and lungs decreased in bases otherwise clear. Voiced no c/o pain or nausea during the night and slept fair between cares. Blood glucose stable and had a snack overnight. Voiding good amounts of urine via premafit and urine specimen sent.   I Vital's, assessment and med's per order.  A Resting in bed with call light in reach.  P Continue to monitor and update MD with changes.

## 2023-04-15 NOTE — CONSULTS
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 Urbina and Dr. Chanel for evaluation by advanced heart failure failure team and EP team for VT management and consideration of advanced therapies.       Areas Assessed:      Areas visualized during today's visit: Heels   Wound location: Right heel    4/15  Last photo: 4/15  Wound due to: Diabetic Ulcer  Wound history/plan of care: Per patients wife wound has been there since December 2022 and started as redness, evolved into blister, then dried up. Nurse and family concerned today 4/25 as wound was moist with purulent drainage. Per patients wife he has been seeing podiatry and they recommended mepilex dressings. While patient was at assisted living they said the wound had dried up and that no dressing was needed anymore. Wound appears to have been kept in a moist environment, unclear if dressings were being used, but drainage that had been noted by nuring and family was from autolytic debridement of necrotic tissue. No infection suspected at this time.   Wound base: 70 % slough, 30 % non-granular tissue     Palpation of the wound bed: normal      Drainage: small     Description of drainage: cloudy     Measurements (length x width x depth, in cm): 1 x 1.2 x 0.2 cm      Tunneling: N/A     Undermining: N/A  Periwound skin: Intact      Color: normal and consistent with surrounding  tissue      Temperature: normal   Odor: none  Pain: moderate, Irritable or crying out at intervals and during dressing change, tender  Pain interventions prior to dressing change: patient tolerated well and slow and gentle cares   Treatment goal: Infection control/prevention, Increase granulation, Remove necrotic tissue and Soften necrotic tissue  STATUS: initial assessment  Supplies ordered: gathered, at bedside, discussed with RN and discussed with patient        Treatment Plan:     Right heel wound(s): Every 3 days   Cleanse wound with normal saline and pat dry.  Paint mohsen-wound skin with No sting barrier film (807646)  Apply Iodosorb Gel ([id number]) to open wound bed 3mm thick.  Cover with mepilex flex 4x4 (# [id number])  Keep heels elevated off bed.         Orders: Written    RECOMMEND PRIMARY TEAM ORDER: None, at this time, Patient should follow up with outpatient podiatry.  Education provided: plan of care and wound progress  Discussed plan of care with: Patient, Family and Nurse  WOC nurse follow-up plan: weekly  Notify WOC if wound(s) deteriorate.  Nursing to notify the Provider(s) and re-consult the WOC Nurse if new skin concern.    DATA:     Current support surface: Standard  Standard gel/foam mattress (IsoFlex, Atmos air, etc)  Containment of urine/stool: Incontinent pad in bed  BMI: Body mass index is 23.46 kg/m .   Active diet order: Orders Placed This Encounter      Low Saturated Fat Na <2400 mg     Output: I/O last 3 completed shifts:  In: 720 [P.O.:720]  Out: 1365 [Urine:1365]     Labs: Recent Labs   Lab 04/15/23  0659 04/14/23  0523 04/11/23  1054 04/11/23  0737   ALBUMIN  --  3.2*  --   --    HGB 8.4* 8.1*   < > 8.8*   INR  --   --   --  1.28*   WBC 7.4 11.0   < > 6.4    < > = values in this interval not displayed.     Pressure injury risk assessment:   Sensory Perception: 4-->no impairment  Moisture: 4-->rarely moist  Activity: 3-->walks occasionally  Mobility: 2-->very limited  Nutrition:  3-->adequate  Friction and Shear: 3-->no apparent problem  Linwood Score: 19    Eliana Ace RN, CWOCN  Pager no longer is use, please contact through [employer] group: [facility] Nurse  Dept. Office Number: [phone]

## 2023-04-15 NOTE — PROGRESS NOTES
[facility]  CARDIOLOGY HEART FAILURE SERVICE (CARDS II) PROGRESS NOTE    Patient Name: Rohit Garvey    Medical Record Number: [id number]    YOB: 1955  PCP: Kevyn Salazar    Admit Date/Time: 4/8/2023  3:08 PM   7    Assessment and Plan:  Rohit Garvey is a 67 year old adult male with a PMH significant for NICM (EF of 40-45%) c/b paroxysmal VT s/p dual chamber ICD placement 6/20/2022 c/b recurrent ICD shocks s/p VT ablation 10/31/2022 and re-ablation 12/29/2022, paroxysmal Afib on apixaban, developmental delay (possibly related to viral illnesses during infancy), T2DM, hypertension, and hyperlipidemia who was admitted as a transfer from [place] per recommendations of his cardiologists Dr. Urbina and Dr. Chanel for evaluation by advanced heart failure failure team and EP team for VT management and consideration of advanced therapies.    Today's updates:  - Pt not interested in pursuing any further surgical procedures for his heart, including an ablation   - Switched from PTA carvedilol to metoprolol succinate 12.5 mg daily    - Will reach out to EP on Monday regarding any medication optimization recs for pt's VT now that he has decided to forgo ablation   - Started IV ceftriaxone for UTI  - WOC consult placed for concern of R heel infection   - Continue to hold diuresis today for renal function   - Plan for discharge to TCU next week once bed available      # Persistent Paroxysmal Ventricular Tachycardia s/p dual chamber ICD placement 6/2022 and VT ablation x 2  # Chronic HFrEF 2/2 NICM w/ LVEF 40-45%, NYHA II  High burden of ventricular arrhythmias despite being on 2 antiarrhythmic medications (amiodarone and mexiletine) and being s/p ICD and two attempted VT ablations. Transferred here for evaluation for advanced therapies. ECHO 4/9 showed mildly reduced EF from 46% (Dec 2022) to 40-45%, however still does not qualify him for LVAD (EF < 25%). Possible heart  transplant candidate if he meets criteria for VT storm.   VT episode at 0700 on 4/14. Required ATP and ICD shock. Pt not interested in any further cardiac procedures including ablation.   - EP consulted, appreciate recs   - ICD device interrogation ordered - 1 VT episode on 4/9/23 lasting 25 seconds   - VT episode at 0700 on 4/14. Required ATP and ICD shock. Per sister, EP adjusted ICD after this episode.    - Pt not interested in pursing advanced therapies for his condition or VT ablation. Will have EP evaluate pt on Monday regarding  arrhythmia medication optimization, recs on discharge, and recs if pt were to transition to hospice in near future   - Continue tele    Advanced therapies evaluation workup:  - ECHO 4/9 w/ mildly reduced LV function (EF reduction from 46% to 40-45%) and mild aortic valve calcification   - RHC 4/11:   RA mean 11mmHg. RV 38/4. RVEDP 13. PA 39/16/25. PCWP 16  TD CO/CI: 3.85 L/min / 2.25 L/min/m2  Shena CO/CI: 3.37 L/min / 1.97 L/min/m2    - Cardiopulmonary stress test cancelled as pt deemed not physically able to perform test per RN  - Advanced therapy  following   - Neuropsych testing 4/11 and 4/12  - Financial consult already placed  - Utox screen positive for amphetamines. Quant screen negative, indicating false positive on initial screen.   - Nicotine and cotinine screen negative. PEth negative  - Discussed at 4/14 heart transplant candidacy review     Patient ultimately decided to not pursue heart transplant or ablation. Goal is to go to TCU w/ plan to either return to his AL or LTC w/ consideration of starting hospice. No further advanced therapies workup will be pursued at this time.      GDMTs:  - Antiarrhythmics: PTA amiodarone 200 mg BID and mexiletine 200 mg q8h   - ACEi/ARB: Not currently on due to LVEF of 45%.  - BB:   - Start metoprolol succinate 12.5 mg daily    - Holding PTA carvedilol 3.125 mg BID 2/2 soft bps   - Aldosterone antagonist: None   - SGLT2i:  Holding PTA empagliflozin 10 mg daily   - SCD prophylaxis: S/p secondary prevention ICD 6/20/22  - Volume status: Unclear                - S/p IV lasix 80 mg x 2 on 4/9 and IV lasix 100mg 4/10, 4/11. Holding diuresis 2/2 renal injury               - Hold PTA lasix 40 mg TID (recently increased from qday) while on IV diuresis                - continue PTA KCl supplement 20 meq daily                - continue PTA magnesium oxide 800 mg daily                - BMP BID w/ goal of K > 4 and Mg > 2               - Strict I/Os and daily weights   - Anticoagulation: PTA Eliquis 5 mg BID   - Statin therapy: PTA atorvastatin 80 mg daily     # UTI  UA w/ large leuks and 131 WBCs w/ negative nitrites.   - Started on IV ceftriaxone 1 g q24h 4/15-* w/ plan for 5 days of treatment   - PTA empagliflozin held   - UC pending     # R heel ulcer  Present since Dec 2022, per sister. RN noticed erythema and ?purulent drainage from area 4/15   - WOC consulted     # Positive U tox for amphetamines  Pt and sister deny any illegal or legal stimulant use. No obvious PTA drugs on inspection that could be causing a false positive. Urine quant screen ultimately showed < 50 ng/mL amphetamines, well below the level to indicate a positive result. Suspect false positive on urine tox screen.   - No further workup/intervention needed      # Recent clavicular fracture  At baseline uses walker for ambulation. Had recent clavicle fracture now requiring him to be wheelchair bound. Supposed to be non weight bearing on LUE for another 4 weeks   - Fall precautions  - NWB LUE precautions provided   - PT/OT following, recommending TCU placement   - SW placed referrals for TCU. Awaiting bed availability     # Afib w/o RVR  - metoprolol, amiodarone, and mexiletine   - PTA Eliquis 5 mg BID  - Tele     # ROSALINO on CKD, stable  Uptrending Cr above baseline Cr of 1.3. No observed mention of etiology of pt's CKD in chart. UA w/o protein, RBCs, or casts. Concern for  baseline cardiorenal syndrome  - diuresis per above   - BID BMP     # Acute on chronic normocytic anemia concerning for mixed picture of iron deficiency and inflammatory   # Iron deficiency   # Hx of anemia of chronic disease   Hgb 8.7 on arrival and downtrending. Baseline Hgb ~9-10 reportedly 2/2 anemia of chronic disease. Tbili normal. No source of active bleeding. Workup revealed low iron level, normal TIBC, and low end-of-normal ferritin concerning for iron deficiency anemia.   - IV Iron sucrose 200 mg qday x 5 days (starting 4/9)  - AM CBCs     # Chronic hyponatremia, resolved  BL sodium ~130-133. Suspect hypervolemic hyponatremia in setting of CHF. Improved after IV diuresis   - daily BMP    # DM2   On metformin 1,000 mg BID PTA.   - hold PTA metformin   - Continue empagliflozin as above   - LDSS  - Hypoglycemia protocol      # BPH  - PTA tamulosin  - CTM w/ bladder scans and PVRs PRN     # Developmental delay   Lives at assisted living where he receives assistance w/ medications, meals, etc. Independent in basic ADLs (feeding, clothing, bathing) at his baseline. Sister Citlali is his HCA and POA but pt currently has capacity   - Delirium precautions      # Constipation   - PTA metamucil      FEN: Soft bite sized (due to lack of dentition), 2 L fluid restriction   PPx: PTA Eliquis   Code: Full Code   Dispo: Discharge to TCU once bed available    Pt was discussed and evaluated with Dr. Salter, attending physician, who agrees with the assessment and plan above.     Prachi Call, DO  Internal Medicine, PGY-I    Interval Changes in Past 24 Hours:   No overnight events. This AM, the patient is feeling fine from a cardiopulmonary standpoint. No further VT on tele. BP improved this AM, no dizziness. No fever or chills. Endorses intermittent dysuria, last occurring this AM.     Review Of Systems  A 4-point ROS was negative aside from those listed above.    OBJECTIVE FINDINGS:    Temp:  [97.6  F (36.4  C)-98.5  F (36.9  C)]  98.4  F (36.9  C)  Pulse:  [61-82] 82  Resp:  [16-18] 16  BP: ()/(54-74) 95/74  SpO2:  [95 %-99 %] 97 %     MAPs: 70s-82   HR 80s  Afebrile     I/Os: net neg -1.1 L yesterday, net neg -360 ml yesterday     Vitals:    04/13/23 0600 04/14/23 1531 04/15/23 0305   Weight: 62.5 kg (137 lb 11.2 oz) 63 kg (138 lb 12.8 oz) 62 kg (136 lb 11.2 oz)     Gen: Laying in bed, no acute distress  Neck: JVP ~8, unchanged from yesterday  Resp: clear to auscultation bilaterally, no crackles or wheezing   CV: Irregular rhythm, regular rate.   Abd: soft, NT, ND  Ext: warm and well perfused, 1+-2+ lower extremity edema to level of knees     Current medications   Current Facility-Administered Medications   Medication     acetaminophen (TYLENOL) Suppository 650 mg     acetaminophen (TYLENOL) tablet 650 mg     alum & mag hydroxide-simethicone (MAALOX) suspension 30 mL     amiodarone (PACERONE) tablet 200 mg     apixaban ANTICOAGULANT (ELIQUIS) tablet 5 mg     artificial tears ophthalmic ointment     atorvastatin (LIPITOR) tablet 80 mg     calcium carbonate-vitamin D (CALTRATE) 600-10 MG-MCG per tablet 1 tablet     carboxymethylcellulose PF (REFRESH PLUS) 0.5 % ophthalmic solution 1 drop     [Held by provider] carvedilol (COREG) tablet 3.125 mg     glucose gel 15-30 g    Or     dextrose 50 % injection 25-50 mL    Or     glucagon injection 1 mg     [Held by provider] empagliflozin (JARDIANCE) tablet 10 mg     insulin aspart (NovoLOG) injection (RAPID ACTING)     insulin aspart (NovoLOG) injection (RAPID ACTING)     lidocaine (LMX4) cream     lidocaine 1 % 0.1-1 mL     magnesium oxide (MAG-OX) tablet 800 mg     medication instruction     mexiletine (MEXITIL) capsule 200 mg     miconazole (MICATIN) 2 % powder     nitroGLYcerin (NITROSTAT) sublingual tablet 0.4 mg     potassium chloride ER (KLOR-CON M) CR tablet 20 mEq     psyllium (METAMUCIL/KONSYL) capsule 1 capsule     sodium chloride (PF) 0.9% PF flush 3 mL     sodium chloride (PF) 0.9%  PF flush 3 mL     tamsulosin (FLOMAX) capsule 0.4 mg     [Held by provider] torsemide (DEMADEX) tablet 40 mg       LABS Reviewed  Na 137, K 4.0, Mg 2.5, Cr 1.93 (1.97),   WBC 7  UA LE and 130 WBCs. No nitrites.     IMAGES Reviewed:    ECHO 4/9/23 Interpretation Summary:  Left ventricular function is decreased. The ejection fraction is 40-45%  (mildly reduced compared to 46% back in Dec 2022).   LV size and wall thickness is normal. Abnormal septal wall motion consistent w/ pacemaker.   Global right ventricular function is normal.  Mild aortic valve calcification is present. The mean gradient across the  aortic valve is 8 mmHg.  Pulmonary artery systolic pressure is normal.  IVC diameter and respiratory changes fall into an intermediate range  suggesting an RA pressure of 8 mmHg.  No pericardial effusion is present.    ICD interrogation 4/10/23:  Device: Abbott [id number] [place] HF  Normal Device Function.   Mode: DDDR 60-90 bpm  AP: 49%  BVP: 91%  Intrinsic rhythm: CHB w/ AS @ 60 bpm; no intrinsic R-waves at VVI 30 bpm  Lead Trends Appear Stable: Yes  Estimated battery longevity to RRT = 5.9 years. Remaining Capacity to OSCAR = 92%.  Atrial arrhythmia: None  AF burden: 0%  Anticoagulant: Eliquis  Ventricular Arrhythmia: 1 VT episode recorded on 4/9/23 at 9:43 AM, lasting ~25 seconds at 114 bpm. EGM reveals a slow VT which is converted with one sequence of ATP.  Setting changes: None    RHC 4/11/23:  RA mean 11mmHg  RV 40/11  PA 39/16/25  PCWP 16    Pa Sat 49.9%  Art Sat 99%    TD CO/CI: 3.85 L/min / 2.25 L/min/m2  Shena CO/CI: 3.37 L/min / 1.97 L/min/m2    PVR 2.3 (TD)  SVR  1538 (TD) Right sided filling pressures are mildly elevated. Left sided filling pressures are mildly elevated. Mild elevated pulmonary hypertension. Reduced cardiac output level. Hemodynamic data has been modified in Epic per physician review.      I have reviewed today's vital signs, notes, medications, labs and imaging.  I have also seen and  examined the patient and agree with the findings and plan as outlined above.  Pt with ROSALINO, VT, new UTI and cardiomyopathy.  Await EP input and begin abx for UTI.     Lorenzo Salter MD, PhD  Professor, Heart Failure and Cardiac Transplantation  [facility]

## 2023-04-16 ENCOUNTER — APPOINTMENT (OUTPATIENT)
Dept: OCCUPATIONAL THERAPY | Facility: CLINIC | Age: 68
DRG: 287 | End: 2023-04-16
Attending: INTERNAL MEDICINE
Payer: MEDICARE

## 2023-04-16 LAB
ANION GAP SERPL CALCULATED.3IONS-SCNC: 11 MMOL/L (ref 7–15)
ANION GAP SERPL CALCULATED.3IONS-SCNC: 13 MMOL/L (ref 7–15)
BACTERIA UR CULT: NO GROWTH
BUN SERPL-MCNC: 25.3 MG/DL (ref 8–23)
BUN SERPL-MCNC: 25.8 MG/DL (ref 8–23)
CALCIUM SERPL-MCNC: 8.7 MG/DL (ref 8.8–10.2)
CALCIUM SERPL-MCNC: 9 MG/DL (ref 8.8–10.2)
CHLORIDE SERPL-SCNC: 101 MMOL/L (ref 98–107)
CHLORIDE SERPL-SCNC: 103 MMOL/L (ref 98–107)
CREAT SERPL-MCNC: 1.46 MG/DL (ref 0.51–1.17)
CREAT SERPL-MCNC: 1.51 MG/DL (ref 0.51–1.17)
DEPRECATED HCO3 PLAS-SCNC: 22 MMOL/L (ref 22–29)
DEPRECATED HCO3 PLAS-SCNC: 23 MMOL/L (ref 22–29)
ERYTHROCYTE [DISTWIDTH] IN BLOOD BY AUTOMATED COUNT: 20.8 % (ref 10–15)
GFR SERPL CREATININE-BSD FRML MDRD: 50 ML/MIN/1.73M2
GFR SERPL CREATININE-BSD FRML MDRD: 52 ML/MIN/1.73M2
GLUCOSE BLDC GLUCOMTR-MCNC: 152 MG/DL (ref 70–99)
GLUCOSE BLDC GLUCOMTR-MCNC: 159 MG/DL (ref 70–99)
GLUCOSE BLDC GLUCOMTR-MCNC: 170 MG/DL (ref 70–99)
GLUCOSE BLDC GLUCOMTR-MCNC: 234 MG/DL (ref 70–99)
GLUCOSE BLDC GLUCOMTR-MCNC: 346 MG/DL (ref 70–99)
GLUCOSE BLDC GLUCOMTR-MCNC: 357 MG/DL (ref 70–99)
GLUCOSE BLDC GLUCOMTR-MCNC: 396 MG/DL (ref 70–99)
GLUCOSE SERPL-MCNC: 149 MG/DL (ref 70–99)
GLUCOSE SERPL-MCNC: 224 MG/DL (ref 70–99)
HCT VFR BLD AUTO: 32.1 % (ref 35–53)
HGB BLD-MCNC: 9.6 G/DL (ref 11.7–17.7)
MAGNESIUM SERPL-MCNC: 2.2 MG/DL (ref 1.7–2.3)
MAGNESIUM SERPL-MCNC: 2.3 MG/DL (ref 1.7–2.3)
MCH RBC QN AUTO: 25.5 PG (ref 26.5–33)
MCHC RBC AUTO-ENTMCNC: 29.9 G/DL (ref 31.5–36.5)
MCV RBC AUTO: 85 FL (ref 78–100)
PLATELET # BLD AUTO: 206 10E3/UL (ref 150–450)
POTASSIUM SERPL-SCNC: 4.2 MMOL/L (ref 3.4–5.3)
POTASSIUM SERPL-SCNC: 5.2 MMOL/L (ref 3.4–5.3)
RBC # BLD AUTO: 3.77 10E6/UL (ref 3.8–5.9)
SODIUM SERPL-SCNC: 136 MMOL/L (ref 136–145)
SODIUM SERPL-SCNC: 137 MMOL/L (ref 136–145)
WBC # BLD AUTO: 7 10E3/UL (ref 4–11)

## 2023-04-16 PROCEDURE — 36415 COLL VENOUS BLD VENIPUNCTURE: CPT

## 2023-04-16 PROCEDURE — 85027 COMPLETE CBC AUTOMATED: CPT

## 2023-04-16 PROCEDURE — 82310 ASSAY OF CALCIUM: CPT

## 2023-04-16 PROCEDURE — 97530 THERAPEUTIC ACTIVITIES: CPT | Mod: GO | Performed by: OCCUPATIONAL THERAPIST

## 2023-04-16 PROCEDURE — 80048 BASIC METABOLIC PNL TOTAL CA: CPT

## 2023-04-16 PROCEDURE — 99232 SBSQ HOSP IP/OBS MODERATE 35: CPT | Mod: GC | Performed by: INTERNAL MEDICINE

## 2023-04-16 PROCEDURE — 97140 MANUAL THERAPY 1/> REGIONS: CPT | Mod: GO | Performed by: OCCUPATIONAL THERAPIST

## 2023-04-16 PROCEDURE — 214N000001 HC R&B CCU UMMC

## 2023-04-16 PROCEDURE — 83735 ASSAY OF MAGNESIUM: CPT

## 2023-04-16 PROCEDURE — 250N000013 HC RX MED GY IP 250 OP 250 PS 637

## 2023-04-16 PROCEDURE — 97535 SELF CARE MNGMENT TRAINING: CPT | Mod: GO | Performed by: OCCUPATIONAL THERAPIST

## 2023-04-16 PROCEDURE — 250N000011 HC RX IP 250 OP 636

## 2023-04-16 PROCEDURE — 250N000013 HC RX MED GY IP 250 OP 250 PS 637: Performed by: STUDENT IN AN ORGANIZED HEALTH CARE EDUCATION/TRAINING PROGRAM

## 2023-04-16 RX ADMIN — APIXABAN 5 MG: 5 TABLET, FILM COATED ORAL at 19:58

## 2023-04-16 RX ADMIN — AMIODARONE HYDROCHLORIDE 200 MG: 200 TABLET ORAL at 19:58

## 2023-04-16 RX ADMIN — MEXILETINE HYDROCHLORIDE 200 MG: 200 CAPSULE ORAL at 13:59

## 2023-04-16 RX ADMIN — AMIODARONE HYDROCHLORIDE 200 MG: 200 TABLET ORAL at 08:33

## 2023-04-16 RX ADMIN — MICONAZOLE NITRATE: 20 POWDER TOPICAL at 08:34

## 2023-04-16 RX ADMIN — INSULIN ASPART 1 UNITS: 100 INJECTION, SOLUTION INTRAVENOUS; SUBCUTANEOUS at 07:30

## 2023-04-16 RX ADMIN — APIXABAN 5 MG: 5 TABLET, FILM COATED ORAL at 08:32

## 2023-04-16 RX ADMIN — INSULIN ASPART 1 UNITS: 100 INJECTION, SOLUTION INTRAVENOUS; SUBCUTANEOUS at 17:17

## 2023-04-16 RX ADMIN — ATORVASTATIN CALCIUM 80 MG: 80 TABLET, FILM COATED ORAL at 08:33

## 2023-04-16 RX ADMIN — Medication 1 CAPSULE: at 08:32

## 2023-04-16 RX ADMIN — TAMSULOSIN HYDROCHLORIDE 0.4 MG: 0.4 CAPSULE ORAL at 19:58

## 2023-04-16 RX ADMIN — MICONAZOLE NITRATE: 20 POWDER TOPICAL at 19:59

## 2023-04-16 RX ADMIN — WHITE PETROLATUM 57.7 %-MINERAL OIL 31.9 % EYE OINTMENT: at 21:42

## 2023-04-16 RX ADMIN — Medication 12.5 MG: at 08:32

## 2023-04-16 RX ADMIN — MEXILETINE HYDROCHLORIDE 200 MG: 200 CAPSULE ORAL at 21:41

## 2023-04-16 RX ADMIN — MAGNESIUM OXIDE TAB 400 MG (241.3 MG ELEMENTAL MG) 800 MG: 400 (241.3 MG) TAB at 08:33

## 2023-04-16 RX ADMIN — POTASSIUM CHLORIDE 20 MEQ: 750 TABLET, EXTENDED RELEASE ORAL at 08:33

## 2023-04-16 RX ADMIN — CEFTRIAXONE SODIUM 1 G: 1 INJECTION, POWDER, FOR SOLUTION INTRAMUSCULAR; INTRAVENOUS at 13:59

## 2023-04-16 RX ADMIN — CALCIUM CARBONATE 600 MG (1,500 MG)-VITAMIN D3 400 UNIT TABLET 1 TABLET: at 08:32

## 2023-04-16 RX ADMIN — INSULIN ASPART 3 UNITS: 100 INJECTION, SOLUTION INTRAVENOUS; SUBCUTANEOUS at 12:01

## 2023-04-16 RX ADMIN — MEXILETINE HYDROCHLORIDE 200 MG: 200 CAPSULE ORAL at 05:29

## 2023-04-16 ASSESSMENT — ACTIVITIES OF DAILY LIVING (ADL)
ADLS_ACUITY_SCORE: 40

## 2023-04-16 NOTE — PLAN OF CARE
D: Pt was transferred from OSH on 4/8/23 for evaluation for advanced heart failure options and for VT management.    I: Monitored and assessed patient status; nursing cares provided.    A:   Today's Highlights/Changes:    Pt's BS before qxens=662, covered with 3 units per ordered protocol. BS re-check 30 minutes after receiving insulin, NZ=570. Cards 2 contacted, order received to give additional 2 units of Novolog. Pt's BS elevated before dinner at 234, covered with 1 unit per ordered protocol. Pt would likely benefit from a sliding scale with higher insulin coverage.      Neuro: A&Ox4, cognitively delayed at baseline                                                     Cardiac: AV Paced, frequent PVC's  Respiratory: Lungs clear, on RA, denies shortness of breath  GI/: +BS, BMx1; male external catheter in place with adequate urine output.  Diet/Appetite: Low fat diet, 2L FR, adequate appetite  Skin: Right heel wound with foam dressing to be changed every 3 days per WOC orders, next drsg change due 4/18.  Pain: Denies  Labs/Lytes: WDL  LDA's: Right PIV SL'd  Activity: Up with assist of 1-2; ambulated with OT, up to chair several times today.    P: EP to finalize any additional recommendations for patient's care/treatment plan. Pt will then be ready to transfer to TCU in [facility]. Continue with current plan of care. Contact Cards 2 for questions or concerns.    Maddie oSto, RN  Cardiology

## 2023-04-16 NOTE — PROGRESS NOTES
[facility]  CARDIOLOGY HEART FAILURE SERVICE (CARDS II) PROGRESS NOTE    Patient Name: Rohit Garvey    Medical Record Number: [id number]    YOB: 1955  PCP: Kevyn Salazar    Admit Date/Time: 4/8/2023  3:08 PM   8    Assessment and Plan:  Rohit Garvey is a 67 year old adult male with a PMH significant for NICM (EF of 40-45%) c/b paroxysmal VT s/p dual chamber ICD placement 6/20/2022 c/b recurrent ICD shocks s/p VT ablation 10/31/2022 and re-ablation 12/29/2022, paroxysmal Afib on apixaban, developmental delay (possibly related to viral illnesses during infancy), T2DM, hypertension, and hyperlipidemia who was admitted as a transfer from [place] per recommendations of his cardiologists Dr. Urbina and Dr. Chanel for evaluation by advanced heart failure failure team and EP team for VT management and consideration of advanced therapies.    Today's updates:  - No acute evidence for infection of R heel ulcer, per WOC. Recommended dressing changes q3 days w/ f/u outpatient w/ podiatry  - UC negative  - Additional 2 units of aspart given for persistent post-prandial hyperglycemia this AM  - Will reach out to EP on Monday regarding any medication optimization recs for pt's VT now that he has decided to forgo ablation   - Plan for discharge to TCU next week once bed available      # Persistent Paroxysmal Ventricular Tachycardia s/p dual chamber ICD placement 6/2022 and VT ablation x 2  # Chronic HFrEF 2/2 NICM w/ LVEF 40-45%, NYHA II  High burden of ventricular arrhythmias despite being on 2 antiarrhythmic medications (amiodarone and mexiletine) and being s/p ICD and two attempted VT ablations. Transferred here for evaluation for advanced therapies. ECHO 4/9 showed mildly reduced EF from 46% (Dec 2022) to 40-45%, however still does not qualify him for LVAD (EF < 25%). Possible heart transplant candidate if he meets criteria for VT storm.   VT episode at 0700 on 4/14.  Required ATP and ICD shock. Pt not interested in any further cardiac procedures including ablation.   - EP consulted, appreciate recs   - ICD device interrogation ordered - 1 VT episode on 4/9/23 lasting 25 seconds   - VT episode at 0700 on 4/14. Required ATP and ICD shock. Per sister, EP adjusted ICD after this episode.    - Pt not interested in pursing advanced therapies for his condition or VT ablation. Will have EP evaluate pt on Monday regarding  arrhythmia medication optimization, recs on discharge, and recs if pt were to transition to hospice in near future   - Continue tele    Advanced therapies evaluation workup:  - ECHO 4/9 w/ mildly reduced LV function (EF reduction from 46% to 40-45%) and mild aortic valve calcification   - RHC 4/11:   RA mean 11mmHg. RV 38/4. RVEDP 13. PA 39/16/25. PCWP 16  TD CO/CI: 3.85 L/min / 2.25 L/min/m2  Shena CO/CI: 3.37 L/min / 1.97 L/min/m2    - Cardiopulmonary stress test cancelled as pt deemed not physically able to perform test per RN  - Advanced therapy  following   - Neuropsych testing 4/11 and 4/12  - Financial consult already placed  - Utox screen positive for amphetamines. Quant screen negative, indicating false positive on initial screen.   - Nicotine and cotinine screen negative. PEth negative  - Discussed at 4/14 heart transplant candidacy review     Patient ultimately decided to not pursue heart transplant or ablation. Goal is to go to TCU w/ plan to either return to his [place] or LTC w/ consideration of starting hospice. No further advanced therapies workup will be pursued at this time.      GDMTs:  - Antiarrhythmics: PTA amiodarone 200 mg BID and mexiletine 200 mg q8h   - ACEi/ARB: Not currently on due to LVEF of 45%.  - BB:   - Continue metoprolol succinate 12.5 mg daily    - Holding PTA carvedilol 3.125 mg BID 2/2 soft bps   - Aldosterone antagonist: None   - SGLT2i: Holding PTA empagliflozin 10 mg daily   - SCD prophylaxis: S/p secondary prevention ICD  6/20/22  - Volume status: Euvolemic                - S/p IV lasix 80 mg x 2 on 4/9 and IV lasix 100mg 4/10, 4/11. Holding diuresis 2/2 renal injury               - Hold PTA lasix 40 mg TID (recently increased from qday) while on IV diuresis                - continue PTA KCl supplement 20 meq daily                - continue PTA magnesium oxide 800 mg daily                - BMP BID w/ goal of K > 4 and Mg > 2               - Strict I/Os and daily weights   - Anticoagulation: PTA Eliquis 5 mg BID   - Statin therapy: PTA atorvastatin 80 mg daily   - OT to assist in compression stockings     # UTI vs sterile pyuria   UA w/ large leuks and 131 WBCs w/ negative nitrites. Pt reported intermittent dysuria. Started on IV ceftriaxone. UC returned negative for bacteria.   - Continue on IV ceftriaxone 1 g q24h 4/15-* w/ plan for 5 days of antibiotics  - PTA empagliflozin held     # Chronic Diabetic R heel ulcer  Present since Dec 2022, per sister. RN noticed erythema and ?purulent drainage from area 4/15. WOC consulted and deemed ulcer not actively infected. Recommend q3 day dressing changes.   - wound care and dressing changes per WOC orders  - f/u outpatient w/ podiatry      # Positive U tox for amphetamines  Pt and sister deny any illegal or legal stimulant use. No obvious PTA drugs on inspection that could be causing a false positive. Urine quant screen ultimately showed < 50 ng/mL amphetamines, well below the level to indicate a positive result. Suspect false positive on urine tox screen.   - No further workup/intervention needed      # Recent clavicular fracture  At baseline uses walker for ambulation. Had recent clavicle fracture now requiring him to be wheelchair bound. Supposed to be non weight bearing on LUE for another 4 weeks   - Fall precautions  - NWB LUE precautions provided   - PT/OT following, recommending TCU placement   - SW placed referrals for TCU. Awaiting bed availability     # Afib w/o RVR  - metoprolol,  amiodarone, and mexiletine   - PTA Eliquis 5 mg BID  - Tele     # ROSALINO on CKD, improving  Uptrending Cr above baseline Cr of 1.3. No observed mention of etiology of pt's CKD in chart. UA w/o protein, RBCs, or casts. Suspect prerenal as Cr improving after diuresis held   - holding diuresis for now   - BID BMP     # Acute on chronic normocytic anemia concerning for mixed picture of iron deficiency and inflammatory   # Iron deficiency   # Hx of anemia of chronic disease   Hgb 8.7 on arrival and downtrending. Baseline Hgb ~9-10 reportedly 2/2 anemia of chronic disease. Tbili normal. No source of active bleeding. Workup revealed low iron level, normal TIBC, and low end-of-normal ferritin concerning for iron deficiency anemia.   - IV Iron sucrose 200 mg qday x 5 days (starting 4/9)  - AM CBCs     # Chronic hyponatremia, resolved  BL sodium ~130-133. Suspect hypervolemic hyponatremia in setting of CHF. Improved after IV diuresis   - daily BMP    # DM2   On metformin 1,000 mg BID PTA.   - hold PTA metformin   - Holding empagliflozin as above   - LDSS  - Additional 2 units of aspart given 4/16 for persistent postprandial hyperglycemia   - Hypoglycemia protocol      # BPH  - PTA tamulosin  - CTM w/ bladder scans and PVRs PRN     # Developmental delay   Lives at assisted living where he receives assistance w/ medications, meals, etc. Independent in basic ADLs (feeding, clothing, bathing) at his baseline. Sister Peg is his HCA and POA but pt currently has capacity   - Delirium precautions      # Constipation   - PTA metamucil      FEN: Soft bite sized (due to lack of dentition), 2 L fluid restriction   PPx: PTA Eliquis   Code: Full Code   Dispo: Discharge to TCU once bed available    Pt was discussed and evaluated with Dr. Salter, attending physician, who agrees with the assessment and plan above.     Prachi Call, DO  Internal Medicine, PGY-I    Interval Changes in Past 24 Hours:   No overnight events. Pt doing well today. No  complaints. Sister Citlali working on getting transportation back to [place] sorted so that once a TCU bed is available they can book flights. Wondering about new compression stockings for patient.     Review Of Systems  A 4-point ROS was negative aside from those listed above.    OBJECTIVE FINDINGS:    Temp:  [97.3  F (36.3  C)-98.4  F (36.9  C)] 97.8  F (36.6  C)  Pulse:  [60-88] 78  Resp:  [16-18] 18  BP: ()/(58-75) 105/58  SpO2:  [95 %-97 %] 96 %     MAPs: 80s  HR 80s overnight, 60 this AM  Afebrile     I/Os: net neg -815 ml yesterday, -330 since midnight     Vitals:    04/14/23 1531 04/15/23 0305 04/16/23 0400   Weight: 63 kg (138 lb 12.8 oz) 62 kg (136 lb 11.2 oz) 61.8 kg (136 lb 4.8 oz)     Gen: Laying in bed, no acute distress  Neck: JVP ~8, unchanged from yesterday  Resp: clear to auscultation bilaterally, no crackles or wheezing   CV: Irregular rhythm, regular rate.   Abd: soft, NT, ND  Ext: warm and well perfused, 1+-2+ lower extremity edema to level of knees     Current medications   Current Facility-Administered Medications   Medication     acetaminophen (TYLENOL) Suppository 650 mg     acetaminophen (TYLENOL) tablet 650 mg     alum & mag hydroxide-simethicone (MAALOX) suspension 30 mL     amiodarone (PACERONE) tablet 200 mg     apixaban ANTICOAGULANT (ELIQUIS) tablet 5 mg     artificial tears ophthalmic ointment     atorvastatin (LIPITOR) tablet 80 mg     calcium carbonate-vitamin D (CALTRATE) 600-10 MG-MCG per tablet 1 tablet     carboxymethylcellulose PF (REFRESH PLUS) 0.5 % ophthalmic solution 1 drop     cefTRIAXone (ROCEPHIN) 1 g vial to attach to  mL bag for ADULTS or NS 50 mL bag for PEDS     glucose gel 15-30 g    Or     dextrose 50 % injection 25-50 mL    Or     glucagon injection 1 mg     [Held by provider] empagliflozin (JARDIANCE) tablet 10 mg     insulin aspart (NovoLOG) injection (RAPID ACTING)     insulin aspart (NovoLOG) injection (RAPID ACTING)     lidocaine (LMX4) cream      lidocaine 1 % 0.1-1 mL     magnesium oxide (MAG-OX) tablet 800 mg     medication instruction     metoprolol succinate ER (TOPROL-XL) 24 hr half-tab 12.5 mg     mexiletine (MEXITIL) capsule 200 mg     miconazole (MICATIN) 2 % powder     nitroGLYcerin (NITROSTAT) sublingual tablet 0.4 mg     potassium chloride ER (KLOR-CON M) CR tablet 20 mEq     psyllium (METAMUCIL/KONSYL) capsule 1 capsule     sodium chloride (PF) 0.9% PF flush 3 mL     sodium chloride (PF) 0.9% PF flush 3 mL     tamsulosin (FLOMAX) capsule 0.4 mg     [Held by provider] torsemide (DEMADEX) tablet 40 mg       LABS Reviewed  Na 137, K 4.2, Mg 2.3, Cr 1.51 (1.77), baseline 1.3  Normal white count   UC negative (final)     IMAGES Reviewed:    ECHO 4/9/23 Interpretation Summary:  Left ventricular function is decreased. The ejection fraction is 40-45%  (mildly reduced compared to 46% back in Dec 2022).   LV size and wall thickness is normal. Abnormal septal wall motion consistent w/ pacemaker.   Global right ventricular function is normal.  Mild aortic valve calcification is present. The mean gradient across the  aortic valve is 8 mmHg.  Pulmonary artery systolic pressure is normal.  IVC diameter and respiratory changes fall into an intermediate range  suggesting an RA pressure of 8 mmHg.  No pericardial effusion is present.    ICD interrogation 4/10/23:  Device: Abbott [id number] [place] HF  Normal Device Function.   Mode: DDDR 60-90 bpm  AP: 49%  BVP: 91%  Intrinsic rhythm: CHB w/ AS @ 60 bpm; no intrinsic R-waves at VVI 30 bpm  Lead Trends Appear Stable: Yes  Estimated battery longevity to RRT = 5.9 years. Remaining Capacity to OSCAR = 92%.  Atrial arrhythmia: None  AF burden: 0%  Anticoagulant: Eliquis  Ventricular Arrhythmia: 1 VT episode recorded on 4/9/23 at 9:43 AM, lasting ~25 seconds at 114 bpm. EGM reveals a slow VT which is converted with one sequence of ATP.  Setting changes: None    RHC 4/11/23:  RA mean 11mmHg  RV 40/11  PA 39/16/25  PCWP  16    Pa Sat 49.9%  Art Sat 99%    TD CO/CI: 3.85 L/min / 2.25 L/min/m2  Shena CO/CI: 3.37 L/min / 1.97 L/min/m2    PVR 2.3 (TD)  SVR  1538 (TD) Right sided filling pressures are mildly elevated. Left sided filling pressures are mildly elevated. Mild elevated pulmonary hypertension. Reduced cardiac output level. Hemodynamic data has been modified in Epic per physician review.    I have reviewed today's vital signs, notes, medications, labs and imaging.  I have also seen and examined the patient and agree with the findings and plan as outlined above.  Pt with spouse at bedside.  VSS with pt having no complaints.  Labs WNL.  Pt up in chair and ambulated with PT today.  Plan for TCU and will review recommendations of VT treatment with EP.     Lorenzo Salter MD, PhD  Professor, Heart Failure and Cardiac Transplantation  [facility]

## 2023-04-16 NOTE — PLAN OF CARE
D AVSS with sat's 96% on room air. Heart regular AV paced and lungs decreased in bases otherwise clear. Voiced no c/o pain or nausea during the night and slept fair between cares. Blood glucose stable and had a snack overnight. Voiding good amounts of urine via premafit and urine specimen sent.   I Vital's, assessment and med's per order.  A Resting in bed with call light in reach.  P Continue to monitor and update MD with changes.

## 2023-04-17 ENCOUNTER — APPOINTMENT (OUTPATIENT)
Dept: OCCUPATIONAL THERAPY | Facility: CLINIC | Age: 68
DRG: 287 | End: 2023-04-17
Attending: INTERNAL MEDICINE
Payer: MEDICARE

## 2023-04-17 LAB
ANION GAP SERPL CALCULATED.3IONS-SCNC: 10 MMOL/L (ref 7–15)
ANION GAP SERPL CALCULATED.3IONS-SCNC: 9 MMOL/L (ref 7–15)
BUN SERPL-MCNC: 23.3 MG/DL (ref 8–23)
BUN SERPL-MCNC: 26.7 MG/DL (ref 8–23)
CALCIUM SERPL-MCNC: 8.6 MG/DL (ref 8.8–10.2)
CALCIUM SERPL-MCNC: 8.9 MG/DL (ref 8.8–10.2)
CHLORIDE SERPL-SCNC: 102 MMOL/L (ref 98–107)
CHLORIDE SERPL-SCNC: 104 MMOL/L (ref 98–107)
CREAT SERPL-MCNC: 1.33 MG/DL (ref 0.51–1.17)
CREAT SERPL-MCNC: 1.4 MG/DL (ref 0.51–1.17)
DEPRECATED HCO3 PLAS-SCNC: 24 MMOL/L (ref 22–29)
DEPRECATED HCO3 PLAS-SCNC: 24 MMOL/L (ref 22–29)
ERYTHROCYTE [DISTWIDTH] IN BLOOD BY AUTOMATED COUNT: 20.7 % (ref 10–15)
GFR SERPL CREATININE-BSD FRML MDRD: 55 ML/MIN/1.73M2
GFR SERPL CREATININE-BSD FRML MDRD: 59 ML/MIN/1.73M2
GLUCOSE BLDC GLUCOMTR-MCNC: 130 MG/DL (ref 70–99)
GLUCOSE BLDC GLUCOMTR-MCNC: 145 MG/DL (ref 70–99)
GLUCOSE BLDC GLUCOMTR-MCNC: 166 MG/DL (ref 70–99)
GLUCOSE BLDC GLUCOMTR-MCNC: 238 MG/DL (ref 70–99)
GLUCOSE BLDC GLUCOMTR-MCNC: 266 MG/DL (ref 70–99)
GLUCOSE SERPL-MCNC: 149 MG/DL (ref 70–99)
GLUCOSE SERPL-MCNC: 254 MG/DL (ref 70–99)
HCT VFR BLD AUTO: 31.3 % (ref 35–53)
HGB BLD-MCNC: 9.2 G/DL (ref 11.7–17.7)
MAGNESIUM SERPL-MCNC: 2 MG/DL (ref 1.7–2.3)
MAGNESIUM SERPL-MCNC: 2 MG/DL (ref 1.7–2.3)
MCH RBC QN AUTO: 25.2 PG (ref 26.5–33)
MCHC RBC AUTO-ENTMCNC: 29.4 G/DL (ref 31.5–36.5)
MCV RBC AUTO: 86 FL (ref 78–100)
PLATELET # BLD AUTO: 198 10E3/UL (ref 150–450)
POTASSIUM SERPL-SCNC: 4.6 MMOL/L (ref 3.4–5.3)
POTASSIUM SERPL-SCNC: 4.7 MMOL/L (ref 3.4–5.3)
RBC # BLD AUTO: 3.65 10E6/UL (ref 3.8–5.9)
SODIUM SERPL-SCNC: 136 MMOL/L (ref 136–145)
SODIUM SERPL-SCNC: 137 MMOL/L (ref 136–145)
WBC # BLD AUTO: 6.2 10E3/UL (ref 4–11)

## 2023-04-17 PROCEDURE — 85027 COMPLETE CBC AUTOMATED: CPT

## 2023-04-17 PROCEDURE — 36415 COLL VENOUS BLD VENIPUNCTURE: CPT

## 2023-04-17 PROCEDURE — 250N000013 HC RX MED GY IP 250 OP 250 PS 637

## 2023-04-17 PROCEDURE — 250N000011 HC RX IP 250 OP 636: Performed by: INTERNAL MEDICINE

## 2023-04-17 PROCEDURE — 83735 ASSAY OF MAGNESIUM: CPT

## 2023-04-17 PROCEDURE — 97530 THERAPEUTIC ACTIVITIES: CPT | Mod: GO

## 2023-04-17 PROCEDURE — 250N000013 HC RX MED GY IP 250 OP 250 PS 637: Performed by: STUDENT IN AN ORGANIZED HEALTH CARE EDUCATION/TRAINING PROGRAM

## 2023-04-17 PROCEDURE — 250N000011 HC RX IP 250 OP 636

## 2023-04-17 PROCEDURE — 214N000001 HC R&B CCU UMMC

## 2023-04-17 PROCEDURE — 99232 SBSQ HOSP IP/OBS MODERATE 35: CPT | Mod: GC | Performed by: INTERNAL MEDICINE

## 2023-04-17 PROCEDURE — 97535 SELF CARE MNGMENT TRAINING: CPT | Mod: GO | Performed by: OCCUPATIONAL THERAPIST

## 2023-04-17 PROCEDURE — 82310 ASSAY OF CALCIUM: CPT

## 2023-04-17 PROCEDURE — 80048 BASIC METABOLIC PNL TOTAL CA: CPT

## 2023-04-17 RX ORDER — MAGNESIUM OXIDE 400 MG/1
400 TABLET ORAL EVERY 4 HOURS
Status: ACTIVE | OUTPATIENT
Start: 2023-04-17 | End: 2023-04-17

## 2023-04-17 RX ORDER — MAGNESIUM SULFATE HEPTAHYDRATE 40 MG/ML
2 INJECTION, SOLUTION INTRAVENOUS ONCE
Status: COMPLETED | OUTPATIENT
Start: 2023-04-17 | End: 2023-04-17

## 2023-04-17 RX ADMIN — AMIODARONE HYDROCHLORIDE 200 MG: 200 TABLET ORAL at 19:54

## 2023-04-17 RX ADMIN — MICONAZOLE NITRATE: 20 POWDER TOPICAL at 20:00

## 2023-04-17 RX ADMIN — AMIODARONE HYDROCHLORIDE 200 MG: 200 TABLET ORAL at 08:34

## 2023-04-17 RX ADMIN — MICONAZOLE NITRATE: 20 POWDER TOPICAL at 08:37

## 2023-04-17 RX ADMIN — MEXILETINE HYDROCHLORIDE 200 MG: 200 CAPSULE ORAL at 21:54

## 2023-04-17 RX ADMIN — APIXABAN 5 MG: 5 TABLET, FILM COATED ORAL at 19:55

## 2023-04-17 RX ADMIN — MAGNESIUM SULFATE IN WATER 2 G: 40 INJECTION, SOLUTION INTRAVENOUS at 17:06

## 2023-04-17 RX ADMIN — INSULIN ASPART 2 UNITS: 100 INJECTION, SOLUTION INTRAVENOUS; SUBCUTANEOUS at 16:51

## 2023-04-17 RX ADMIN — MEXILETINE HYDROCHLORIDE 200 MG: 200 CAPSULE ORAL at 06:38

## 2023-04-17 RX ADMIN — MAGNESIUM OXIDE TAB 400 MG (241.3 MG ELEMENTAL MG) 800 MG: 400 (241.3 MG) TAB at 08:34

## 2023-04-17 RX ADMIN — INSULIN ASPART 1 UNITS: 100 INJECTION, SOLUTION INTRAVENOUS; SUBCUTANEOUS at 11:14

## 2023-04-17 RX ADMIN — CEFTRIAXONE SODIUM 1 G: 1 INJECTION, POWDER, FOR SOLUTION INTRAMUSCULAR; INTRAVENOUS at 13:20

## 2023-04-17 RX ADMIN — Medication 12.5 MG: at 08:36

## 2023-04-17 RX ADMIN — TAMSULOSIN HYDROCHLORIDE 0.4 MG: 0.4 CAPSULE ORAL at 19:55

## 2023-04-17 RX ADMIN — MEXILETINE HYDROCHLORIDE 200 MG: 200 CAPSULE ORAL at 13:20

## 2023-04-17 RX ADMIN — CALCIUM CARBONATE 600 MG (1,500 MG)-VITAMIN D3 400 UNIT TABLET 1 TABLET: at 08:35

## 2023-04-17 RX ADMIN — WHITE PETROLATUM 57.7 %-MINERAL OIL 31.9 % EYE OINTMENT: at 22:07

## 2023-04-17 RX ADMIN — Medication 1 CAPSULE: at 08:34

## 2023-04-17 RX ADMIN — APIXABAN 5 MG: 5 TABLET, FILM COATED ORAL at 08:34

## 2023-04-17 RX ADMIN — ATORVASTATIN CALCIUM 80 MG: 80 TABLET, FILM COATED ORAL at 08:34

## 2023-04-17 ASSESSMENT — ACTIVITIES OF DAILY LIVING (ADL)
ADLS_ACUITY_SCORE: 40

## 2023-04-17 NOTE — PLAN OF CARE
"/64 (BP Location: Right arm)   Pulse 66   Temp 97.4  F (36.3  C) (Oral)   Resp 18   Ht 1.626 m (5' 4\")   Wt 61.9 kg (136 lb 6.4 oz)   SpO2 94%   BMI 23.41 kg/m      Shift: [id number]     Status: Adm from OSH 4/8 for VT management and consideration of advanced HF therapies. PMH significant for NICM (EF of 40-45%) c/b paroxysmal VT s/p dual chamber ICD placement 6/20/2022 c/b recurrent ICD shocks s/p VT ablation 10/31/2022 and re-ablation 12/29/2022, paroxysmal Afib on apixaban, developmental delay (possibly related to viral illnesses during infancy), T2DM, HTN, and HLD    Neuro: A&O x4. Able to make needs known   Respiratory: RA, denies sob   Cardiac: AV paced, denies cardiac chest pain   GI/: External cath in place, LBM today   Diet: cardiac diet, 2L FR. BS ACHS   Pain: Denies   Skin: R heel wound w/ mepilex (dressing change due 4/18), L clavicle fracture (sling on when OOB), 1/2+ BLE edema   IV Access: L PIV SL   Labs: Mag 2.0 (replaced per protocol)   Activity: Ax2 w/ walker + GB     New changes this shift: No acute changes today. Sister at the bedside accompanying pt throughout the day.     Plan: Discharge pending TCU placement in [place], [place]. Continue w/ POC and notify Cards 2 of any changes or concerns.     "

## 2023-04-17 NOTE — PROGRESS NOTES
[facility]  CARDIOLOGY HEART FAILURE SERVICE (CARDS II) PROGRESS NOTE    Patient Name: Rohit Garvey    Medical Record Number: [id number]    YOB: 1955  PCP: Kevyn Salazar    Admit Date/Time: 4/8/2023  3:08 PM   9    Assessment and Plan:  Rohit Garvey is a 67 year old adult male with a PMH significant for NICM (EF of 40-45%) c/b paroxysmal VT s/p dual chamber ICD placement 6/20/2022 c/b recurrent ICD shocks s/p VT ablation 10/31/2022 and re-ablation 12/29/2022, paroxysmal Afib on apixaban, developmental delay (possibly related to viral illnesses during infancy), T2DM, hypertension, and hyperlipidemia who was admitted as a transfer from [place] per recommendations of his cardiologists Dr. Urbina and Dr. Chanel for evaluation by advanced heart failure failure team and EP team for VT management and consideration of advanced therapies.    Today's updates:  - EP to see today regarding any medication optimization recs and discharge recs for pt's VT now that he has decided to forgo ablation  - Continue to hold diuresis 2/2 renal function   - Held PTA KCl   - Switched from IV ceftriaxone to PO Augmentin to finish 5 day course for UTI  - Medically ready for discharge from primary team's perspective. Plan for discharge to TCU next week once bed available      # Persistent Paroxysmal Ventricular Tachycardia s/p dual chamber ICD placement 6/2022 and VT ablation x 2  # Chronic HFrEF 2/2 NICM w/ LVEF 40-45%, NYHA II  High burden of ventricular arrhythmias despite being on 2 antiarrhythmic medications (amiodarone and mexiletine) and being s/p ICD and two attempted VT ablations. Transferred here for evaluation for advanced therapies. ECHO 4/9 showed mildly reduced EF from 46% (Dec 2022) to 40-45%, however still does not qualify him for LVAD (EF < 25%). Possible heart transplant candidate if he meets criteria for VT storm.   VT episode at 0700 on 4/14. Required ATP and ICD shock.  Pt not interested in any further cardiac procedures including ablation.   - EP consulted, appreciate recs   - ICD device interrogation ordered - 1 VT episode on 4/9/23 lasting 25 seconds   - VT episode at 0700 on 4/14. Required ATP and ICD shock. Per sister, EP adjusted ICD after this episode.    - Patient not interested in any operative/procedural interventions. EP to provide recs for arrhythmia medication optimization, recs on  discharge, and recs if pt were to transition to hospice in near future   - Continue tele    Advanced therapies evaluation workup:  - ECHO 4/9 w/ mildly reduced LV function (EF reduction from 46% to 40-45%) and mild aortic valve calcification   - RHC 4/11:   RA mean 11mmHg. RV 38/4. RVEDP 13. PA 39/16/25. PCWP 16  TD CO/CI: 3.85 L/min / 2.25 L/min/m2  Shena CO/CI: 3.37 L/min / 1.97 L/min/m2    - Cardiopulmonary stress test cancelled as pt deemed not physically able to perform test per RN  - Advanced therapy  following   - Neuropsych testing 4/11 and 4/12  - Financial consult already placed  - Utox screen positive for amphetamines. Quant screen negative, indicating false positive on initial screen.   - Nicotine and cotinine screen negative. PEth negative  - Discussed at 4/14 heart transplant candidacy review     Patient ultimately decided to not pursue heart transplant or ablation. Goal is to go to TCU w/ plan to either return to his AL or LTC w/ consideration of starting hospice. No further advanced therapies workup will be pursued at this time.      GDMTs:  - Antiarrhythmics: PTA amiodarone 200 mg BID and mexiletine 200 mg q8h   - ACEi/ARB: Not currently on due to LVEF of 45%.  - BB:   - Continue metoprolol succinate 12.5 mg daily    - Holding PTA carvedilol 3.125 mg BID 2/2 soft bps   - Aldosterone antagonist: None   - SGLT2i: Holding PTA empagliflozin 10 mg daily   - SCD prophylaxis: S/p secondary prevention ICD 6/20/22  - Volume status: Euvolemic                - S/p IV lasix  80 mg x 2 on 4/9 and IV lasix 100mg 4/10, 4/11. Holding diuresis 2/2 renal injury               - Hold PTA lasix 40 mg TID (recently increased from qday) while on IV diuresis                - Hold PTA KCl supplement 20 meq daily while holding diuresis                - continue PTA magnesium oxide 800 mg daily                - BMP BID w/ goal of K > 4 and Mg > 2               - Strict I/Os and daily weights   - Anticoagulation: PTA Eliquis 5 mg BID   - Statin therapy: PTA atorvastatin 80 mg daily   - OT to assist in compression stockings     # UTI vs sterile pyuria   UA w/ large leuks and 131 WBCs w/ negative nitrites. Pt reported intermittent dysuria. Started on IV ceftriaxone. UC returned negative for bacteria.   - Transition from IV ceftriaxone 1 g q24h to Augmentin 4/18 w/ plan for 5 days total of antibiotics (4/15-4/19)   - PTA empagliflozin held, plan to resume 4/20     # Chronic Diabetic R heel ulcer  Present since Dec 2022, per sister. RN noticed erythema and ?purulent drainage from area 4/15. WOC consulted and deemed ulcer not actively infected. Recommend q3 day dressing changes.   - wound care and dressing changes per WOC orders  - f/u outpatient w/ podiatry      # Positive U tox for amphetamines  Pt and sister deny any illegal or legal stimulant use. No obvious PTA drugs on inspection that could be causing a false positive. Urine quant screen ultimately showed < 50 ng/mL amphetamines, well below the level to indicate a positive result. Suspect false positive on urine tox screen.   - No further workup/intervention needed      # Recent clavicular fracture  At baseline uses walker for ambulation. Had recent clavicle fracture now requiring him to be wheelchair bound. Supposed to be non weight bearing on LUE for another 4 weeks   - Fall precautions  - NWB LUE precautions provided   - PT/OT following, recommending TCU placement   - SW placed referrals for TCU. Awaiting bed availability     # Afib w/o RVR  -  metoprolol, amiodarone, and mexiletine   - PTA Eliquis 5 mg BID  - Tele     # ROSALINO on CKD, improving  Uptrending Cr above baseline Cr of 1.3. No observed mention of etiology of pt's CKD in chart. UA w/o protein, RBCs, or casts. Suspect prerenal as Cr improving after diuresis held   - holding diuresis for now   - BID BMP     # Acute on chronic normocytic anemia concerning for mixed picture of iron deficiency and inflammatory   # Iron deficiency   # Hx of anemia of chronic disease   Hgb 8.7 on arrival and downtrending. Baseline Hgb ~9-10 reportedly 2/2 anemia of chronic disease. Tbili normal. No source of active bleeding. Workup revealed low iron level, normal TIBC, and low end-of-normal ferritin concerning for iron deficiency anemia.   - IV Iron sucrose 200 mg qday x 5 days (starting 4/9)  - AM CBCs     # Chronic hyponatremia, resolved  BL sodium ~130-133. Suspect hypervolemic hyponatremia in setting of CHF. Improved after IV diuresis   - daily BMP    # DM2   On metformin 1,000 mg BID PTA.   - hold PTA metformin, plan to resume on discharge   - Holding empagliflozin as above   - LDSS  - Hypoglycemia protocol      # BPH  - PTA tamulosin  - CTM w/ bladder scans and PVRs PRN     # Developmental delay   Lives at assisted living where he receives assistance w/ medications, meals, etc. Independent in basic ADLs (feeding, clothing, bathing) at his baseline. Sister Citlali is his HCA and POA but pt currently has capacity   - Delirium precautions      # Constipation   - PTA metamucil      FEN: Soft bite sized (due to lack of dentition), 2 L fluid restriction   PPx: PTA Eliquis   Code: Full Code   Dispo: Discharge to TCU once bed available    Pt was discussed and evaluated with Dr. Salter, attending physician, who agrees with the assessment and plan above.     Prachi Call,   Internal Medicine, PGY-I    Interval Changes in Past 24 Hours:   No overnight events. Doing well today. No complaints or concerns. No new VT or shocks.  Received new compression stockings.    Review Of Systems  A 4-point ROS was negative aside from those listed above.    OBJECTIVE FINDINGS:    Temp:  [97.3  F (36.3  C)-98  F (36.7  C)] 97.6  F (36.4  C)  Pulse:  [60-72] 62  Resp:  [18] 18  BP: ()/(55-74) 116/74  SpO2:  [93 %-100 %] 98 %     MAP 80s overnight, 73 this AM  HR 55-60     I/Os: net neg -445 ml yesterday, -300 ml since midnight     Vitals:    04/15/23 0305 04/16/23 0400 04/17/23 0420   Weight: 62 kg (136 lb 11.2 oz) 61.8 kg (136 lb 4.8 oz) 61.9 kg (136 lb 6.4 oz)     Gen: Laying in bed, no acute distress  Neck: JVP ~8, unchanged from yesterday  Resp: clear to auscultation bilaterally, no crackles or wheezing   CV: Irregular rhythm, regular rate.   Abd: soft, NT, ND  Ext: warm and well perfused, 1+ lower extremity edema to level of knees, overall improved      Current medications   Current Facility-Administered Medications   Medication     acetaminophen (TYLENOL) Suppository 650 mg     acetaminophen (TYLENOL) tablet 650 mg     alum & mag hydroxide-simethicone (MAALOX) suspension 30 mL     amiodarone (PACERONE) tablet 200 mg     apixaban ANTICOAGULANT (ELIQUIS) tablet 5 mg     artificial tears ophthalmic ointment     atorvastatin (LIPITOR) tablet 80 mg     calcium carbonate-vitamin D (CALTRATE) 600-10 MG-MCG per tablet 1 tablet     carboxymethylcellulose PF (REFRESH PLUS) 0.5 % ophthalmic solution 1 drop     cefTRIAXone (ROCEPHIN) 1 g vial to attach to  mL bag for ADULTS or NS 50 mL bag for PEDS     glucose gel 15-30 g    Or     dextrose 50 % injection 25-50 mL    Or     glucagon injection 1 mg     [Held by provider] empagliflozin (JARDIANCE) tablet 10 mg     insulin aspart (NovoLOG) injection (RAPID ACTING)     insulin aspart (NovoLOG) injection (RAPID ACTING)     lidocaine (LMX4) cream     lidocaine 1 % 0.1-1 mL     magnesium oxide (MAG-OX) tablet 400 mg     magnesium oxide (MAG-OX) tablet 800 mg     medication instruction     metoprolol  succinate ER (TOPROL-XL) 24 hr half-tab 12.5 mg     mexiletine (MEXITIL) capsule 200 mg     miconazole (MICATIN) 2 % powder     nitroGLYcerin (NITROSTAT) sublingual tablet 0.4 mg     potassium chloride ER (KLOR-CON M) CR tablet 20 mEq     psyllium (METAMUCIL/KONSYL) capsule 1 capsule     sodium chloride (PF) 0.9% PF flush 3 mL     sodium chloride (PF) 0.9% PF flush 3 mL     tamsulosin (FLOMAX) capsule 0.4 mg     [Held by provider] torsemide (DEMADEX) tablet 40 mg       LABS Reviewed  Na 137, K 4.6, Mg 2.0, Cr 1.40 (1.51)      IMAGES Reviewed:    ECHO 4/9/23 Interpretation Summary:  Left ventricular function is decreased. The ejection fraction is 40-45%  (mildly reduced compared to 46% back in Dec 2022).   LV size and wall thickness is normal. Abnormal septal wall motion consistent w/ pacemaker.   Global right ventricular function is normal.  Mild aortic valve calcification is present. The mean gradient across the  aortic valve is 8 mmHg.  Pulmonary artery systolic pressure is normal.  IVC diameter and respiratory changes fall into an intermediate range  suggesting an RA pressure of 8 mmHg.  No pericardial effusion is present.    ICD interrogation 4/10/23:  Device: Abbott [id number] [place] HF  Normal Device Function.   Mode: DDDR 60-90 bpm  AP: 49%  BVP: 91%  Intrinsic rhythm: CHB w/ AS @ 60 bpm; no intrinsic R-waves at VVI 30 bpm  Lead Trends Appear Stable: Yes  Estimated battery longevity to RRT = 5.9 years. Remaining Capacity to OSCAR = 92%.  Atrial arrhythmia: None  AF burden: 0%  Anticoagulant: Eliquis  Ventricular Arrhythmia: 1 VT episode recorded on 4/9/23 at 9:43 AM, lasting ~25 seconds at 114 bpm. EGM reveals a slow VT which is converted with one sequence of ATP.  Setting changes: None    RHC 4/11/23:  RA mean 11mmHg  RV 40/11  PA 39/16/25  PCWP 16    Pa Sat 49.9%  Art Sat 99%    TD CO/CI: 3.85 L/min / 2.25 L/min/m2  Shena CO/CI: 3.37 L/min / 1.97 L/min/m2    PVR 2.3 (TD)  SVR  1538 (TD) Right sided filling  pressures are mildly elevated. Left sided filling pressures are mildly elevated. Mild elevated pulmonary hypertension. Reduced cardiac output level. Hemodynamic data has been modified in Epic per physician review.    I have reviewed today's vital signs, notes, medications, labs and imaging.  I have also seen and examined the patient and agree with the findings and plan as outlined above.  Pt with spouse at bedside.  No VT over the past 24 hrs.  Plan is to continue PT and await TCU placement.      Lorenzo Salter MD, PhD  Professor, Heart Failure and Cardiac Transplantation  [facility]

## 2023-04-17 NOTE — PLAN OF CARE
D AVSS with sat's 96% on room air. Heart regular A/AV paced and lungs decreased in bases otherwise clear. Voiced no c/o pain or nausea during the night and slept fair between cares. Blood glucose stable and had a snack overnight. Voiding good amounts of urine via premafit and urine specimen sent. Weight remains the same. Sitting up in chair and ordered breakfast.  I Vital's, assessment and med's per order.  A Resting in bed with call light in reach.  P Continue to monitor and update MD with changes.

## 2023-04-17 NOTE — PROGRESS NOTES
TCU Referral Follow-up     CHW was delegated task to follow-up on TCU referrals by 6C SW.     Pending Referrals    [facility]  [address] [place] [address]  [phone]  4/14: SW received the fax number from the admissions director Malena (Phone: [phone], Fax: [phone]) and SW sent the faxed TCU referral at 3:56pm   4/17: CHW spoke with Malena who said they are still reviewing and CHW provided call back number for the 6C SW.      [facility]  [address]  [place], [place] [address] [phone]  4/14: SW received the fax number from the admissions director Malena (Phone: [phone], Fax: [phone]) and SW sent the faxed TCU referral at 3:56pm  4/17: CHW spoke with Malena who said they are still reviewing and CHW provided call back number for the 6C SW.     **Janel is the admissions coordinator for both [facility] (Phone: [phone]. Fax: [phone])    [facility]  [address] [place] [address]  [phone]  Fax: [phone]  4/17: CHW spoke with admissions coordinator who said they never received the referral. CHW hard faxed at 3:10 pm.      [facility]  [address] [place] [address]  [phone]  4/17: CHW spoke with admissions coordinator who said they never received the referral. CHW hard faxed at 3:10 pm.     Declined  4/17: [facility]- CHW spoke with the  who said they no longer have a TCU.

## 2023-04-17 NOTE — PROVIDER NOTIFICATION
6C. Rohit Garvey. 13-2. Pt and sister (POA) wondering if pt will be seen by EP today regarding med trx, VT plans moving forward? Thanks. Sandhya [phone]    Update: Cards 2 have yet to hear back from EP regarding plans. Team will notify pt and sister when updates available.

## 2023-04-18 ENCOUNTER — APPOINTMENT (OUTPATIENT)
Dept: PHYSICAL THERAPY | Facility: CLINIC | Age: 68
DRG: 287 | End: 2023-04-18
Attending: INTERNAL MEDICINE
Payer: MEDICARE

## 2023-04-18 LAB
ANION GAP SERPL CALCULATED.3IONS-SCNC: 10 MMOL/L (ref 7–15)
ANION GAP SERPL CALCULATED.3IONS-SCNC: 12 MMOL/L (ref 7–15)
BUN SERPL-MCNC: 23.8 MG/DL (ref 8–23)
BUN SERPL-MCNC: 28.1 MG/DL (ref 8–23)
CALCIUM SERPL-MCNC: 8.3 MG/DL (ref 8.8–10.2)
CALCIUM SERPL-MCNC: 8.7 MG/DL (ref 8.8–10.2)
CHLORIDE SERPL-SCNC: 101 MMOL/L (ref 98–107)
CHLORIDE SERPL-SCNC: 103 MMOL/L (ref 98–107)
CREAT SERPL-MCNC: 1.23 MG/DL (ref 0.51–1.17)
CREAT SERPL-MCNC: 1.27 MG/DL (ref 0.51–1.17)
DEPRECATED HCO3 PLAS-SCNC: 20 MMOL/L (ref 22–29)
DEPRECATED HCO3 PLAS-SCNC: 23 MMOL/L (ref 22–29)
ERYTHROCYTE [DISTWIDTH] IN BLOOD BY AUTOMATED COUNT: 21.1 % (ref 10–15)
GFR SERPL CREATININE-BSD FRML MDRD: 62 ML/MIN/1.73M2
GFR SERPL CREATININE-BSD FRML MDRD: 64 ML/MIN/1.73M2
GLUCOSE BLDC GLUCOMTR-MCNC: 135 MG/DL (ref 70–99)
GLUCOSE BLDC GLUCOMTR-MCNC: 178 MG/DL (ref 70–99)
GLUCOSE BLDC GLUCOMTR-MCNC: 201 MG/DL (ref 70–99)
GLUCOSE BLDC GLUCOMTR-MCNC: 242 MG/DL (ref 70–99)
GLUCOSE SERPL-MCNC: 155 MG/DL (ref 70–99)
GLUCOSE SERPL-MCNC: 259 MG/DL (ref 70–99)
HCT VFR BLD AUTO: 30.1 % (ref 35–53)
HGB BLD-MCNC: 9 G/DL (ref 11.7–17.7)
MAGNESIUM SERPL-MCNC: 1.9 MG/DL (ref 1.7–2.3)
MAGNESIUM SERPL-MCNC: 2 MG/DL (ref 1.7–2.3)
MCH RBC QN AUTO: 25.5 PG (ref 26.5–33)
MCHC RBC AUTO-ENTMCNC: 29.9 G/DL (ref 31.5–36.5)
MCV RBC AUTO: 85 FL (ref 78–100)
PLATELET # BLD AUTO: 201 10E3/UL (ref 150–450)
POTASSIUM SERPL-SCNC: 4.4 MMOL/L (ref 3.4–5.3)
POTASSIUM SERPL-SCNC: 4.5 MMOL/L (ref 3.4–5.3)
RBC # BLD AUTO: 3.53 10E6/UL (ref 3.8–5.9)
SODIUM SERPL-SCNC: 133 MMOL/L (ref 136–145)
SODIUM SERPL-SCNC: 136 MMOL/L (ref 136–145)
WBC # BLD AUTO: 7.2 10E3/UL (ref 4–11)

## 2023-04-18 PROCEDURE — 80048 BASIC METABOLIC PNL TOTAL CA: CPT

## 2023-04-18 PROCEDURE — 250N000013 HC RX MED GY IP 250 OP 250 PS 637

## 2023-04-18 PROCEDURE — 83735 ASSAY OF MAGNESIUM: CPT

## 2023-04-18 PROCEDURE — 99232 SBSQ HOSP IP/OBS MODERATE 35: CPT | Mod: GC | Performed by: INTERNAL MEDICINE

## 2023-04-18 PROCEDURE — 214N000001 HC R&B CCU UMMC

## 2023-04-18 PROCEDURE — 250N000013 HC RX MED GY IP 250 OP 250 PS 637: Performed by: INTERNAL MEDICINE

## 2023-04-18 PROCEDURE — 82310 ASSAY OF CALCIUM: CPT

## 2023-04-18 PROCEDURE — 97530 THERAPEUTIC ACTIVITIES: CPT | Mod: GP

## 2023-04-18 PROCEDURE — 97116 GAIT TRAINING THERAPY: CPT | Mod: GP

## 2023-04-18 PROCEDURE — 36415 COLL VENOUS BLD VENIPUNCTURE: CPT

## 2023-04-18 PROCEDURE — 85027 COMPLETE CBC AUTOMATED: CPT

## 2023-04-18 PROCEDURE — 250N000013 HC RX MED GY IP 250 OP 250 PS 637: Performed by: STUDENT IN AN ORGANIZED HEALTH CARE EDUCATION/TRAINING PROGRAM

## 2023-04-18 RX ORDER — FUROSEMIDE 40 MG
40 TABLET ORAL DAILY
Status: DISCONTINUED | OUTPATIENT
Start: 2023-04-18 | End: 2023-04-28 | Stop reason: HOSPADM

## 2023-04-18 RX ADMIN — AMOXICILLIN AND CLAVULANATE POTASSIUM 1 TABLET: 875; 125 TABLET, FILM COATED ORAL at 20:10

## 2023-04-18 RX ADMIN — AMIODARONE HYDROCHLORIDE 200 MG: 200 TABLET ORAL at 20:10

## 2023-04-18 RX ADMIN — FUROSEMIDE 40 MG: 40 TABLET ORAL at 10:18

## 2023-04-18 RX ADMIN — APIXABAN 5 MG: 5 TABLET, FILM COATED ORAL at 20:10

## 2023-04-18 RX ADMIN — MICONAZOLE NITRATE: 20 POWDER TOPICAL at 20:10

## 2023-04-18 RX ADMIN — INSULIN ASPART 1 UNITS: 100 INJECTION, SOLUTION INTRAVENOUS; SUBCUTANEOUS at 10:22

## 2023-04-18 RX ADMIN — Medication 12.5 MG: at 08:21

## 2023-04-18 RX ADMIN — CALCIUM CARBONATE 600 MG (1,500 MG)-VITAMIN D3 400 UNIT TABLET 1 TABLET: at 08:21

## 2023-04-18 RX ADMIN — WHITE PETROLATUM 57.7 %-MINERAL OIL 31.9 % EYE OINTMENT: at 21:50

## 2023-04-18 RX ADMIN — INSULIN ASPART 2 UNITS: 100 INJECTION, SOLUTION INTRAVENOUS; SUBCUTANEOUS at 16:16

## 2023-04-18 RX ADMIN — APIXABAN 5 MG: 5 TABLET, FILM COATED ORAL at 08:21

## 2023-04-18 RX ADMIN — AMOXICILLIN AND CLAVULANATE POTASSIUM 1 TABLET: 875; 125 TABLET, FILM COATED ORAL at 08:21

## 2023-04-18 RX ADMIN — MICONAZOLE NITRATE: 20 POWDER TOPICAL at 09:33

## 2023-04-18 RX ADMIN — MEXILETINE HYDROCHLORIDE 200 MG: 200 CAPSULE ORAL at 21:50

## 2023-04-18 RX ADMIN — MAGNESIUM OXIDE TAB 400 MG (241.3 MG ELEMENTAL MG) 800 MG: 400 (241.3 MG) TAB at 08:21

## 2023-04-18 RX ADMIN — ATORVASTATIN CALCIUM 80 MG: 80 TABLET, FILM COATED ORAL at 08:21

## 2023-04-18 RX ADMIN — Medication 1 CAPSULE: at 08:21

## 2023-04-18 RX ADMIN — MEXILETINE HYDROCHLORIDE 200 MG: 200 CAPSULE ORAL at 06:19

## 2023-04-18 RX ADMIN — MEXILETINE HYDROCHLORIDE 200 MG: 200 CAPSULE ORAL at 14:25

## 2023-04-18 RX ADMIN — TAMSULOSIN HYDROCHLORIDE 0.4 MG: 0.4 CAPSULE ORAL at 20:10

## 2023-04-18 RX ADMIN — AMIODARONE HYDROCHLORIDE 200 MG: 200 TABLET ORAL at 08:21

## 2023-04-18 ASSESSMENT — ACTIVITIES OF DAILY LIVING (ADL)
ADLS_ACUITY_SCORE: 40

## 2023-04-18 NOTE — PLAN OF CARE
Goal Outcome Evaluation:    Plan of Care Reviewed With: pt      Overall Patient Progress: improving    Outcome Evaluation: Rec modify diet order to a 2g vs 3g sodium diet. Continue fluid restriction per provider. Encourage intake of tolerated foods/beverages. Rec adequate intake at meals.

## 2023-04-18 NOTE — PROGRESS NOTES
[facility]  CARDIOLOGY HEART FAILURE SERVICE (CARDS II) PROGRESS NOTE    Patient Name: Rohit Garvey    Medical Record Number: [id number]    YOB: 1955  PCP: Kevyn Salazar    Admit Date/Time: 4/8/2023  3:08 PM   10    Assessment and Plan:  Rohit Garvey is a 67 year old adult male with a PMH significant for NICM (EF of 40-45%) c/b paroxysmal VT s/p dual chamber ICD placement 6/20/2022 c/b recurrent ICD shocks s/p VT ablation 10/31/2022 and re-ablation 12/29/2022, paroxysmal Afib on apixaban, developmental delay (possibly related to viral illnesses during infancy), T2DM, hypertension, and hyperlipidemia who was admitted as a transfer from [place] per recommendations of his cardiologists Dr. Urbina and Dr. Chanel for evaluation by advanced heart failure failure team and EP team for VT management and consideration of advanced therapies.    Today's updates:  - Dr. Leon talked with patient and family; no plans to change AAT or ICD settings at this time. No need to go to hospital for outpatient ICD shocks unless >3 a day. Asked patient to notify outpatient providers if he is shocked by his ICD.   - Medically ready for discharge from primary team's perspective. Plan for discharge to TCU next week once bed available   - Restart home lasix 40 mg BID, monitor UOP, weights, Cr trend     # Persistent Paroxysmal Ventricular Tachycardia s/p dual chamber ICD placement 6/2022 and VT ablation x 2  # Chronic HFrEF 2/2 NICM w/ LVEF 40-45%, NYHA II  High burden of ventricular arrhythmias despite being on 2 antiarrhythmic medications (amiodarone and mexiletine) and being s/p ICD and two attempted VT ablations. Transferred here for evaluation for advanced therapies. ECHO 4/9 showed mildly reduced EF from 46% (Dec 2022) to 40-45%, however still does not qualify him for LVAD (EF < 25%). Possible heart transplant candidate if he meets criteria for VT storm.   VT episode at 0700 on 4/14.  Required ATP and ICD shock. Pt not interested in any further cardiac procedures including ablation.   - EP consulted, appreciate recs   - ICD device interrogation ordered - 1 VT episode on 4/9/23 lasting 25 seconds   - VT episode at 0700 on 4/14. Required ATP and ICD shock. Per sister, EP adjusted ICD after this episode.    - Patient not interested in any operative/procedural interventions. EP does not plan to change his AAT or ICD settings. No need to go to hospital for outpatient ICD shocks unless >3 a day. Asked patient to notify outpatient providers if he is shocked by his ICD.   - Continue tele    Advanced therapies evaluation workup:  - ECHO 4/9 w/ mildly reduced LV function (EF reduction from 46% to 40-45%) and mild aortic valve calcification   - RHC 4/11:   RA mean 11mmHg. RV 38/4. RVEDP 13. PA 39/16/25. PCWP 16  TD CO/CI: 3.85 L/min / 2.25 L/min/m2  Shena CO/CI: 3.37 L/min / 1.97 L/min/m2    - Cardiopulmonary stress test cancelled as pt deemed not physically able to perform test per RN  - Advanced therapy  following   - Neuropsych testing 4/11 and 4/12  - Financial consult already placed  - Utox screen positive for amphetamines. Quant screen negative, indicating false positive on initial screen.   - Nicotine and cotinine screen negative. PEth negative  - Discussed at 4/14 heart transplant candidacy review     Patient ultimately decided to not pursue heart transplant or ablation. Goal is to go to TCU w/ plan to either return to his AL or LTC w/ consideration of starting hospice. No further advanced therapies workup will be pursued at this time.      GDMTs:  - Antiarrhythmics: PTA amiodarone 200 mg BID and mexiletine 200 mg q8h   - ACEi/ARB: Not currently on due to LVEF of 45%.  - BB:   - Continue metoprolol succinate 12.5 mg daily    - Holding PTA carvedilol 3.125 mg BID 2/2 soft bps   - Aldosterone antagonist: None   - SGLT2i: Holding PTA empagliflozin 10 mg daily due to UTI  - SCD  prophylaxis: S/p secondary prevention ICD 6/20/22  - Volume status: Euvolemic; restart home lasix 40 BID               - Hold PTA KCl supplement 20 meq daily while holding diuresis                - continue PTA magnesium oxide 800 mg daily                - BMP BID w/ goal of K > 4 and Mg > 2               - Strict I/Os and daily weights   - Anticoagulation: PTA Eliquis 5 mg BID   - Statin therapy: PTA atorvastatin 80 mg daily   - OT to assist in compression stockings     # UTI vs sterile pyuria   UA w/ large leuks and 131 WBCs w/ negative nitrites. Pt reported intermittent dysuria. Started on IV ceftriaxone. UC returned negative for bacteria.   - Continue Augmentin BID for 5 total days of abx (4/15-4/19)   - PTA empagliflozin held, plan to resume 4/20     # Chronic Diabetic R heel ulcer  Present since Dec 2022, per sister. RN noticed erythema and ?purulent drainage from area 4/15. WOC consulted and deemed ulcer not actively infected. Recommend q3 day dressing changes.   - wound care and dressing changes per WOC orders  - f/u outpatient w/ podiatry      # Positive U tox for amphetamines  Pt and sister deny any illegal or legal stimulant use. No obvious PTA drugs on inspection that could be causing a false positive. Urine quant screen ultimately showed < 50 ng/mL amphetamines, well below the level to indicate a positive result. Suspect false positive on urine tox screen.   - No further workup/intervention needed      # Recent clavicular fracture  At baseline uses walker for ambulation. Had recent clavicle fracture now requiring him to be wheelchair bound. Supposed to be non weight bearing on LUE for another 4 weeks   - Fall precautions  - NWB LUE precautions provided   - PT/OT following, recommending TCU placement   - SW placed referrals for TCU. Awaiting bed availability     # Afib w/o RVR  - metoprolol, amiodarone, and mexiletine   - PTA Eliquis 5 mg BID  - Tele     # ROSALINO on CKD, improving  Uptrending Cr above  baseline Cr of 1.3. No observed mention of etiology of pt's CKD in chart. UA w/o protein, RBCs, or casts. Suspect prerenal as Cr improving after diuresis held   - holding diuresis for now   - BID BMP     # Acute on chronic normocytic anemia concerning for mixed picture of iron deficiency and inflammatory   # Iron deficiency   # Hx of anemia of chronic disease   Hgb 8.7 on arrival and downtrending. Baseline Hgb ~9-10 reportedly 2/2 anemia of chronic disease. Tbili normal. No source of active bleeding. Workup revealed low iron level, normal TIBC, and low end-of-normal ferritin concerning for iron deficiency anemia.   - s/p IV Iron sucrose 200 mg qday x 5 days  - AM CBCs     # Chronic hyponatremia, resolved  BL sodium ~130-133. Suspect hypervolemic hyponatremia in setting of CHF. Improved after IV diuresis   - daily BMP    # DM2   On metformin 1,000 mg BID PTA.   - hold PTA metformin, plan to resume on discharge   - Holding empagliflozin as above   - LDSS  - Hypoglycemia protocol      # BPH  - PTA tamulosin  - CTM w/ bladder scans and PVRs PRN     # Developmental delay   Lives at assisted living where he receives assistance w/ medications, meals, etc. Independent in basic ADLs (feeding, clothing, bathing) at his baseline. Sister Peg is his HCA and POA but pt currently has capacity   - Delirium precautions      # Constipation   - PTA metamucil      FEN: Soft bite sized (due to lack of dentition), 2 L fluid restriction   PPx: PTA Eliquis   Code: Full Code   Dispo: Discharge to TCU once bed available    Pt was discussed and evaluated with Dr. Salter, attending physician, who agrees with the assessment and plan above.     Mayru Navas, PGY-4  Cardiovascular Disease Fellow    Interval Changes in Past 24 Hours:   No acute events overnight. Denies any cardiopulmonary complaints. No ICD shocks.     Review Of Systems  A 4-point ROS was negative aside from those listed above.    OBJECTIVE FINDINGS:    Temp:  [97.4  F (36.3   C)-98.1  F (36.7  C)] 97.4  F (36.3  C)  Pulse:  [60-83] 60  Resp:  [16-18] 18  BP: ()/(54-80) 98/54  SpO2:  [94 %-98 %] 96 %     Intake/Output Summary (Last 24 hours) at 4/18/2023 0809  Last data filed at 4/18/2023 0600  Gross per 24 hour   Intake 920 ml   Output 550 ml   Net 370 ml     Vitals:    04/16/23 0400 04/17/23 0420 04/18/23 0600   Weight: 61.8 kg (136 lb 4.8 oz) 61.9 kg (136 lb 6.4 oz) 62.3 kg (137 lb 6.4 oz)     Gen: Sitting in chair, NAD  Neck: JVP ~8, unchanged  Resp: clear to auscultation bilaterally, no crackles or wheezing   CV: Irregular rhythm, regular rate.   Abd: soft, NT, ND  Ext: warm and well perfused, trace to 1+ lower extremity edema to level of knees    Current medications   Current Facility-Administered Medications   Medication     acetaminophen (TYLENOL) Suppository 650 mg     acetaminophen (TYLENOL) tablet 650 mg     alum & mag hydroxide-simethicone (MAALOX) suspension 30 mL     amiodarone (PACERONE) tablet 200 mg     amoxicillin-clavulanate (AUGMENTIN) 875-125 MG per tablet 1 tablet     apixaban ANTICOAGULANT (ELIQUIS) tablet 5 mg     artificial tears ophthalmic ointment     atorvastatin (LIPITOR) tablet 80 mg     calcium carbonate-vitamin D (CALTRATE) 600-10 MG-MCG per tablet 1 tablet     carboxymethylcellulose PF (REFRESH PLUS) 0.5 % ophthalmic solution 1 drop     glucose gel 15-30 g    Or     dextrose 50 % injection 25-50 mL    Or     glucagon injection 1 mg     [Held by provider] empagliflozin (JARDIANCE) tablet 10 mg     insulin aspart (NovoLOG) injection (RAPID ACTING)     insulin aspart (NovoLOG) injection (RAPID ACTING)     lidocaine (LMX4) cream     lidocaine 1 % 0.1-1 mL     magnesium oxide (MAG-OX) tablet 800 mg     medication instruction     metoprolol succinate ER (TOPROL-XL) 24 hr half-tab 12.5 mg     mexiletine (MEXITIL) capsule 200 mg     miconazole (MICATIN) 2 % powder     nitroGLYcerin (NITROSTAT) sublingual tablet 0.4 mg     [Held by provider] potassium  chloride ER (KLOR-CON M) CR tablet 20 mEq     psyllium (METAMUCIL/KONSYL) capsule 1 capsule     sodium chloride (PF) 0.9% PF flush 3 mL     sodium chloride (PF) 0.9% PF flush 3 mL     tamsulosin (FLOMAX) capsule 0.4 mg     [Held by provider] torsemide (DEMADEX) tablet 40 mg     LABS Reviewed    IMAGES Reviewed:    ECHO 4/9/23 Interpretation Summary:  Left ventricular function is decreased. The ejection fraction is 40-45%  (mildly reduced compared to 46% back in Dec 2022).   LV size and wall thickness is normal. Abnormal septal wall motion consistent w/ pacemaker.   Global right ventricular function is normal.  Mild aortic valve calcification is present. The mean gradient across the  aortic valve is 8 mmHg.  Pulmonary artery systolic pressure is normal.  IVC diameter and respiratory changes fall into an intermediate range  suggesting an RA pressure of 8 mmHg.  No pericardial effusion is present.    ICD interrogation 4/10/23:  Device: Abbott [id number] [place] HF  Normal Device Function.   Mode: DDDR 60-90 bpm  AP: 49%  BVP: 91%  Intrinsic rhythm: CHB w/ AS @ 60 bpm; no intrinsic R-waves at VVI 30 bpm  Lead Trends Appear Stable: Yes  Estimated battery longevity to RRT = 5.9 years. Remaining Capacity to OSCAR = 92%.  Atrial arrhythmia: None  AF burden: 0%  Anticoagulant: Eliquis  Ventricular Arrhythmia: 1 VT episode recorded on 4/9/23 at 9:43 AM, lasting ~25 seconds at 114 bpm. EGM reveals a slow VT which is converted with one sequence of ATP.  Setting changes: None    RHC 4/11/23:  RA mean 11mmHg  RV 40/11  PA 39/16/25  PCWP 16    Pa Sat 49.9%  Art Sat 99%    TD CO/CI: 3.85 L/min / 2.25 L/min/m2  Shena CO/CI: 3.37 L/min / 1.97 L/min/m2    PVR 2.3 (TD)  SVR  1538 (TD) Right sided filling pressures are mildly elevated. Left sided filling pressures are mildly elevated. Mild elevated pulmonary hypertension. Reduced cardiac output level. Hemodynamic data has been modified in [facility] per physician review.      I have reviewed  today's vital signs, notes, medications, labs and imaging.  I have also seen and examined the patient and agree with the findings and plan as outlined above.  Pt with abx for UTI.  No other complaints.  VSS with Labs WNL.  Agree with EP assessment and will reinitiate diuretic therapy.  Plan for TCU care. Pt with hx of VT and LV dysfn.     Lorenzo Salter MD, PhD  Professor, Heart Failure and Cardiac Transplantation  [facility]

## 2023-04-18 NOTE — PROGRESS NOTES
TCU Referral Follow-up      CHW was delegated task to follow-up on TCU referrals by 6C SW. Pt is medically ready to discharge to TCU.       Pending Referrals     [facility]  [address] [place] [address]  [phone]  4/14: SW received the fax number from the admissions director Malena (Phone: [phone], Fax: [phone]) and SW sent the faxed TCU referral at 3:56pm   4/17: CHW spoke with Malena who said they are still reviewing and CHW provided call back number for the 6C SW.    4/18: CHW LVM to follow-up on referral at 2:24 pm.     [facility]  [address], [place] [address] [phone]  4/14: SW received the fax number from the admissions director Malena (Phone: [phone], Fax: [phone]) and SW sent the faxed TCU referral at 3:56pm  4/17: CHW spoke with Malena who said they are still reviewing and CHW provided call back number for the 6C SW.   4/18: CHW LVM to follow-up on referral at 2:24 pm.     **Janel is the admissions coordinator for both [facility] (Phone: [phone]. Fax: [phone])     [facility]  [address], [place] [address] [phone]  Fax: [phone]  4/17: CHW spoke with admissions coordinator who said they never received the referral. CHW hard faxed at 3:10 pm.   4/18: CHW LVM to follow-up on referral at 2:22 pm.      [facility]  [address] [place], [place] [address] [phone]  4/17: CHW spoke with admissions coordinator who said they never received the referral. CHW hard faxed at 3:10 pm.   4/18: CHW LVM to follow-up on referral at 2:22 pm.      Declined  4/17: [facility]- CHW spoke with the  who said they no longer have a TCU.

## 2023-04-18 NOTE — PROGRESS NOTES
"CLINICAL NUTRITION SERVICES - ASSESSMENT NOTE     Nutrition Prescription    RECOMMENDATIONS FOR MDs/PROVIDERS TO ORDER:  1. Rec modify diet to a 2 g vs 3 g sodium diet.   2. Order a multivitamin with minerals to help ensure micronutrient needs are met    Malnutrition Status:    Moderate malnutrition in the context of chronic illness    Recommendations already ordered by Registered Dietitian (RD):  Prn oral supplement order    Future/Additional Recommendations:  1. Encourage adequate intake at meals. Rec pt self-select soft foods as needed. Monitor need for room service appropriate with assist.   2. Consider checking vitamin D if/when appropriate. Pt on calcium/vitamin D (on twice daily PTA).   3. Consider checking vitamin B12 status, if/when appropriate.  4. Monitor stooling with h/o constipation  5. Monitor BG control. Hgb A1c of 6.7 on 11/4/2022. BG was 135 on 4/18. Monitor potential need for consistent carbohydrate diet restriction.    6. Continue to monitor iron status (received venofer).     REASON FOR ASSESSMENT  Rohit Garvey is a/an 67 year old adult assessed by the dietitian for LOS    NUTRITION/ADDITIONAL HISTORY  Pt not known to this service PTA.    Per H & P, \"PMH significant for NICM (EF of 45%) c/b paroxysmal VT s/p dual chamber ICD placement 6/20/2022 c/b recurrent ICD shocks s/p VT ablation 10/31/2022 and re-ablation 12/29/2022, paroxysmal Afib on apixaban, developmental delay (possibly related to viral illnesses during infancy), T2DM, hypertension, and hyperlipidemia who was admitted as a transfer from [place] per recommendations of his cardiologists Dr. Urbina and Dr. Chanel for evaluation by advanced heart failure failure team and EP team for VT management and consideration of advanced therapies.Constipation.\" Per provider note, \"Lives at assisted living where he receives assistance w/ medications, meals, etc. Independent in basic ADLs (feeding, clothing, bathing) at his baseline.\" Pt was " "ordered to take, noting, amiodarone, calcium/vitamin D, fish oil, lasix, natural fiber, jardiance, mag-ox, potassium, metformin and PTA.     Per RD note 4/9, \"Consulted for Heart Failure - Dietitian to instruct patient on 2 gram sodium diet. Visited with patient today. Patient sound asleep. Patient's sister at bedside reporting, patient lives in an assisted living and all his meals are prepared. Briefly reviewed low salt diet guidelines. Left education handout at bedside. Patient's sister will share the low salt handout with the assisted living facility.\"    Per discussion with pt, good appetite PTA. States he was eating normal amounts. Per pt, some chicken is hard for him to chew. Able to chew most foods. Pt aware of sodium restriction PTA and states he was trying to follow a low sodium diet PTA.     CURRENT NUTRITION ORDERS  Diet: 2000 mL Fluid Restriction (since 4/9) and Low Saturated Fat/2400 mg Sodium (since 4/11). SLP not following pt.  Intake/Tolerance: Good diet tolerance. Per nursing flowsheets (% intake), pt consistently consuming %. Good appetite noted most frequently, but pt had a poor appetite on 4/13 and consumed 50% of a meal. Per pt, his appetite is good and he is eating well. [employer] (meal ordering system) indicates pt is receiving three meals daily.    LABS  Labs reviewed    MEDICATIONS  Medications reviewed    ANTHROPOMETRICS  Height: 162.6 cm (5' 4\")  Most Recent Weight: 62.3 kg (137 lb 6.4 oz) - On 4/18  IBW: 59.1 kg   BMI: Normal BMI  Weight History: 74.3 kg (6/16/2022), 71.2 kg (10/21/2022), 67.4 kg (12/29/2022), 68.5 kg (1/20/2023), 68 kg (3/20/2023) - Difficult to assess wt changes with changes in fluid status and diuresis. Pt has lost 12.5% of body wt over the last six months. In regards to wt loss, pt states he may have lost weight due to eating differently.   Dosing Weight: 62 kg (based on lowest wt so far this admission of 61.8 kg on 4/16)    ASSESSED NUTRITION NEEDS (for " "inpatient hospital stay)  Estimated Energy Needs: 1223-9896 kcals/day (25 - 30 kcals/kg)  Justification: Maintenance needs, although rec the high end of this range due to wt loss.   Estimated Protein Needs: 68-87 grams protein/day (1.1 - 1.4 grams of pro/kg)  Justification: Increased needs with cardiac status, pending renal function  Estimated Fluid Needs: 2000 mL/day    Justification: On a fluid restriction    PHYSICAL FINDINGS/OTHER FINDINGS  See malnutrition section below.  HEENT: No teeth per chart  WOC (4/15): Areas visualized during today's visit: Heels. Wound location: Right heel.\"  GI: Having zero to two stools daily. Last stool on 4/17.    MALNUTRITION  % Intake: Not meeting this criteria.     % Weight Loss: Difficult to assess wt changes with changes in fluid status and diuresis. Potentially meeting this criteria.   Subcutaneous Fat Loss: Facial region: Mild  Muscle Loss: Temporal:  Mild, Thoracic region (clavicle, acromium bone, deltoid, trapezius, pectoral):  Mild and Dorsal hand:  Mild  Fluid Accumulation/Edema: Does not meet criteria  Malnutrition Diagnosis: Moderate malnutrition in the context of chronic illness    NUTRITION DIAGNOSIS  Malnutrition related to potentially eating less than estimated needs, restrictive diet as evidenced by pt with mild muscle loss and mild subcutaneous fat loss.       INTERVENTIONS  Implementation  Nutrition Education: Importance of adequate nutrition intake discussed with pt. Rec he continue with low-sodium diet order/fluid restriction once discharged. Encouraged foods he can chew.   Medical food supplement therapy: Placed prn snack/supplement order. Pt may request snacks/oral supplements prn.     Goals  Patient to consume % of nutritionally adequate meal trays TID, or the equivalent with supplements/snacks.     Monitoring/Evaluation  Progress toward goals will be monitored and evaluated per protocol.       Nutrition will continue to follow.     Leanna Bella, " MS, RD, ISMAEL, [facility]   6C Pgr: [phone]

## 2023-04-18 NOTE — PLAN OF CARE
Pt admitted as a transfer from [place] per recommendations of his cardiologists Dr. Urbina and Dr. Chanel for evaluation by advanced heart failure failure team and EP team for VT management and consideration of advanced therapies. 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    Code status: Full Code    Team: Cards 2     Neuro: AOx4, calm and cooperative, Calls appropriately, no neuro deficits  Cardiac/Tele/VS: AV Paced, normotensive, afebrile, no CP, palpitations or VT runs this shift. Other VSS  Respiratory: RA, no SOB noted. O2 sats > 92%  GI/: Voids spontaneously via an external catheter. Check periodically for incontinence/leaks. BS audible. LBM 4/17  Diet/Appetite: Cardiac diet with 2L FR  Skin: Redness and blanchable to the bottom, Mepilex on the coccyx. Rash to the perineum. Small scab on the L chest ICD site. No other skin deficits  Endocrine/Electrolytes: BG checks ACHS. Replace lytes per protocols  LDAs: PIV x1, saline locked  Activity: Up with 1-2, GB and a walker  Pain: Denies pain     Plan: Medically stable to discharge to TCU when bed available per team. Continue to monitor and notify team of concerns

## 2023-04-18 NOTE — PLAN OF CARE
Neuro: A&Ox4.   Cardiac: AV paced    Respiratory: Sating 96 on RA.  GI/: External Catheter, adequate output. 1 BM this shift  Diet/appetite: Tolerating low sat fat low na diet. Eating well.  Activity:  Assist of 1-2, up to chair   Pain: pt denies pain  Skin: Redness and blanchable on bottom. Mepilex on the coccyx. Rash to the perineum.   LDA's:  1 PIV SL     Plan: Continue with POC. Notify primary team with changes.  Awaiting TCU placement

## 2023-04-19 ENCOUNTER — APPOINTMENT (OUTPATIENT)
Dept: OCCUPATIONAL THERAPY | Facility: CLINIC | Age: 68
DRG: 287 | End: 2023-04-19
Attending: INTERNAL MEDICINE
Payer: MEDICARE

## 2023-04-19 LAB
ANION GAP SERPL CALCULATED.3IONS-SCNC: 12 MMOL/L (ref 7–15)
ANION GAP SERPL CALCULATED.3IONS-SCNC: 9 MMOL/L (ref 7–15)
BACTERIA BLD CULT: NO GROWTH
BACTERIA BLD CULT: NO GROWTH
BUN SERPL-MCNC: 24.2 MG/DL (ref 8–23)
BUN SERPL-MCNC: 24.2 MG/DL (ref 8–23)
CALCIUM SERPL-MCNC: 8.5 MG/DL (ref 8.8–10.2)
CALCIUM SERPL-MCNC: 8.7 MG/DL (ref 8.8–10.2)
CHLORIDE SERPL-SCNC: 101 MMOL/L (ref 98–107)
CHLORIDE SERPL-SCNC: 104 MMOL/L (ref 98–107)
CREAT SERPL-MCNC: 1.08 MG/DL (ref 0.51–1.17)
CREAT SERPL-MCNC: 1.12 MG/DL (ref 0.51–1.17)
DEPRECATED HCO3 PLAS-SCNC: 20 MMOL/L (ref 22–29)
DEPRECATED HCO3 PLAS-SCNC: 22 MMOL/L (ref 22–29)
ERYTHROCYTE [DISTWIDTH] IN BLOOD BY AUTOMATED COUNT: 21 % (ref 10–15)
GFR SERPL CREATININE-BSD FRML MDRD: 72 ML/MIN/1.73M2
GFR SERPL CREATININE-BSD FRML MDRD: 75 ML/MIN/1.73M2
GLUCOSE BLDC GLUCOMTR-MCNC: 153 MG/DL (ref 70–99)
GLUCOSE BLDC GLUCOMTR-MCNC: 206 MG/DL (ref 70–99)
GLUCOSE BLDC GLUCOMTR-MCNC: 211 MG/DL (ref 70–99)
GLUCOSE BLDC GLUCOMTR-MCNC: 235 MG/DL (ref 70–99)
GLUCOSE BLDC GLUCOMTR-MCNC: 241 MG/DL (ref 70–99)
GLUCOSE SERPL-MCNC: 175 MG/DL (ref 70–99)
GLUCOSE SERPL-MCNC: 306 MG/DL (ref 70–99)
HCT VFR BLD AUTO: 32.8 % (ref 35–53)
HGB BLD-MCNC: 9.8 G/DL (ref 11.7–17.7)
MAGNESIUM SERPL-MCNC: 1.6 MG/DL (ref 1.7–2.3)
MAGNESIUM SERPL-MCNC: 1.8 MG/DL (ref 1.7–2.3)
MCH RBC QN AUTO: 25.7 PG (ref 26.5–33)
MCHC RBC AUTO-ENTMCNC: 29.9 G/DL (ref 31.5–36.5)
MCV RBC AUTO: 86 FL (ref 78–100)
PLATELET # BLD AUTO: 212 10E3/UL (ref 150–450)
POTASSIUM SERPL-SCNC: 4.3 MMOL/L (ref 3.4–5.3)
POTASSIUM SERPL-SCNC: 4.5 MMOL/L (ref 3.4–5.3)
RBC # BLD AUTO: 3.82 10E6/UL (ref 3.8–5.9)
SODIUM SERPL-SCNC: 133 MMOL/L (ref 136–145)
SODIUM SERPL-SCNC: 135 MMOL/L (ref 136–145)
WBC # BLD AUTO: 7.7 10E3/UL (ref 4–11)

## 2023-04-19 PROCEDURE — 83735 ASSAY OF MAGNESIUM: CPT

## 2023-04-19 PROCEDURE — 36415 COLL VENOUS BLD VENIPUNCTURE: CPT

## 2023-04-19 PROCEDURE — 99232 SBSQ HOSP IP/OBS MODERATE 35: CPT | Mod: GC | Performed by: INTERNAL MEDICINE

## 2023-04-19 PROCEDURE — 250N000013 HC RX MED GY IP 250 OP 250 PS 637

## 2023-04-19 PROCEDURE — 250N000013 HC RX MED GY IP 250 OP 250 PS 637: Performed by: STUDENT IN AN ORGANIZED HEALTH CARE EDUCATION/TRAINING PROGRAM

## 2023-04-19 PROCEDURE — 214N000001 HC R&B CCU UMMC

## 2023-04-19 PROCEDURE — 97530 THERAPEUTIC ACTIVITIES: CPT | Mod: GO

## 2023-04-19 PROCEDURE — 250N000011 HC RX IP 250 OP 636

## 2023-04-19 PROCEDURE — 85027 COMPLETE CBC AUTOMATED: CPT

## 2023-04-19 PROCEDURE — 80048 BASIC METABOLIC PNL TOTAL CA: CPT

## 2023-04-19 PROCEDURE — 97535 SELF CARE MNGMENT TRAINING: CPT | Mod: GO

## 2023-04-19 PROCEDURE — 250N000013 HC RX MED GY IP 250 OP 250 PS 637: Performed by: INTERNAL MEDICINE

## 2023-04-19 PROCEDURE — 97140 MANUAL THERAPY 1/> REGIONS: CPT | Mod: GO

## 2023-04-19 RX ORDER — MAGNESIUM OXIDE 400 MG/1
800 TABLET ORAL 2 TIMES DAILY
Status: DISCONTINUED | OUTPATIENT
Start: 2023-04-20 | End: 2023-04-28 | Stop reason: HOSPADM

## 2023-04-19 RX ORDER — MAGNESIUM SULFATE HEPTAHYDRATE 40 MG/ML
2 INJECTION, SOLUTION INTRAVENOUS ONCE
Status: COMPLETED | OUTPATIENT
Start: 2023-04-19 | End: 2023-04-19

## 2023-04-19 RX ADMIN — INSULIN ASPART 2 UNITS: 100 INJECTION, SOLUTION INTRAVENOUS; SUBCUTANEOUS at 17:00

## 2023-04-19 RX ADMIN — MAGNESIUM SULFATE IN WATER 2 G: 40 INJECTION, SOLUTION INTRAVENOUS at 17:12

## 2023-04-19 RX ADMIN — AMIODARONE HYDROCHLORIDE 200 MG: 200 TABLET ORAL at 20:28

## 2023-04-19 RX ADMIN — APIXABAN 5 MG: 5 TABLET, FILM COATED ORAL at 08:17

## 2023-04-19 RX ADMIN — CALCIUM CARBONATE 600 MG (1,500 MG)-VITAMIN D3 400 UNIT TABLET 1 TABLET: at 08:17

## 2023-04-19 RX ADMIN — MEXILETINE HYDROCHLORIDE 200 MG: 200 CAPSULE ORAL at 05:33

## 2023-04-19 RX ADMIN — INSULIN ASPART 1 UNITS: 100 INJECTION, SOLUTION INTRAVENOUS; SUBCUTANEOUS at 11:22

## 2023-04-19 RX ADMIN — MEXILETINE HYDROCHLORIDE 200 MG: 200 CAPSULE ORAL at 22:07

## 2023-04-19 RX ADMIN — INSULIN ASPART 1 UNITS: 100 INJECTION, SOLUTION INTRAVENOUS; SUBCUTANEOUS at 08:02

## 2023-04-19 RX ADMIN — WHITE PETROLATUM 57.7 %-MINERAL OIL 31.9 % EYE OINTMENT: at 22:08

## 2023-04-19 RX ADMIN — POTASSIUM CHLORIDE 20 MEQ: 750 TABLET, EXTENDED RELEASE ORAL at 08:20

## 2023-04-19 RX ADMIN — MICONAZOLE NITRATE: 20 POWDER TOPICAL at 09:46

## 2023-04-19 RX ADMIN — ATORVASTATIN CALCIUM 80 MG: 80 TABLET, FILM COATED ORAL at 08:16

## 2023-04-19 RX ADMIN — MEXILETINE HYDROCHLORIDE 200 MG: 200 CAPSULE ORAL at 13:58

## 2023-04-19 RX ADMIN — MAGNESIUM OXIDE TAB 400 MG (241.3 MG ELEMENTAL MG) 800 MG: 400 (241.3 MG) TAB at 08:17

## 2023-04-19 RX ADMIN — Medication 1 CAPSULE: at 08:16

## 2023-04-19 RX ADMIN — APIXABAN 5 MG: 5 TABLET, FILM COATED ORAL at 20:28

## 2023-04-19 RX ADMIN — AMIODARONE HYDROCHLORIDE 200 MG: 200 TABLET ORAL at 08:16

## 2023-04-19 RX ADMIN — MICONAZOLE NITRATE: 20 POWDER TOPICAL at 20:28

## 2023-04-19 RX ADMIN — FUROSEMIDE 40 MG: 40 TABLET ORAL at 08:16

## 2023-04-19 RX ADMIN — TAMSULOSIN HYDROCHLORIDE 0.4 MG: 0.4 CAPSULE ORAL at 20:28

## 2023-04-19 RX ADMIN — Medication 12.5 MG: at 08:16

## 2023-04-19 ASSESSMENT — ACTIVITIES OF DAILY LIVING (ADL)
ADLS_ACUITY_SCORE: 40

## 2023-04-19 NOTE — PROGRESS NOTES
[facility]  CARDIOLOGY HEART FAILURE SERVICE (CARDS II) PROGRESS NOTE    Patient Name: Rohit Garvey    Medical Record Number: [id number]    YOB: 1955  PCP: Kevyn Salazar    Admit Date/Time: 4/8/2023  3:08 PM   11    Assessment and Plan:  Rohit Garvey is a 67 year old adult male with a PMH significant for NICM (EF of 40-45%) c/b paroxysmal VT s/p dual chamber ICD placement 6/20/2022 c/b recurrent ICD shocks s/p VT ablation 10/31/2022 and re-ablation 12/29/2022, paroxysmal Afib on apixaban, developmental delay (possibly related to viral illnesses during infancy), T2DM, hypertension, and hyperlipidemia who was admitted as a transfer from [place] per recommendations of his cardiologists Dr. Urbina and Dr. Chanel for evaluation by advanced heart failure failure team and EP team for VT management and consideration of advanced therapies.    Today's updates:  - Resumed PTA KCl supplement   - Completed antibiotic course for UTI  - Plan to resume empagliflozin tomorrow   - Medically ready for discharge from primary team's perspective. Plan for discharge to TCU next week once bed available      # Persistent Paroxysmal Ventricular Tachycardia s/p dual chamber ICD placement 6/2022 and VT ablation x 2  # Chronic HFrEF 2/2 NICM w/ LVEF 40-45%, NYHA II  High burden of ventricular arrhythmias despite being on 2 antiarrhythmic medications (amiodarone and mexiletine) and being s/p ICD and two attempted VT ablations. Transferred here for evaluation for advanced therapies. ECHO 4/9 showed mildly reduced EF from 46% (Dec 2022) to 40-45%, however still does not qualify him for LVAD (EF < 25%). Possible heart transplant candidate if he meets criteria for VT storm.   VT episode at 0700 on 4/14. Required ATP and ICD shock. Pt not interested in any further cardiac procedures including ablation.   - EP consulted, appreciate recs   - ICD device interrogation ordered - 1 VT episode on  4/9/23 lasting 25 seconds   - VT episode at 0700 on 4/14. Required ATP and ICD shock. Per sister, EP adjusted ICD after this episode.    - Patient not interested in any operative/procedural interventions. EP does not plan to change his AAT or ICD settings. No need to go to hospital for outpatient ICD shocks unless >3 a day. Asked patient to notify outpatient providers if he is shocked by his ICD.   - Continue tele    Advanced therapies evaluation workup:  - ECHO 4/9 w/ mildly reduced LV function (EF reduction from 46% to 40-45%) and mild aortic valve calcification   - RHC 4/11:   RA mean 11mmHg. RV 38/4. RVEDP 13. PA 39/16/25. PCWP 16  TD CO/CI: 3.85 L/min / 2.25 L/min/m2  Shena CO/CI: 3.37 L/min / 1.97 L/min/m2    - Cardiopulmonary stress test cancelled as pt deemed not physically able to perform test per RN  - Advanced therapy  following   - Neuropsych testing 4/11 and 4/12  - Financial consult already placed  - Utox screen positive for amphetamines. Quant screen negative, indicating false positive on initial screen.   - Nicotine and cotinine screen negative. PEth negative  - Discussed at 4/14 heart transplant candidacy review     Patient ultimately decided to not pursue heart transplant or ablation. Goal is to go to TCU w/ plan to either return to his AL or LTC w/ consideration of starting hospice. No further advanced therapies workup will be pursued at this time.      GDMTs:  - Antiarrhythmics: PTA amiodarone 200 mg BID and mexiletine 200 mg q8h   - ACEi/ARB: Not currently on due to LVEF of 45%.  - BB:   - Continue metoprolol succinate 12.5 mg daily    - Holding PTA carvedilol 3.125 mg BID 2/2 soft bps   - Aldosterone antagonist: None   - SGLT2i: Holding PTA empagliflozin 10 mg daily due to UTI. Plan to resume 4/20  - SCD prophylaxis: S/p secondary prevention ICD 6/20/22  - Volume status: Euvolemic; continue lasix 40 mg qday                - Resume PTA KCl supplement 20 meq daily               -  continue PTA magnesium oxide 800 mg daily                - BMP BID w/ goal of K > 4 and Mg > 2               - Strict I/Os and daily weights   - Anticoagulation: PTA Eliquis 5 mg BID   - Statin therapy: PTA atorvastatin 80 mg daily   - OT to assist in compression stockings     # UTI vs sterile pyuria   UA w/ large leuks and 131 WBCs w/ negative nitrites. Pt reported intermittent dysuria. Started on IV ceftriaxone. UC returned negative for bacteria.   - Completed 5 days of Augmentin today (4/15-4/19)   - PTA empagliflozin held, plan to resume 4/20     # Chronic Diabetic R heel ulcer  Present since Dec 2022, per sister. RN noticed erythema and ?purulent drainage from area 4/15. WOC consulted and deemed ulcer not actively infected. Recommend q3 day dressing changes.   - wound care and dressing changes per WOC orders  - f/u outpatient w/ podiatry      # Positive U tox for amphetamines  Pt and sister deny any illegal or legal stimulant use. No obvious PTA drugs on inspection that could be causing a false positive. Urine quant screen ultimately showed < 50 ng/mL amphetamines, well below the level to indicate a positive result. Suspect false positive on urine tox screen.   - No further workup/intervention needed      # Recent clavicular fracture  At baseline uses walker for ambulation. Had recent clavicle fracture now requiring him to be wheelchair bound. Supposed to be non weight bearing on LUE for another 4 weeks   - Fall precautions  - NWB LUE precautions provided   - PT/OT following, recommending TCU placement   - SW placed referrals for TCU. Awaiting bed availability     # Afib w/o RVR  - metoprolol, amiodarone, and mexiletine   - PTA Eliquis 5 mg BID  - Tele     # ROSALINO on CKD, improving  Uptrending Cr above baseline Cr of 1.3. No observed mention of etiology of pt's CKD in chart. UA w/o protein, RBCs, or casts. Suspect prerenal as Cr improving after diuresis held   - holding diuresis for now   - BID BMP     # Acute on  chronic normocytic anemia concerning for mixed picture of iron deficiency and inflammatory   # Iron deficiency   # Hx of anemia of chronic disease   Hgb 8.7 on arrival and downtrending. Baseline Hgb ~9-10 reportedly 2/2 anemia of chronic disease. Tbili normal. No source of active bleeding. Workup revealed low iron level, normal TIBC, and low end-of-normal ferritin concerning for iron deficiency anemia.   - s/p IV Iron sucrose 200 mg qday x 5 days  - AM CBCs     # Chronic hyponatremia, intermittent   BL sodium ~130-133. Suspect hypervolemic hyponatremia in setting of CHF. Improved after IV diuresis   - daily BMP    # DM2   On metformin 1,000 mg BID PTA.   - hold PTA metformin, plan to resume on discharge   - Holding empagliflozin as above   - LDSS  - Hypoglycemia protocol      # BPH  - PTA tamulosin  - CTM w/ bladder scans and PVRs PRN     # Developmental delay   Lives at assisted living where he receives assistance w/ medications, meals, etc. Independent in basic ADLs (feeding, clothing, bathing) at his baseline. Sister Citlali is his HCA and POA but pt currently has capacity   - Delirium precautions      # Constipation   - PTA metamucil      FEN: Soft bite sized (due to lack of dentition), 2 L fluid restriction   PPx: PTA Eliquis   Code: Full Code   Dispo: Discharge to TCU once bed available    Pt was discussed and evaluated with Dr. Salter, attending physician, who agrees with the assessment and plan above.     Prachi Call,   Internal Medicine, PGY-I    Interval Changes in Past 24 Hours:   No acute events overnight. Denies any cardiopulmonary complaints. No ICD shocks.     Review Of Systems  A 4-point ROS was negative aside from those listed above.    OBJECTIVE FINDINGS:    Temp:  [97.4  F (36.3  C)-98.1  F (36.7  C)] 98.1  F (36.7  C)  Pulse:  [59-90] 59  Resp:  [16-18] 16  BP: ()/(54-92) 101/92  SpO2:  [95 %-100 %] 95 %     BP 110s/60s overnight. 101/92 this AM.     I/Os: net neg -1.4 L yesterday. Nothing  recorded since midnight.     Intake/Output Summary (Last 24 hours) at 4/18/2023 0809  Last data filed at 4/18/2023 0600  Gross per 24 hour   Intake 920 ml   Output 550 ml   Net 370 ml     Vitals:    04/17/23 0420 04/18/23 0600 04/19/23 0424   Weight: 61.9 kg (136 lb 6.4 oz) 62.3 kg (137 lb 6.4 oz) 63.1 kg (139 lb 3.2 oz)     Gen: Sitting in chair, NAD  Neck: JVP ~8, unchanged  Resp: clear to auscultation bilaterally, no crackles or wheezing   CV: Irregular rhythm, regular rate.   Abd: soft, NT, ND  Ext: warm and well perfused, 1+ lower extremity edema to level of knees    Current medications   Current Facility-Administered Medications   Medication     acetaminophen (TYLENOL) Suppository 650 mg     acetaminophen (TYLENOL) tablet 650 mg     alum & mag hydroxide-simethicone (MAALOX) suspension 30 mL     amiodarone (PACERONE) tablet 200 mg     apixaban ANTICOAGULANT (ELIQUIS) tablet 5 mg     artificial tears ophthalmic ointment     atorvastatin (LIPITOR) tablet 80 mg     calcium carbonate-vitamin D (CALTRATE) 600-10 MG-MCG per tablet 1 tablet     carboxymethylcellulose PF (REFRESH PLUS) 0.5 % ophthalmic solution 1 drop     glucose gel 15-30 g    Or     dextrose 50 % injection 25-50 mL    Or     glucagon injection 1 mg     [Held by provider] empagliflozin (JARDIANCE) tablet 10 mg     furosemide (LASIX) tablet 40 mg     insulin aspart (NovoLOG) injection (RAPID ACTING)     insulin aspart (NovoLOG) injection (RAPID ACTING)     lidocaine (LMX4) cream     lidocaine 1 % 0.1-1 mL     magnesium oxide (MAG-OX) tablet 800 mg     medication instruction     metoprolol succinate ER (TOPROL-XL) 24 hr half-tab 12.5 mg     mexiletine (MEXITIL) capsule 200 mg     miconazole (MICATIN) 2 % powder     nitroGLYcerin (NITROSTAT) sublingual tablet 0.4 mg     [Held by provider] potassium chloride ER (KLOR-CON M) CR tablet 20 mEq     psyllium (METAMUCIL/KONSYL) capsule 1 capsule     sodium chloride (PF) 0.9% PF flush 3 mL     sodium chloride  (PF) 0.9% PF flush 3 mL     tamsulosin (FLOMAX) capsule 0.4 mg     [Held by provider] torsemide (DEMADEX) tablet 40 mg     LABS Reviewed:  Na 135, K 4.3, Mg 1.8, Cr 1.12 (1.27),     IMAGES Reviewed:    ECHO 4/9/23 Interpretation Summary:  Left ventricular function is decreased. The ejection fraction is 40-45%  (mildly reduced compared to 46% back in Dec 2022).   LV size and wall thickness is normal. Abnormal septal wall motion consistent w/ pacemaker.   Global right ventricular function is normal.  Mild aortic valve calcification is present. The mean gradient across the  aortic valve is 8 mmHg.  Pulmonary artery systolic pressure is normal.  IVC diameter and respiratory changes fall into an intermediate range  suggesting an RA pressure of 8 mmHg.  No pericardial effusion is present.    ICD interrogation 4/10/23:  Device: Abbott [id number] [place] HF  Normal Device Function.   Mode: DDDR 60-90 bpm  AP: 49%  BVP: 91%  Intrinsic rhythm: CHB w/ AS @ 60 bpm; no intrinsic R-waves at VVI 30 bpm  Lead Trends Appear Stable: Yes  Estimated battery longevity to RRT = 5.9 years. Remaining Capacity to OSCAR = 92%.  Atrial arrhythmia: None  AF burden: 0%  Anticoagulant: Eliquis  Ventricular Arrhythmia: 1 VT episode recorded on 4/9/23 at 9:43 AM, lasting ~25 seconds at 114 bpm. EGM reveals a slow VT which is converted with one sequence of ATP.  Setting changes: None    RHC 4/11/23:  RA mean 11mmHg  RV 40/11  PA 39/16/25  PCWP 16    Pa Sat 49.9%  Art Sat 99%    TD CO/CI: 3.85 L/min / 2.25 L/min/m2  Shena CO/CI: 3.37 L/min / 1.97 L/min/m2    PVR 2.3 (TD)  SVR  1538 (TD) Right sided filling pressures are mildly elevated. Left sided filling pressures are mildly elevated. Mild elevated pulmonary hypertension. Reduced cardiac output level. Hemodynamic data has been modified in Epic per physician review.      I have reviewed today's vital signs, notes, medications, labs and imaging.  I have also seen and examined the patient and agree with  the findings and plan as outlined above.  TCU planning in progress.  UTI with abx treatment.      Lorenzo Salter MD, PhD  Professor, Heart Failure and Cardiac Transplantation  [facility]

## 2023-04-19 NOTE — PROGRESS NOTES
TCU Referral Follow-up      CHW was delegated task to follow-up on TCU referrals by 6C SW. Pt is medically ready to discharge to TCU.       Pending Referrals     [facility]  [address] [place] [address]  [phone]  4/14: SW received the fax number from the admissions director Malena (Phone: [phone], Fax: [phone]) and SW sent the faxed TCU referral at 3:56pm   4/17: CHW spoke with Malena who said they are still reviewing and CHW provided call back number for the 6C SW.    4/18: CHW LVM to follow-up on referral at 2:24 pm.  4/19: CHW spoke with Malena who had additional questions regarding prior living arrangements, tobacco use and drug screen results that were mentioned on the CMA. CHW directed Malena to call SW for additional information.      **Janel is the admissions coordinator for both [facility] (Phone: [phone]. Fax: [phone])     [facility]  [address] [place] [address]  [phone]  Fax: [phone]  4/17: CHW spoke with admissions coordinator who said they never received the referral. CHW hard faxed at 3:10 pm.   4/18: CHW LVM to follow-up on referral at 2:22 pm.   4/19: CHW LVM at 2:13 pm to follow-up on referral.      [facility]  [address] [place] [address]  [phone]  4/17: CHW spoke with admissions coordinator who said they never received the referral. CHW hard faxed at 3:10 pm.   4/18: CHW LVM to follow-up on referral at 2:22 pm  4/19: CHW LVM at 2:13 pm to follow-up on referral. .      Declined  4/17: [facility]- CHW spoke with the  who said they no longer have a TCU.  4/19: [facility] - declined not appropriate for services- only

## 2023-04-19 NOTE — PLAN OF CARE
Goal Outcome Evaluation:      Plan of Care Reviewed With: patient, sibling    Overall Patient Progress: no changeOverall Patient Progress: no change    Outcome Evaluation: Ambulated with PT, sat in chair    Patient admitted for advanced heart failure therapy evaluation. PMH of DM2, HLD, ICD placement 6/2022, and clavicular fracture 3/2023.     Neuro: A&O x4, denied pain and dizziness.  Respiratory:  Denied shortness of breath, lung sounds diminished in bases.  Cardiac: Faint heart murmur noted.  Had 1-2+ edema in legs and feet, lymphedema wraps placed.     GI: Denied nausea, bowel sounds normoactive.  No BM during shift.  : Had good urine output via external catheter, see I&O flowsheet.  Skin: See PCS for assessment and treatment of wounds and surgical incisions.   VS: In AV paced rhythm with occasional PVCs, HR 60s, SBP 90s-100s, SaO2 % on room air.    Drips:  None.    Electrolytes: Magnesium replaced per one-time order.  Pain: Denied.  Tests/procedures: None performed during shift.    Mobility: Ambulated in room with 1-person assist and use of walker.  Social: Sister visited during shift.    Plan:  Continue to monitor pain, VS, heart rhythm, skin integrity, fluid status, bowel status, cardiac and respiratory status.  Notify care team of changes in patient condition or other concerns.  Expected to discharge to TCU when placement is established.

## 2023-04-19 NOTE — PLAN OF CARE
General: Denied pain, rested well overnight  Neuro: A&Ox4, pleasant, calling appropriately. Denies numbness or tingling  CV: AV paced, +1 and +2 edematous BLE  Resp: RA, LSC  GI/: LBM 4/18, Primofit in place overnight, working well with good output  Skin: Antifungal powder applied for groin rash. q3days R heel ulcer dressing change completed 4/19 overnight - next due 4/23.  Lines: R PIV SL  Activity/Transfers: L arm sling at bedside. L arm non-weightbearing during ambulation due to clavicular fracture. Up with 2 and walker.    Diet: Cardiac, 2L FR    Plan: Awaiting TCU placement

## 2023-04-20 ENCOUNTER — APPOINTMENT (OUTPATIENT)
Dept: OCCUPATIONAL THERAPY | Facility: CLINIC | Age: 68
DRG: 287 | End: 2023-04-20
Attending: INTERNAL MEDICINE
Payer: MEDICARE

## 2023-04-20 ENCOUNTER — APPOINTMENT (OUTPATIENT)
Dept: PHYSICAL THERAPY | Facility: CLINIC | Age: 68
DRG: 287 | End: 2023-04-20
Attending: INTERNAL MEDICINE
Payer: MEDICARE

## 2023-04-20 LAB
ANION GAP SERPL CALCULATED.3IONS-SCNC: 10 MMOL/L (ref 7–15)
ANION GAP SERPL CALCULATED.3IONS-SCNC: 8 MMOL/L (ref 7–15)
BUN SERPL-MCNC: 22.9 MG/DL (ref 8–23)
BUN SERPL-MCNC: 25.8 MG/DL (ref 8–23)
CALCIUM SERPL-MCNC: 8.6 MG/DL (ref 8.8–10.2)
CALCIUM SERPL-MCNC: 8.6 MG/DL (ref 8.8–10.2)
CHLORIDE SERPL-SCNC: 105 MMOL/L (ref 98–107)
CHLORIDE SERPL-SCNC: 105 MMOL/L (ref 98–107)
CREAT SERPL-MCNC: 1.06 MG/DL (ref 0.51–1.17)
CREAT SERPL-MCNC: 1.23 MG/DL (ref 0.51–1.17)
DEPRECATED HCO3 PLAS-SCNC: 21 MMOL/L (ref 22–29)
DEPRECATED HCO3 PLAS-SCNC: 21 MMOL/L (ref 22–29)
ERYTHROCYTE [DISTWIDTH] IN BLOOD BY AUTOMATED COUNT: 21.2 % (ref 10–15)
GFR SERPL CREATININE-BSD FRML MDRD: 64 ML/MIN/1.73M2
GFR SERPL CREATININE-BSD FRML MDRD: 77 ML/MIN/1.73M2
GLUCOSE BLDC GLUCOMTR-MCNC: 137 MG/DL (ref 70–99)
GLUCOSE BLDC GLUCOMTR-MCNC: 153 MG/DL (ref 70–99)
GLUCOSE BLDC GLUCOMTR-MCNC: 203 MG/DL (ref 70–99)
GLUCOSE BLDC GLUCOMTR-MCNC: 230 MG/DL (ref 70–99)
GLUCOSE SERPL-MCNC: 160 MG/DL (ref 70–99)
GLUCOSE SERPL-MCNC: 217 MG/DL (ref 70–99)
HCT VFR BLD AUTO: 30.4 % (ref 35–53)
HGB BLD-MCNC: 9.2 G/DL (ref 11.7–17.7)
MAGNESIUM SERPL-MCNC: 1.9 MG/DL (ref 1.7–2.3)
MAGNESIUM SERPL-MCNC: 1.9 MG/DL (ref 1.7–2.3)
MCH RBC QN AUTO: 25.5 PG (ref 26.5–33)
MCHC RBC AUTO-ENTMCNC: 30.3 G/DL (ref 31.5–36.5)
MCV RBC AUTO: 84 FL (ref 78–100)
PLATELET # BLD AUTO: 190 10E3/UL (ref 150–450)
POTASSIUM SERPL-SCNC: 4.4 MMOL/L (ref 3.4–5.3)
POTASSIUM SERPL-SCNC: 4.5 MMOL/L (ref 3.4–5.3)
RBC # BLD AUTO: 3.61 10E6/UL (ref 3.8–5.9)
SODIUM SERPL-SCNC: 134 MMOL/L (ref 136–145)
SODIUM SERPL-SCNC: 136 MMOL/L (ref 136–145)
WBC # BLD AUTO: 5.5 10E3/UL (ref 4–11)

## 2023-04-20 PROCEDURE — 97116 GAIT TRAINING THERAPY: CPT | Mod: GP

## 2023-04-20 PROCEDURE — 97535 SELF CARE MNGMENT TRAINING: CPT | Mod: GO

## 2023-04-20 PROCEDURE — 250N000013 HC RX MED GY IP 250 OP 250 PS 637: Performed by: INTERNAL MEDICINE

## 2023-04-20 PROCEDURE — 250N000013 HC RX MED GY IP 250 OP 250 PS 637: Performed by: STUDENT IN AN ORGANIZED HEALTH CARE EDUCATION/TRAINING PROGRAM

## 2023-04-20 PROCEDURE — 36415 COLL VENOUS BLD VENIPUNCTURE: CPT

## 2023-04-20 PROCEDURE — 99232 SBSQ HOSP IP/OBS MODERATE 35: CPT | Mod: GC | Performed by: INTERNAL MEDICINE

## 2023-04-20 PROCEDURE — 85027 COMPLETE CBC AUTOMATED: CPT

## 2023-04-20 PROCEDURE — 83735 ASSAY OF MAGNESIUM: CPT

## 2023-04-20 PROCEDURE — 250N000013 HC RX MED GY IP 250 OP 250 PS 637

## 2023-04-20 PROCEDURE — 80048 BASIC METABOLIC PNL TOTAL CA: CPT

## 2023-04-20 PROCEDURE — 214N000001 HC R&B CCU UMMC

## 2023-04-20 PROCEDURE — 97530 THERAPEUTIC ACTIVITIES: CPT | Mod: GO

## 2023-04-20 PROCEDURE — 97530 THERAPEUTIC ACTIVITIES: CPT | Mod: GP

## 2023-04-20 RX ORDER — SPIRONOLACTONE 25 MG/1
25 TABLET ORAL DAILY
Status: DISCONTINUED | OUTPATIENT
Start: 2023-04-20 | End: 2023-04-28 | Stop reason: HOSPADM

## 2023-04-20 RX ADMIN — FUROSEMIDE 40 MG: 40 TABLET ORAL at 08:46

## 2023-04-20 RX ADMIN — Medication 12.5 MG: at 08:47

## 2023-04-20 RX ADMIN — MICONAZOLE NITRATE: 20 POWDER TOPICAL at 09:00

## 2023-04-20 RX ADMIN — INSULIN ASPART 1 UNITS: 100 INJECTION, SOLUTION INTRAVENOUS; SUBCUTANEOUS at 17:12

## 2023-04-20 RX ADMIN — CALCIUM CARBONATE 600 MG (1,500 MG)-VITAMIN D3 400 UNIT TABLET 1 TABLET: at 08:47

## 2023-04-20 RX ADMIN — Medication 1 CAPSULE: at 08:46

## 2023-04-20 RX ADMIN — Medication 800 MG: at 08:47

## 2023-04-20 RX ADMIN — APIXABAN 5 MG: 5 TABLET, FILM COATED ORAL at 08:47

## 2023-04-20 RX ADMIN — MICONAZOLE NITRATE: 20 POWDER TOPICAL at 19:56

## 2023-04-20 RX ADMIN — POTASSIUM CHLORIDE 20 MEQ: 750 TABLET, EXTENDED RELEASE ORAL at 08:47

## 2023-04-20 RX ADMIN — EMPAGLIFLOZIN 10 MG: 10 TABLET, FILM COATED ORAL at 08:47

## 2023-04-20 RX ADMIN — ATORVASTATIN CALCIUM 80 MG: 80 TABLET, FILM COATED ORAL at 08:47

## 2023-04-20 RX ADMIN — TAMSULOSIN HYDROCHLORIDE 0.4 MG: 0.4 CAPSULE ORAL at 19:55

## 2023-04-20 RX ADMIN — SPIRONOLACTONE 25 MG: 25 TABLET, FILM COATED ORAL at 12:16

## 2023-04-20 RX ADMIN — MEXILETINE HYDROCHLORIDE 200 MG: 200 CAPSULE ORAL at 21:37

## 2023-04-20 RX ADMIN — WHITE PETROLATUM 57.7 %-MINERAL OIL 31.9 % EYE OINTMENT: at 21:38

## 2023-04-20 RX ADMIN — APIXABAN 5 MG: 5 TABLET, FILM COATED ORAL at 19:55

## 2023-04-20 RX ADMIN — AMIODARONE HYDROCHLORIDE 200 MG: 200 TABLET ORAL at 08:46

## 2023-04-20 RX ADMIN — MEXILETINE HYDROCHLORIDE 200 MG: 200 CAPSULE ORAL at 06:26

## 2023-04-20 RX ADMIN — AMIODARONE HYDROCHLORIDE 200 MG: 200 TABLET ORAL at 19:55

## 2023-04-20 RX ADMIN — MEXILETINE HYDROCHLORIDE 200 MG: 200 CAPSULE ORAL at 13:50

## 2023-04-20 RX ADMIN — Medication 800 MG: at 19:55

## 2023-04-20 RX ADMIN — INSULIN ASPART 1 UNITS: 100 INJECTION, SOLUTION INTRAVENOUS; SUBCUTANEOUS at 12:16

## 2023-04-20 ASSESSMENT — ACTIVITIES OF DAILY LIVING (ADL)
ADLS_ACUITY_SCORE: 40

## 2023-04-20 NOTE — PROGRESS NOTES
[facility]  CARDIOLOGY HEART FAILURE SERVICE (CARDS II) PROGRESS NOTE    Patient Name: Rohit Garvey    Medical Record Number: [id number]    YOB: 1955  PCP: Kevyn Salazar    Admit Date/Time: 4/8/2023  3:08 PM   12    Assessment and Plan:  Rohit Garvey is a 67 year old adult male with a PMH significant for NICM (EF of 40-45%) c/b paroxysmal VT s/p dual chamber ICD placement 6/20/2022 c/b recurrent ICD shocks s/p VT ablation 10/31/2022 and re-ablation 12/29/2022, paroxysmal Afib on apixaban, developmental delay (possibly related to viral illnesses during infancy), T2DM, hypertension, and hyperlipidemia who was admitted as a transfer from [place] per recommendations of his cardiologists Dr. Urbina and Dr. Chanel for evaluation by advanced heart failure failure team and EP team for VT management and consideration of advanced therapies.    Today's updates:  - Resumed PTA empagliflozin   - Increased PTA magnesium supplement from 800 mg qday to BID  - Started spironolactone 25 mg daily   - Discontinued PTA KCl supplement now that we are adding spironolactone   - Medically ready for discharge. Waiting for TCU bed      # Persistent Paroxysmal Ventricular Tachycardia s/p dual chamber ICD placement 6/2022 and VT ablation x 2  # Chronic HFrEF 2/2 NICM w/ LVEF 40-45%, NYHA II  High burden of ventricular arrhythmias despite being on 2 antiarrhythmic medications (amiodarone and mexiletine) and being s/p ICD and two attempted VT ablations. Transferred here for evaluation for advanced therapies. ECHO 4/9 showed mildly reduced EF from 46% (Dec 2022) to 40-45%, however still does not qualify him for LVAD (EF < 25%). Possible heart transplant candidate if he meets criteria for VT storm.   VT episode at 0700 on 4/14. Required ATP and ICD shock. Pt not interested in any further cardiac procedures including ablation.   - EP consulted, appreciate recs   - ICD device interrogation  ordered - 1 VT episode on 4/9/23 lasting 25 seconds   - VT episode at 0700 on 4/14. Required ATP and ICD shock. Per sister, EP adjusted ICD after this episode.    - Patient not interested in any operative/procedural interventions. EP does not plan to change his AAT or ICD settings. No need to go to hospital for outpatient ICD shocks unless >3 a day. Asked patient to notify outpatient providers if he is shocked by his ICD.   - Continue tele    Advanced therapies evaluation workup:  - ECHO 4/9 w/ mildly reduced LV function (EF reduction from 46% to 40-45%) and mild aortic valve calcification   - RHC 4/11:   RA mean 11mmHg. RV 38/4. RVEDP 13. PA 39/16/25. PCWP 16  TD CO/CI: 3.85 L/min / 2.25 L/min/m2  Shena CO/CI: 3.37 L/min / 1.97 L/min/m2    - Cardiopulmonary stress test cancelled as pt deemed not physically able to perform test per RN  - Advanced therapy  following   - Neuropsych testing 4/11 and 4/12  - Financial consult already placed  - Utox screen positive for amphetamines. Quant screen negative, indicating false positive on initial screen.   - Nicotine and cotinine screen negative. PEth negative  - Discussed at 4/14 heart transplant candidacy review     Patient ultimately decided to not pursue heart transplant or ablation. Goal is to go to TCU w/ plan to either return to his AL or LTC w/ consideration of starting hospice. No further advanced therapies workup will be pursued at this time.      GDMTs:  - Antiarrhythmics: PTA amiodarone 200 mg BID and mexiletine 200 mg q8h   - ACEi/ARB: Not currently on due to LVEF of 45%.  - BB:   - Continue metoprolol succinate 12.5 mg daily    - Holding PTA carvedilol 3.125 mg BID 2/2 soft bps   - Aldosterone antagonist: Start spironolactone 25 mg daily   - SGLT2i: Resumed PTA empagliflozin 10 mg daily  - SCD prophylaxis: S/p secondary prevention ICD 6/20/22  - Volume status: Euvolemic; continue lasix 40 mg qday                - Discontinued PTA KCl supplement 20  meq daily now that spironolactone is added                - Increased PTA magnesium oxide 800 mg from daily to BID               - BMP BID w/ goal of K > 4 and Mg > 2               - Strict I/Os and daily weights   - Anticoagulation: PTA Eliquis 5 mg BID   - Statin therapy: PTA atorvastatin 80 mg daily   - OT to assist in compression stockings     # UTI vs sterile pyuria   UA w/ large leuks and 131 WBCs w/ negative nitrites. Pt reported intermittent dysuria. Started on IV ceftriaxone. UC returned negative for bacteria.   - Completed 5 days of Augmentin today (4/15-4/19)   - PTA empagliflozin held, plan to resume 4/20     # Chronic Diabetic R heel ulcer  Present since Dec 2022, per sister. RN noticed erythema and ?purulent drainage from area 4/15. WOC consulted and deemed ulcer not actively infected. Recommend q3 day dressing changes.   - wound care and dressing changes per WOC orders  - f/u outpatient w/ podiatry      # Positive U tox for amphetamines  Pt and sister deny any illegal or legal stimulant use. No obvious PTA drugs on inspection that could be causing a false positive. Urine quant screen ultimately showed < 50 ng/mL amphetamines, well below the level to indicate a positive result. Suspect false positive on urine tox screen.   - No further workup/intervention needed      # Recent clavicular fracture  At baseline uses walker for ambulation. Had recent clavicle fracture now requiring him to be wheelchair bound. Supposed to be non weight bearing on LUE for another 4 weeks   - Fall precautions  - NWB LUE precautions provided   - PT/OT following, recommending TCU placement   - SW placed referrals for TCU. Awaiting bed availability     # Afib w/o RVR  - metoprolol, amiodarone, and mexiletine   - PTA Eliquis 5 mg BID  - Tele     # ROSALINO on CKD, resolved  Uptrending Cr above baseline Cr of 1.3. No observed mention of etiology of pt's CKD in chart. UA w/o protein, RBCs, or casts. Suspect prerenal as Cr improved after  diuresis held and has remained stable since restarting diuresis at a lower dose   - diuresis per above   - BID BMP     # Acute on chronic normocytic anemia concerning for mixed picture of iron deficiency and inflammatory   # Iron deficiency   # Hx of anemia of chronic disease   Hgb 8.7 on arrival and downtrending. Baseline Hgb ~9-10 reportedly 2/2 anemia of chronic disease. Tbili normal. No source of active bleeding. Workup revealed low iron level, normal TIBC, and low end-of-normal ferritin concerning for iron deficiency anemia.   - s/p IV Iron sucrose 200 mg qday x 5 days  - AM CBCs     # Chronic hyponatremia, intermittent   BL sodium ~130-133. Suspect hypervolemic hyponatremia in setting of CHF. Improved after IV diuresis   - daily BMP    # DM2   On metformin 1,000 mg BID PTA.   - hold PTA metformin, plan to resume on discharge   - Holding empagliflozin as above   - LDSS  - Hypoglycemia protocol      # BPH  - PTA tamulosin  - CTM w/ bladder scans and PVRs PRN     # Developmental delay   Lives at assisted living where he receives assistance w/ medications, meals, etc. Independent in basic ADLs (feeding, clothing, bathing) at his baseline. Sister Citlali is his HCA and POA but pt currently has capacity   - Delirium precautions      # Constipation   - PTA metamucil      FEN: Soft bite sized (due to lack of dentition), 2 L fluid restriction   PPx: PTA Eliquis   Code: Full Code   Dispo: Discharge to TCU once bed available    Pt was discussed and evaluated with Dr. Salter, attending physician, who agrees with the assessment and plan above.     Prachi Call, DO  Internal Medicine, PGY-I    Interval Changes in Past 24 Hours:   No acute events overnight. Mg was 1.6 on PTA mag replacement. Given 2 g IV replacement. Today, pt doing well. Denies any cardiopulmonary complaints. No ICD shocks.     Review Of Systems  A 4-point ROS was negative aside from those listed above.    OBJECTIVE FINDINGS:    Temp:  [97.3  F (36.3  C)-97.8  F  (36.6  C)] 97.6  F (36.4  C)  Pulse:  [58-68] 58  Resp:  [16-18] 16  BP: ()/(55-71) 104/64  SpO2:  [94 %-100 %] 100 %     BP 98/65 and 104/64 this AM.     I/Os: net even yesterday. Net neg -600 ml since midnight     Intake/Output Summary (Last 24 hours) at 4/18/2023 0809  Last data filed at 4/18/2023 0600  Gross per 24 hour   Intake 920 ml   Output 550 ml   Net 370 ml     Vitals:    04/18/23 0600 04/19/23 0424 04/20/23 0500   Weight: 62.3 kg (137 lb 6.4 oz) 63.1 kg (139 lb 3.2 oz) 63 kg (138 lb 12.8 oz)     Gen: Sitting in chair, NAD  Neck: JVP ~8, unchanged  Resp: clear to auscultation bilaterally, no crackles or wheezing   CV: Irregular rhythm, regular rate.   Abd: soft, NT, ND  Ext: warm and well perfused, 1+ lower extremity edema to level of knees    Current medications   Current Facility-Administered Medications   Medication     acetaminophen (TYLENOL) Suppository 650 mg     acetaminophen (TYLENOL) tablet 650 mg     alum & mag hydroxide-simethicone (MAALOX) suspension 30 mL     amiodarone (PACERONE) tablet 200 mg     apixaban ANTICOAGULANT (ELIQUIS) tablet 5 mg     artificial tears ophthalmic ointment     atorvastatin (LIPITOR) tablet 80 mg     calcium carbonate-vitamin D (CALTRATE) 600-10 MG-MCG per tablet 1 tablet     carboxymethylcellulose PF (REFRESH PLUS) 0.5 % ophthalmic solution 1 drop     glucose gel 15-30 g    Or     dextrose 50 % injection 25-50 mL    Or     glucagon injection 1 mg     empagliflozin (JARDIANCE) tablet 10 mg     furosemide (LASIX) tablet 40 mg     insulin aspart (NovoLOG) injection (RAPID ACTING)     insulin aspart (NovoLOG) injection (RAPID ACTING)     lidocaine (LMX4) cream     lidocaine 1 % 0.1-1 mL     magnesium oxide (MAG-OX) tablet 800 mg     medication instruction     metoprolol succinate ER (TOPROL-XL) 24 hr half-tab 12.5 mg     mexiletine (MEXITIL) capsule 200 mg     miconazole (MICATIN) 2 % powder     nitroGLYcerin (NITROSTAT) sublingual tablet 0.4 mg     potassium  chloride ER (KLOR-CON M) CR tablet 20 mEq     psyllium (METAMUCIL/KONSYL) capsule 1 capsule     sodium chloride (PF) 0.9% PF flush 3 mL     sodium chloride (PF) 0.9% PF flush 3 mL     tamsulosin (FLOMAX) capsule 0.4 mg     LABS Reviewed:  Na 134, K 4.5, Mg 1.9, Cr 1.06  Cell counts normal     IMAGES Reviewed:    ECHO 4/9/23 Interpretation Summary:  Left ventricular function is decreased. The ejection fraction is 40-45%  (mildly reduced compared to 46% back in Dec 2022).   LV size and wall thickness is normal. Abnormal septal wall motion consistent w/ pacemaker.   Global right ventricular function is normal.  Mild aortic valve calcification is present. The mean gradient across the  aortic valve is 8 mmHg.  Pulmonary artery systolic pressure is normal.  IVC diameter and respiratory changes fall into an intermediate range  suggesting an RA pressure of 8 mmHg.  No pericardial effusion is present.    ICD interrogation 4/10/23:  Device: Abbott [id number] [place] HF  Normal Device Function.   Mode: DDDR 60-90 bpm  AP: 49%  BVP: 91%  Intrinsic rhythm: CHB w/ AS @ 60 bpm; no intrinsic R-waves at VVI 30 bpm  Lead Trends Appear Stable: Yes  Estimated battery longevity to RRT = 5.9 years. Remaining Capacity to OSCAR = 92%.  Atrial arrhythmia: None  AF burden: 0%  Anticoagulant: Eliquis  Ventricular Arrhythmia: 1 VT episode recorded on 4/9/23 at 9:43 AM, lasting ~25 seconds at 114 bpm. EGM reveals a slow VT which is converted with one sequence of ATP.  Setting changes: None    RHC 4/11/23:  RA mean 11mmHg  RV 40/11  PA 39/16/25  PCWP 16    Pa Sat 49.9%  Art Sat 99%    TD CO/CI: 3.85 L/min / 2.25 L/min/m2  Shena CO/CI: 3.37 L/min / 1.97 L/min/m2    PVR 2.3 (TD)  SVR  1538 (TD) Right sided filling pressures are mildly elevated. Left sided filling pressures are mildly elevated. Mild elevated pulmonary hypertension. Reduced cardiac output level. Hemodynamic data has been modified in Epic per physician review.    I have reviewed today's  vital signs, notes, medications, labs and imaging.  I have also seen and examined the patient and agree with the findings and plan as outlined above.  Pt with spouse at bedside.  Afebrile with VSS and 850 ccc UO with Cr 1.06 and WBC 5.5.  Pt successfully treated for UTI and VT episodes stable.  Plan for rehab and awaiting TCU placement.      Lorenzo Salter MD, PhD  Professor, Heart Failure and Cardiac Transplantation  [facility]

## 2023-04-20 NOTE — PLAN OF CARE
Goal Outcome Evaluation:      Plan of Care Reviewed With: patient, sibling    Overall Patient Progress: no changeOverall Patient Progress: no change    Outcome Evaluation: Awaiting TCU placement    Patient admitted for advanced heart failure therapy evaluation. PMH of DM2, HLD, ICD placement 6/2022, and clavicular fracture 3/2023.     Neuro: A&O x4, denied pain and dizziness.  Respiratory:  Denied shortness of breath, lung sounds diminished in bases, intermittently coarse in left lung base.  Cardiac: No abnormal heart sounds noted.  Had 1-2+ edema in legs and feet, patient transitioned to compression stockings.     GI: Denied nausea, bowel sounds normoactive.  No BM during shift.  : Had good urine output via external catheter, see I&O flowsheet.  Skin: See PCS for assessment and treatment of wounds and surgical incisions.   VS: In AV paced rhythm with occasional PVCs, HR 60s, SBP 90s-100s, SaO2 97-99% on room air.    Drips:  None.    Electrolytes: Magnesium replaced via scheduled supplements.  Pain: Denied.  Tests/procedures: None performed during shift.    Mobility: Ambulated in room with 1-person assist and use of walker.  Social: Sister visited during shift.    Plan:  Continue to monitor pain, VS, heart rhythm, skin integrity, fluid status, bowel status, cardiac and respiratory status.  Notify care team of changes in patient condition or other concerns.  Expected to discharge to TCU when placement is established.

## 2023-04-20 NOTE — PLAN OF CARE
Pt admitted as a transfer from [place] per recommendations of his cardiologists Dr. Urbina and Dr. Chanel for evaluation by advanced heart failure failure team and EP team for VT management and consideration of advanced therapies. 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    Code status: Full Code    Team: Cards 2     Neuro: AOx4, calm and cooperative, Calls appropriately, no neuro deficits  Cardiac/Tele/VS: AV Paced, normotensive, afebrile, no CP, palpitations or VT runs this shift. Other VSS  Respiratory: RA, no SOB noted. O2 sats > 92%  GI/: Voids spontaneously via an external catheter and bedside commode. BS audible. LBM 4/18  Diet/Appetite: Cardiac diet with 2L FR  Skin: Redness and blanchable to the bottom, Mepilex on the coccyx. Rash to the perineum. Small scab on the L chest ICD site. No other skin deficits  Endocrine/Electrolytes: BG checks ACHS. Replace lytes per protocols  LDAs: PIV, saline locked  Activity: Up with 1-2, GB and a walker  Pain: Denies pain    Plan: Awaiting TCU placement. Continue to monitor and notify team of concerns

## 2023-04-20 NOTE — PROGRESS NOTES
Care Management Follow Up    Length of Stay (days): 12    Expected Discharge Date: 04/20/2023     Concerns to be Addressed: Discharge Planning        Patient plan of care discussed at interdisciplinary rounds: Yes    Anticipated Discharge Disposition: TCU     Anticipated Discharge Services: Post Acute Therapies     Anticipated Discharge DME: None       Patient/family educated on Medicare website which has current facility and service quality ratings: Yes     Education Provided on the Discharge Plan: Yes      Patient/Family in Agreement with the Plan: Yes      Referrals Placed by CM/SW: TCU    Private pay costs discussed: Not applicable    Additional Information:    Pending Referrals     [facility]read[facility]  [address] [place] [address]  [phone]  Ph: [phone]   F: [phone]  4/17: CHW spoke with admissions coordinator who said they never received the referral. CHW hard faxed at 3:10 pm.   4/18: CHW LVM to follow-up on referral at 2:22 pm.   4/19: CHW LVM at 2:13 pm to follow-up on referral.   4/20: Writer left message with [facility] Admissions Coordinator at [phone] requesting a call back regarding referral status.    [facility]  [address] [place] [address]  Ph: [phone]   F: [phone]  4/17: CHW spoke with admissions coordinator who said they never received the referral. CHW hard faxed at 3:10 pm.   4/18: CHW LVM to follow-up on referral at 2:22 pm  4/19: CHW LVM at 2:13 pm to follow-up on referral.    4/20: Writer left message with [facility] Admissions Coordinator at [phone] requesting a call back regarding referral status.     Declined Referrals    [facility] - Declined due to no bed available.  [facility] - Declined due to they no longer have a TCU.    TALI Balderas, MSW  Adult Acute Care Float   Pager [phone]

## 2023-04-21 ENCOUNTER — APPOINTMENT (OUTPATIENT)
Dept: OCCUPATIONAL THERAPY | Facility: CLINIC | Age: 68
DRG: 287 | End: 2023-04-21
Attending: INTERNAL MEDICINE
Payer: MEDICARE

## 2023-04-21 ENCOUNTER — APPOINTMENT (OUTPATIENT)
Dept: PHYSICAL THERAPY | Facility: CLINIC | Age: 68
DRG: 287 | End: 2023-04-21
Attending: INTERNAL MEDICINE
Payer: MEDICARE

## 2023-04-21 LAB
ANION GAP SERPL CALCULATED.3IONS-SCNC: 12 MMOL/L (ref 7–15)
ANION GAP SERPL CALCULATED.3IONS-SCNC: 9 MMOL/L (ref 7–15)
BUN SERPL-MCNC: 24.9 MG/DL (ref 8–23)
BUN SERPL-MCNC: 28.5 MG/DL (ref 8–23)
CALCIUM SERPL-MCNC: 8.5 MG/DL (ref 8.8–10.2)
CALCIUM SERPL-MCNC: 8.7 MG/DL (ref 8.8–10.2)
CHLORIDE SERPL-SCNC: 103 MMOL/L (ref 98–107)
CHLORIDE SERPL-SCNC: 105 MMOL/L (ref 98–107)
CREAT SERPL-MCNC: 1.29 MG/DL (ref 0.51–1.17)
CREAT SERPL-MCNC: 1.4 MG/DL (ref 0.51–1.17)
DEPRECATED HCO3 PLAS-SCNC: 21 MMOL/L (ref 22–29)
DEPRECATED HCO3 PLAS-SCNC: 21 MMOL/L (ref 22–29)
ERYTHROCYTE [DISTWIDTH] IN BLOOD BY AUTOMATED COUNT: 21.6 % (ref 10–15)
GFR SERPL CREATININE-BSD FRML MDRD: 55 ML/MIN/1.73M2
GFR SERPL CREATININE-BSD FRML MDRD: 61 ML/MIN/1.73M2
GLUCOSE BLDC GLUCOMTR-MCNC: 123 MG/DL (ref 70–99)
GLUCOSE BLDC GLUCOMTR-MCNC: 159 MG/DL (ref 70–99)
GLUCOSE BLDC GLUCOMTR-MCNC: 192 MG/DL (ref 70–99)
GLUCOSE BLDC GLUCOMTR-MCNC: 211 MG/DL (ref 70–99)
GLUCOSE SERPL-MCNC: 123 MG/DL (ref 70–99)
GLUCOSE SERPL-MCNC: 245 MG/DL (ref 70–99)
HCT VFR BLD AUTO: 28.1 % (ref 35–53)
HGB BLD-MCNC: 8.3 G/DL (ref 11.7–17.7)
MAGNESIUM SERPL-MCNC: 1.8 MG/DL (ref 1.7–2.3)
MAGNESIUM SERPL-MCNC: 2.5 MG/DL (ref 1.7–2.3)
MCH RBC QN AUTO: 25.1 PG (ref 26.5–33)
MCHC RBC AUTO-ENTMCNC: 29.5 G/DL (ref 31.5–36.5)
MCV RBC AUTO: 85 FL (ref 78–100)
PLATELET # BLD AUTO: 193 10E3/UL (ref 150–450)
POTASSIUM SERPL-SCNC: 4.2 MMOL/L (ref 3.4–5.3)
POTASSIUM SERPL-SCNC: 4.3 MMOL/L (ref 3.4–5.3)
RBC # BLD AUTO: 3.31 10E6/UL (ref 3.8–5.9)
SODIUM SERPL-SCNC: 135 MMOL/L (ref 136–145)
SODIUM SERPL-SCNC: 136 MMOL/L (ref 136–145)
WBC # BLD AUTO: 6.4 10E3/UL (ref 4–11)

## 2023-04-21 PROCEDURE — 97116 GAIT TRAINING THERAPY: CPT | Mod: GP | Performed by: REHABILITATION PRACTITIONER

## 2023-04-21 PROCEDURE — 97530 THERAPEUTIC ACTIVITIES: CPT | Mod: GP | Performed by: REHABILITATION PRACTITIONER

## 2023-04-21 PROCEDURE — 250N000013 HC RX MED GY IP 250 OP 250 PS 637

## 2023-04-21 PROCEDURE — 97535 SELF CARE MNGMENT TRAINING: CPT | Mod: GO

## 2023-04-21 PROCEDURE — 250N000013 HC RX MED GY IP 250 OP 250 PS 637: Performed by: STUDENT IN AN ORGANIZED HEALTH CARE EDUCATION/TRAINING PROGRAM

## 2023-04-21 PROCEDURE — 80048 BASIC METABOLIC PNL TOTAL CA: CPT

## 2023-04-21 PROCEDURE — 97602 WOUND(S) CARE NON-SELECTIVE: CPT

## 2023-04-21 PROCEDURE — 36415 COLL VENOUS BLD VENIPUNCTURE: CPT

## 2023-04-21 PROCEDURE — 97530 THERAPEUTIC ACTIVITIES: CPT | Mod: GO

## 2023-04-21 PROCEDURE — 250N000011 HC RX IP 250 OP 636

## 2023-04-21 PROCEDURE — 99232 SBSQ HOSP IP/OBS MODERATE 35: CPT | Mod: GC | Performed by: INTERNAL MEDICINE

## 2023-04-21 PROCEDURE — 250N000013 HC RX MED GY IP 250 OP 250 PS 637: Performed by: INTERNAL MEDICINE

## 2023-04-21 PROCEDURE — 83735 ASSAY OF MAGNESIUM: CPT

## 2023-04-21 PROCEDURE — 214N000001 HC R&B CCU UMMC

## 2023-04-21 PROCEDURE — G0463 HOSPITAL OUTPT CLINIC VISIT: HCPCS | Mod: 25

## 2023-04-21 PROCEDURE — 85027 COMPLETE CBC AUTOMATED: CPT

## 2023-04-21 RX ORDER — MAGNESIUM SULFATE HEPTAHYDRATE 40 MG/ML
2 INJECTION, SOLUTION INTRAVENOUS ONCE
Status: COMPLETED | OUTPATIENT
Start: 2023-04-21 | End: 2023-04-21

## 2023-04-21 RX ADMIN — APIXABAN 5 MG: 5 TABLET, FILM COATED ORAL at 19:50

## 2023-04-21 RX ADMIN — Medication 1 CAPSULE: at 07:50

## 2023-04-21 RX ADMIN — ATORVASTATIN CALCIUM 80 MG: 80 TABLET, FILM COATED ORAL at 07:49

## 2023-04-21 RX ADMIN — TAMSULOSIN HYDROCHLORIDE 0.4 MG: 0.4 CAPSULE ORAL at 19:50

## 2023-04-21 RX ADMIN — INSULIN ASPART 1 UNITS: 100 INJECTION, SOLUTION INTRAVENOUS; SUBCUTANEOUS at 16:26

## 2023-04-21 RX ADMIN — MEXILETINE HYDROCHLORIDE 200 MG: 200 CAPSULE ORAL at 21:35

## 2023-04-21 RX ADMIN — SPIRONOLACTONE 25 MG: 25 TABLET, FILM COATED ORAL at 07:50

## 2023-04-21 RX ADMIN — APIXABAN 5 MG: 5 TABLET, FILM COATED ORAL at 07:50

## 2023-04-21 RX ADMIN — MICONAZOLE NITRATE: 20 POWDER TOPICAL at 07:50

## 2023-04-21 RX ADMIN — INSULIN ASPART 1 UNITS: 100 INJECTION, SOLUTION INTRAVENOUS; SUBCUTANEOUS at 11:55

## 2023-04-21 RX ADMIN — AMIODARONE HYDROCHLORIDE 200 MG: 200 TABLET ORAL at 07:50

## 2023-04-21 RX ADMIN — MEXILETINE HYDROCHLORIDE 200 MG: 200 CAPSULE ORAL at 13:31

## 2023-04-21 RX ADMIN — WHITE PETROLATUM 57.7 %-MINERAL OIL 31.9 % EYE OINTMENT: at 21:36

## 2023-04-21 RX ADMIN — CALCIUM CARBONATE 600 MG (1,500 MG)-VITAMIN D3 400 UNIT TABLET 1 TABLET: at 07:50

## 2023-04-21 RX ADMIN — EMPAGLIFLOZIN 10 MG: 10 TABLET, FILM COATED ORAL at 07:50

## 2023-04-21 RX ADMIN — MEXILETINE HYDROCHLORIDE 200 MG: 200 CAPSULE ORAL at 05:45

## 2023-04-21 RX ADMIN — MICONAZOLE NITRATE: 20 POWDER TOPICAL at 19:50

## 2023-04-21 RX ADMIN — Medication 800 MG: at 19:50

## 2023-04-21 RX ADMIN — Medication 800 MG: at 07:50

## 2023-04-21 RX ADMIN — MAGNESIUM SULFATE IN WATER 2 G: 40 INJECTION, SOLUTION INTRAVENOUS at 11:55

## 2023-04-21 RX ADMIN — Medication 12.5 MG: at 07:49

## 2023-04-21 RX ADMIN — FUROSEMIDE 40 MG: 40 TABLET ORAL at 07:49

## 2023-04-21 RX ADMIN — AMIODARONE HYDROCHLORIDE 200 MG: 200 TABLET ORAL at 19:50

## 2023-04-21 ASSESSMENT — ACTIVITIES OF DAILY LIVING (ADL)
ADLS_ACUITY_SCORE: 40

## 2023-04-21 NOTE — PROGRESS NOTES
Care Management Follow Up    Length of Stay (days): 13    Expected Discharge Date: 04/20/2023     Concerns to be Addressed: Discharge Planning        Patient plan of care discussed at interdisciplinary rounds: Yes    Anticipated Discharge Disposition: TCU     Anticipated Discharge Services: Post Acute Therapies     Anticipated Discharge DME: None    Patient/family educated on Medicare website which has current facility and service quality ratings: Yes     Education Provided on the Discharge Plan: Yes      Patient/Family in Agreement with the Plan: Yes     Referrals Placed by CM/SW: TCU      Private pay costs discussed: Not applicable    Additional Information:    Pending Referrals     [facility]  [address], [address]  [place] [address]  [phone]  4/21: Referral Sent     [facility]  [address] [place] [address]  [phone]  4/21: Referral Sent     [facility]  [address] [place], [place] [address] [phone]  Ph: [phone]   F: [phone]  4/17: CHW spoke with admissions coordinator who said they never received the referral. CHW hard faxed at 3:10 pm.   4/18: CHW LVM to follow-up on referral at 2:22 pm.   4/19: CHW LVM at 2:13 pm to follow-up on referral.   4/20: Writer left message with [facility] Admissions Coordinator at [phone] requesting a call back regarding referral status.  4/21: Writer left message with [facility] Admissions requesting a call back regarding referral status. Writer called facility directly and they will email them as well.    [facility]  [address] [place] [address]  Ph: [phone]   F: [phone]  4/17: CHW spoke with admissions coordinator who said they never received the referral. CHW hard faxed at 3:10 pm.   4/18: CHW LVM to follow-up on referral at 2:22 pm  4/19: CHW LVM at 2:13 pm to follow-up on referral.    4/20: Writer left message with  [facility] Admissions Coordinator at [phone] requesting a call back regarding referral status.  4/21: Writer left message with [facility] Admissions requesting a call back regarding referral status. Writer called facility directly and they will email them as well.    Declined Referrals     [facility] - Declined due to no bed available.  [facility] - Declined due to they no longer have a TCU.  [facility] - Declined due to not appropriate for services.    TALI Balderas, MSW  Adult Acute Care Float   Pager [phone]

## 2023-04-21 NOTE — PROGRESS NOTES
[facility]  CARDIOLOGY HEART FAILURE SERVICE (CARDS II) PROGRESS NOTE        Patient Name: Rohit Garvey    Medical Record Number: [id number]    YOB: 1955  PCP: Kevyn Salazar personally sawand examined this patient with the fellow and agree with observations and plans outlined here.    Admit Date/Time: 4/8/2023  3:08 PM   13    Assessment and Plan:  Rohit Garvey is a 67 year old adult male with a PMH significant for NICM (EF of 40-45%) c/b paroxysmal VT s/p dual chamber ICD placement 6/20/2022 c/b recurrent ICD shocks s/p VT ablation 10/31/2022 and re-ablation 12/29/2022, paroxysmal Afib on apixaban, developmental delay (possibly related to viral illnesses during infancy), T2DM, hypertension, and hyperlipidemia who was admitted as a transfer from [place] per recommendations of his cardiologists Dr. Urbina and Dr. Chanel for evaluation by advanced heart failure failure team and EP team for VT management and consideration of advanced therapies.    Today's updates:  - Changed code status from full to DNR/DNI based on GOC discussion w/ patient   - SW consult for drafting updated advance care directive to reflect new DNR/DNI status   - IV mag replacement 2 g x 1  - WOC consult placed for sacral wound, appreciate recs   - Medically ready for discharge. Waiting for TCU bed      # Persistent Paroxysmal Ventricular Tachycardia s/p dual chamber ICD placement 6/2022 and VT ablation x 2  # Chronic HFrEF 2/2 NICM w/ LVEF 40-45%, NYHA II  High burden of ventricular arrhythmias despite being on 2 antiarrhythmic medications (amiodarone and mexiletine) and being s/p ICD and two attempted VT ablations. Transferred here for evaluation for advanced therapies. ECHO 4/9 showed mildly reduced EF from 46% (Dec 2022) to 40-45%, however still does not qualify him for LVAD (EF < 25%). Possible heart transplant candidate if he meets criteria for VT storm.   VT episode at 0700 on 4/14.  Required ATP and ICD shock. Pt not interested in any further cardiac procedures including ablation.   - EP consulted, appreciate recs   - ICD device interrogation ordered - 1 VT episode on 4/9/23 lasting 25 seconds   - VT episode at 0700 on 4/14. Required ATP and ICD shock. Per sister, EP adjusted ICD after this episode.    - Patient not interested in any operative/procedural interventions. EP does not plan to change his AAT or ICD settings. No need to go to hospital for outpatient ICD shocks unless >3 a day. Asked patient to notify outpatient providers if he is shocked by his ICD.   - Continue tele  - Per GO discussion w/ patient and sister 4/21: changed from full code to DNR/DNI status w/ plan to keep ICD on for the meantime   -  consult for drafting updated advance care directive to reflect new DNR/DNI status     Advanced therapies evaluation workup:  - ECHO 4/9 w/ mildly reduced LV function (EF reduction from 46% to 40-45%) and mild aortic valve calcification   - RHC 4/11:   RA mean 11mmHg. RV 38/4. RVEDP 13. PA 39/16/25. PCWP 16  TD CO/CI: 3.85 L/min / 2.25 L/min/m2  Shena CO/CI: 3.37 L/min / 1.97 L/min/m2    - Cardiopulmonary stress test cancelled as pt deemed not physically able to perform test per RN  - Advanced therapy  following   - Neuropsych testing 4/11 and 4/12  - Financial consult already placed  - Utox screen positive for amphetamines. Quant screen negative, indicating false positive on initial screen.   - Nicotine and cotinine screen negative. PEth negative  - Discussed at 4/14 heart transplant candidacy review     Patient ultimately decided to not pursue heart transplant or ablation. Goal is to go to TCU w/ plan to either return to his AL or LTC w/ consideration of starting hospice. No further advanced therapies workup will be pursued at this time.      GDMTs:  - Antiarrhythmics: PTA amiodarone 200 mg BID and mexiletine 200 mg q8h   - ACEi/ARB: Not currently on due to LVEF of 45%.  -  BB:   - Continue metoprolol succinate 12.5 mg daily    - Holding PTA carvedilol 3.125 mg BID 2/2 soft bps   - Aldosterone antagonist: Continue spironolactone 25 mg daily   - SGLT2i: Continue PTA empagliflozin 10 mg daily  - SCD prophylaxis: S/p secondary prevention ICD 6/20/22  - Volume status: Euvolemic; continue lasix 40 mg qday                - Discontinued PTA KCl supplement 20 meq daily now that spironolactone is added                - Continue PTA magnesium oxide 800 mg BID               - BMP BID w/ goal of K > 4 and Mg > 2               - Strict I/Os and daily weights   - Anticoagulation: PTA Eliquis 5 mg BID   - Statin therapy: PTA atorvastatin 80 mg daily   - OT to assist in compression stockings     # UTI vs sterile pyuria, resolved    UA w/ large leuks and 131 WBCs w/ negative nitrites. Pt reported intermittent dysuria. Started on IV ceftriaxone. UC returned negative for bacteria.   - Completed 5 days of Augmentin today (4/15-4/19)   - PTA empagliflozin held during treatment of UTI. Resumed 4/20      # Chronic Diabetic R heel ulcer  Present since Dec 2022, per sister. RN noticed erythema and ?purulent drainage from area 4/15. WOC consulted and deemed ulcer not actively infected. Recommend q3 day dressing changes.   - wound care and dressing changes per WOC orders  - f/u outpatient w/ podiatry      # Positive U tox for amphetamines  Pt and sister deny any illegal or legal stimulant use. No obvious PTA drugs on inspection that could be causing a false positive. Urine quant screen ultimately showed < 50 ng/mL amphetamines, well below the level to indicate a positive result. Suspect false positive on urine tox screen.   - No further workup/intervention needed      # Recent clavicular fracture  At baseline uses walker for ambulation. Had recent clavicle fracture now requiring him to be wheelchair bound. Supposed to be non weight bearing on LUE for another 4 weeks   - Fall precautions  - NWB LUE precautions  provided   - PT/OT following, recommending TCU placement   - SW placed referrals for TCU. Awaiting bed availability     # Afib w/o RVR  - metoprolol, amiodarone, and mexiletine   - PTA Eliquis 5 mg BID  - Tele     # ROSALINO on CKD, resolved  Uptrending Cr above baseline Cr of 1.3. No observed mention of etiology of pt's CKD in chart. UA w/o protein, RBCs, or casts. Suspect prerenal as Cr improved after diuresis held and has remained stable since restarting diuresis at a lower dose   - diuresis per above   - BID BMP     # Acute on chronic normocytic anemia concerning for mixed picture of iron deficiency and inflammatory   # Iron deficiency   # Hx of anemia of chronic disease   Hgb 8.7 on arrival and downtrending. Baseline Hgb ~9-10 reportedly 2/2 anemia of chronic disease. Tbili normal. No source of active bleeding. Workup revealed low iron level, normal TIBC, and low end-of-normal ferritin concerning for iron deficiency anemia.   - s/p IV Iron sucrose 200 mg qday x 5 days  - AM CBCs     # Chronic hyponatremia, intermittent   BL sodium ~130-133. Suspect hypervolemic hyponatremia in setting of CHF. Improved after IV diuresis   - daily BMP    # DM2   On metformin 1,000 mg BID PTA.   - hold PTA metformin, plan to resume on discharge   - Holding empagliflozin as above   - LDSS  - Hypoglycemia protocol      # BPH  - PTA tamulosin  - CTM w/ bladder scans and PVRs PRN     # Developmental delay   Lives at assisted living where he receives assistance w/ medications, meals, etc. Independent in basic ADLs (feeding, clothing, bathing) at his baseline. Sister Peg is his HCA and POA but pt currently has capacity   - Delirium precautions      # Constipation   - PTA metamucil     # R coccyx wound  - WOC consulted, appreciate recs:  - If pt has constant incontinent loose stools needing dressing changes Q shift recommend discontinuing the Mepilex dressing and applying criticaid barrier paste BID and PRN.     FEN: Soft bite sized (due to  "lack of dentition), 2 L fluid restriction   PPx: PTA Eliquis   Code: DNR/DNI  Dispo: Discharge to TCU once bed available    Pt was discussed and evaluated with Dr. Mitchell, attending physician, who agrees with the assessment and plan above.     Prachi Call,   Internal Medicine, PGY-I    Interval Changes in Past 24 Hours:   No acute events overnight. No new shocks. Doing well this AM. No complaints.     Discussed w/ patient (and sister Peg) code status. Discussed w/ Piyush that given my understanding of his desire to not pursue further surgeries, procedures, or advanced therapies for his heart, being full code may not align w/ his care goals. Discussed the details of CPR and mechanical ventilation. After hearing this information, patient elected no chest compressions or intubation, stating \"don't press on my chest, just let me go.\" Sister peg in agreement with plan to make pt DNR/DNI. According to her, pt is already DNR at his living facility because they do not have an AED. Doesn't have advance care directive stating his code status but does have one listing that he would want to go comfort cares if in a vegetative state. Patient and sister areed to have SW come by for drafting a new advanced directive to reflect his new code status.     Review Of Systems  A 4-point ROS was negative aside from those listed above.    OBJECTIVE FINDINGS:    Temp:  [97.3  F (36.3  C)-98  F (36.7  C)] 97.9  F (36.6  C)  Pulse:  [60-85] 85  Resp:  [16-18] 16  BP: ()/(61-78) 102/66  SpO2:  [97 %-100 %] 98 %     /66 (MAP of 88) and 98/59 (MAP of 76) this AM    I/Os: net even yesterday.    Intake/Output Summary (Last 24 hours) at 4/18/2023 0809  Last data filed at 4/18/2023 0600  Gross per 24 hour   Intake 920 ml   Output 550 ml   Net 370 ml     Vitals:    04/19/23 0424 04/20/23 0500 04/21/23 0544   Weight: 63.1 kg (139 lb 3.2 oz) 63 kg (138 lb 12.8 oz) 62.3 kg (137 lb 4.8 oz)     Gen: Sitting in chair, NAD  Neck: JVP ~8, " unchanged  Resp: clear to auscultation bilaterally, no crackles or wheezing   CV: Irregular rhythm, regular rate.   Abd: soft, NT, ND  Ext: warm and well perfused, 1+ lower extremity edema to level of knees    Current medications   Current Facility-Administered Medications   Medication    acetaminophen (TYLENOL) Suppository 650 mg    acetaminophen (TYLENOL) tablet 650 mg    alum & mag hydroxide-simethicone (MAALOX) suspension 30 mL    amiodarone (PACERONE) tablet 200 mg    apixaban ANTICOAGULANT (ELIQUIS) tablet 5 mg    artificial tears ophthalmic ointment    atorvastatin (LIPITOR) tablet 80 mg    calcium carbonate-vitamin D (CALTRATE) 600-10 MG-MCG per tablet 1 tablet    carboxymethylcellulose PF (REFRESH PLUS) 0.5 % ophthalmic solution 1 drop    glucose gel 15-30 g    Or    dextrose 50 % injection 25-50 mL    Or    glucagon injection 1 mg    empagliflozin (JARDIANCE) tablet 10 mg    furosemide (LASIX) tablet 40 mg    insulin aspart (NovoLOG) injection (RAPID ACTING)    insulin aspart (NovoLOG) injection (RAPID ACTING)    lidocaine (LMX4) cream    lidocaine 1 % 0.1-1 mL    magnesium oxide (MAG-OX) tablet 800 mg    medication instruction    metoprolol succinate ER (TOPROL-XL) 24 hr half-tab 12.5 mg    mexiletine (MEXITIL) capsule 200 mg    miconazole (MICATIN) 2 % powder    nitroGLYcerin (NITROSTAT) sublingual tablet 0.4 mg    psyllium (METAMUCIL/KONSYL) capsule 1 capsule    sodium chloride (PF) 0.9% PF flush 3 mL    sodium chloride (PF) 0.9% PF flush 3 mL    spironolactone (ALDACTONE) tablet 25 mg    tamsulosin (FLOMAX) capsule 0.4 mg     LABS Reviewed:  Na 135, K 4.2, Mg 1.8     IMAGES Reviewed:    ECHO 4/9/23 Interpretation Summary:  Left ventricular function is decreased. The ejection fraction is 40-45%  (mildly reduced compared to 46% back in Dec 2022).   LV size and wall thickness is normal. Abnormal septal wall motion consistent w/ pacemaker.   Global right ventricular function is normal.  Mild aortic valve  calcification is present. The mean gradient across the  aortic valve is 8 mmHg.  Pulmonary artery systolic pressure is normal.  IVC diameter and respiratory changes fall into an intermediate range  suggesting an RA pressure of 8 mmHg.  No pericardial effusion is present.    ICD interrogation 4/10/23:  Device: Abbott [id number] [place] HF  Normal Device Function.   Mode: DDDR 60-90 bpm  AP: 49%  BVP: 91%  Intrinsic rhythm: CHB w/ AS @ 60 bpm; no intrinsic R-waves at VVI 30 bpm  Lead Trends Appear Stable: Yes  Estimated battery longevity to RRT = 5.9 years. Remaining Capacity to OSCAR = 92%.  Atrial arrhythmia: None  AF burden: 0%  Anticoagulant: Eliquis  Ventricular Arrhythmia: 1 VT episode recorded on 4/9/23 at 9:43 AM, lasting ~25 seconds at 114 bpm. EGM reveals a slow VT which is converted with one sequence of ATP.  Setting changes: None    RHC 4/11/23:  RA mean 11mmHg  RV 40/11  PA 39/16/25  PCWP 16    Pa Sat 49.9%  Art Sat 99%    TD CO/CI: 3.85 L/min / 2.25 L/min/m2  Shena CO/CI: 3.37 L/min / 1.97 L/min/m2    PVR 2.3 (TD)  SVR  1538 (TD) Right sided filling pressures are mildly elevated. Left sided filling pressures are mildly elevated. Mild elevated pulmonary hypertension. Reduced cardiac output level. Hemodynamic data has been modified in Epic per physician review.

## 2023-04-21 NOTE — PLAN OF CARE
D: 67 year old adult male with a PMH significant for NICM (EF of 40-45%) c/b paroxysmal VT s/p dual chamber ICD placement 6/20/2022 c/b recurrent ICD shocks s/p VT ablation 10/31/2022 and re-ablation 12/29/2022, paroxysmal Afib on apixaban, developmental delay (possibly related to viral illnesses during infancy), T2DM, hypertension, and hyperlipidemia who was admitted as a transfer from [place] per recommendations of his cardiologists Dr. Urbina and Dr. Chanel for evaluation by advanced heart failure failure team and EP team for VT management and consideration of advanced therapies.     I: Monitored vitals and assessed pt status.   LDA:   -PIV x1  -External catheter  Tele: AV paced, HR 60's-70's  O2: RA  Mobility: Assist of 1 + walker/GB     A: A0x4, calls appropriately, able to make needs known. VSS. Afebrile. Denied pain, numbness/tingling, CP, SOB, or nausea. Cardiac diet with 2 L FR. Takes pills with water and/or apple sauce. ACHS POCT. Urinating adequately via external catheter. No BM this shift, LBM 4/20. BLE compression socks removed at bedtime per Pt request. Trace BLE edema. Mepilex on coccyx for blanchable redness. Perineum rash, scheduled Micatin powder applied. L chest ICD site. Mepilex on R heel wound. No new deficits noted, see flowsheets for full skin assessment. NWB LUE (recent clavicle fracture), sling when OOB. Repositioned in bed Q2-3H as tolerated. Appeared to sleep well overnight.      P: Continue to monitor Pt status and report changes to Cards 2. Medically ready for discharge, awaiting TCU bed.      Shift of care [id number]

## 2023-04-21 NOTE — PLAN OF CARE
"/62 (BP Location: Right arm)   Pulse 59   Temp 97.6  F (36.4  C) (Oral)   Resp 16   Ht 1.626 m (5' 4\")   Wt 62.3 kg (137 lb 4.8 oz)   SpO2 97%   BMI 23.57 kg/m      Shift: [id number]  Reason for Admission: Evaluation by advanced heart failure failure team and EP team for VT management and consideration of advanced therapies.  VS/Tele: VSS on RA. AV paced  Neuros: A/Ox4, able to make needs known. Developmental delay. Numbness/tingling in BLE (baseline)  Respiratory: Sats >95% on RA  GI/: 1 large BM this shift, primofit in place.   Nutrition: Tolerating cardiac diet, 2L FR  Drains/Lines: L. PIV SL  Activity: A1 + GB + walker. L. Arm sling on when OOB d/t fractured L. Clavicle. Worked with PT today  Pain/Nausea: Denies both  Skin: WOC RN updated wound care orders. R. Heel diabetic ulcer- CDI. Mepilex on R. Buttocks- Stage 2 pressure ulcer. Compression socks on.   Labs: BG ACHS. Mg replaced, recheck 2.5.  Social: Sister visited briefly   New this shift: Code status changed to DNR/DNI  Plan of care: Start new wound care orders for R. Heel tomorrow. Will continue to monitor and follow POC.     "

## 2023-04-21 NOTE — CONSULTS
[facility]  WO Nurse Inpatient Assessment     Consulted for: 4/21 sacrum/coccyx area hospital acquired stage 2 resurfaced  Right heel wound    Patient History (according to provider note(s):      Rohit Garvey is a 67 year old adult male with a PMH significant for NICM (EF of 40-45%) c/b paroxysmal VT s/p dual chamber ICD placement 6/20/2022 c/b recurrent ICD shocks s/p VT ablation 10/31/2022 and re-ablation 12/29/2022, paroxysmal Afib on apixaban, developmental delay (possibly related to viral illnesses during infancy), T2DM, hypertension, and hyperlipidemia who was admitted as a transfer from [place] per recommendations of his cardiologists Dr. Urbina and Dr. Chanel for evaluation by advanced heart failure failure team and EP team for VT management and consideration of advanced therapies.       Areas Assessed:      Areas visualized during today's visit: Heels & sacrum     Pressure Injury Location: sacrum/right buttock and gluteal cleft affected     Last photo: 4/21  Wound type: Pressure Injury, Friction and Intertrigo     Pressure Injury Stage: 2, hospital acquired      Wound history/plan of care:   Patient reports he has had this since he has been in the hospital. RN found it.  Wound base: 100 % epidermis, distally in gluteal cleft and reabsorbed partial thickness blister     Palpation of the wound bed: normal      Drainage: none     Description of drainage: none     Measurements (length x width x depth, in cm)   Entire affected area 7  x 2.5  x  0.1 cm with 1.5cm x0.6cm x0.1cm open over the coccyx - not well visualized in the picture   Periwound skin: Dry/scaly and Skin stripping      Color: blanchable erythema      Temperature: normal   Odor: none  Pain: denies , none  Pain intervention prior to dressing change: patient tolerated well and no significant pain present   Treatment goal: Heal  and Protection  STATUS: initial assessment, healing, healed and  improving  Supplies ordered: supplies stored on unit, discussed with RN and discussed with patient     My PI Risk Assessment     Sensory Perception: 2 - Very Limited     Moisture: 3 - Occasionally moist      Activity: 2 - Chairfast     Mobility: 2 - Very limited     Nutrition: 2 - Probably inadequate      Friction/Shear: 1 - Problem     TOTAL: 12    Wound location: Right heel     4/15  Last photo: 4/15  Wound due to: Diabetic Ulcer  Wound history/plan of care: Per patients wife wound has been there since December 2022 and started as redness, evolved into blister, then dried up. Nurse and family concerned today 4/25 as wound was moist with purulent drainage. Per patients wife he has been seeing podiatry and they recommended mepilex dressings. While patient was at assisted living they said the wound had dried up and that no dressing was needed anymore. Wound appears to have been kept in a moist environment, unclear if dressings were being used, but drainage that had been noted by nuring and family was from autolytic debridement of necrotic tissue. No infection suspected at this time.   Wound base: 70 %lysing [id number] % granulation tissue      Palpation of the wound bed: normal      Drainage: small     Description of drainage: tan     Measurements (length x width x depth, in cm): 1 x 1.2 x 0.2 cm      Tunneling: N/A     Undermining: N/A  Periwound skin: bright pink with petechia       Color: normal and consistent with surrounding tissue      Temperature: normal   Odor: none  Pain: tolerated well, grimacing during cleansing   Pain interventions prior to dressing change: gentle cleansing   Treatment goal: Infection control/prevention, Increase granulation, Remove necrotic tissue and Soften necrotic tissue  STATUS: follow up -increasing amount of healthy tissue, necrotic tissue demarcated 100% and lysing,   Supplies ordered: ordered polymem 2x2 for gentle cleansing and autolytic debridement, vashe to decrease inflammation  in periwound tissue due to biofilm in periwound and wound bed.      Treatment Plan:       Sacrum wound: Every 3 days     Cleanse the area with NS and pat dry.    Apply No sting film barrier to periwound skin.    Cover wound with Sacral Mepilex (#[id number])    Change dressing Q 3 days.    Turn and reposition Q 2hrs side to side only.    Ensure pt has Royce-cushion while sitting up in the chair.    FYI- If pt has constant incontinent loose stools needing dressing changes Q shift please discontinue the Mepilex dressing and apply criticaid barrier paste BID and PRN.      Right heel wound(s): every other day   Apply vashe #[id number] moistened gauze x 5 minutes to wound and periwound skin  Paint mohsen-wound skin with No sting barrier film ([id number])  Apply 2x2 polymem foam #[id number] dressing  Keep heels elevated off bed.    Orders: updated     RECOMMEND PRIMARY TEAM ORDER: None, at this time, Patient should follow up with outpatient podiatry.  Education provided: rationale for POC change and progress   Discussed plan of care with: patient, sister RN   WOC nurse follow-up plan: weekly  Notify WOC if wound(s) deteriorate.  Nursing to notify the Provider(s) and re-consult the WOC Nurse if new skin concern.    DATA:     Current support surface: Standard  Standard gel/foam mattress (IsoFlex, Atmos air, etc)  Containment of urine/stool: Incontinent pad in bed  BMI: Body mass index is 23.57 kg/m .   Active diet order: Orders Placed This Encounter      Low Saturated Fat Na <2400 mg     Output: I/O last 3 completed shifts:  In: 1640 [P.O.:1640]  Out: 1500 [Urine:1500]     Labs:   Recent Labs   Lab 04/21/23  0443   HGB 8.3*   WBC 6.4     Pressure injury risk assessment:   Sensory Perception: 4-->no impairment  Moisture: 4-->rarely moist  Activity: 3-->walks occasionally  Mobility: 3-->slightly limited  Nutrition: 3-->adequate  Friction and Shear: 2-->potential problem  Linwood Score: 19    Prachi Gomez RN BS, CWOCN  Pager no longer is use,  please contact through [employer]   Vocera group: [facility] Nurse  Dept. Office Number: [phone]

## 2023-04-21 NOTE — CONSULTS
[facility] Nurse Inpatient Assessment     Consulted for: Right heel wound    Patient History (according to provider note(s):      Rohit Garvey is a 67 year old adult male with a PMH significant for NICM (EF of 40-45%) c/b paroxysmal VT s/p dual chamber ICD placement 6/20/2022 c/b recurrent ICD shocks s/p VT ablation 10/31/2022 and re-ablation 12/29/2022, paroxysmal Afib on apixaban, developmental delay (possibly related to viral illnesses during infancy), T2DM, hypertension, and hyperlipidemia who was admitted as a transfer from [place] per recommendations of his cardiologists Dr. Urbina and Dr. Chanel for evaluation by advanced heart failure failure team and EP team for VT management and consideration of advanced therapies.       Areas Assessed:      Areas visualized during today's visit: Heels  Sacrum/Coccyx    Wound location: Right heel       4/15  Last photo: 4/15  Wound due to: Diabetic Ulcer  Wound history/plan of care: Per patients wife wound has been there since December 2022 and started as redness, evolved into blister, then dried up. Nurse and family concerned today 4/25 as wound was moist with purulent drainage. Per patients wife he has been seeing podiatry and they recommended mepilex dressings. While patient was at assisted living they said the wound had dried up and that no dressing was needed anymore. Wound appears to have been kept in a moist environment, unclear if dressings were being used, but drainage that had been noted by nuring and family was from autolytic debridement of necrotic tissue. No infection suspected at this time.   Wound base: 70 % slough, 30 %granulation tissue      Palpation of the wound bed: normal      Drainage: small     Description of drainage: tan     Measurements (length x width x depth, in cm): 1 x 1.1 x 0.2 cm      Tunneling: N/A     Undermining: N/A  Periwound skin: slough demarcated, edges even attached defined        Color: bright pink with petechia      Temperature: normal   Odor: none  Pain: grimacing during cleansing   Pain interventions prior to dressing change: patient tolerated well and slow and gentle cares   Treatment goal: Infection control/prevention, Increase granulation, Remove necrotic tissue and Soften necrotic tissue  STATUS: follow up improving still painful and bright pink, smaller    Supplies ordered: vashe to decrease inflammation and biofilm and polymem for gentle continuous autolytic debridement       Treatment Plan:     Right heel wound(s): every other day   Cleanse wound with vashe #145025 moistened gauze x 5 minutes   Paint mohsen-wound skin with No sting barrier film (644001)  Apply 2x2 adhesive polymem foam dressing #[id number]  Keep heels elevated off bed.         Orders: Written    RECOMMEND PRIMARY TEAM ORDER: None, at this time, Patient should follow up with outpatient podiatry.  Education provided: plan of care and wound progress  Discussed plan of care with: Patient, Family and Nurse  WOC nurse follow-up plan: weekly  Notify WOC if wound(s) deteriorate.  Nursing to notify the Provider(s) and re-consult the WOC Nurse if new skin concern.    DATA:     Current support surface: Standard  Standard gel/foam mattress (IsoFlex, Atmos air, etc)  Containment of urine/stool: Incontinent pad in bed  BMI: Body mass index is 23.57 kg/m .   Active diet order: Orders Placed This Encounter      Low Saturated Fat Na <2400 mg     Output: I/O last 3 completed shifts:  In: 1640 [P.O.:1640]  Out: 1500 [Urine:1500]     Labs:   Recent Labs   Lab 04/21/23  0443   HGB 8.3*   WBC 6.4     Pressure injury risk assessment:   Sensory Perception: 4-->no impairment  Moisture: 4-->rarely moist  Activity: 3-->walks occasionally  Mobility: 3-->slightly limited  Nutrition: 3-->adequate  Friction and Shear: 2-->potential problem  Linwood Score: 19    Prachi Gomez RN BS, CWOCN  Pager no longer is use, please contact through [employer]    [place] [facility]: [facility] Nurse  Dept. Office Number: [phone]

## 2023-04-22 ENCOUNTER — APPOINTMENT (OUTPATIENT)
Dept: PHYSICAL THERAPY | Facility: CLINIC | Age: 68
DRG: 287 | End: 2023-04-22
Attending: INTERNAL MEDICINE
Payer: MEDICARE

## 2023-04-22 LAB
ANION GAP SERPL CALCULATED.3IONS-SCNC: 10 MMOL/L (ref 7–15)
ANION GAP SERPL CALCULATED.3IONS-SCNC: 12 MMOL/L (ref 7–15)
BUN SERPL-MCNC: 26.9 MG/DL (ref 8–23)
BUN SERPL-MCNC: 27 MG/DL (ref 8–23)
CALCIUM SERPL-MCNC: 8.8 MG/DL (ref 8.8–10.2)
CALCIUM SERPL-MCNC: 9 MG/DL (ref 8.8–10.2)
CHLORIDE SERPL-SCNC: 101 MMOL/L (ref 98–107)
CHLORIDE SERPL-SCNC: 106 MMOL/L (ref 98–107)
CREAT SERPL-MCNC: 1.37 MG/DL (ref 0.51–1.17)
CREAT SERPL-MCNC: 1.54 MG/DL (ref 0.51–1.17)
DEPRECATED HCO3 PLAS-SCNC: 22 MMOL/L (ref 22–29)
DEPRECATED HCO3 PLAS-SCNC: 23 MMOL/L (ref 22–29)
ERYTHROCYTE [DISTWIDTH] IN BLOOD BY AUTOMATED COUNT: 22.4 % (ref 10–15)
GFR SERPL CREATININE-BSD FRML MDRD: 49 ML/MIN/1.73M2
GFR SERPL CREATININE-BSD FRML MDRD: 57 ML/MIN/1.73M2
GLUCOSE BLDC GLUCOMTR-MCNC: 130 MG/DL (ref 70–99)
GLUCOSE BLDC GLUCOMTR-MCNC: 161 MG/DL (ref 70–99)
GLUCOSE BLDC GLUCOMTR-MCNC: 192 MG/DL (ref 70–99)
GLUCOSE BLDC GLUCOMTR-MCNC: 196 MG/DL (ref 70–99)
GLUCOSE SERPL-MCNC: 140 MG/DL (ref 70–99)
GLUCOSE SERPL-MCNC: 240 MG/DL (ref 70–99)
HCT VFR BLD AUTO: 31.2 % (ref 35–53)
HGB BLD-MCNC: 9.3 G/DL (ref 11.7–17.7)
MAGNESIUM SERPL-MCNC: 2.1 MG/DL (ref 1.7–2.3)
MAGNESIUM SERPL-MCNC: 2.2 MG/DL (ref 1.7–2.3)
MCH RBC QN AUTO: 25.3 PG (ref 26.5–33)
MCHC RBC AUTO-ENTMCNC: 29.8 G/DL (ref 31.5–36.5)
MCV RBC AUTO: 85 FL (ref 78–100)
PLATELET # BLD AUTO: 215 10E3/UL (ref 150–450)
POTASSIUM SERPL-SCNC: 4.5 MMOL/L (ref 3.4–5.3)
POTASSIUM SERPL-SCNC: 4.6 MMOL/L (ref 3.4–5.3)
RBC # BLD AUTO: 3.68 10E6/UL (ref 3.8–5.9)
SODIUM SERPL-SCNC: 136 MMOL/L (ref 136–145)
SODIUM SERPL-SCNC: 138 MMOL/L (ref 136–145)
WBC # BLD AUTO: 6.8 10E3/UL (ref 4–11)

## 2023-04-22 PROCEDURE — 36415 COLL VENOUS BLD VENIPUNCTURE: CPT

## 2023-04-22 PROCEDURE — 80048 BASIC METABOLIC PNL TOTAL CA: CPT

## 2023-04-22 PROCEDURE — 99231 SBSQ HOSP IP/OBS SF/LOW 25: CPT | Mod: GC | Performed by: INTERNAL MEDICINE

## 2023-04-22 PROCEDURE — 214N000001 HC R&B CCU UMMC

## 2023-04-22 PROCEDURE — 250N000013 HC RX MED GY IP 250 OP 250 PS 637

## 2023-04-22 PROCEDURE — 250N000013 HC RX MED GY IP 250 OP 250 PS 637: Performed by: INTERNAL MEDICINE

## 2023-04-22 PROCEDURE — 97530 THERAPEUTIC ACTIVITIES: CPT | Mod: GP

## 2023-04-22 PROCEDURE — 83735 ASSAY OF MAGNESIUM: CPT

## 2023-04-22 PROCEDURE — 250N000013 HC RX MED GY IP 250 OP 250 PS 637: Performed by: STUDENT IN AN ORGANIZED HEALTH CARE EDUCATION/TRAINING PROGRAM

## 2023-04-22 PROCEDURE — 85027 COMPLETE CBC AUTOMATED: CPT

## 2023-04-22 PROCEDURE — 97116 GAIT TRAINING THERAPY: CPT | Mod: GP

## 2023-04-22 RX ADMIN — MICONAZOLE NITRATE: 20 POWDER TOPICAL at 13:14

## 2023-04-22 RX ADMIN — WHITE PETROLATUM 57.7 %-MINERAL OIL 31.9 % EYE OINTMENT: at 21:25

## 2023-04-22 RX ADMIN — Medication 800 MG: at 19:47

## 2023-04-22 RX ADMIN — INSULIN ASPART 1 UNITS: 100 INJECTION, SOLUTION INTRAVENOUS; SUBCUTANEOUS at 08:12

## 2023-04-22 RX ADMIN — MEXILETINE HYDROCHLORIDE 200 MG: 200 CAPSULE ORAL at 06:10

## 2023-04-22 RX ADMIN — MEXILETINE HYDROCHLORIDE 200 MG: 200 CAPSULE ORAL at 13:15

## 2023-04-22 RX ADMIN — Medication 800 MG: at 08:12

## 2023-04-22 RX ADMIN — MEXILETINE HYDROCHLORIDE 200 MG: 200 CAPSULE ORAL at 21:25

## 2023-04-22 RX ADMIN — AMIODARONE HYDROCHLORIDE 200 MG: 200 TABLET ORAL at 08:13

## 2023-04-22 RX ADMIN — APIXABAN 5 MG: 5 TABLET, FILM COATED ORAL at 08:13

## 2023-04-22 RX ADMIN — ATORVASTATIN CALCIUM 80 MG: 80 TABLET, FILM COATED ORAL at 08:13

## 2023-04-22 RX ADMIN — APIXABAN 5 MG: 5 TABLET, FILM COATED ORAL at 19:47

## 2023-04-22 RX ADMIN — MICONAZOLE NITRATE: 20 POWDER TOPICAL at 19:47

## 2023-04-22 RX ADMIN — TAMSULOSIN HYDROCHLORIDE 0.4 MG: 0.4 CAPSULE ORAL at 19:47

## 2023-04-22 RX ADMIN — Medication 12.5 MG: at 08:13

## 2023-04-22 RX ADMIN — AMIODARONE HYDROCHLORIDE 200 MG: 200 TABLET ORAL at 19:47

## 2023-04-22 RX ADMIN — SPIRONOLACTONE 25 MG: 25 TABLET, FILM COATED ORAL at 08:13

## 2023-04-22 RX ADMIN — INSULIN ASPART 1 UNITS: 100 INJECTION, SOLUTION INTRAVENOUS; SUBCUTANEOUS at 13:15

## 2023-04-22 RX ADMIN — INSULIN ASPART 1 UNITS: 100 INJECTION, SOLUTION INTRAVENOUS; SUBCUTANEOUS at 17:26

## 2023-04-22 RX ADMIN — Medication 1 CAPSULE: at 08:13

## 2023-04-22 RX ADMIN — FUROSEMIDE 40 MG: 40 TABLET ORAL at 08:13

## 2023-04-22 RX ADMIN — EMPAGLIFLOZIN 10 MG: 10 TABLET, FILM COATED ORAL at 08:13

## 2023-04-22 RX ADMIN — CALCIUM CARBONATE 600 MG (1,500 MG)-VITAMIN D3 400 UNIT TABLET 1 TABLET: at 08:13

## 2023-04-22 ASSESSMENT — ACTIVITIES OF DAILY LIVING (ADL)
ADLS_ACUITY_SCORE: 40

## 2023-04-22 NOTE — PLAN OF CARE
D: Pt was transferred from OSH on 4/8/23 for evaluation for advanced heart failure options and for VT management.      I: Monitored and assessed patient status; nursing cares provided.    A:   Neuro: A&Ox4, cognitively delayed at baseline                                                     Cardiac: AV Paced, HR 60-80's, slightly hypotensive, although MAP's adequate in the 70's.  Respiratory: Lungs clear, on RA, denies shortness of breath  GI/: +BS, BM x1; male external catheter in place with adequate urine output.  Diet/Appetite: Low fat diet, good appetite, BS checks ac & hs   Skin: Right heel wound with foam dressing C/D/I, orders to change every other day, next drsg change due 4/23. Sacral pressure injury with dressing change ordered every 3 days, next drsg change due 4/24.  Pain: Denies  Labs/Lytes: WDL  LDA's: Right PIV SL'd  Activity: Up with assist of 1; ambulated halls with PT, up to chair several times today.     P: Awaiting TCU placement/bed availability. Continue with current plan of care with emphasis on encouraging and assisting with activity. Contact [place] [phone] for questions or concerns.    Maddie Soto, RN  Cardiology

## 2023-04-22 NOTE — PLAN OF CARE
D: PMH of NICM (EF 40-45%) c/b paroxysmal VT s/p dual chamber ICD placement 6/20/2022 c/b recurrent ICD shocks s/p VT ablation 10/31/2022 and re-ablation 12/29/2022, paroxysmal Afib on apixaban, developmental delay, DM2, HTN, and HLD. Admitted for evaluation for VT management and consideration of advanced therapies    A:   Neuro: A/Ox4. Calls appropriately. Cooperative with cares. Denies headache, dizziness, and lightheadedness.  Cardiac/Tele: VSS. AV paced. HR 60's-70's. Afebrile. Denies chest pain and palpitations.   Respiratory: Sats >95% on room air. Denies SOB  GI/: Primofit in place with adequate urine output. Last BM 4/21.  Diet/Appetite: 2 gram Na+ diet, 2LFR.  Skin: No new deficits noted. BLE compression socks taken off overnight. Mepilex to sacrum and R heel CDI. To wear black sling on left arm when out of bed.  LDAs: L PIV- SL  Activity: 1 assist with walker and gait belt.  Pain: Denies     P: Awaiting TCU bed. Continue to monitor and notify Cards 2 with changes.

## 2023-04-22 NOTE — PROGRESS NOTES
Care Management Follow Up    Length of Stay (days): 14    Expected Discharge Date: 04/20/2023     Concerns to be Addressed:     discharge planning   Patient plan of care discussed at interdisciplinary rounds: Yes    Anticipated Discharge Disposition: Home     Anticipated Discharge Services:  Therapy Services   Anticipated Discharge DME:  NA    Patient/family educated on Medicare website which has current facility and service quality ratings:  Yes  Education Provided on the Discharge Plan:  Yes  Patient/Family in Agreement with the Plan:  Yes     Referrals Placed by CM/SW:      [facility]  [address], [address]  [place] [address]  [phone]  4/21: Referral Sent   4/22: No weekend admissions available- call requested Monday    [facility]  [address] [place] [address]  [phone]  4/21: Referral Sent   4/22: No weekend admissions available- call requested Monday    [facility]  [address] [place] [address]  [phone]  Ph: [phone]   F: [phone]  4/17: CHW spoke with admissions coordinator who said they never received the referral. CHW hard faxed at 3:10 pm.   4/18: CHW LVM to follow-up on referral at 2:22 pm.   4/19: CHW LVM at 2:13 pm to follow-up on referral.   4/20: Writer left message with [facility] Admissions Coordinator at [phone] requesting a call back regarding referral status.  4/21: Writer left message with [facility] Admissions requesting a call back regarding referral status. Writer called facility directly and they will email them as well.  4/22: No weekend admissions available- call requested Monday    [facility]  [address] [place] [address]  Ph: [phone]   F: [phone]  4/17: CHW spoke with admissions coordinator who said they never received the referral. CHW hard faxed at 3:10 pm.   4/18: CHW LVM to follow-up on referral at 2:22 pm  4/19: CHW LVM  at 2:13 pm to follow-up on referral.    4/20: Writer left message with [facility] Admissions Coordinator at [phone] requesting a call back regarding referral status.  4/21: Writer left message with [facility] Admissions requesting a call back regarding referral status. Writer called facility directly and they will email them as well.  4/22: No weekend admissions available- call requested Monday    Declined Referrals     [facility] - Declined due to no bed available.  [facility] - Declined due to they no longer have a TCU.  [facility] - Declined due to not appropriate for services.     Private pay costs discussed: Not applicable    Additional Information:  SW following for discharge planning. Unfortunately, weekend admissions are not available for the selected facilities, requesting return calls on Monday. SW will continue to follow for psychosocial support, resources and advocate on behalf of the patient.    Geovanna Fan   [facility]  Outpatient Kidney/Pancreas/Auto Islet Transplant Program     Searchable on [employer] SOCIAL WORK - for text paging options    Saturday & Sunday & Holidays    On-Call Pager [phone]    4A 4C 4E 5A 5B         [phone]    6A 6B 6C 6D               [phone]    5C 7A 7B 7C 7D         [phone]      For Hours 1600-Midnight Pager [phone]    TALI Duque

## 2023-04-22 NOTE — PROGRESS NOTES
[facility]  CARDIOLOGY HEART FAILURE SERVICE (CARDS II) PROGRESS NOTE        Patient Name: Rohit Garvey    Medical Record Number: [id number]    YOB: 1955  PCP: Kevyn Salazar    Admit Date/Time: 4/8/2023  3:08 PM       Faculty Attestation  Joseph Mitchell M.D.    I personally saw and examined this patientwith fellow, reviewed recent laboratories and imaging studies, discussed the case with the housestaff, and conveyed plan to the patient.  I answered all questions from patient and/or family. I agree with the examination, assessment and plan outlined here.        Assessment and Plan:  Rohit Garvey is a 67 year old adult male with a PMH significant for NICM (EF of 40-45%) c/b paroxysmal VT s/p dual chamber ICD placement 6/20/2022 c/b recurrent ICD shocks s/p VT ablation 10/31/2022 and re-ablation 12/29/2022, paroxysmal Afib on apixaban, developmental delay (possibly related to viral illnesses during infancy), T2DM, hypertension, and hyperlipidemia who was admitted as a transfer from [place] per recommendations of his cardiologists Dr. Urbina and Dr. Chanel for evaluation by advanced heart failure failure team and EP team for VT management and consideration of advanced therapies.    Today's updates:  - Medically ready for discharge. Waiting for TCU bed   - Continuing to make good progress w/ PT. Possible pt may be able to discharge back to [place] next week based on updated PT recs      # Persistent Paroxysmal Ventricular Tachycardia s/p dual chamber ICD placement 6/2022 and VT ablation x 2  # Chronic HFrEF 2/2 NICM w/ LVEF 40-45%, NYHA II  High burden of ventricular arrhythmias despite being on 2 antiarrhythmic medications (amiodarone and mexiletine) and being s/p ICD and two attempted VT ablations. Transferred here for evaluation for advanced therapies. ECHO 4/9 showed mildly reduced EF from 46% (Dec 2022) to 40-45%, however still does not qualify him for LVAD (EF <  25%). Possible heart transplant candidate if he meets criteria for VT storm.   VT episode at 0700 on 4/14. Required ATP and ICD shock. Pt not interested in any further cardiac procedures including ablation.   - EP consulted, appreciate recs   - ICD device interrogation ordered - 1 VT episode on 4/9/23 lasting 25 seconds   - VT episode at 0700 on 4/14. Required ATP and ICD shock. Per sister, EP adjusted ICD after this episode.    - Patient not interested in any operative/procedural interventions. EP does not plan to change his AAT or ICD settings. No need to go to hospital for outpatient ICD shocks unless >3 a day. Asked patient to notify outpatient providers if he is shocked by his ICD.   - Continue tele  - Per GOC discussion w/ patient and sister 4/21: changed from full code to DNR/DNI status w/ plan to keep ICD on for the meantime   - SW consulted for drafting updated advance care directive to reflect new DNR/DNI status     Advanced therapies evaluation workup:  - ECHO 4/9 w/ mildly reduced LV function (EF reduction from 46% to 40-45%) and mild aortic valve calcification   - RHC 4/11:   RA mean 11mmHg. RV 38/4. RVEDP 13. PA 39/16/25. PCWP 16  TD CO/CI: 3.85 L/min / 2.25 L/min/m2  Shena CO/CI: 3.37 L/min / 1.97 L/min/m2    - Cardiopulmonary stress test cancelled as pt deemed not physically able to perform test per RN  - Advanced therapy  following   - Neuropsych testing 4/11 and 4/12  - Financial consult already placed  - Utox screen positive for amphetamines. Quant screen negative, indicating false positive on initial screen.   - Nicotine and cotinine screen negative. PEth negative  - Discussed at 4/14 heart transplant candidacy review     Patient ultimately decided to not pursue heart transplant or ablation. Goal is to go to TCU w/ plan to either return to his AL or LTC w/ consideration of starting hospice. No further advanced therapies workup will be pursued at this time.      GDMTs:  -  Antiarrhythmics: PTA amiodarone 200 mg BID and mexiletine 200 mg q8h   - ACEi/ARB: Not currently on due to LVEF of 45%.  - BB:   - Continue metoprolol succinate 12.5 mg daily    - Holding PTA carvedilol 3.125 mg BID 2/2 soft bps   - Aldosterone antagonist: Continue spironolactone 25 mg daily   - SGLT2i: Continue PTA empagliflozin 10 mg daily  - SCD prophylaxis: S/p secondary prevention ICD 6/20/22  - Volume status: Euvolemic; continue lasix 40 mg qday                - Discontinued PTA KCl supplement 20 meq daily now that spironolactone is added                - Continue PTA magnesium oxide 800 mg BID               - BMP BID w/ goal of K > 4 and Mg > 2               - Strict I/Os and daily weights   - Anticoagulation: PTA Eliquis 5 mg BID   - Statin therapy: PTA atorvastatin 80 mg daily   - OT to assist in compression stockings     # UTI vs sterile pyuria, resolved    UA w/ large leuks and 131 WBCs w/ negative nitrites. Pt reported intermittent dysuria. Started on IV ceftriaxone. UC returned negative for bacteria.   - Completed 5 days of Augmentin (4/15-4/19)   - PTA empagliflozin held during treatment of UTI. Resumed 4/20      # Chronic Diabetic R heel ulcer  Present since Dec 2022, per sister. RN noticed erythema and ?purulent drainage from area 4/15. WOC consulted and deemed ulcer not actively infected. Recommend q3 day dressing changes.   - wound care and dressing changes per WOC orders  - f/u outpatient w/ podiatry      # Positive U tox for amphetamines  Pt and sister deny any illegal or legal stimulant use. No obvious PTA drugs on inspection that could be causing a false positive. Urine quant screen ultimately showed < 50 ng/mL amphetamines, well below the level to indicate a positive result. Suspect false positive on urine tox screen.   - No further workup/intervention needed      # Recent clavicular fracture  At baseline uses walker for ambulation. Had recent clavicle fracture now requiring him to be  wheelchair bound. Supposed to be non weight bearing on LUE for another 4 weeks   - Fall precautions  - NWB LUE precautions provided   - PT/OT following, recommending TCU placement   - SW placed referrals for TCU. Awaiting bed availability     # Afib w/o RVR  - metoprolol, amiodarone, and mexiletine   - PTA Eliquis 5 mg BID  - Tele     # ROSALINO on CKD, resolved  Uptrending Cr above baseline Cr of 1.3. No observed mention of etiology of pt's CKD in chart. UA w/o protein, RBCs, or casts. Suspect prerenal as Cr improved after diuresis held and has remained stable since restarting diuresis at a lower dose   - diuresis per above   - BID BMP     # Acute on chronic normocytic anemia concerning for mixed picture of iron deficiency and inflammatory   # Iron deficiency   # Hx of anemia of chronic disease   Hgb 8.7 on arrival and downtrending. Baseline Hgb ~9-10 reportedly 2/2 anemia of chronic disease. Tbili normal. No source of active bleeding. Workup revealed low iron level, normal TIBC, and low end-of-normal ferritin concerning for iron deficiency anemia.   - s/p IV Iron sucrose 200 mg qday x 5 days  - AM CBCs     # Chronic hyponatremia, intermittent   BL sodium ~130-133. Suspect hypervolemic hyponatremia in setting of CHF. Improved after IV diuresis   - daily BMP    # DM2   On metformin 1,000 mg BID PTA.   - hold PTA metformin, plan to resume on discharge   - Holding empagliflozin as above   - LDSS  - Hypoglycemia protocol      # BPH  - PTA tamulosin  - CTM w/ bladder scans and PVRs PRN     # Developmental delay   Lives at assisted living where he receives assistance w/ medications, meals, etc. Independent in basic ADLs (feeding, clothing, bathing) at his baseline. Sister Peg is his HCA and POA but pt currently has capacity   - Delirium precautions      # Constipation   - PTA metamucil     # R coccyx wound  - WOC consulted, appreciate recs:  - If pt has constant incontinent loose stools needing dressing changes Q shift  recommend discontinuing the Mepilex dressing and applying criticaid barrier paste BID and PRN.     FEN: Soft bite sized (due to lack of dentition), 2 L fluid restriction   PPx: PTA Eliquis   Code: DNR/DNI  Dispo: Discharge to TCU once bed available    Pt was discussed and evaluated with Dr. Mitchell, attending physician, who agrees with the assessment and plan above.     Prachi Call, DO  Internal Medicine, PGY-I    Interval Changes in Past 24 Hours:   No acute events overnight. No new shocks. Doing well this AM. No complaints. Working well with PT and now able to ambulate around the floor w/ assist     Review Of Systems  A 4-point ROS was negative aside from those listed above.    OBJECTIVE FINDINGS:    Temp:  [97.5  F (36.4  C)-98.3  F (36.8  C)] 97.6  F (36.4  C)  Pulse:  [59-76] 60  Resp:  [14-16] 16  BP: ()/(51-70) 99/60  SpO2:  [97 %-99 %] 97 %     BP 89/51 (MAP 63), repeat 89/67 w/ MAP of 79     I/Os: net even yesterday.    Intake/Output Summary (Last 24 hours) at 4/18/2023 0809  Last data filed at 4/18/2023 0600  Gross per 24 hour   Intake 920 ml   Output 550 ml   Net 370 ml     Vitals:    04/20/23 0500 04/21/23 0544 04/22/23 0611   Weight: 63 kg (138 lb 12.8 oz) 62.3 kg (137 lb 4.8 oz) 61 kg (134 lb 6.4 oz)     Gen: Sitting in chair, NAD  Neck: JVP ~8, unchanged  Resp: clear to auscultation bilaterally, no crackles or wheezing   CV: Irregular rhythm, regular rate.   Abd: soft, NT, ND  Ext: warm and well perfused, 1+ lower extremity edema to level of knees    Current medications   Current Facility-Administered Medications   Medication     acetaminophen (TYLENOL) Suppository 650 mg     acetaminophen (TYLENOL) tablet 650 mg     alum & mag hydroxide-simethicone (MAALOX) suspension 30 mL     amiodarone (PACERONE) tablet 200 mg     apixaban ANTICOAGULANT (ELIQUIS) tablet 5 mg     artificial tears ophthalmic ointment     atorvastatin (LIPITOR) tablet 80 mg     calcium carbonate-vitamin D (CALTRATE) 600-10  MG-MCG per tablet 1 tablet     carboxymethylcellulose PF (REFRESH PLUS) 0.5 % ophthalmic solution 1 drop     glucose gel 15-30 g    Or     dextrose 50 % injection 25-50 mL    Or     glucagon injection 1 mg     empagliflozin (JARDIANCE) tablet 10 mg     furosemide (LASIX) tablet 40 mg     insulin aspart (NovoLOG) injection (RAPID ACTING)     insulin aspart (NovoLOG) injection (RAPID ACTING)     lidocaine (LMX4) cream     lidocaine 1 % 0.1-1 mL     magnesium oxide (MAG-OX) tablet 800 mg     medication instruction     metoprolol succinate ER (TOPROL-XL) 24 hr half-tab 12.5 mg     mexiletine (MEXITIL) capsule 200 mg     miconazole (MICATIN) 2 % powder     nitroGLYcerin (NITROSTAT) sublingual tablet 0.4 mg     psyllium (METAMUCIL/KONSYL) capsule 1 capsule     sodium chloride (PF) 0.9% PF flush 3 mL     sodium chloride (PF) 0.9% PF flush 3 mL     spironolactone (ALDACTONE) tablet 25 mg     tamsulosin (FLOMAX) capsule 0.4 mg     LABS Reviewed:  Na 138, K 4.5, Mg 2.2  Cr 1.37 (1.40)    IMAGES Reviewed:    ECHO 4/9/23 Interpretation Summary:  Left ventricular function is decreased. The ejection fraction is 40-45%  (mildly reduced compared to 46% back in Dec 2022).   LV size and wall thickness is normal. Abnormal septal wall motion consistent w/ pacemaker.   Global right ventricular function is normal.  Mild aortic valve calcification is present. The mean gradient across the  aortic valve is 8 mmHg.  Pulmonary artery systolic pressure is normal.  IVC diameter and respiratory changes fall into an intermediate range  suggesting an RA pressure of 8 mmHg.  No pericardial effusion is present.    ICD interrogation 4/10/23:  Device: Abbott [id number] Telephone HF  Normal Device Function.   Mode: DDDR 60-90 bpm  AP: 49%  BVP: 91%  Intrinsic rhythm: CHB w/ AS @ 60 bpm; no intrinsic R-waves at VVI 30 bpm  Lead Trends Appear Stable: Yes  Estimated battery longevity to RRT = 5.9 years. Remaining Capacity to OSCAR = 92%.  Atrial arrhythmia:  None  AF burden: 0%  Anticoagulant: Eliquis  Ventricular Arrhythmia: 1 VT episode recorded on 4/9/23 at 9:43 AM, lasting ~25 seconds at 114 bpm. EGM reveals a slow VT which is converted with one sequence of ATP.  Setting changes: None    RHC 4/11/23:  RA mean 11mmHg  RV 40/11  PA 39/16/25  PCWP 16    Pa Sat 49.9%  Art Sat 99%    TD CO/CI: 3.85 L/min / 2.25 L/min/m2  Shena CO/CI: 3.37 L/min / 1.97 L/min/m2    PVR 2.3 (TD)  SVR  1538 (TD) Right sided filling pressures are mildly elevated. Left sided filling pressures are mildly elevated. Mild elevated pulmonary hypertension. Reduced cardiac output level. Hemodynamic data has been modified in Epic per physician review.

## 2023-04-23 LAB
ANION GAP SERPL CALCULATED.3IONS-SCNC: 10 MMOL/L (ref 7–15)
BUN SERPL-MCNC: 22.7 MG/DL (ref 8–23)
CALCIUM SERPL-MCNC: 9.2 MG/DL (ref 8.8–10.2)
CHLORIDE SERPL-SCNC: 105 MMOL/L (ref 98–107)
CREAT SERPL-MCNC: 1.34 MG/DL (ref 0.51–1.17)
DEPRECATED HCO3 PLAS-SCNC: 23 MMOL/L (ref 22–29)
ERYTHROCYTE [DISTWIDTH] IN BLOOD BY AUTOMATED COUNT: 22.4 % (ref 10–15)
GFR SERPL CREATININE-BSD FRML MDRD: 58 ML/MIN/1.73M2
GLUCOSE BLDC GLUCOMTR-MCNC: 129 MG/DL (ref 70–99)
GLUCOSE BLDC GLUCOMTR-MCNC: 178 MG/DL (ref 70–99)
GLUCOSE BLDC GLUCOMTR-MCNC: 183 MG/DL (ref 70–99)
GLUCOSE BLDC GLUCOMTR-MCNC: 184 MG/DL (ref 70–99)
GLUCOSE SERPL-MCNC: 149 MG/DL (ref 70–99)
HCT VFR BLD AUTO: 32.9 % (ref 35–53)
HGB BLD-MCNC: 10 G/DL (ref 11.7–17.7)
MAGNESIUM SERPL-MCNC: 2 MG/DL (ref 1.7–2.3)
MAGNESIUM SERPL-MCNC: 2.8 MG/DL (ref 1.7–2.3)
MCH RBC QN AUTO: 25.8 PG (ref 26.5–33)
MCHC RBC AUTO-ENTMCNC: 30.4 G/DL (ref 31.5–36.5)
MCV RBC AUTO: 85 FL (ref 78–100)
PLATELET # BLD AUTO: 216 10E3/UL (ref 150–450)
POTASSIUM SERPL-SCNC: 4.3 MMOL/L (ref 3.4–5.3)
RBC # BLD AUTO: 3.87 10E6/UL (ref 3.8–5.9)
SODIUM SERPL-SCNC: 138 MMOL/L (ref 136–145)
WBC # BLD AUTO: 7.6 10E3/UL (ref 4–11)

## 2023-04-23 PROCEDURE — 83735 ASSAY OF MAGNESIUM: CPT

## 2023-04-23 PROCEDURE — 214N000001 HC R&B CCU UMMC

## 2023-04-23 PROCEDURE — 250N000013 HC RX MED GY IP 250 OP 250 PS 637: Performed by: INTERNAL MEDICINE

## 2023-04-23 PROCEDURE — 250N000013 HC RX MED GY IP 250 OP 250 PS 637: Performed by: STUDENT IN AN ORGANIZED HEALTH CARE EDUCATION/TRAINING PROGRAM

## 2023-04-23 PROCEDURE — 85027 COMPLETE CBC AUTOMATED: CPT

## 2023-04-23 PROCEDURE — 250N000011 HC RX IP 250 OP 636: Performed by: INTERNAL MEDICINE

## 2023-04-23 PROCEDURE — 80048 BASIC METABOLIC PNL TOTAL CA: CPT

## 2023-04-23 PROCEDURE — 250N000013 HC RX MED GY IP 250 OP 250 PS 637

## 2023-04-23 PROCEDURE — 99231 SBSQ HOSP IP/OBS SF/LOW 25: CPT | Mod: GC | Performed by: INTERNAL MEDICINE

## 2023-04-23 PROCEDURE — 36415 COLL VENOUS BLD VENIPUNCTURE: CPT

## 2023-04-23 RX ORDER — MAGNESIUM SULFATE HEPTAHYDRATE 40 MG/ML
2 INJECTION, SOLUTION INTRAVENOUS ONCE
Status: COMPLETED | OUTPATIENT
Start: 2023-04-23 | End: 2023-04-23

## 2023-04-23 RX ADMIN — AMIODARONE HYDROCHLORIDE 200 MG: 200 TABLET ORAL at 20:09

## 2023-04-23 RX ADMIN — FUROSEMIDE 40 MG: 40 TABLET ORAL at 08:27

## 2023-04-23 RX ADMIN — MICONAZOLE NITRATE: 20 POWDER TOPICAL at 20:09

## 2023-04-23 RX ADMIN — ATORVASTATIN CALCIUM 80 MG: 80 TABLET, FILM COATED ORAL at 08:28

## 2023-04-23 RX ADMIN — Medication 1 CAPSULE: at 08:27

## 2023-04-23 RX ADMIN — TAMSULOSIN HYDROCHLORIDE 0.4 MG: 0.4 CAPSULE ORAL at 20:08

## 2023-04-23 RX ADMIN — Medication 800 MG: at 20:08

## 2023-04-23 RX ADMIN — EMPAGLIFLOZIN 10 MG: 10 TABLET, FILM COATED ORAL at 08:27

## 2023-04-23 RX ADMIN — AMIODARONE HYDROCHLORIDE 200 MG: 200 TABLET ORAL at 08:28

## 2023-04-23 RX ADMIN — INSULIN ASPART 1 UNITS: 100 INJECTION, SOLUTION INTRAVENOUS; SUBCUTANEOUS at 17:43

## 2023-04-23 RX ADMIN — APIXABAN 5 MG: 5 TABLET, FILM COATED ORAL at 08:27

## 2023-04-23 RX ADMIN — INSULIN ASPART 1 UNITS: 100 INJECTION, SOLUTION INTRAVENOUS; SUBCUTANEOUS at 12:58

## 2023-04-23 RX ADMIN — MEXILETINE HYDROCHLORIDE 200 MG: 200 CAPSULE ORAL at 21:25

## 2023-04-23 RX ADMIN — WHITE PETROLATUM 57.7 %-MINERAL OIL 31.9 % EYE OINTMENT: at 21:26

## 2023-04-23 RX ADMIN — SPIRONOLACTONE 25 MG: 25 TABLET, FILM COATED ORAL at 08:28

## 2023-04-23 RX ADMIN — MEXILETINE HYDROCHLORIDE 200 MG: 200 CAPSULE ORAL at 05:58

## 2023-04-23 RX ADMIN — MEXILETINE HYDROCHLORIDE 200 MG: 200 CAPSULE ORAL at 13:00

## 2023-04-23 RX ADMIN — CALCIUM CARBONATE 600 MG (1,500 MG)-VITAMIN D3 400 UNIT TABLET 1 TABLET: at 08:28

## 2023-04-23 RX ADMIN — Medication 800 MG: at 08:28

## 2023-04-23 RX ADMIN — MAGNESIUM SULFATE IN WATER 2 G: 40 INJECTION, SOLUTION INTRAVENOUS at 12:58

## 2023-04-23 RX ADMIN — Medication 12.5 MG: at 08:27

## 2023-04-23 RX ADMIN — APIXABAN 5 MG: 5 TABLET, FILM COATED ORAL at 20:08

## 2023-04-23 RX ADMIN — MICONAZOLE NITRATE: 20 POWDER TOPICAL at 17:43

## 2023-04-23 ASSESSMENT — ACTIVITIES OF DAILY LIVING (ADL)
ADLS_ACUITY_SCORE: 40

## 2023-04-23 NOTE — PLAN OF CARE
D: PMH of NICM (EF 40-45%) c/b paroxysmal VT s/p dual chamber ICD placement 6/20/2022 c/b recurrent ICD shocks s/p VT ablation 10/31/2022 and re-ablation 12/29/2022, paroxysmal Afib on apixaban, developmental delay, DM2, HTN, and HLD. Admitted for evaluation for VT management and consideration of advanced therapies     A:   Neuro: A/Ox4. Calls appropriately. Cooperative with cares. Denies headache, dizziness, and lightheadedness.  Cardiac/Tele: VSS. AV paced. HR 60's-80's. Afebrile. Denies chest pain and palpitations.   Respiratory: Sats >95% on room air. Denies SOB.  GI/: Primofit in place with adequate urine output. Last BM 4/22.  Diet/Appetite: Low saturated fat diet, 2LFR.  Skin: No new deficits noted. BLE compression socks taken off overnight. Mepilex to sacrum and R heel CDI. To wear black sling on left arm when out of bed.  LDAs: L PIV- SL  Activity: 1 assist with walker and gait belt.  Pain: Denies      P: Awaiting TCU bed. Continue to monitor and notify Cards 2 with changes.

## 2023-04-23 NOTE — PROGRESS NOTES
[facility]  CARDIOLOGY HEART FAILURE SERVICE (CARDS II) PROGRESS NOTE    I personally saw and examined this patient, reviewed imaging and laboratory studies, confirmed physical examination and discussed results and plan with patient and or family.    Patient Name: Rohit Garvey    Medical Record Number: [id number]    YOB: 1955  PCP: Kevyn Salazar    Admit Date/Time: 4/8/2023  3:08 PM     Assessment and Plan:  Rohit Garvey is a 67 year old adult male with a PMH significant for NICM (EF of 40-45%) c/b paroxysmal VT s/p dual chamber ICD placement 6/20/2022 c/b recurrent ICD shocks s/p VT ablation 10/31/2022 and re-ablation 12/29/2022, paroxysmal Afib on apixaban, developmental delay (possibly related to viral illnesses during infancy), T2DM, hypertension, and hyperlipidemia who was admitted as a transfer from [place] per recommendations of his cardiologists Dr. Urbina and Dr. Chanel for evaluation by advanced heart failure failure team and EP team for VT management and consideration of advanced therapies.    Today's updates:  - Mg replacement IV  - Medically ready for discharge. Waiting for TCU bed   - Continuing to make good progress w/ PT. Possible pt may be able to discharge back to AL next week based on updated PT recs      # Persistent Paroxysmal Ventricular Tachycardia s/p dual chamber ICD placement 6/2022 and VT ablation x 2  # Chronic HFrEF 2/2 NICM w/ LVEF 40-45%, NYHA II  High burden of ventricular arrhythmias despite being on 2 antiarrhythmic medications (amiodarone and mexiletine) and being s/p ICD and two attempted VT ablations. Transferred here for evaluation for advanced therapies. ECHO 4/9 showed mildly reduced EF from 46% (Dec 2022) to 40-45%, however still does not qualify him for LVAD (EF < 25%). Possible heart transplant candidate if he meets criteria for VT storm.   VT episode at 0700 on 4/14. Required ATP and ICD shock. Pt not interested in  any further cardiac procedures including ablation.   - EP consulted, appreciate recs   - ICD device interrogation ordered - 1 VT episode on 4/9/23 lasting 25 seconds   - VT episode at 0700 on 4/14. Required ATP and ICD shock. Per sister, EP adjusted ICD after this episode.    - Patient not interested in any operative/procedural interventions. EP does not plan to change his AAT or ICD settings. No need to go to hospital for outpatient ICD shocks unless >3 a day. Asked patient to notify outpatient providers if he is shocked by his ICD.   - Continue tele  - Per GOC discussion w/ patient and sister 4/21: changed from full code to DNR/DNI status w/ plan to keep ICD on for the meantime   - SW consulted for drafting updated advance care directive to reflect new DNR/DNI status     Advanced therapies evaluation workup:  - ECHO 4/9 w/ mildly reduced LV function (EF reduction from 46% to 40-45%) and mild aortic valve calcification   - RHC 4/11:   RA mean 11mmHg. RV 38/4. RVEDP 13. PA 39/16/25. PCWP 16  TD CO/CI: 3.85 L/min / 2.25 L/min/m2  Shena CO/CI: 3.37 L/min / 1.97 L/min/m2    - Cardiopulmonary stress test cancelled as pt deemed not physically able to perform test per RN  - Advanced therapy  following   - Neuropsych testing 4/11 and 4/12  - Financial consult already placed  - Utox screen positive for amphetamines. Quant screen negative, indicating false positive on initial screen.   - Nicotine and cotinine screen negative. PEth negative  - Discussed at 4/14 heart transplant candidacy review     Patient ultimately decided to not pursue heart transplant or ablation. Goal is to go to TCU w/ plan to either return to his AL or LTC w/ consideration of starting hospice. No further advanced therapies workup will be pursued at this time.      GDMTs:  - Antiarrhythmics: PTA amiodarone 200 mg BID and mexiletine 200 mg q8h   - ACEi/ARB: Not currently on due to LVEF of 45%.  - BB:   - Continue metoprolol succinate 12.5 mg  daily    - Holding PTA carvedilol 3.125 mg BID 2/2 soft bps   - Aldosterone antagonist: Continue spironolactone 25 mg daily   - SGLT2i: Continue PTA empagliflozin 10 mg daily  - SCD prophylaxis: S/p secondary prevention ICD 6/20/22  - Volume status: Euvolemic; continue lasix 40 mg qday                - Discontinued PTA KCl supplement 20 meq daily now that spironolactone is added                - Continue PTA magnesium oxide 800 mg BID               - BMP BID w/ goal of K > 4 and Mg > 2               - Strict I/Os and daily weights   - Anticoagulation: PTA Eliquis 5 mg BID   - Statin therapy: PTA atorvastatin 80 mg daily   - OT to assist in compression stockings     # UTI vs sterile pyuria, resolved    UA w/ large leuks and 131 WBCs w/ negative nitrites. Pt reported intermittent dysuria. Started on IV ceftriaxone. UC returned negative for bacteria.   - Completed 5 days of Augmentin (4/15-4/19)   - PTA empagliflozin held during treatment of UTI. Resumed 4/20      # Chronic Diabetic R heel ulcer  Present since Dec 2022, per sister. RN noticed erythema and ?purulent drainage from area 4/15. WOC consulted and deemed ulcer not actively infected. Recommend q3 day dressing changes.   - wound care and dressing changes per WOC orders  - f/u outpatient w/ podiatry      # Positive U tox for amphetamines  Pt and sister deny any illegal or legal stimulant use. No obvious PTA drugs on inspection that could be causing a false positive. Urine quant screen ultimately showed < 50 ng/mL amphetamines, well below the level to indicate a positive result. Suspect false positive on urine tox screen.   - No further workup/intervention needed      # Recent clavicular fracture  At baseline uses walker for ambulation. Had recent clavicle fracture now requiring him to be wheelchair bound. Supposed to be non weight bearing on LUE for another 4 weeks   - Fall precautions  - NWB LUE precautions provided   - PT/OT following, recommending TCU  placement   - SW placed referrals for TCU. Awaiting bed availability     # Afib w/o RVR  - metoprolol, amiodarone, and mexiletine   - PTA Eliquis 5 mg BID  - Tele     # ROSALINO on CKD, resolved  Uptrending Cr above baseline Cr of 1.3. No observed mention of etiology of pt's CKD in chart. UA w/o protein, RBCs, or casts. Suspect prerenal as Cr improved after diuresis held and has remained stable since restarting diuresis at a lower dose   - diuresis per above   - BID BMP     # Acute on chronic normocytic anemia concerning for mixed picture of iron deficiency and inflammatory   # Iron deficiency   # Hx of anemia of chronic disease   Hgb 8.7 on arrival and downtrending. Baseline Hgb ~9-10 reportedly 2/2 anemia of chronic disease. Tbili normal. No source of active bleeding. Workup revealed low iron level, normal TIBC, and low end-of-normal ferritin concerning for iron deficiency anemia.   - s/p IV Iron sucrose 200 mg qday x 5 days  - AM CBCs     # Chronic hyponatremia, intermittent   BL sodium ~130-133. Suspect hypervolemic hyponatremia in setting of CHF. Improved after IV diuresis   - daily BMP    # DM2   On metformin 1,000 mg BID PTA.   - hold PTA metformin, plan to resume on discharge   - Holding empagliflozin as above   - LDSS  - Hypoglycemia protocol      # BPH  - PTA tamulosin  - CTM w/ bladder scans and PVRs PRN     # Developmental delay   Lives at assisted living where he receives assistance w/ medications, meals, etc. Independent in basic ADLs (feeding, clothing, bathing) at his baseline. Sister Citlali is his HCA and POA but pt currently has capacity   - Delirium precautions      # Constipation   - PTA metamucil     # R coccyx wound  - WOC consulted, appreciate recs:  - If pt has constant incontinent loose stools needing dressing changes Q shift recommend discontinuing the Mepilex dressing and applying criticaid barrier paste BID and PRN.     FEN: Soft bite sized (due to lack of dentition), 2 L fluid restriction   PPx:  PTA Eliquis   Code: DNR/DNI  Dispo: Discharge to TCU once bed available    Pt was discussed and evaluated with Dr. Mitchell, attending physician, who agrees with the assessment and plan above.     Prachi Call DO  Internal Medicine, PGY-I    Interval Changes in Past 24 Hours:   No acute events overnight. No new shocks. Doing well this AM. No complaints.     Review Of Systems  A 4-point ROS was negative aside from those listed above.    OBJECTIVE FINDINGS:    Temp:  [97.6  F (36.4  C)-98.4  F (36.9  C)] 97.7  F (36.5  C)  Pulse:  [60-88] 85  Resp:  [16-18] 16  BP: ()/(60-85) 110/85  Cuff Mean (mmHg):  [77] 77  SpO2:  [95 %-99 %] 95 %     -110/70-80s    I/Os: -2.2 L yesterday, -500 ml since midnight     Intake/Output Summary (Last 24 hours) at 4/18/2023 0809  Last data filed at 4/18/2023 0600  Gross per 24 hour   Intake 920 ml   Output 550 ml   Net 370 ml     Vitals:    04/21/23 0544 04/22/23 0611 04/23/23 0610   Weight: 62.3 kg (137 lb 4.8 oz) 61 kg (134 lb 6.4 oz) 59.1 kg (130 lb 3.2 oz)     Gen: Sitting in chair, NAD  Neck: JVP ~8, unchanged  Resp: clear to auscultation bilaterally, no crackles or wheezing   CV: Irregular rhythm, regular rate.   Abd: soft, NT, ND  Ext: warm and well perfused, 1+ lower extremity edema to level of knees    Current medications   Current Facility-Administered Medications   Medication     acetaminophen (TYLENOL) Suppository 650 mg     acetaminophen (TYLENOL) tablet 650 mg     alum & mag hydroxide-simethicone (MAALOX) suspension 30 mL     amiodarone (PACERONE) tablet 200 mg     apixaban ANTICOAGULANT (ELIQUIS) tablet 5 mg     artificial tears ophthalmic ointment     atorvastatin (LIPITOR) tablet 80 mg     calcium carbonate-vitamin D (CALTRATE) 600-10 MG-MCG per tablet 1 tablet     carboxymethylcellulose PF (REFRESH PLUS) 0.5 % ophthalmic solution 1 drop     glucose gel 15-30 g    Or     dextrose 50 % injection 25-50 mL    Or     glucagon injection 1 mg     empagliflozin  (JARDIANCE) tablet 10 mg     furosemide (LASIX) tablet 40 mg     insulin aspart (NovoLOG) injection (RAPID ACTING)     insulin aspart (NovoLOG) injection (RAPID ACTING)     lidocaine (LMX4) cream     lidocaine 1 % 0.1-1 mL     magnesium oxide (MAG-OX) tablet 800 mg     medication instruction     metoprolol succinate ER (TOPROL-XL) 24 hr half-tab 12.5 mg     mexiletine (MEXITIL) capsule 200 mg     miconazole (MICATIN) 2 % powder     nitroGLYcerin (NITROSTAT) sublingual tablet 0.4 mg     psyllium (METAMUCIL/KONSYL) capsule 1 capsule     sodium chloride (PF) 0.9% PF flush 3 mL     sodium chloride (PF) 0.9% PF flush 3 mL     spironolactone (ALDACTONE) tablet 25 mg     tamsulosin (FLOMAX) capsule 0.4 mg     LABS Reviewed:  Cr 1.3, Mg 2.0    IMAGES Reviewed:    ECHO 4/9/23 Interpretation Summary:  Left ventricular function is decreased. The ejection fraction is 40-45%  (mildly reduced compared to 46% back in Dec 2022).   LV size and wall thickness is normal. Abnormal septal wall motion consistent w/ pacemaker.   Global right ventricular function is normal.  Mild aortic valve calcification is present. The mean gradient across the  aortic valve is 8 mmHg.  Pulmonary artery systolic pressure is normal.  IVC diameter and respiratory changes fall into an intermediate range  suggesting an RA pressure of 8 mmHg.  No pericardial effusion is present.    ICD interrogation 4/10/23:  Device: Abbott [id number] [place] HF  Normal Device Function.   Mode: DDDR 60-90 bpm  AP: 49%  BVP: 91%  Intrinsic rhythm: CHB w/ AS @ 60 bpm; no intrinsic R-waves at VVI 30 bpm  Lead Trends Appear Stable: Yes  Estimated battery longevity to RRT = 5.9 years. Remaining Capacity to OSCAR = 92%.  Atrial arrhythmia: None  AF burden: 0%  Anticoagulant: Eliquis  Ventricular Arrhythmia: 1 VT episode recorded on 4/9/23 at 9:43 AM, lasting ~25 seconds at 114 bpm. EGM reveals a slow VT which is converted with one sequence of ATP.  Setting changes: None    RHC  4/11/23:  RA mean 11mmHg  RV 40/11  PA 39/16/25  PCWP 16    Pa Sat 49.9%  Art Sat 99%    TD CO/CI: 3.85 L/min / 2.25 L/min/m2  Shena CO/CI: 3.37 L/min / 1.97 L/min/m2    PVR 2.3 (TD)  SVR  1538 (TD) Right sided filling pressures are mildly elevated. Left sided filling pressures are mildly elevated. Mild elevated pulmonary hypertension. Reduced cardiac output level. Hemodynamic data has been modified in Epic per physician review.

## 2023-04-23 NOTE — PROGRESS NOTES
Care Management Follow Up    Length of Stay (days): 15    Expected Discharge Date: 04/20/2023     Concerns to be Addressed:     Psychosocial support, decision making   Patient plan of care discussed at interdisciplinary rounds: Yes    Anticipated Discharge Disposition: TCU     Anticipated Discharge Services:  Therapy Services   Anticipated Discharge DME:      Patient/family educated on Medicare website which has current facility and service quality ratings:    Education Provided on the Discharge Plan:    Patient/Family in Agreement with the Plan:      Referrals Placed by CM/SW:    [facility]  [address], [address]  [place] [address]  [phone]  4/21: Referral Sent   4/22: No weekend admissions available- call requested Monday     [facility]  [address] [place] [address]  [phone]  4/21: Referral Sent   4/22: No weekend admissions available- call requested Monday     [facility]  [address] [place] [address]  [phone]  Ph: [phone]   F: [phone]  4/17: CHW spoke with admissions coordinator who said they never received the referral. CHW hard faxed at 3:10 pm.   4/18: CHW LVM to follow-up on referral at 2:22 pm.   4/19: CHW LVM at 2:13 pm to follow-up on referral.   4/20: Writer left message with [facility] Admissions Coordinator at [phone] requesting a call back regarding referral status.  4/21: Writer left message with [facility] Admissions requesting a call back regarding referral status. Writer called facility directly and they will email them as well.  4/22: No weekend admissions available- call requested Monday     [facility]  [address] [place] [address]  Ph: [phone]   F: [phone]  4/17: CHW spoke with admissions coordinator who said they never received the referral. CHW hard faxed at 3:10 pm.   4/18: CHW LVM to follow-up on referral at 2:22 pm  4/19:  JUDEW LVM at 2:13 pm to follow-up on referral.    4/20: Writer left message with [facility] Admissions Coordinator at [phone] requesting a call back regarding referral status.  4/21: Writer left message with [facility] Admissions requesting a call back regarding referral status. Writer called facility directly and they will email them as well.  4/22: No weekend admissions available- call requested Monday     Declined Referrals     [facility] - Declined due to no bed available.  [facility] - Declined due to they no longer have a TCU.  [facility] - Declined due to not appropriate for services.  Private pay costs discussed: Not applicable    Additional Information:  SW received page that patient sister has questions on advanced care planning documentation. DESHAWN met with Rohit and sister Citlali. Citlali reports that she would like to confirm that Michael has ACP documents and is DNR status per his/family wishes. DESHAWN confirmed that we have ACP documents in Epic with his sister labeled as primary health care agent, brother is secondary.     He is current listed DNR on his code status. DESHAWN educated peg that this is what our institution has documented and we can change this based on request and a new HCD for health care agent change. Citlali was appreciative of support. DESHAWN will continue to follow for psychosocial support, resources and advocate on behalf of the patient.    Geovanna ARCOS [facility]  Outpatient Kidney/Pancreas/Auto Islet Transplant Program     Searchable on [employer]-search SOCIAL WORK - for text paging options      Saturday & Sunday & Holidays  On-Call Pager [phone]    4A 4C 4E 5A 5B         [phone]  6A 6B 6C 6D               [phone]  5C 7A 7B 7C 7D         [phone]    For Hours 1600-Midnight Pager [phone]

## 2023-04-23 NOTE — PLAN OF CARE
D: Pt was transferred from OSH on 4/8/23 for evaluation for advanced heart failure options and for VT management.      I: Monitored and assessed patient status; nursing cares provided.    A:   Neuro: A&Ox4, able to make needs known, cognitively delayed at baseline                                                     Cardiac: AV Paced, HR 60-80's  Respiratory: Lungs clear, on RA, denies shortness of breath  GI/: +BS, last BM 4/22; male external catheter changed, adequate urine output  Diet/Appetite: Low fat diet, good appetite, BS checks ac & hs, minimal insulin required    Skin: Right heel wound dressing changed; due next on 4/25. Sacral pressure injury dressing change ordered for every 3 days, next drsg change due 4/24.  Pain: Denies  Labs/Lytes: Mg 2.0; replacement via IV per MD request as pt already receives scheduled oral magnesium daily.  LDA's: Left PIV SL'd  Activity: Up with assist of 1; ambulated halls x2, up to chair several times today.    P: Pt continues to await bed availability at TCU. Continue with current plan of care; contact [place] [phone] for questions or concerns.

## 2023-04-24 ENCOUNTER — APPOINTMENT (OUTPATIENT)
Dept: PHYSICAL THERAPY | Facility: CLINIC | Age: 68
DRG: 287 | End: 2023-04-24
Attending: INTERNAL MEDICINE
Payer: MEDICARE

## 2023-04-24 ENCOUNTER — APPOINTMENT (OUTPATIENT)
Dept: OCCUPATIONAL THERAPY | Facility: CLINIC | Age: 68
DRG: 287 | End: 2023-04-24
Attending: INTERNAL MEDICINE
Payer: MEDICARE

## 2023-04-24 LAB
ANION GAP SERPL CALCULATED.3IONS-SCNC: 12 MMOL/L (ref 7–15)
BUN SERPL-MCNC: 26.4 MG/DL (ref 8–23)
CALCIUM SERPL-MCNC: 9.3 MG/DL (ref 8.8–10.2)
CHLORIDE SERPL-SCNC: 103 MMOL/L (ref 98–107)
CREAT SERPL-MCNC: 1.39 MG/DL (ref 0.51–1.17)
DEPRECATED HCO3 PLAS-SCNC: 22 MMOL/L (ref 22–29)
ERYTHROCYTE [DISTWIDTH] IN BLOOD BY AUTOMATED COUNT: 22.5 % (ref 10–15)
GFR SERPL CREATININE-BSD FRML MDRD: 56 ML/MIN/1.73M2
GLUCOSE BLDC GLUCOMTR-MCNC: 138 MG/DL (ref 70–99)
GLUCOSE BLDC GLUCOMTR-MCNC: 205 MG/DL (ref 70–99)
GLUCOSE BLDC GLUCOMTR-MCNC: 227 MG/DL (ref 70–99)
GLUCOSE BLDC GLUCOMTR-MCNC: 270 MG/DL (ref 70–99)
GLUCOSE SERPL-MCNC: 149 MG/DL (ref 70–99)
HCT VFR BLD AUTO: 35.7 % (ref 35–53)
HGB BLD-MCNC: 10.9 G/DL (ref 11.7–17.7)
MAGNESIUM SERPL-MCNC: 2.2 MG/DL (ref 1.7–2.3)
MAGNESIUM SERPL-MCNC: 2.2 MG/DL (ref 1.7–2.3)
MCH RBC QN AUTO: 26.1 PG (ref 26.5–33)
MCHC RBC AUTO-ENTMCNC: 30.5 G/DL (ref 31.5–36.5)
MCV RBC AUTO: 86 FL (ref 78–100)
PLATELET # BLD AUTO: 238 10E3/UL (ref 150–450)
POTASSIUM SERPL-SCNC: 4.7 MMOL/L (ref 3.4–5.3)
RBC # BLD AUTO: 4.17 10E6/UL (ref 3.8–5.9)
SODIUM SERPL-SCNC: 137 MMOL/L (ref 136–145)
WBC # BLD AUTO: 8.1 10E3/UL (ref 4–11)

## 2023-04-24 PROCEDURE — 97116 GAIT TRAINING THERAPY: CPT | Mod: GP

## 2023-04-24 PROCEDURE — 97530 THERAPEUTIC ACTIVITIES: CPT | Mod: GP

## 2023-04-24 PROCEDURE — 97535 SELF CARE MNGMENT TRAINING: CPT | Mod: GO

## 2023-04-24 PROCEDURE — 97530 THERAPEUTIC ACTIVITIES: CPT | Mod: GO

## 2023-04-24 PROCEDURE — 99231 SBSQ HOSP IP/OBS SF/LOW 25: CPT | Mod: GC | Performed by: INTERNAL MEDICINE

## 2023-04-24 PROCEDURE — 250N000013 HC RX MED GY IP 250 OP 250 PS 637

## 2023-04-24 PROCEDURE — 250N000013 HC RX MED GY IP 250 OP 250 PS 637: Performed by: INTERNAL MEDICINE

## 2023-04-24 PROCEDURE — 36415 COLL VENOUS BLD VENIPUNCTURE: CPT

## 2023-04-24 PROCEDURE — 85027 COMPLETE CBC AUTOMATED: CPT

## 2023-04-24 PROCEDURE — 80048 BASIC METABOLIC PNL TOTAL CA: CPT

## 2023-04-24 PROCEDURE — 250N000013 HC RX MED GY IP 250 OP 250 PS 637: Performed by: STUDENT IN AN ORGANIZED HEALTH CARE EDUCATION/TRAINING PROGRAM

## 2023-04-24 PROCEDURE — 83735 ASSAY OF MAGNESIUM: CPT

## 2023-04-24 PROCEDURE — 214N000001 HC R&B CCU UMMC

## 2023-04-24 RX ORDER — MULTIPLE VITAMINS W/ MINERALS TAB 9MG-400MCG
1 TAB ORAL DAILY
Status: DISCONTINUED | OUTPATIENT
Start: 2023-04-24 | End: 2023-04-28 | Stop reason: HOSPADM

## 2023-04-24 RX ADMIN — INSULIN ASPART 1 UNITS: 100 INJECTION, SOLUTION INTRAVENOUS; SUBCUTANEOUS at 11:18

## 2023-04-24 RX ADMIN — ATORVASTATIN CALCIUM 80 MG: 80 TABLET, FILM COATED ORAL at 08:01

## 2023-04-24 RX ADMIN — CALCIUM CARBONATE 600 MG (1,500 MG)-VITAMIN D3 400 UNIT TABLET 1 TABLET: at 08:02

## 2023-04-24 RX ADMIN — Medication 800 MG: at 19:53

## 2023-04-24 RX ADMIN — AMIODARONE HYDROCHLORIDE 200 MG: 200 TABLET ORAL at 19:53

## 2023-04-24 RX ADMIN — Medication 1 TABLET: at 13:41

## 2023-04-24 RX ADMIN — EMPAGLIFLOZIN 10 MG: 10 TABLET, FILM COATED ORAL at 08:01

## 2023-04-24 RX ADMIN — MEXILETINE HYDROCHLORIDE 200 MG: 200 CAPSULE ORAL at 06:13

## 2023-04-24 RX ADMIN — APIXABAN 5 MG: 5 TABLET, FILM COATED ORAL at 19:54

## 2023-04-24 RX ADMIN — MEXILETINE HYDROCHLORIDE 200 MG: 200 CAPSULE ORAL at 13:41

## 2023-04-24 RX ADMIN — MEXILETINE HYDROCHLORIDE 200 MG: 200 CAPSULE ORAL at 21:20

## 2023-04-24 RX ADMIN — MICONAZOLE NITRATE: 20 POWDER TOPICAL at 08:12

## 2023-04-24 RX ADMIN — Medication 800 MG: at 08:02

## 2023-04-24 RX ADMIN — Medication 1 CAPSULE: at 08:01

## 2023-04-24 RX ADMIN — APIXABAN 5 MG: 5 TABLET, FILM COATED ORAL at 08:01

## 2023-04-24 RX ADMIN — SPIRONOLACTONE 25 MG: 25 TABLET, FILM COATED ORAL at 11:19

## 2023-04-24 RX ADMIN — INSULIN ASPART 1 UNITS: 100 INJECTION, SOLUTION INTRAVENOUS; SUBCUTANEOUS at 17:37

## 2023-04-24 RX ADMIN — MICONAZOLE NITRATE: 20 POWDER TOPICAL at 19:58

## 2023-04-24 RX ADMIN — TAMSULOSIN HYDROCHLORIDE 0.4 MG: 0.4 CAPSULE ORAL at 19:53

## 2023-04-24 RX ADMIN — WHITE PETROLATUM 57.7 %-MINERAL OIL 31.9 % EYE OINTMENT: at 21:20

## 2023-04-24 RX ADMIN — Medication 12.5 MG: at 11:19

## 2023-04-24 RX ADMIN — AMIODARONE HYDROCHLORIDE 200 MG: 200 TABLET ORAL at 08:02

## 2023-04-24 ASSESSMENT — ACTIVITIES OF DAILY LIVING (ADL)
ADLS_ACUITY_SCORE: 40

## 2023-04-24 NOTE — PROGRESS NOTES
Care Management Follow Up    Length of Stay (days): 16    Expected Discharge Date:  4/25/23  Concerns to be Addressed: discharge planning      Patient plan of care discussed at interdisciplinary rounds: Yes    Anticipated Discharge Disposition: TCU (per pt, plan is to fly back with sister)     Anticipated Discharge Services:  TCU therapy services  Anticipated Discharge DME:  n/a    Patient/family educated on Medicare website which has current facility and service quality ratings:  yes  Education Provided on the Discharge Plan:  yes  Patient/Family in Agreement with the Plan:  yes    Referrals Placed by CM/SW:      PENDING  [facility]  [address], [address], [place] [address]  Phone: [phone]  - 4/24: DESHAWN called at 10:58am, got transferred to admissions, and left a VM requesting a call back with an update as soon as possible.     [facility]  [address] [place] [address]  Phone: [phone]  - 4/24: DESHAWN called at 11am, got transferred to admissions, and left a VM requesting a call back with an update as soon as possible.     [facility]  [address] [place] [address]  Admissions: [phone]   Fax: [phone]  [facility]  [address] [place] [address]  Admissions: [phone]   Fax: [phone]  - 4/24: DESHAWN called admissions at 11:03am and per admissions, they've had no beds for the last week or so but per admissions, there are potential discharges this week so possible openings. Admissions requested updated notes to review so DESHAWN printed updated notes from 4/21 until today and faxed those over at 11:29am.     DENIALS  [facility] - Declined due to no bed available.  [facility] - Declined due to they no longer have a TCU.  [facility] - Declined due to not appropriate for services.     Private pay costs discussed: Not applicable    Additional Information:  DESHAWN is following for  discharge planning.     Pt is medically ready for TCU discharge. See above in the referrals section for updates.    ___________________    ARTI Valdez, ORIN  6C   [facility]  Phone: [phone]  Pager: [phone]

## 2023-04-24 NOTE — PROGRESS NOTES
[facility]  CARDIOLOGY HEART FAILURE SERVICE (CARDS II) PROGRESS NOTE    I personally saw and examined this patient, reviewed imaging and laboratory studies, confirmed physical examination and discussed results and plan with patient and or family.    Patient Name: Rohit Garvey    Medical Record Number: [id number]    YOB: 1955  PCP: Kevyn Salazar    Admit Date/Time: 4/8/2023  3:08 PM     Assessment and Plan:  Rohit Garvey is a 67 year old adult male with a PMH significant for NICM (EF of 40-45%) c/b paroxysmal VT s/p dual chamber ICD placement 6/20/2022 c/b recurrent ICD shocks s/p VT ablation 10/31/2022 and re-ablation 12/29/2022, paroxysmal Afib on apixaban, developmental delay (possibly related to viral illnesses during infancy), T2DM, hypertension, and hyperlipidemia who was admitted as a transfer from [place] per recommendations of his cardiologists Dr. Urbina and Dr. Chanel for evaluation by advanced heart failure failure team and EP team for VT management and consideration of advanced therapies.    Today's updates:  - Held lasix today 2/2 lower MAPs this AM. Euvolemic on exam.   - PT to re-evaluate today and give updated dispo recs   - Medically ready for discharge. Waiting for TCU bed vs discharge back to AL pending PT recs      # Persistent Paroxysmal Ventricular Tachycardia s/p dual chamber ICD placement 6/2022 and VT ablation x 2  # Chronic HFrEF 2/2 NICM w/ LVEF 40-45%, NYHA II  High burden of ventricular arrhythmias despite being on 2 antiarrhythmic medications (amiodarone and mexiletine) and being s/p ICD and two attempted VT ablations. Transferred here for evaluation for advanced therapies. ECHO 4/9 showed mildly reduced EF from 46% (Dec 2022) to 40-45%, however still does not qualify him for LVAD (EF < 25%). Possible heart transplant candidate if he meets criteria for VT storm.   VT episode at 0700 on 4/14. Required ATP and ICD shock. Pt not  interested in any further cardiac procedures including ablation.   - EP consulted, appreciate recs   - ICD device interrogation ordered - 1 VT episode on 4/9/23 lasting 25 seconds   - VT episode at 0700 on 4/14. Required ATP and ICD shock. Per sister, EP adjusted ICD after this episode.    - Patient not interested in any operative/procedural interventions. EP does not plan to change his AAT or ICD settings. No need to go to hospital for outpatient ICD shocks unless >3 a day. Asked patient to notify outpatient providers if he is shocked by his ICD.   - Continue tele  - Per GOC discussion w/ patient and sister 4/21: changed from full code to DNR/DNI status w/ plan to keep ICD on for the meantime   - SW consulted for drafting updated advance care directive to reflect new DNR/DNI status     Advanced therapies evaluation workup:  - ECHO 4/9 w/ mildly reduced LV function (EF reduction from 46% to 40-45%) and mild aortic valve calcification   - RHC 4/11:   RA mean 11mmHg. RV 38/4. RVEDP 13. PA 39/16/25. PCWP 16  TD CO/CI: 3.85 L/min / 2.25 L/min/m2  Shena CO/CI: 3.37 L/min / 1.97 L/min/m2    - Cardiopulmonary stress test cancelled as pt deemed not physically able to perform test per RN  - Advanced therapy  following   - Neuropsych testing 4/11 and 4/12  - Financial consult already placed  - Utox screen positive for amphetamines. Quant screen negative, indicating false positive on initial screen.   - Nicotine and cotinine screen negative. PEth negative  - Discussed at 4/14 heart transplant candidacy review     Patient ultimately decided to not pursue heart transplant or ablation. Goal is to go to TCU w/ plan to either return to his AL or LTC w/ consideration of starting hospice. No further advanced therapies workup will be pursued at this time.      GDMTs:  - Antiarrhythmics: PTA amiodarone 200 mg BID and mexiletine 200 mg q8h   - ACEi/ARB: Not currently on due to LVEF of 45%.  - BB:   - Continue metoprolol  succinate 12.5 mg daily    - Holding PTA carvedilol 3.125 mg BID 2/2 soft bps   - Aldosterone antagonist: Continue spironolactone 25 mg daily   - SGLT2i: Continue PTA empagliflozin 10 mg daily  - SCD prophylaxis: S/p secondary prevention ICD 6/20/22  - Volume status: Euvolemic: held lasix 40 mg dose today. Plan to resume tomorrow.                - Discontinued PTA KCl supplement 20 meq daily now that spironolactone is added                - Continue PTA magnesium oxide 800 mg BID               - BMP BID w/ goal of K > 4 and Mg > 2               - Strict I/Os and daily weights   - Anticoagulation: PTA Eliquis 5 mg BID   - Statin therapy: PTA atorvastatin 80 mg daily   - OT to assist in compression stockings     # UTI vs sterile pyuria, resolved    UA w/ large leuks and 131 WBCs w/ negative nitrites. Pt reported intermittent dysuria. Started on IV ceftriaxone. UC returned negative for bacteria.   - Completed 5 days of Augmentin (4/15-4/19)   - PTA empagliflozin held during treatment of UTI. Resumed 4/20      # Chronic Diabetic R heel ulcer  Present since Dec 2022, per sister. RN noticed erythema and ?purulent drainage from area 4/15. WOC consulted and deemed ulcer not actively infected. Recommend q3 day dressing changes.   - wound care and dressing changes per WOC orders  - f/u outpatient w/ podiatry      # Positive U tox for amphetamines  Pt and sister deny any illegal or legal stimulant use. No obvious PTA drugs on inspection that could be causing a false positive. Urine quant screen ultimately showed < 50 ng/mL amphetamines, well below the level to indicate a positive result. Suspect false positive on urine tox screen.   - No further workup/intervention needed      # Recent clavicular fracture  At baseline uses walker for ambulation. Had recent clavicle fracture now requiring him to be wheelchair bound. Supposed to be non weight bearing on LUE for another 4 weeks   - Fall precautions  - NWB LUE precautions provided    - PT/OT following, recommending TCU placement   - SW placed referrals for TCU. Awaiting bed availability     # Afib w/o RVR  - metoprolol, amiodarone, and mexiletine   - PTA Eliquis 5 mg BID  - Tele     # ROSALINO on CKD, resolved  Uptrending Cr above baseline Cr of 1.3. No observed mention of etiology of pt's CKD in chart. UA w/o protein, RBCs, or casts. Suspect prerenal as Cr improved after diuresis held and has remained stable since restarting diuresis at a lower dose   - diuresis per above   - BID BMP     # Acute on chronic normocytic anemia concerning for mixed picture of iron deficiency and inflammatory   # Iron deficiency   # Hx of anemia of chronic disease   Hgb 8.7 on arrival and downtrending. Baseline Hgb ~9-10 reportedly 2/2 anemia of chronic disease. Tbili normal. No source of active bleeding. Workup revealed low iron level, normal TIBC, and low end-of-normal ferritin concerning for iron deficiency anemia.   - s/p IV Iron sucrose 200 mg qday x 5 days  - AM CBCs     # Chronic hyponatremia, intermittent   BL sodium ~130-133. Suspect hypervolemic hyponatremia in setting of CHF. Improved after IV diuresis   - daily BMP    # DM2   On metformin 1,000 mg BID PTA.   - hold PTA metformin, plan to resume on discharge   - Holding empagliflozin as above   - LDSS  - Hypoglycemia protocol      # BPH  - PTA tamulosin  - CTM w/ bladder scans and PVRs PRN     # Developmental delay   Lives at assisted living where he receives assistance w/ medications, meals, etc. Independent in basic ADLs (feeding, clothing, bathing) at his baseline. Sister Citlali is his HCA and POA but pt currently has capacity   - Delirium precautions      # Constipation   - PTA metamucil     # R coccyx wound  - WOC consulted, appreciate recs:  - If pt has constant incontinent loose stools needing dressing changes Q shift recommend discontinuing the Mepilex dressing and applying criticaid barrier paste BID and PRN.     FEN: Soft bite sized (due to lack of  dentition), 2 L fluid restriction   PPx: PTA Eliquis   Code: DNR/DNI  Dispo: Discharge to TCU once bed available    Pt was discussed and evaluated with Dr. Mitchell, attending physician, who agrees with the assessment and plan above.     Prachi Call, DO  Internal Medicine, PGY-I    Interval Changes in Past 24 Hours:   No overnight events. MAP low this AM at 69. Intermittently low in mornings and evenings on chart review. Held metoprolol, spironolactone, and lasix with reassessment in 1-2 hours.     Today, doing well. No complaints. No new ICD shocks.     Review Of Systems  A 4-point ROS was negative aside from those listed above.    OBJECTIVE FINDINGS:    Temp:  [97.6  F (36.4  C)-97.9  F (36.6  C)] 97.8  F (36.6  C)  Pulse:  [60-75] 75  Resp:  [16-18] 16  BP: ()/(55-72) 95/55  SpO2:  [94 %-100 %] 100 %     BP 80s overnight, 69 this AM     I/Os: net neg -535 yesterday, -430 since midnight     Intake/Output Summary (Last 24 hours) at 4/18/2023 0809  Last data filed at 4/18/2023 0600  Gross per 24 hour   Intake 920 ml   Output 550 ml   Net 370 ml     Vitals:    04/22/23 0611 04/23/23 0610 04/24/23 0618   Weight: 61 kg (134 lb 6.4 oz) 59.1 kg (130 lb 3.2 oz) 59.4 kg (130 lb 14.4 oz)     Gen: Sitting in chair, NAD  Neck: No JVD   Resp: clear to auscultation bilaterally, no crackles or wheezing   CV: Irregular rhythm, regular rate.   Abd: soft, NT, ND  Ext: warm and well perfused, no peripheral edema     Current medications   Current Facility-Administered Medications   Medication     acetaminophen (TYLENOL) Suppository 650 mg     acetaminophen (TYLENOL) tablet 650 mg     alum & mag hydroxide-simethicone (MAALOX) suspension 30 mL     amiodarone (PACERONE) tablet 200 mg     apixaban ANTICOAGULANT (ELIQUIS) tablet 5 mg     artificial tears ophthalmic ointment     atorvastatin (LIPITOR) tablet 80 mg     calcium carbonate-vitamin D (CALTRATE) 600-10 MG-MCG per tablet 1 tablet     carboxymethylcellulose PF (REFRESH PLUS)  0.5 % ophthalmic solution 1 drop     glucose gel 15-30 g    Or     dextrose 50 % injection 25-50 mL    Or     glucagon injection 1 mg     empagliflozin (JARDIANCE) tablet 10 mg     furosemide (LASIX) tablet 40 mg     insulin aspart (NovoLOG) injection (RAPID ACTING)     insulin aspart (NovoLOG) injection (RAPID ACTING)     lidocaine (LMX4) cream     lidocaine 1 % 0.1-1 mL     magnesium oxide (MAG-OX) tablet 800 mg     medication instruction     metoprolol succinate ER (TOPROL-XL) 24 hr half-tab 12.5 mg     mexiletine (MEXITIL) capsule 200 mg     miconazole (MICATIN) 2 % powder     nitroGLYcerin (NITROSTAT) sublingual tablet 0.4 mg     psyllium (METAMUCIL/KONSYL) capsule 1 capsule     sodium chloride (PF) 0.9% PF flush 3 mL     sodium chloride (PF) 0.9% PF flush 3 mL     spironolactone (ALDACTONE) tablet 25 mg     tamsulosin (FLOMAX) capsule 0.4 mg     LABS Reviewed:  Cr 1.39 (1.34), K 4.7, Mg 2.2     IMAGES Reviewed:    ECHO 4/9/23 Interpretation Summary:  Left ventricular function is decreased. The ejection fraction is 40-45%  (mildly reduced compared to 46% back in Dec 2022).   LV size and wall thickness is normal. Abnormal septal wall motion consistent w/ pacemaker.   Global right ventricular function is normal.  Mild aortic valve calcification is present. The mean gradient across the  aortic valve is 8 mmHg.  Pulmonary artery systolic pressure is normal.  IVC diameter and respiratory changes fall into an intermediate range  suggesting an RA pressure of 8 mmHg.  No pericardial effusion is present.    ICD interrogation 4/10/23:  Device: Abbott [id number] [place] HF  Normal Device Function.   Mode: DDDR 60-90 bpm  AP: 49%  BVP: 91%  Intrinsic rhythm: CHB w/ AS @ 60 bpm; no intrinsic R-waves at VVI 30 bpm  Lead Trends Appear Stable: Yes  Estimated battery longevity to RRT = 5.9 years. Remaining Capacity to OSCAR = 92%.  Atrial arrhythmia: None  AF burden: 0%  Anticoagulant: Eliquis  Ventricular Arrhythmia: 1 VT episode  recorded on 4/9/23 at 9:43 AM, lasting ~25 seconds at 114 bpm. EGM reveals a slow VT which is converted with one sequence of ATP.  Setting changes: None    RHC 4/11/23:  RA mean 11mmHg  RV 40/11  PA 39/16/25  PCWP 16    Pa Sat 49.9%  Art Sat 99%    TD CO/CI: 3.85 L/min / 2.25 L/min/m2  Shena CO/CI: 3.37 L/min / 1.97 L/min/m2    PVR 2.3 (TD)  SVR  1538 (TD) Right sided filling pressures are mildly elevated. Left sided filling pressures are mildly elevated. Mild elevated pulmonary hypertension. Reduced cardiac output level. Hemodynamic data has been modified in Epic per physician review.

## 2023-04-24 NOTE — PROGRESS NOTES
"CLINICAL NUTRITION SERVICES - REASSESSMENT NOTE     Nutrition Prescription    RECOMMENDATIONS FOR MDs/PROVIDERS TO ORDER:  Rec modify diet to a 2 g vs 3 g sodium diet.      Malnutrition Status:    Moderate malnutrition in the context of chronic illness    Recommendations already ordered by Registered Dietitian (RD):  Oral supplements  MVI with minerals    Future/Additional Recommendations:  1. Encourage adequate intake at meals. Rec pt self-select soft foods as needed. Encourage intake of twice daily oral supplements. Goal for wound healing.   2. Consider checking vitamin D if/when appropriate. Pt on calcium/vitamin D once daily (on twice daily PTA).   3. Monitor BG control. Hgb A1c of 6.7 on 11/4/2022. BG was 205 on 4/24. Monitor potential need for consistent carbohydrate diet restriction.    4. Monitor stooling with h/o constipation; potentially need for more aggressive bowel regimen.   5. Consider checking vitamin B12 status, if/when appropriate.  6. Continue to monitor iron status (received venofer this admit).     EVALUATION OF THE PROGRESS TOWARD GOALS   Diet: 2000 mL Fluid Restriction (since 4/9) and Low Saturated Fat/2400 mg Sodium (since 4/11)  Intake/Tolerance: Good diet tolerance. Per nursing flowsheets (% intake), pt consistently consuming 100% per % intake flowsheets with the exception of 75% once on 4/20. Pt was sleeping during a portion of visit and pt's sister was at his bedside. Per pt's sister, pt is ordering his own meals. He is eating a little less than normal, possibly due to food choices in hospital and diet restrictions. Has consumed oral supplements in the past. [employer] (meal ordering system) indicates food/beverages sent 4/21-4/23 totaled 1190 kcals and 58 g protein daily on average. Not meeting estimated needs below. Pt's sister brought outside food (fruit) in one day.      NEW FINDINGS   HEENT: No teeth per chart  WOC (4/21): \"Pressure Injury Location: sacrum/right buttock and gluteal " "cleft affected. Wound type: Pressure Injury, Friction and Intertrigo. Pressure Injury Stage: 2, hospital acquired. Wound location: Right heel. Wound due to: Diabetic Ulcer\"   GI: Having zero to one stool daily on average. Last stool on 4/22. Stools are soft and brown, formed.    Weight History: 74.3 kg (6/16/2022), 71.2 kg (10/21/2022), 67.4 kg (12/29/2022), 68.5 kg (1/20/2023), 68 kg (3/20/2023), 72.3 kg (4/8, admit), 59.4 kg (4/24) - Difficult to assess wt changes with changes in fluid status and diuresis. Pt has lost 16.6% of body wt over the last approximate six months. In regards to wt loss, pt stated (4/18) he may have lost weight due to eating differently.     ASSESSED NUTRITION NEEDS (for inpatient hospital stay)  Dosing Weight: 59 kg (based on lowest wt so far this admission of 49.1 kg on 4/23)  Estimated Energy Needs: 1733-3580 kcals/day (30-35 kcals/kg)  Justification: Increased needs, wound healing  Estimated Protein Needs: 77-89 grams protein/day (1.3-1.5 grams of pro/kg)  Justification: Increased needs with cardiac status and wound healing, pending renal function  Estimated Fluid Needs: 2000 mL/day    Justification: On a fluid restriction    MALNUTRITION  % Intake: < 75% for > 7 days (moderate)  % Weight Loss: Difficult to assess wt changes with changes in fluid status and diuresis. Potentially meeting this criteria.   Subcutaneous Fat Loss: Facial region: Mild per prior nutrition note  Muscle Loss: Temporal:  Mild, Thoracic region (clavicle, acromium bone, deltoid, trapezius, pectoral):  Mild and Dorsal hand: Mild per prior nutrition note  Fluid Accumulation/Edema: Does not meet criteria  Malnutrition Diagnosis: Moderate malnutrition in the context of chronic illness    Previous Goals   Patient to consume % of nutritionally adequate meal trays TID, or the equivalent with supplements/snacks.  Evaluation: Not meeting.     Previous Nutrition Diagnosis  Malnutrition related to potentially eating " less than estimated needs, restrictive diet as evidenced by pt with mild muscle loss and mild subcutaneous fat loss.     Evaluation: Unresolved, updated below.     CURRENT NUTRITION DIAGNOSIS  Inadequate oral intake related to food choices in hospital and diet restrictions as evidenced by [employer] (meal ordering system) indicates food/beverages sent 4/21-4/23 totaled 1190 kcals and 58 g protein daily on average while estimated needs are 9488-7808 kcals/day (30-35 kcals/kg) and 77-89 grams protein/day (1.3-1.5 grams of pro/kg).    INTERVENTIONS  Implementation: (pt's sister and for a portion of visit, pt)  Nutrition Education: Importance of adequate nutrition intake discussed, especially in the setting of wt loss and for wound healing. Encouraged intake of oral supplements.  Medical food supplement therapy: Discussed oral supplements and encouraged intake. Ordered Glucerna at 10:00 and HS, vary flavors.   Multivitamin/mineral supplement therapy: Ordered a multivitamin with minerals to help meet micronutrient needs, for wound healing.   Modify composition of meals/snacks: Gave room service menu and explained restrictions (also, wrote on menu)    Goals  Patient to consume % of nutritionally adequate meal trays TID, or the equivalent with supplements/snacks.    Monitoring/Evaluation  Progress toward goals will be monitored and evaluated per protocol.     Nutrition will continue to follow.      Leanna Bella, MS, RD, LD, [facility]   6C/5A(beds 5201 - 4290) RD pgr: [phone]

## 2023-04-24 NOTE — PLAN OF CARE
D: PMH of NICM (EF 40-45%) c/b paroxysmal VT s/p dual chamber ICD placement 6/20/2022 c/b recurrent ICD shocks s/p VT ablation 10/31/2022 and re-ablation 12/29/2022, paroxysmal Afib on apixaban, developmental delay, DM2, HTN, and HLD. Admitted for evaluation for VT management and consideration of advanced therapies     A:   Neuro: A/Ox4. Calls appropriately. Cooperative with cares. Denies headache, dizziness, and lightheadedness.  Cardiac/Tele: VSS. AV paced. HR 60's-70's. Afebrile. Denies chest pain and palpitations.   Respiratory: Sats >95% on room air. Denies SOB.  GI/: Primofit in place with adequate urine output. Last BM 4/22.  Diet/Appetite: Low saturated fat diet, 2LFR. ACHS BG.  Skin: No new deficits noted. BLE compression socks taken off overnight. Mepilex to sacrum and R heel CDI. To wear black sling on left arm when out of bed.  LDAs: L PIV- SL  Activity: 1 assist with walker and gait belt.  Pain: Denies      P: Awaiting TCU bed. Continue to monitor and notify Cards 2 with changes.

## 2023-04-24 NOTE — PLAN OF CARE
Neuro: A&Ox4.   Cardiac: HR AV paced. VSS.   Respiratory: Sating >92% on RA.  GI/: Adequate urine output. No BM this shift.   Diet/appetite: Tolerating Cardiac diet. Eating well.  Activity:  Assist of 1 with GB, up to chair and in halls. NWB L arm with sling.   Pain: Denies pain.  Skin: -See flowsheet.   LDA's: L PIV-SL. Primofit.     Plan: Lasix held 4/24-see prior MD note.  Release of medical records form signed and faxed to [employer], original copy in paper chart. Encouraged activity and PO intake. See SWS noted for discharge plan. Updated care plan.

## 2023-04-24 NOTE — PROVIDER NOTIFICATION
Prachi CASTORENA 2 MD notified:  0800: Pts BP 95/55 (MAP 69). Writer asked about giving Metoprolol, lasix and spironolactolone. Per MD hold all 3 meds, recheck BP in 1-2 hours and call MD.     1010: BP rechecked 95/61 (MAP 77). Per Prachi ROBISON give Metoprolol and Spironolactolone. Hold lasix today 4/24. Writer verbalized order per Readback technique.

## 2023-04-24 NOTE — PROGRESS NOTES
D/He was up in the chair, now back to bed. Rt heel and butt wounds are covered. He continues to wear a Primofit as reportedly he dribbles much of the time. He is  and continues on Mexiletine and Amiodarone.  A/progressing  P/to TCU [facility]BA

## 2023-04-24 NOTE — PLAN OF CARE
Goal Outcome Evaluation:      Plan of Care Reviewed With: patient, family    Overall Patient Progress: decliningOverall Patient Progress: declining    Outcome Evaluation: Encourage adequate intake at meals. Rec pt self-select soft foods as needed. Encourage intake of twice daily oral supplements. Goal for wound healing.

## 2023-04-25 ENCOUNTER — APPOINTMENT (OUTPATIENT)
Dept: OCCUPATIONAL THERAPY | Facility: CLINIC | Age: 68
DRG: 287 | End: 2023-04-25
Attending: INTERNAL MEDICINE
Payer: MEDICARE

## 2023-04-25 ENCOUNTER — APPOINTMENT (OUTPATIENT)
Dept: PHYSICAL THERAPY | Facility: CLINIC | Age: 68
DRG: 287 | End: 2023-04-25
Attending: INTERNAL MEDICINE
Payer: MEDICARE

## 2023-04-25 LAB
ANION GAP SERPL CALCULATED.3IONS-SCNC: 9 MMOL/L (ref 7–15)
BUN SERPL-MCNC: 24.5 MG/DL (ref 8–23)
CALCIUM SERPL-MCNC: 9.1 MG/DL (ref 8.8–10.2)
CHLORIDE SERPL-SCNC: 103 MMOL/L (ref 98–107)
CREAT SERPL-MCNC: 1.3 MG/DL (ref 0.51–1.17)
DEPRECATED HCO3 PLAS-SCNC: 23 MMOL/L (ref 22–29)
ERYTHROCYTE [DISTWIDTH] IN BLOOD BY AUTOMATED COUNT: 22.5 % (ref 10–15)
GFR SERPL CREATININE-BSD FRML MDRD: 60 ML/MIN/1.73M2
GLUCOSE BLDC GLUCOMTR-MCNC: 147 MG/DL (ref 70–99)
GLUCOSE BLDC GLUCOMTR-MCNC: 174 MG/DL (ref 70–99)
GLUCOSE BLDC GLUCOMTR-MCNC: 254 MG/DL (ref 70–99)
GLUCOSE BLDC GLUCOMTR-MCNC: 282 MG/DL (ref 70–99)
GLUCOSE SERPL-MCNC: 155 MG/DL (ref 70–99)
HCT VFR BLD AUTO: 32.7 % (ref 35–53)
HGB BLD-MCNC: 9.9 G/DL (ref 11.7–17.7)
MAGNESIUM SERPL-MCNC: 2 MG/DL (ref 1.7–2.3)
MAGNESIUM SERPL-MCNC: 2 MG/DL (ref 1.7–2.3)
MCH RBC QN AUTO: 25.7 PG (ref 26.5–33)
MCHC RBC AUTO-ENTMCNC: 30.3 G/DL (ref 31.5–36.5)
MCV RBC AUTO: 85 FL (ref 78–100)
PLATELET # BLD AUTO: 207 10E3/UL (ref 150–450)
POTASSIUM SERPL-SCNC: 4.7 MMOL/L (ref 3.4–5.3)
RBC # BLD AUTO: 3.85 10E6/UL (ref 3.8–5.9)
SODIUM SERPL-SCNC: 135 MMOL/L (ref 136–145)
WBC # BLD AUTO: 7.7 10E3/UL (ref 4–11)

## 2023-04-25 PROCEDURE — 250N000013 HC RX MED GY IP 250 OP 250 PS 637: Performed by: INTERNAL MEDICINE

## 2023-04-25 PROCEDURE — 85014 HEMATOCRIT: CPT

## 2023-04-25 PROCEDURE — 99233 SBSQ HOSP IP/OBS HIGH 50: CPT | Mod: GC | Performed by: INTERNAL MEDICINE

## 2023-04-25 PROCEDURE — 80048 BASIC METABOLIC PNL TOTAL CA: CPT

## 2023-04-25 PROCEDURE — 250N000013 HC RX MED GY IP 250 OP 250 PS 637: Performed by: STUDENT IN AN ORGANIZED HEALTH CARE EDUCATION/TRAINING PROGRAM

## 2023-04-25 PROCEDURE — 36415 COLL VENOUS BLD VENIPUNCTURE: CPT

## 2023-04-25 PROCEDURE — 83735 ASSAY OF MAGNESIUM: CPT

## 2023-04-25 PROCEDURE — 214N000001 HC R&B CCU UMMC

## 2023-04-25 PROCEDURE — 97530 THERAPEUTIC ACTIVITIES: CPT | Mod: GP

## 2023-04-25 PROCEDURE — 250N000013 HC RX MED GY IP 250 OP 250 PS 637

## 2023-04-25 PROCEDURE — 97535 SELF CARE MNGMENT TRAINING: CPT | Mod: GO

## 2023-04-25 PROCEDURE — 97116 GAIT TRAINING THERAPY: CPT | Mod: GP

## 2023-04-25 RX ORDER — MAGNESIUM OXIDE 400 MG/1
400 TABLET ORAL EVERY 4 HOURS
Status: COMPLETED | OUTPATIENT
Start: 2023-04-25 | End: 2023-04-25

## 2023-04-25 RX ADMIN — FUROSEMIDE 40 MG: 40 TABLET ORAL at 08:59

## 2023-04-25 RX ADMIN — AMIODARONE HYDROCHLORIDE 200 MG: 200 TABLET ORAL at 08:58

## 2023-04-25 RX ADMIN — INSULIN ASPART 1 UNITS: 100 INJECTION, SOLUTION INTRAVENOUS; SUBCUTANEOUS at 08:56

## 2023-04-25 RX ADMIN — MICONAZOLE NITRATE: 20 POWDER TOPICAL at 20:10

## 2023-04-25 RX ADMIN — INSULIN ASPART 1 UNITS: 100 INJECTION, SOLUTION INTRAVENOUS; SUBCUTANEOUS at 17:09

## 2023-04-25 RX ADMIN — Medication 800 MG: at 20:09

## 2023-04-25 RX ADMIN — APIXABAN 5 MG: 5 TABLET, FILM COATED ORAL at 08:59

## 2023-04-25 RX ADMIN — TAMSULOSIN HYDROCHLORIDE 0.4 MG: 0.4 CAPSULE ORAL at 20:10

## 2023-04-25 RX ADMIN — AMIODARONE HYDROCHLORIDE 200 MG: 200 TABLET ORAL at 20:10

## 2023-04-25 RX ADMIN — MEXILETINE HYDROCHLORIDE 200 MG: 200 CAPSULE ORAL at 21:48

## 2023-04-25 RX ADMIN — Medication 400 MG: at 09:05

## 2023-04-25 RX ADMIN — SPIRONOLACTONE 25 MG: 25 TABLET, FILM COATED ORAL at 08:58

## 2023-04-25 RX ADMIN — Medication 400 MG: at 14:30

## 2023-04-25 RX ADMIN — MEXILETINE HYDROCHLORIDE 200 MG: 200 CAPSULE ORAL at 14:30

## 2023-04-25 RX ADMIN — Medication 1 CAPSULE: at 08:58

## 2023-04-25 RX ADMIN — EMPAGLIFLOZIN 10 MG: 10 TABLET, FILM COATED ORAL at 08:59

## 2023-04-25 RX ADMIN — CALCIUM CARBONATE 600 MG (1,500 MG)-VITAMIN D3 400 UNIT TABLET 1 TABLET: at 08:59

## 2023-04-25 RX ADMIN — WHITE PETROLATUM 57.7 %-MINERAL OIL 31.9 % EYE OINTMENT: at 21:48

## 2023-04-25 RX ADMIN — MEXILETINE HYDROCHLORIDE 200 MG: 200 CAPSULE ORAL at 05:50

## 2023-04-25 RX ADMIN — ATORVASTATIN CALCIUM 80 MG: 80 TABLET, FILM COATED ORAL at 08:59

## 2023-04-25 RX ADMIN — Medication 800 MG: at 08:58

## 2023-04-25 RX ADMIN — APIXABAN 5 MG: 5 TABLET, FILM COATED ORAL at 20:09

## 2023-04-25 RX ADMIN — Medication 12.5 MG: at 08:58

## 2023-04-25 RX ADMIN — Medication 1 TABLET: at 08:59

## 2023-04-25 ASSESSMENT — ACTIVITIES OF DAILY LIVING (ADL)
ADLS_ACUITY_SCORE: 40

## 2023-04-25 NOTE — PROGRESS NOTES
Care Management Follow Up    Length of Stay (days): 17    Expected Discharge Date: 04/26/2023     Concerns to be Addressed: discharge planning    Patient plan of care discussed at interdisciplinary rounds: Yes    Anticipated Discharge Disposition: TCU (per pt, plan is to fly back with sister)     Anticipated Discharge Services:  TCU therapy services  Anticipated Discharge DME:  n/a    Patient/family educated on Medicare website which has current facility and service quality ratings:  yes  Education Provided on the Discharge Plan:  yes  Patient/Family in Agreement with the Plan:  yes    Referrals Placed by CM/SW:      PENDING  [facility]  [address], [address], [place] [address]  Phone: [phone]  - 4/25: SW called at 1:38pm, got transferred to admissions, and left a VM requesting a call back with an update ASAP.      [facility]  [address] [place] [address]  Phone: [phone]  - 4/25: SW called at 1:40pm, got transferred to admissions, and left a VM requesting a call back with an update ASAP.     [facility]  [address] [place] [address]  Admissions: [phone]   Fax: [phone]  [facility]  [address] [place] [address]  Admissions: [phone]   Fax: [phone]  - 4/25: SW called admissions at 1:51pm and left a VM requesting a call back with an update ASAP.     DENIALS  [facility] - Declined due to no bed available.  [facility] - Declined due to they no longer have a TCU.  [facility] - Declined due to not appropriate for services.  Private pay costs discussed: Not applicable    Additional Information:  SW is following for discharge planning.    Pt is medically ready for TCU discharge. See above in the referrals section for updates.    ___________________    ARTI Valdez, RAJATSW  6C   [facility]  Phone:  [phone]  Pager: [phone]

## 2023-04-25 NOTE — PLAN OF CARE
Goal Outcome Evaluation: Pt will be provided with a quiet environment to foster rest.   no changed in neurologic status.  Pt denied pain during shift. Pt appeared to be asleep for most of the night. Pt is currently sitting in the chair.            Problem: Plan of Care - These are the overarching goals to be used throughout the patient stay.    Goal: Optimal Comfort and Wellbeing  Outcome: Progressing  Intervention: Provide Person-Centered Care  Recent Flowsheet Documentation  Taken 4/25/2023 0319 by Jarett Araiza RN  Trust Relationship/Rapport:   care explained   choices provided  Taken 4/25/2023 0008 by Jarett Araiza RN  Trust Relationship/Rapport:   care explained   choices provided     Problem: Plan of Care - These are the overarching goals to be used throughout the patient stay.    Goal: Absence of Hospital-Acquired Illness or Injury  Intervention: Identify and Manage Fall Risk  Recent Flowsheet Documentation  Taken 4/25/2023 0319 by Jarett Araiza RN  Safety Promotion/Fall Prevention:   nonskid shoes/slippers when out of bed   clutter free environment maintained   assistive device/personal items within reach  Taken 4/25/2023 0008 by Jarett Araiza RN  Safety Promotion/Fall Prevention:   nonskid shoes/slippers when out of bed   clutter free environment maintained   assistive device/personal items within reach

## 2023-04-25 NOTE — PROGRESS NOTES
TCU Referral Follow-up      CHW was delegated task to make additional TCU referrals by 6C SW. Pt is medically ready to discharge to TCU. CHW expanded Medicare.gov list to include TCUs in a 50 mile radius of [place], [place].      Pending Referrals     [facility]  [address] [place] [address]  Phone: [phone]  Fax: [phone]  4/25: CHW faxed over referral through [facility].     [facility]  [address] [place] [address]  Phone: [phone]   Fax: [phone]  4/25: CHW faxed over referral through Epic.     [facility]   [address] [place] [address]  Phone: [phone]  Fax: [phone]  4/25: CHW faxed over referral through Epic.     [facility]  [address], [place] [address]  Phone: [phone]  - 4/25: SW called at 1:38pm, got transferred to admissions, and left a VM requesting a call back with an update ASAP.     [facility]  [address] [place] [address]  Phone: [phone]  - 4/25: SW called at 1:40pm, got transferred to admissions, and left a VM requesting a call back with an update ASAP.     [facility]  [address] [place], [place] [address] [phone]  Admissions: Janel ([phone])  Fax: [phone]  4/25: SW called admissions at 1:51pm and left a VM requesting a call back with an update ASAP     [facility]  [address] [place], [place] [address] [phone]  Admissions: Janel ([phone])  4/25: SW called admissions at 1:51pm and left a VM requesting a call back with an update ASAP    Declined  [facility] - Declined due to no bed available. (Malena [phone])  [facility] - Declined due to they no longer have a TCU.  [facility] - Declined due to not appropriate for services.

## 2023-04-25 NOTE — PROGRESS NOTES
[facility]  CARDIOLOGY HEART FAILURE SERVICE (CARDS II) PROGRESS NOTE    I personally saw and examined this patient, reviewed imaging and laboratory studies, confirmed physical examination and discussed results and plan with patient and or family.    The patient has not required acute in-patient hospitalization but is too frail to discharge home.  Awaiting location of appropriate TCU.  Evidently, [place] Medicaid will not cover care here.      Prolonged time secondary to discussion with social work regarding limited number of options.     I personally call [facility] to discuss continuing care in [place].    Patient has evidence of protein calorie malnutrition as evidence I dieticians note of 4/18.      Patient Name: Rohit Garvey    Medical Record Number: [id number]    YOB: 1955  PCP: Kevyn Salazar    Admit Date/Time: 4/8/2023  3:08 PM     Assessment and Plan:  Rohit Garvey is a 67 year old adult male with a PMH significant for NICM (EF of 40-45%) c/b paroxysmal VT s/p dual chamber ICD placement 6/20/2022 c/b recurrent ICD shocks s/p VT ablation 10/31/2022 and re-ablation 12/29/2022, paroxysmal Afib on apixaban, developmental delay (possibly related to viral illnesses during infancy), T2DM, hypertension, and hyperlipidemia who was admitted as a transfer from [place] per recommendations of his cardiologists Dr. Urbina and Dr. Chanel for evaluation by advanced heart failure failure team and EP team for VT management and consideration of advanced therapies.    Today's updates:  - Resume lasix 40 mg today now that Bps have normalized   - Release sent to [facility] in [place] to get raw XR images of L shoulder from 2/21. If able to get, can then get XR here to compare and if healed, can discontinue NWB LUE status and allow pt to use walker for ambulation   - PT still recommending TCU placement at this time   - Medically ready for discharge.  Waiting for TCU bed. Expanded search today to more locations in [place]      # Persistent Paroxysmal Ventricular Tachycardia s/p dual chamber ICD placement 6/2022 and VT ablation x 2  # Chronic HFrEF 2/2 NICM w/ LVEF 40-45%, NYHA II  High burden of ventricular arrhythmias despite being on 2 antiarrhythmic medications (amiodarone and mexiletine) and being s/p ICD and two attempted VT ablations. Transferred here for evaluation for advanced therapies. ECHO 4/9 showed mildly reduced EF from 46% (Dec 2022) to 40-45%, however still does not qualify him for LVAD (EF < 25%). Possible heart transplant candidate if he meets criteria for VT storm.   VT episode at 0700 on 4/14. Required ATP and ICD shock. Pt not interested in any further cardiac procedures including ablation.   - EP consulted, appreciate recs   - ICD device interrogation ordered - 1 VT episode on 4/9/23 lasting 25 seconds   - VT episode at 0700 on 4/14. Required ATP and ICD shock. Per sister, EP adjusted ICD after this episode.    - Patient not interested in any operative/procedural interventions. EP does not plan to change his AAT or ICD settings. No need to go to hospital for outpatient ICD shocks unless >3 a day. Asked patient to notify outpatient providers if he is shocked by his ICD.   - Continue tele  - Per [facility] discussion w/ patient and sister 4/21: changed from full code to DNR/DNI status w/ plan to keep ICD on for the meantime   - SW consulted for drafting updated advance care directive to reflect new DNR/DNI status     Advanced therapies evaluation workup:  - ECHO 4/9 w/ mildly reduced LV function (EF reduction from 46% to 40-45%) and mild aortic valve calcification   - RHC 4/11:   RA mean 11mmHg. RV 38/4. RVEDP 13. PA 39/16/25. PCWP 16  TD CO/CI: 3.85 L/min / 2.25 L/min/m2  Shena CO/CI: 3.37 L/min / 1.97 L/min/m2    - Cardiopulmonary stress test cancelled as pt deemed not physically able to perform test per RN  - Advanced therapy   following   - Neuropsych testing 4/11 and 4/12  - Financial consult already placed  - Utox screen positive for amphetamines. Quant screen negative, indicating false positive on initial screen.   - Nicotine and cotinine screen negative. PEth negative  - Discussed at 4/14 heart transplant candidacy review     Patient ultimately decided to not pursue heart transplant or ablation. Goal is to go to TCU w/ plan to either return to his AL or LTC w/ consideration of starting hospice. No further advanced therapies workup will be pursued at this time.      GDMTs:  - Antiarrhythmics: PTA amiodarone 200 mg BID and mexiletine 200 mg q8h   - ACEi/ARB: Not currently on due to LVEF of 45%.  - BB:   - Continue metoprolol succinate 12.5 mg daily    - Holding PTA carvedilol 3.125 mg BID 2/2 soft bps   - Aldosterone antagonist: Continue spironolactone 25 mg daily   - SGLT2i: Continue PTA empagliflozin 10 mg daily  - SCD prophylaxis: S/p secondary prevention ICD 6/20/22  - Volume status: Euvolemic: resume lasix 40 mg dose today               - Discontinued PTA KCl supplement 20 meq daily now that spironolactone is added                - Continue PTA magnesium oxide 800 mg BID               - BMP BID w/ goal of K > 4 and Mg > 2               - Strict I/Os and daily weights   - Anticoagulation: PTA Eliquis 5 mg BID   - Statin therapy: PTA atorvastatin 80 mg daily   - OT to assist in compression stockings     # UTI vs sterile pyuria, resolved    UA w/ large leuks and 131 WBCs w/ negative nitrites. Pt reported intermittent dysuria. Started on IV ceftriaxone. UC returned negative for bacteria.   - Completed 5 days of Augmentin (4/15-4/19)   - PTA empagliflozin held during treatment of UTI. Resumed 4/20      # Chronic Diabetic R heel ulcer  Present since Dec 2022, per sister. RN noticed erythema and ?purulent drainage from area 4/15. WOC consulted and deemed ulcer not actively infected. Recommend q3 day dressing changes.   - wound care and  dressing changes per WOC orders  - f/u outpatient w/ podiatry      # Positive U tox for amphetamines  Pt and sister deny any illegal or legal stimulant use. No obvious PTA drugs on inspection that could be causing a false positive. Urine quant screen ultimately showed < 50 ng/mL amphetamines, well below the level to indicate a positive result. Suspect false positive on urine tox screen.   - No further workup/intervention needed      # Recent clavicular fracture  At baseline uses walker for ambulation. Had clavicle fracture back in February requiring him to be NWB LUE so he was using a wheelchair at his facility   - Release sent to [facility] in [place] to get raw XR images of L shoulder from 2/21. If able to get, can then get XR here to compare and if healed, can discontinue NWB LUE status and allow pt to use walker for ambulation   - Fall precautions  - NWB LUE precautions until 8 week f/u with repeat XRs  - PT/OT following, recommending TCU placement   - SW placed referrals for TCU. Awaiting bed availability     # Afib w/o RVR  - metoprolol, amiodarone, and mexiletine   - PTA Eliquis 5 mg BID  - Tele     # ROSALINO on CKD, resolved  Uptrending Cr above baseline Cr of 1.3. No observed mention of etiology of pt's CKD in chart. UA w/o protein, RBCs, or casts. Suspect prerenal as Cr improved after diuresis held and has remained stable since restarting diuresis at a lower dose   - diuresis per above   - BID BMP     # Acute on chronic normocytic anemia concerning for mixed picture of iron deficiency and inflammatory   # Iron deficiency   # Hx of anemia of chronic disease   Hgb 8.7 on arrival and downtrending. Baseline Hgb ~9-10 reportedly 2/2 anemia of chronic disease. Tbili normal. No source of active bleeding. Workup revealed low iron level, normal TIBC, and low end-of-normal ferritin concerning for iron deficiency anemia.   - s/p IV Iron sucrose 200 mg qday x 5 days  - AM CBCs     # Chronic hyponatremia, intermittent   BL  sodium ~130-133. Suspect hypervolemic hyponatremia in setting of CHF. Improved after IV diuresis   - daily BMP    # DM2   On metformin 1,000 mg BID PTA.   - hold PTA metformin, plan to resume on discharge   - Holding empagliflozin as above   - LDSS  - Hypoglycemia protocol      # BPH  - PTA tamulosin  - CTM w/ bladder scans and PVRs PRN     # Developmental delay   Lives at assisted living where he receives assistance w/ medications, meals, etc. Independent in basic ADLs (feeding, clothing, bathing) at his baseline. Sister Citlali is his HCA and POA but pt currently has capacity   - Delirium precautions      # Constipation   - PTA metamucil     # R coccyx wound  - WOC consulted, appreciate recs:  - If pt has constant incontinent loose stools needing dressing changes Q shift recommend discontinuing the Mepilex dressing and applying criticaid barrier paste BID and PRN.     FEN: Soft bite sized (due to lack of dentition), 2 L fluid restriction   PPx: PTA Eliquis   Code: DNR/DNI  Dispo: Discharge to TCU once bed available    Pt was discussed and evaluated with Dr. Mitchell, attending physician, who agrees with the assessment and plan above.     Prachi Call, DO  Internal Medicine, PGY-I    Interval Changes in Past 24 Hours:   No overnight events. Today, doing well. No complaints. No new ICD shocks.     Review Of Systems  A 4-point ROS was negative aside from those listed above.    OBJECTIVE FINDINGS:    Temp:  [97.5  F (36.4  C)-98.3  F (36.8  C)] 98.1  F (36.7  C)  Pulse:  [58-84] 62  Resp:  [16-18] 16  BP: ()/(61-87) 94/61  SpO2:  [93 %-100 %] 98 %     -124/70s overnight. MAP of 75 this AM.     I/Os: net neg -1 L yesterday     Intake/Output Summary (Last 24 hours) at 4/18/2023 0809  Last data filed at 4/18/2023 0600  Gross per 24 hour   Intake 920 ml   Output 550 ml   Net 370 ml     Vitals:    04/24/23 0618 04/25/23 0319 04/25/23 0549   Weight: 59.4 kg (130 lb 14.4 oz) 59.1 kg (130 lb 4.7 oz) 60.1 kg (132 lb 9.6  oz)     Gen: Sitting in chair, NAD  Neck: No JVD   Resp: clear to auscultation bilaterally, no crackles or wheezing   CV: Irregular rhythm, regular rate.   Abd: soft, NT, ND  Ext: warm and well perfused, no peripheral edema     Current medications   Current Facility-Administered Medications   Medication     acetaminophen (TYLENOL) Suppository 650 mg     acetaminophen (TYLENOL) tablet 650 mg     alum & mag hydroxide-simethicone (MAALOX) suspension 30 mL     amiodarone (PACERONE) tablet 200 mg     apixaban ANTICOAGULANT (ELIQUIS) tablet 5 mg     artificial tears ophthalmic ointment     atorvastatin (LIPITOR) tablet 80 mg     calcium carbonate-vitamin D (CALTRATE) 600-10 MG-MCG per tablet 1 tablet     carboxymethylcellulose PF (REFRESH PLUS) 0.5 % ophthalmic solution 1 drop     glucose gel 15-30 g    Or     dextrose 50 % injection 25-50 mL    Or     glucagon injection 1 mg     empagliflozin (JARDIANCE) tablet 10 mg     furosemide (LASIX) tablet 40 mg     insulin aspart (NovoLOG) injection (RAPID ACTING)     insulin aspart (NovoLOG) injection (RAPID ACTING)     lidocaine (LMX4) cream     lidocaine 1 % 0.1-1 mL     magnesium oxide (MAG-OX) tablet 400 mg     magnesium oxide (MAG-OX) tablet 800 mg     medication instruction     metoprolol succinate ER (TOPROL-XL) 24 hr half-tab 12.5 mg     mexiletine (MEXITIL) capsule 200 mg     miconazole (MICATIN) 2 % powder     multivitamin w/minerals (THERA-VIT-M) tablet 1 tablet     nitroGLYcerin (NITROSTAT) sublingual tablet 0.4 mg     psyllium (METAMUCIL/KONSYL) capsule 1 capsule     sodium chloride (PF) 0.9% PF flush 3 mL     sodium chloride (PF) 0.9% PF flush 3 mL     spironolactone (ALDACTONE) tablet 25 mg     tamsulosin (FLOMAX) capsule 0.4 mg     LABS Reviewed:  Cr 1.30 (1.39), K 4.7, Mg 2.0     IMAGES Reviewed:    ECHO 4/9/23 Interpretation Summary:  Left ventricular function is decreased. The ejection fraction is 40-45%  (mildly reduced compared to 46% back in Dec 2022).   LV  size and wall thickness is normal. Abnormal septal wall motion consistent w/ pacemaker.   Global right ventricular function is normal.  Mild aortic valve calcification is present. The mean gradient across the  aortic valve is 8 mmHg.  Pulmonary artery systolic pressure is normal.  IVC diameter and respiratory changes fall into an intermediate range  suggesting an RA pressure of 8 mmHg.  No pericardial effusion is present.    ICD interrogation 4/10/23:  Device: Abbott [id number] [place] HF  Normal Device Function.   Mode: DDDR 60-90 bpm  AP: 49%  BVP: 91%  Intrinsic rhythm: CHB w/ AS @ 60 bpm; no intrinsic R-waves at VVI 30 bpm  Lead Trends Appear Stable: Yes  Estimated battery longevity to RRT = 5.9 years. Remaining Capacity to OSCAR = 92%.  Atrial arrhythmia: None  AF burden: 0%  Anticoagulant: Eliquis  Ventricular Arrhythmia: 1 VT episode recorded on 4/9/23 at 9:43 AM, lasting ~25 seconds at 114 bpm. EGM reveals a slow VT which is converted with one sequence of ATP.  Setting changes: None    RHC 4/11/23:  RA mean 11mmHg  RV 40/11  PA 39/16/25  PCWP 16    Pa Sat 49.9%  Art Sat 99%    TD CO/CI: 3.85 L/min / 2.25 L/min/m2  Shena CO/CI: 3.37 L/min / 1.97 L/min/m2    PVR 2.3 (TD)  SVR  1538 (TD) Right sided filling pressures are mildly elevated. Left sided filling pressures are mildly elevated. Mild elevated pulmonary hypertension. Reduced cardiac output level. Hemodynamic data has been modified in Epic per physician review.

## 2023-04-26 ENCOUNTER — APPOINTMENT (OUTPATIENT)
Dept: GENERAL RADIOLOGY | Facility: CLINIC | Age: 68
DRG: 287 | End: 2023-04-26
Attending: INTERNAL MEDICINE
Payer: MEDICARE

## 2023-04-26 ENCOUNTER — APPOINTMENT (OUTPATIENT)
Dept: OCCUPATIONAL THERAPY | Facility: CLINIC | Age: 68
DRG: 287 | End: 2023-04-26
Attending: INTERNAL MEDICINE
Payer: MEDICARE

## 2023-04-26 ENCOUNTER — APPOINTMENT (OUTPATIENT)
Dept: PHYSICAL THERAPY | Facility: CLINIC | Age: 68
DRG: 287 | End: 2023-04-26
Attending: INTERNAL MEDICINE
Payer: MEDICARE

## 2023-04-26 LAB
ANION GAP SERPL CALCULATED.3IONS-SCNC: 12 MMOL/L (ref 7–15)
BUN SERPL-MCNC: 29.7 MG/DL (ref 8–23)
CALCIUM SERPL-MCNC: 9.4 MG/DL (ref 8.8–10.2)
CHLORIDE SERPL-SCNC: 102 MMOL/L (ref 98–107)
CREAT SERPL-MCNC: 1.32 MG/DL (ref 0.51–1.17)
DEPRECATED HCO3 PLAS-SCNC: 22 MMOL/L (ref 22–29)
ERYTHROCYTE [DISTWIDTH] IN BLOOD BY AUTOMATED COUNT: 22.5 % (ref 10–15)
GFR SERPL CREATININE-BSD FRML MDRD: 59 ML/MIN/1.73M2
GLUCOSE BLDC GLUCOMTR-MCNC: 190 MG/DL (ref 70–99)
GLUCOSE BLDC GLUCOMTR-MCNC: 234 MG/DL (ref 70–99)
GLUCOSE BLDC GLUCOMTR-MCNC: 282 MG/DL (ref 70–99)
GLUCOSE BLDC GLUCOMTR-MCNC: 283 MG/DL (ref 70–99)
GLUCOSE SERPL-MCNC: 189 MG/DL (ref 70–99)
HCT VFR BLD AUTO: 35.6 % (ref 35–53)
HGB BLD-MCNC: 10.5 G/DL (ref 11.7–17.7)
MAGNESIUM SERPL-MCNC: 2 MG/DL (ref 1.7–2.3)
MAGNESIUM SERPL-MCNC: 2.1 MG/DL (ref 1.7–2.3)
MCH RBC QN AUTO: 25.8 PG (ref 26.5–33)
MCHC RBC AUTO-ENTMCNC: 29.5 G/DL (ref 31.5–36.5)
MCV RBC AUTO: 88 FL (ref 78–100)
PLATELET # BLD AUTO: 194 10E3/UL (ref 150–450)
POTASSIUM SERPL-SCNC: 4.8 MMOL/L (ref 3.4–5.3)
RBC # BLD AUTO: 4.07 10E6/UL (ref 3.8–5.9)
SODIUM SERPL-SCNC: 136 MMOL/L (ref 136–145)
WBC # BLD AUTO: 7.7 10E3/UL (ref 4–11)

## 2023-04-26 PROCEDURE — 97530 THERAPEUTIC ACTIVITIES: CPT | Mod: GP

## 2023-04-26 PROCEDURE — 250N000013 HC RX MED GY IP 250 OP 250 PS 637: Performed by: INTERNAL MEDICINE

## 2023-04-26 PROCEDURE — 73000 X-RAY EXAM OF COLLAR BONE: CPT | Mod: LT

## 2023-04-26 PROCEDURE — 214N000001 HC R&B CCU UMMC

## 2023-04-26 PROCEDURE — 83735 ASSAY OF MAGNESIUM: CPT

## 2023-04-26 PROCEDURE — 85027 COMPLETE CBC AUTOMATED: CPT

## 2023-04-26 PROCEDURE — 36415 COLL VENOUS BLD VENIPUNCTURE: CPT

## 2023-04-26 PROCEDURE — 97530 THERAPEUTIC ACTIVITIES: CPT | Mod: GO

## 2023-04-26 PROCEDURE — 97535 SELF CARE MNGMENT TRAINING: CPT | Mod: GO

## 2023-04-26 PROCEDURE — 97116 GAIT TRAINING THERAPY: CPT | Mod: GP

## 2023-04-26 PROCEDURE — 250N000013 HC RX MED GY IP 250 OP 250 PS 637: Performed by: STUDENT IN AN ORGANIZED HEALTH CARE EDUCATION/TRAINING PROGRAM

## 2023-04-26 PROCEDURE — 80048 BASIC METABOLIC PNL TOTAL CA: CPT

## 2023-04-26 PROCEDURE — 99232 SBSQ HOSP IP/OBS MODERATE 35: CPT | Mod: GC | Performed by: INTERNAL MEDICINE

## 2023-04-26 PROCEDURE — 250N000013 HC RX MED GY IP 250 OP 250 PS 637

## 2023-04-26 PROCEDURE — 73000 X-RAY EXAM OF COLLAR BONE: CPT | Mod: 26 | Performed by: RADIOLOGY

## 2023-04-26 RX ORDER — MAGNESIUM OXIDE 400 MG/1
400 TABLET ORAL EVERY 4 HOURS
Status: COMPLETED | OUTPATIENT
Start: 2023-04-26 | End: 2023-04-26

## 2023-04-26 RX ADMIN — CALCIUM CARBONATE 600 MG (1,500 MG)-VITAMIN D3 400 UNIT TABLET 1 TABLET: at 08:46

## 2023-04-26 RX ADMIN — MEXILETINE HYDROCHLORIDE 200 MG: 200 CAPSULE ORAL at 14:17

## 2023-04-26 RX ADMIN — INSULIN ASPART 1 UNITS: 100 INJECTION, SOLUTION INTRAVENOUS; SUBCUTANEOUS at 10:53

## 2023-04-26 RX ADMIN — EMPAGLIFLOZIN 10 MG: 10 TABLET, FILM COATED ORAL at 08:43

## 2023-04-26 RX ADMIN — AMIODARONE HYDROCHLORIDE 200 MG: 200 TABLET ORAL at 08:47

## 2023-04-26 RX ADMIN — TAMSULOSIN HYDROCHLORIDE 0.4 MG: 0.4 CAPSULE ORAL at 19:46

## 2023-04-26 RX ADMIN — WHITE PETROLATUM 57.7 %-MINERAL OIL 31.9 % EYE OINTMENT: at 22:59

## 2023-04-26 RX ADMIN — INSULIN ASPART 1 UNITS: 100 INJECTION, SOLUTION INTRAVENOUS; SUBCUTANEOUS at 06:28

## 2023-04-26 RX ADMIN — APIXABAN 5 MG: 5 TABLET, FILM COATED ORAL at 08:46

## 2023-04-26 RX ADMIN — Medication 800 MG: at 19:46

## 2023-04-26 RX ADMIN — Medication 400 MG: at 08:45

## 2023-04-26 RX ADMIN — Medication 800 MG: at 08:55

## 2023-04-26 RX ADMIN — MEXILETINE HYDROCHLORIDE 200 MG: 200 CAPSULE ORAL at 06:25

## 2023-04-26 RX ADMIN — ATORVASTATIN CALCIUM 80 MG: 80 TABLET, FILM COATED ORAL at 08:44

## 2023-04-26 RX ADMIN — SPIRONOLACTONE 25 MG: 25 TABLET, FILM COATED ORAL at 08:46

## 2023-04-26 RX ADMIN — INSULIN ASPART 2 UNITS: 100 INJECTION, SOLUTION INTRAVENOUS; SUBCUTANEOUS at 17:11

## 2023-04-26 RX ADMIN — MEXILETINE HYDROCHLORIDE 200 MG: 200 CAPSULE ORAL at 22:59

## 2023-04-26 RX ADMIN — APIXABAN 5 MG: 5 TABLET, FILM COATED ORAL at 19:46

## 2023-04-26 RX ADMIN — Medication 12.5 MG: at 08:44

## 2023-04-26 RX ADMIN — MICONAZOLE NITRATE: 20 POWDER TOPICAL at 19:47

## 2023-04-26 RX ADMIN — MICONAZOLE NITRATE: 20 POWDER TOPICAL at 10:53

## 2023-04-26 RX ADMIN — Medication 1 CAPSULE: at 08:47

## 2023-04-26 RX ADMIN — Medication 1 TABLET: at 08:44

## 2023-04-26 RX ADMIN — AMIODARONE HYDROCHLORIDE 200 MG: 200 TABLET ORAL at 19:46

## 2023-04-26 RX ADMIN — Medication 400 MG: at 13:36

## 2023-04-26 RX ADMIN — FUROSEMIDE 40 MG: 40 TABLET ORAL at 08:45

## 2023-04-26 ASSESSMENT — ACTIVITIES OF DAILY LIVING (ADL)
ADLS_ACUITY_SCORE: 40
ADLS_ACUITY_SCORE: 37
ADLS_ACUITY_SCORE: 40
ADLS_ACUITY_SCORE: 37

## 2023-04-26 NOTE — PLAN OF CARE
Neuro: A&Ox4.   Cardiac: VSS AV paced.    Respiratory: Sating >92 on RA.  GI/: Adequate urine output. No bm this shift using bedside urinal during shift.   Diet/appetite: Tolerating low fat low na diet. Eating well. ACHS  Activity:  Assist of 2, up to chair and in halls. L arm with sling.   Pain: pt denies pain  Skin: No new deficits noted.  LDA's:  L PIV - SL   Plan: Continue with POC. Notify primary team with changes.  SW note for discharge plan.

## 2023-04-26 NOTE — PROGRESS NOTES
Care Management Follow Up    Length of Stay (days): 18    Expected Discharge Date: Pending Placement     Concerns to be Addressed: Discharge Planning        Patient plan of care discussed at interdisciplinary rounds: Yes    Anticipated Discharge Disposition: TCU     Anticipated Discharge Services: Therapy Services     Anticipated Discharge DME: None     Patient/family educated on Medicare website which has current facility and service quality ratings: Yes      Education Provided on the Discharge Plan: Yes     Patient/Family in Agreement with the Plan: Yes     Referrals Placed by CM/SW: TCU     Private pay costs discussed: Not applicable    Additional Information:  When speaking with representative from Skilled Nursing Facility, writer learned that patient will trigger a Level II. Writer was told [place] and level II triggered. Writer called [facility] and was told that [place] would not be completing [place] Screen and [place] would need to. Writer learned that the [place] Level I/II screening was completed by Ashok. Writer completed screening with Ashok and is in process. Writer also called [facility] and spoke with Long Term Care MA staff. They state patient doesn't have a . Patient's KARIE staff also confirmed this.    Pending Referrals    [facility] [address] [place] [address]  [phone]  4/26: Referral Sent. Writer spoke with Lisa in admissions and answered questions she had. Writer faxed PT/OT requested notes to her.    [facility]  [address] [place] [address]  [phone]  4/26: Referral Sent.    [facility] [address], [place] [address]  [phone]  4/26: Referral Sent.    [facility]  [address], [place] [address]  [phone]  4/26: Referral Sent.    [facility]  [address] [address]  [phone]  4/26:  Referral Sent.    [facility] [place]  [address] [place] [address]  [phone]  4/26: Referral Sent.    [facility]   [address] [facility] [place] [address]  [phone]  4/26: Referral Sent.    [facility]  [address] [place] [address]  Phone: [phone]  Fax: [phone]  4/25: CHW faxed over referral through [facility].   4/26: Writer spoke with Jacki in admissions. The referral has been received and is being reviewed.     [facility]   [address] [place] [address]  Phone: [phone]  Fax: [phone]  4/25: CHW faxed over referral through [facility].   4/26: Writer left message with admissions requesting a call back regarding referral status.     [facility]  [address] [place] [address]  Admissions: [phone]   Fax: [phone]  [facility]  [address] [place] [address]  Admissions: [phone]   Fax: [phone]  4/17: CHW spoke with admissions coordinator who said they never received the referral. CHW hard faxed at 3:10 pm.   4/18: CHW LVM to follow-up on referral at 2:22 pm.   4/19: CHW LVM at 2:13 pm to follow-up on referral.   4/20: Writer left message with [facility] Admissions Coordinator at [phone] requesting a call back regarding referral status.  4/21: Writer left message with [facility] Admissions requesting a call back regarding referral status. Writer called facility and they will email them as well.  4/22: No weekend admissions available- call requested Monday 4/24: SW called admissions, they've had no beds for the last week or so but, there are potential discharges so possible openings. Admissions requested updated notes to review so SW faxed those.   4/25: SW called admissions and left a VM requesting a call back with an update.  4/26: Writer spoke with Janel in admissions. They do not have openings this week, but potentially next week. Updates faxed.    Declined  "Referrals    [facility] - Declined due to no bed available.  [facility] - Declined due to \"cannot meet his needs\".  [facility] - Declined due to no appropriate bed available.  [facility] - Declined due to no bed available.  [facility] - Declined due to they no longer have a TCU.  [facility] - Declined due to not appropriate for services.    TALI Balderas, MSW  Adult Acute Care Float   Pager [phone]  "

## 2023-04-26 NOTE — PROGRESS NOTES
[facility]  CARDIOLOGY HEART FAILURE SERVICE (CARDS II) PROGRESS NOTE    I personally saw and examined this patient with resident, discussed care options with patient and sister, discussed care options with [facility] by phone, discussed care options per N.D. for care in [place] or [place].  45 minutes.        Patient Name: Rohit Garvey    Medical Record Number: [id number]    YOB: 1955  PCP: Kevyn Salazar    Admit Date/Time: 4/8/2023  3:08 PM     Assessment and Plan:  Rohit Garvey is a 67 year old adult male with a PMH significant for NICM (EF of 40-45%) c/b paroxysmal VT s/p dual chamber ICD placement 6/20/2022 c/b recurrent ICD shocks s/p VT ablation 10/31/2022 and re-ablation 12/29/2022, paroxysmal Afib on apixaban, developmental delay (possibly related to viral illnesses during infancy), T2DM, hypertension, and hyperlipidemia who was admitted as a transfer from [place] per recommendations of his cardiologists Dr. Urbina and Dr. Chanel for evaluation by advanced heart failure failure team and EP team for VT management and consideration of advanced therapies.    Today's updates:  - L clavicle XR today to evaluate healing of previous distal clavicle fracture   - Ortho consulted regarding imaging results and recommendations for updated weight bearing status   - Charts from [facility] in [place] forwarded but only the XR interpretation. Will see if we can get access to raw images of L shoulder from 2/21 if Ortho needs   - PT still recommending TCU placement at this time   - Patient does not meet requirements for acute hospital admission but remains admitted while waiting for TCU bed. Expanded search today to more locations in [place], also looking into TCUs in [place] that could accept his [place] medicare insurance      # Persistent Paroxysmal Ventricular Tachycardia s/p dual chamber ICD placement 6/2022 and VT ablation x 2  # Chronic  HFrEF 2/2 NICM w/ LVEF 40-45%, NYHA II  High burden of ventricular arrhythmias despite being on 2 antiarrhythmic medications (amiodarone and mexiletine) and being s/p ICD and two attempted VT ablations. Transferred here for evaluation for advanced therapies. ECHO 4/9 showed mildly reduced EF from 46% (Dec 2022) to 40-45%, however still does not qualify him for LVAD (EF < 25%). Possible heart transplant candidate if he meets criteria for VT storm.   VT episode at 0700 on 4/14. Required ATP and ICD shock. Pt not interested in any further cardiac procedures including ablation.   - EP consulted, appreciate recs   - ICD device interrogation ordered - 1 VT episode on 4/9/23 lasting 25 seconds   - VT episode at 0700 on 4/14. Required ATP and ICD shock. Per sister, EP adjusted ICD after this episode.    - Patient not interested in any operative/procedural interventions. EP does not plan to change his AAT or ICD settings. No need to go to hospital for outpatient ICD shocks unless >3 a day. Asked patient to notify outpatient providers if he is shocked by his ICD.   - Continue tele  - Per [facility] discussion w/ patient and sister 4/21: changed from full code to DNR/DNI status w/ plan to keep ICD on for the meantime   - SW consulted for drafting updated advance care directive to reflect new DNR/DNI status     Advanced therapies evaluation workup:  - ECHO 4/9 w/ mildly reduced LV function (EF reduction from 46% to 40-45%) and mild aortic valve calcification   - RHC 4/11:   RA mean 11mmHg. RV 38/4. RVEDP 13. PA 39/16/25. PCWP 16  TD CO/CI: 3.85 L/min / 2.25 L/min/m2  Shena CO/CI: 3.37 L/min / 1.97 L/min/m2    - Cardiopulmonary stress test cancelled as pt deemed not physically able to perform test per RN  - Advanced therapy  following   - Neuropsych testing 4/11 and 4/12  - Financial consult already placed  - Utox screen positive for amphetamines. Quant screen negative, indicating false positive on initial screen.   -  Nicotine and cotinine screen negative. PEth negative  - Discussed at 4/14 heart transplant candidacy review     Patient ultimately decided to not pursue heart transplant or ablation. Goal is to go to TCU w/ plan to either return to his AL or LTC w/ consideration of starting hospice. No further advanced therapies workup will be pursued at this time.      GDMTs:  - Antiarrhythmics: PTA amiodarone 200 mg BID and mexiletine 200 mg q8h   - ACEi/ARB: Not currently on due to LVEF of 45%.  - BB:   - Continue metoprolol succinate 12.5 mg daily    - Holding PTA carvedilol 3.125 mg BID 2/2 soft bps   - Aldosterone antagonist: Continue spironolactone 25 mg daily   - SGLT2i: Continue PTA empagliflozin 10 mg daily  - SCD prophylaxis: S/p secondary prevention ICD 6/20/22  - Volume status: Euvolemic: continue lasix 40 mg qday               - Discontinued PTA KCl supplement 20 meq daily now that spironolactone is added                - Continue PTA magnesium oxide 800 mg BID               - BMP BID w/ goal of K > 4 and Mg > 2               - Strict I/Os and daily weights   - Anticoagulation: PTA Eliquis 5 mg BID   - Statin therapy: PTA atorvastatin 80 mg daily   - OT to assist in compression stockings     # Recent L distal clavicular fracture undergoing conservative management   At baseline uses walker for ambulation. Had mechanical fall 2/21/23 sustaining fracture of the distal clavicle just medial to the acromioclavicular joint, per XR interpretations from ED visit at OSH. Per sister, pt was asked to be NWB LUE x 8 weeks until repeat images and f/u outpatient. As a result, has been largely wheelchair dependent as he cannot put weight on his walker.   - Repeat clavicle XR here 4/26 to evaluate healing   - Ortho consulted to evaluate and provide updated recommendations on weight bearing status   - Charts from [facility] in [place] forwarded but only the XR interpretation. Will see if we can get access to raw images of L shoulder from  2/21 if Ortho needs   - Fall precautions  - NWB LUE for now   - PT/OT following, recommending TCU placement   - SW placed referrals for TCU. Awaiting bed availability     # UTI vs sterile pyuria, resolved    UA w/ large leuks and 131 WBCs w/ negative nitrites. Pt reported intermittent dysuria. Started on IV ceftriaxone. UC returned negative for bacteria.   - Completed 5 days of Augmentin (4/15-4/19)   - PTA empagliflozin held during treatment of UTI. Resumed 4/20      # Chronic Diabetic R heel ulcer  Present since Dec 2022, per sister. RN noticed erythema and ?purulent drainage from area 4/15. WOC consulted and deemed ulcer not actively infected. Recommend q3 day dressing changes.   - wound care and dressing changes per WOC orders  - f/u outpatient w/ podiatry      # Positive U tox for amphetamines  Pt and sister deny any illegal or legal stimulant use. No obvious PTA drugs on inspection that could be causing a false positive. Urine quant screen ultimately showed < 50 ng/mL amphetamines, well below the level to indicate a positive result. Suspect false positive on urine tox screen.   - No further workup/intervention needed     # Afib w/o RVR  - metoprolol, amiodarone, and mexiletine   - PTA Eliquis 5 mg BID  - Tele     # ROSALINO on CKD, resolved  Uptrending Cr above baseline Cr of 1.3. No observed mention of etiology of pt's CKD in chart. UA w/o protein, RBCs, or casts. Suspect prerenal as Cr improved after diuresis held and has remained stable since restarting diuresis at a lower dose   - diuresis per above   - BID BMP     # Acute on chronic normocytic anemia concerning for mixed picture of iron deficiency and inflammatory   # Iron deficiency   # Hx of anemia of chronic disease   Hgb 8.7 on arrival and downtrending. Baseline Hgb ~9-10 reportedly 2/2 anemia of chronic disease. Tbili normal. No source of active bleeding. Workup revealed low iron level, normal TIBC, and low end-of-normal ferritin concerning for iron  deficiency anemia.   - s/p IV Iron sucrose 200 mg qday x 5 days  - AM CBCs     # Chronic hyponatremia, intermittent   BL sodium ~130-133. Suspect hypervolemic hyponatremia in setting of CHF. Improved after IV diuresis   - daily BMP    # DM2   On metformin 1,000 mg BID PTA.   - hold PTA metformin, plan to resume on discharge   - Holding empagliflozin as above   - LDSS  - Hypoglycemia protocol      # BPH  - PTA tamulosin  - CTM w/ bladder scans and PVRs PRN     # Developmental delay   Lives at assisted living where he receives assistance w/ medications, meals, etc. Independent in basic ADLs (feeding, clothing, bathing) at his baseline. Sister Peg is his HCA and POA but pt currently has capacity   - Delirium precautions      # Constipation   - PTA metamucil     # R coccyx wound  - WOC consulted, appreciate recs:  - If pt has constant incontinent loose stools needing dressing changes Q shift recommend discontinuing the Mepilex dressing and applying criticaid barrier paste BID and PRN.    # Malnutrition:    - Level of malnutrition: Unable to assess     FEN: Soft bite sized (due to lack of dentition), 2 L fluid restriction   PPx: PTA Eliquis   Code: DNR/DNI  Dispo: Discharge to TCU once bed available    Pt was discussed and evaluated with Dr. Mitchell, attending physician, who agrees with the assessment and plan above.     Prachi Call,   Internal Medicine, PGY-I    Interval Changes in Past 24 Hours:   No overnight events. Today, doing well. No complaints. No new ICD shocks.     Review Of Systems  A 4-point ROS was negative aside from those listed above.    OBJECTIVE FINDINGS:    Temp:  [97.4  F (36.3  C)-98.2  F (36.8  C)] 97.4  F (36.3  C)  Pulse:  [57-65] 60  Resp:  [16-20] 16  BP: ()/(54-72) 94/56  SpO2:  [97 %-100 %] 100 %     BP MAP 80s overnight, 70 this AM     I/Os: net +240 ml yesterday, -865 ml since midnight     Intake/Output Summary (Last 24 hours) at 4/18/2023 0809  Last data filed at 4/18/2023  0600  Gross per 24 hour   Intake 920 ml   Output 550 ml   Net 370 ml     Vitals:    04/25/23 0319 04/25/23 0549 04/26/23 0400   Weight: 59.1 kg (130 lb 4.7 oz) 60.1 kg (132 lb 9.6 oz) 59.5 kg (131 lb 3.2 oz)     Gen: Sitting in chair, NAD  Neck: No JVD   Resp: clear to auscultation bilaterally, no crackles or wheezing   CV: Irregular rhythm, regular rate.   Abd: soft, NT, ND  Ext: warm and well perfused, no peripheral edema     Current medications   Current Facility-Administered Medications   Medication     acetaminophen (TYLENOL) Suppository 650 mg     acetaminophen (TYLENOL) tablet 650 mg     alum & mag hydroxide-simethicone (MAALOX) suspension 30 mL     amiodarone (PACERONE) tablet 200 mg     apixaban ANTICOAGULANT (ELIQUIS) tablet 5 mg     artificial tears ophthalmic ointment     atorvastatin (LIPITOR) tablet 80 mg     calcium carbonate-vitamin D (CALTRATE) 600-10 MG-MCG per tablet 1 tablet     carboxymethylcellulose PF (REFRESH PLUS) 0.5 % ophthalmic solution 1 drop     glucose gel 15-30 g    Or     dextrose 50 % injection 25-50 mL    Or     glucagon injection 1 mg     empagliflozin (JARDIANCE) tablet 10 mg     furosemide (LASIX) tablet 40 mg     insulin aspart (NovoLOG) injection (RAPID ACTING)     insulin aspart (NovoLOG) injection (RAPID ACTING)     lidocaine (LMX4) cream     lidocaine 1 % 0.1-1 mL     magnesium oxide (MAG-OX) tablet 400 mg     magnesium oxide (MAG-OX) tablet 800 mg     medication instruction     metoprolol succinate ER (TOPROL-XL) 24 hr half-tab 12.5 mg     mexiletine (MEXITIL) capsule 200 mg     miconazole (MICATIN) 2 % powder     multivitamin w/minerals (THERA-VIT-M) tablet 1 tablet     nitroGLYcerin (NITROSTAT) sublingual tablet 0.4 mg     psyllium (METAMUCIL/KONSYL) capsule 1 capsule     sodium chloride (PF) 0.9% PF flush 3 mL     sodium chloride (PF) 0.9% PF flush 3 mL     spironolactone (ALDACTONE) tablet 25 mg     tamsulosin (FLOMAX) capsule 0.4 mg     LABS Reviewed:  Cr 1.32 (1.30),  K 4.8, Mg 2.0     IMAGES Reviewed:    ECHO 4/9/23 Interpretation Summary:  Left ventricular function is decreased. The ejection fraction is 40-45%  (mildly reduced compared to 46% back in Dec 2022).   LV size and wall thickness is normal. Abnormal septal wall motion consistent w/ pacemaker.   Global right ventricular function is normal.  Mild aortic valve calcification is present. The mean gradient across the  aortic valve is 8 mmHg.  Pulmonary artery systolic pressure is normal.  IVC diameter and respiratory changes fall into an intermediate range  suggesting an RA pressure of 8 mmHg.  No pericardial effusion is present.    ICD interrogation 4/10/23:  Device: Abbott [id number] [place] HF  Normal Device Function.   Mode: DDDR 60-90 bpm  AP: 49%  BVP: 91%  Intrinsic rhythm: CHB w/ AS @ 60 bpm; no intrinsic R-waves at VVI 30 bpm  Lead Trends Appear Stable: Yes  Estimated battery longevity to RRT = 5.9 years. Remaining Capacity to OSCAR = 92%.  Atrial arrhythmia: None  AF burden: 0%  Anticoagulant: Eliquis  Ventricular Arrhythmia: 1 VT episode recorded on 4/9/23 at 9:43 AM, lasting ~25 seconds at 114 bpm. EGM reveals a slow VT which is converted with one sequence of ATP.  Setting changes: None    RHC 4/11/23:  RA mean 11mmHg  RV 40/11  PA 39/16/25  PCWP 16    Pa Sat 49.9%  Art Sat 99%    TD CO/CI: 3.85 L/min / 2.25 L/min/m2  Shena CO/CI: 3.37 L/min / 1.97 L/min/m2    PVR 2.3 (TD)  SVR  1538 (TD) Right sided filling pressures are mildly elevated. Left sided filling pressures are mildly elevated. Mild elevated pulmonary hypertension. Reduced cardiac output level. Hemodynamic data has been modified in Epic per physician review.

## 2023-04-26 NOTE — PROGRESS NOTES
Patient A&O x4.  Patient slept much of the night.  Patient is up to the chair this morning.  Patient is not having any pain.  Patient vitals stable.

## 2023-04-26 NOTE — PLAN OF CARE
Ortho Phone Consult Note:    Received page from cardiology regarding patient. 67 year old man with known left distal clavicle fracture with minimal displacement, admitted from [facility] for further cardiac work-up of his heart failure.   Per Dr. Call, patient not complaining of pain, is neurovascularly in tact to the LUE. He has been wearing sling since his initial injury noted to be 2/21/23, reviewed from OSH in [place]. Images from that visit not available for review. Patient has been NWB to the LUE and impacts his ability to use walker.     Imaging reviewed. 2 views of the left clavicle reveal minimally displaced distal clavicle fracture with comminution. No step-off of the AC joint. No significant bony bridging of the fracture yet seen.     Recommendations:  - Weightbearing: up to 10 pounds of lifting, pushing, or pulling.   - Bracing/splinting: for comfort. Okay to work on ROM of shoulder, elbow and wrist  - Imaging: plan to repeat x-rays of the clavicle in one month as outpatient.       Please call/page if any questions/concerns arise. Please note, patient was not evaluated in person today.    Germaine Cassidy PA-C  4/26/2023 3:19 PM  Orthopaedic Surgery     Thank you for allowing me to participate in this patient's care. Please page me directly any questions/concerns.   Securely message with the Vocera Web Console (learn more here)  Text page via [employer] Paging/Directory    If there is no response, if it is a weekend, or if it is during evening hours, please page the orthopaedic surgery resident on call via [employer] Paging/Directory    Upon further review with Dr. Chang Paulson, Orthopedic Surgery attending, patient may progress weightbearing through LUE as tolerated as long as no increased pain. Would recommend this be gradual to allow for pain response.

## 2023-04-27 ENCOUNTER — APPOINTMENT (OUTPATIENT)
Dept: OCCUPATIONAL THERAPY | Facility: CLINIC | Age: 68
DRG: 287 | End: 2023-04-27
Attending: INTERNAL MEDICINE
Payer: MEDICARE

## 2023-04-27 ENCOUNTER — APPOINTMENT (OUTPATIENT)
Dept: PHYSICAL THERAPY | Facility: CLINIC | Age: 68
DRG: 287 | End: 2023-04-27
Attending: INTERNAL MEDICINE
Payer: MEDICARE

## 2023-04-27 LAB
ANION GAP SERPL CALCULATED.3IONS-SCNC: 11 MMOL/L (ref 7–15)
BUN SERPL-MCNC: 29.7 MG/DL (ref 8–23)
CALCIUM SERPL-MCNC: 9.1 MG/DL (ref 8.8–10.2)
CHLORIDE SERPL-SCNC: 105 MMOL/L (ref 98–107)
CREAT SERPL-MCNC: 1.38 MG/DL (ref 0.51–1.17)
DEPRECATED HCO3 PLAS-SCNC: 22 MMOL/L (ref 22–29)
ERYTHROCYTE [DISTWIDTH] IN BLOOD BY AUTOMATED COUNT: 22.3 % (ref 10–15)
GFR SERPL CREATININE-BSD FRML MDRD: 56 ML/MIN/1.73M2
GLUCOSE BLDC GLUCOMTR-MCNC: 176 MG/DL (ref 70–99)
GLUCOSE BLDC GLUCOMTR-MCNC: 190 MG/DL (ref 70–99)
GLUCOSE BLDC GLUCOMTR-MCNC: 201 MG/DL (ref 70–99)
GLUCOSE BLDC GLUCOMTR-MCNC: 204 MG/DL (ref 70–99)
GLUCOSE BLDC GLUCOMTR-MCNC: 218 MG/DL (ref 70–99)
GLUCOSE BLDC GLUCOMTR-MCNC: 280 MG/DL (ref 70–99)
GLUCOSE SERPL-MCNC: 181 MG/DL (ref 70–99)
HCT VFR BLD AUTO: 32.8 % (ref 35–53)
HGB BLD-MCNC: 10 G/DL (ref 11.7–17.7)
MAGNESIUM SERPL-MCNC: 2.1 MG/DL (ref 1.7–2.3)
MCH RBC QN AUTO: 25.4 PG (ref 26.5–33)
MCHC RBC AUTO-ENTMCNC: 30.5 G/DL (ref 31.5–36.5)
MCV RBC AUTO: 84 FL (ref 78–100)
PLATELET # BLD AUTO: 200 10E3/UL (ref 150–450)
POTASSIUM SERPL-SCNC: 4.8 MMOL/L (ref 3.4–5.3)
RBC # BLD AUTO: 3.93 10E6/UL (ref 3.8–5.9)
SODIUM SERPL-SCNC: 138 MMOL/L (ref 136–145)
WBC # BLD AUTO: 7.2 10E3/UL (ref 4–11)

## 2023-04-27 PROCEDURE — 83735 ASSAY OF MAGNESIUM: CPT

## 2023-04-27 PROCEDURE — 36415 COLL VENOUS BLD VENIPUNCTURE: CPT

## 2023-04-27 PROCEDURE — 80048 BASIC METABOLIC PNL TOTAL CA: CPT

## 2023-04-27 PROCEDURE — 85048 AUTOMATED LEUKOCYTE COUNT: CPT

## 2023-04-27 PROCEDURE — 97530 THERAPEUTIC ACTIVITIES: CPT | Mod: GO

## 2023-04-27 PROCEDURE — 97116 GAIT TRAINING THERAPY: CPT | Mod: GP

## 2023-04-27 PROCEDURE — 250N000013 HC RX MED GY IP 250 OP 250 PS 637

## 2023-04-27 PROCEDURE — 250N000013 HC RX MED GY IP 250 OP 250 PS 637: Performed by: INTERNAL MEDICINE

## 2023-04-27 PROCEDURE — G0463 HOSPITAL OUTPT CLINIC VISIT: HCPCS | Mod: 25

## 2023-04-27 PROCEDURE — 97602 WOUND(S) CARE NON-SELECTIVE: CPT

## 2023-04-27 PROCEDURE — 250N000013 HC RX MED GY IP 250 OP 250 PS 637: Performed by: STUDENT IN AN ORGANIZED HEALTH CARE EDUCATION/TRAINING PROGRAM

## 2023-04-27 PROCEDURE — 214N000001 HC R&B CCU UMMC

## 2023-04-27 PROCEDURE — 85014 HEMATOCRIT: CPT

## 2023-04-27 PROCEDURE — 99232 SBSQ HOSP IP/OBS MODERATE 35: CPT | Mod: GC | Performed by: INTERNAL MEDICINE

## 2023-04-27 RX ORDER — ACETAMINOPHEN 325 MG/1
650 TABLET ORAL EVERY 4 HOURS PRN
Qty: 30 TABLET | Refills: 0 | Status: SHIPPED | OUTPATIENT
Start: 2023-04-27

## 2023-04-27 RX ORDER — MULTIPLE VITAMINS W/ MINERALS TAB 9MG-400MCG
1 TAB ORAL DAILY
Qty: 30 TABLET | Refills: 1 | Status: SHIPPED | OUTPATIENT
Start: 2023-04-28

## 2023-04-27 RX ORDER — METOPROLOL SUCCINATE 25 MG/1
12.5 TABLET, EXTENDED RELEASE ORAL DAILY
Qty: 15 TABLET | Refills: 1 | Status: SHIPPED | OUTPATIENT
Start: 2023-04-28

## 2023-04-27 RX ORDER — SPIRONOLACTONE 25 MG/1
25 TABLET ORAL DAILY
Qty: 30 TABLET | Refills: 1 | Status: SHIPPED | OUTPATIENT
Start: 2023-04-28

## 2023-04-27 RX ORDER — FUROSEMIDE 40 MG
40 TABLET ORAL DAILY
Qty: 30 TABLET | Refills: 1 | Status: SHIPPED | OUTPATIENT
Start: 2023-04-28

## 2023-04-27 RX ORDER — MAGNESIUM OXIDE 400 MG/1
800 TABLET ORAL 2 TIMES DAILY
Qty: 60 TABLET | Refills: 1 | Status: SHIPPED | OUTPATIENT
Start: 2023-04-27

## 2023-04-27 RX ORDER — NITROGLYCERIN 0.4 MG/1
TABLET SUBLINGUAL
Qty: 30 TABLET | Refills: 0 | Status: SHIPPED | OUTPATIENT
Start: 2023-04-27

## 2023-04-27 RX ORDER — CALCIUM CARBONATE/VITAMIN D3 600 MG-10
1 TABLET ORAL DAILY
Qty: 30 TABLET | Refills: 1 | Status: SHIPPED | OUTPATIENT
Start: 2023-04-28

## 2023-04-27 RX ORDER — FUROSEMIDE 40 MG
40 TABLET ORAL DAILY
Qty: 30 TABLET | Refills: 1 | Status: CANCELLED | OUTPATIENT
Start: 2023-04-28

## 2023-04-27 RX ADMIN — Medication 800 MG: at 08:10

## 2023-04-27 RX ADMIN — MEXILETINE HYDROCHLORIDE 200 MG: 200 CAPSULE ORAL at 05:01

## 2023-04-27 RX ADMIN — CALCIUM CARBONATE 600 MG (1,500 MG)-VITAMIN D3 400 UNIT TABLET 1 TABLET: at 08:10

## 2023-04-27 RX ADMIN — MEXILETINE HYDROCHLORIDE 200 MG: 200 CAPSULE ORAL at 22:35

## 2023-04-27 RX ADMIN — Medication 12.5 MG: at 08:10

## 2023-04-27 RX ADMIN — APIXABAN 5 MG: 5 TABLET, FILM COATED ORAL at 19:46

## 2023-04-27 RX ADMIN — EMPAGLIFLOZIN 10 MG: 10 TABLET, FILM COATED ORAL at 08:10

## 2023-04-27 RX ADMIN — Medication 800 MG: at 19:46

## 2023-04-27 RX ADMIN — SPIRONOLACTONE 25 MG: 25 TABLET, FILM COATED ORAL at 08:11

## 2023-04-27 RX ADMIN — MEXILETINE HYDROCHLORIDE 200 MG: 200 CAPSULE ORAL at 15:23

## 2023-04-27 RX ADMIN — FUROSEMIDE 40 MG: 40 TABLET ORAL at 08:10

## 2023-04-27 RX ADMIN — ATORVASTATIN CALCIUM 80 MG: 80 TABLET, FILM COATED ORAL at 08:10

## 2023-04-27 RX ADMIN — AMIODARONE HYDROCHLORIDE 200 MG: 200 TABLET ORAL at 08:11

## 2023-04-27 RX ADMIN — Medication 1 TABLET: at 08:10

## 2023-04-27 RX ADMIN — INSULIN ASPART 2 UNITS: 100 INJECTION, SOLUTION INTRAVENOUS; SUBCUTANEOUS at 08:12

## 2023-04-27 RX ADMIN — APIXABAN 5 MG: 5 TABLET, FILM COATED ORAL at 08:10

## 2023-04-27 RX ADMIN — AMIODARONE HYDROCHLORIDE 200 MG: 200 TABLET ORAL at 19:46

## 2023-04-27 RX ADMIN — MICONAZOLE NITRATE: 20 POWDER TOPICAL at 19:47

## 2023-04-27 RX ADMIN — INSULIN ASPART 1 UNITS: 100 INJECTION, SOLUTION INTRAVENOUS; SUBCUTANEOUS at 16:52

## 2023-04-27 RX ADMIN — MICONAZOLE NITRATE: 20 POWDER TOPICAL at 08:11

## 2023-04-27 RX ADMIN — WHITE PETROLATUM 57.7 %-MINERAL OIL 31.9 % EYE OINTMENT: at 22:35

## 2023-04-27 RX ADMIN — Medication 1 CAPSULE: at 08:10

## 2023-04-27 RX ADMIN — INSULIN ASPART 1 UNITS: 100 INJECTION, SOLUTION INTRAVENOUS; SUBCUTANEOUS at 12:13

## 2023-04-27 RX ADMIN — TAMSULOSIN HYDROCHLORIDE 0.4 MG: 0.4 CAPSULE ORAL at 19:46

## 2023-04-27 ASSESSMENT — ACTIVITIES OF DAILY LIVING (ADL)
ADLS_ACUITY_SCORE: 37

## 2023-04-27 NOTE — PROGRESS NOTES
Care Management Follow Up    Length of Stay (days): 19    Expected Discharge Date: Pending Placement     Concerns to be Addressed: Discharge Planning      Patient plan of care discussed at interdisciplinary rounds: Yes    Anticipated Discharge Disposition: TCU     Anticipated Discharge Services: Post Acute Therapies     Anticipated Discharge DME: None      Patient/family educated on Medicare website which has current facility and service quality ratings: Yes     Education Provided on the Discharge Plan:  Yes    Patient/Family in Agreement with the Plan: Yes     Referrals Placed by CM/SW:    Private pay costs discussed: Not applicable    Additional Information:  Writer confirmed receipt of Level I assessment with Ashok and was requested to complete a Level of Care assessment. Writer completed and submitted. Writer also submitted requested History and Physcial. Writer will continue to work with Ashok to complete required assessment.     Writer updated patient and sister regarding status of TCU placement and Level I assessment. Sister reports that they plan to have their cousin pick them up and drive them to the [place]. They will fly to [place] and patient's brother-in-law will drive them to facility. Writer called provider to ask if a plan flight was approved. Provider will call back.    Pending Referrals     [facility]  [address] [address]  [phone]  4/26: Referral Sent. Writer spoke with Lisa in admissions and answered questions she had. Writer faxed PT/OT requested notes to her.  4/27: Writer spoke with Lisa in admissions. Patient is able to admit to facility once Level I/Level II assessments are completed with Ashok. P: [phone] F: [phone]. Needs to arrive by 4 on Friday. Cannot admit Saturday or Sunday. Can admit on Monday.     [facility]  [address] [address]  Phone: [phone]  Fax: [phone]  4/25: CHW faxed over  referral through [facility].   4/26: Writer spoke with Jacki in admissions. The referral has been received and is being reviewed. Sister filled out information form requested from facility, and it was faxed over by writer.  4/27: Writer left message with Jacki in admissions requesting a call back regarding referral status.    [facility]  [address] [place] [address]  [phone]  4/26: Referral Sent.  4/27: Writer spoke with Sindhu in admissions. She did not receive the referral. Referral re-sent.     [facility] at [address] [place] [address]  [phone]  4/26: Referral Sent.  4/27: Writer called main number but was unable to get through to anyone.     [facility]  [address] [place] [address]  [phone]  4/26: Referral Sent.  4/27: Writer spoke with Sindhu in admissions. She states she will review referral and respond back.     [facility]   [address] [place] [address]  [phone]  4/26: Referral Sent.  4/27: Writer spoke with Sindhu in admissions. She states she will review referral and respond back.    [facility]  [address] [place] [address]  Admissions: [phone]   Fax: [phone]  [facility]  [address] [place] [address]  Admissions: [phone]   Fax: [phone]  4/17: CHW spoke with admissions coordinator who said they never received the referral. CHW hard faxed at 3:10 pm.   4/18: CHW LVM to follow-up on referral at 2:22 pm.   4/19: CHW LVM at 2:13 pm to follow-up on referral.   4/20: Writer left message with [facility] Admissions Coordinator at [phone] requesting a call back regarding referral status.  4/21: Writer left message with [facility] Admissions requesting a call back regarding referral status. Writer called facility and they will email them as well.  4/22: No weekend admissions available- call requested  "Monday 4/24: [facility] called admissions, they've had no beds for the last week or so but, there are potential discharges so possible openings. Admissions requested updated notes to review so [facility] faxed those.   4/25: [facility] called admissions and left a VM requesting a call back with an update.  4/26: Writer spoke with Janel in admissions. They do not have openings this week, but potentially next week. Updates faxed.     Declined Referrals        [facility] - Declined due to no bed available.  [facility] - Declined due to no bed available.  [facility] - Declined due to no bed available.  [facility] - Declined due to no bed available.  [facility] - Declined due to \"cannot meet his needs\".  [facility] - Declined due to no appropriate bed available.  [facility] - Declined due to no bed available.  [facility] - Declined due to they no longer have a TCU.  [facility] - Declined due to not appropriate for services.     TALI Balderas, MSW  Adult Acute Care Float   Pager [phone]  "

## 2023-04-27 NOTE — CONSULTS
[facility]  WO Nurse Inpatient Assessment     Consulted for: 4/21 sacrum/coccyx area hospital acquired stage 2 resurfaced  Right heel wound    Patient History (according to provider note(s):      Rohit Garvey is a 67 year old adult male with a PMH significant for NICM (EF of 40-45%) c/b paroxysmal VT s/p dual chamber ICD placement 6/20/2022 c/b recurrent ICD shocks s/p VT ablation 10/31/2022 and re-ablation 12/29/2022, paroxysmal Afib on apixaban, developmental delay (possibly related to viral illnesses during infancy), T2DM, hypertension, and hyperlipidemia who was admitted as a transfer from [place] per recommendations of his cardiologists Dr. Urbina and Dr. Chanel for evaluation by advanced heart failure failure team and EP team for VT management and consideration of advanced therapies.       Areas Assessed:      Areas visualized during today's visit: Heels & sacrum     Pressure Injury Location: sacrum/right buttock and gluteal cleft affected     Last photo: 4/21  Wound type: Pressure Injury, Friction and Intertrigo     Pressure Injury Stage: 2, hospital acquired      Wound history/plan of care:   Patient reports he has had this since he has been in the hospital. RN found it.  Wound base: 100 % epidermis, distally in gluteal cleft and reabsorbed partial thickness blister     Palpation of the wound bed: normal      Drainage: none     Description of drainage: none     Measurements (length x width x depth, in cm)   Entire affected area 7  x 2.5  x  0.1 cm with 1.5cm x0.6cm x0.1cm open over the coccyx - not well visualized in the picture   Periwound skin: Dry/scaly and Skin stripping      Color: blanchable erythema      Temperature: normal   Odor: none  Pain: denies , none  Pain intervention prior to dressing change: patient tolerated well and no significant pain present   Treatment goal: Heal  and Protection  STATUS: initial assessment, healing, healed and  improving  Supplies ordered: supplies stored on unit, discussed with RN and discussed with patient     My PI Risk Assessment     Sensory Perception: 2 - Very Limited     Moisture: 3 - Occasionally moist      Activity: 2 - Chairfast     Mobility: 2 - Very limited     Nutrition: 2 - Probably inadequate      Friction/Shear: 1 - Problem     TOTAL: 12    Wound location: Right heel       4/27  Last photo: 4/27  Wound due to: Diabetic Ulcer  Wound history/plan of care: Per patients wife wound has been there since December 2022 and started as redness, evolved into blister, then dried up. Nurse and family concerned today 4/25 as wound was moist with purulent drainage. Per patients wife he has been seeing podiatry and they recommended mepilex dressings. While patient was at assisted living they said the wound had dried up and that no dressing was needed anymore. Wound appears to have been kept in a moist environment, unclear if dressings were being used, but drainage that had been noted by nuring and family was from autolytic debridement of necrotic tissue. No infection suspected at this time.   4/27 slough removed during cleansing of the wound  Wound base: 60% soft lysing yellow slough 40% pink moist granulation tissue localized at the edges     Palpation of the wound bed: normal      Drainage: small     Description of drainage: tan     Measurements (length x width x depth, in cm): 1 x 1.2 x 0.2 cm      Tunneling: N/A     Undermining: N/A  Periwound skin: pale pink      Color: normal and consistent with surrounding tissue      Temperature: normal   Odor: none  Pain: denies  Pain interventions prior to dressing change: gentle cleansing   Treatment goal: Infection control/prevention, Increase granulation, Remove necrotic tissue and Soften necrotic tissue  STATUS: improving POC working, added adaptic to prevent gel from getting absorbed into the foam.  Supplies ordered: in room   Treatment Plan:       Sacrum wound: Every 3 days      Cleanse the area with NS and pat dry.    Apply No sting film barrier to periwound skin.    Cover wound with Sacral Mepilex (#[id number])    Change dressing Q 3 days.    Turn and reposition Q 2hrs side to side only.    Ensure pt has Royce-cushion while sitting up in the chair.    FYI- If pt has constant incontinent loose stools needing dressing changes Q shift please discontinue the Mepilex dressing and apply criticaid barrier paste BID and PRN.      Right medial heel every other day  Paint mohsen-wound skin with No sting barrier film ([id number])  Apply Iodosorb Gel ([id number]) to open wound bed 3mm thick to a small piece of adaptic   Place adaptic gel side down to the wound bed                    Cover with mepilex flex 4x4 (# [id number])  Keep heels elevated off bed.  Compression on in am and off at night    Orders: updated     RECOMMEND PRIMARY TEAM ORDER: None, at this time, Patient should follow up with outpatient podiatry.  Education provided: rationale for POC change and progress   Discussed plan of care with: patient, sister RN   WOC nurse follow-up plan: weekly  Notify WOC if wound(s) deteriorate.  Nursing to notify the Provider(s) and re-consult the WOC Nurse if new skin concern.    DATA:     Current support surface: Standard  Standard gel/foam mattress (IsoFlex, Atmos air, etc)  Containment of urine/stool: Incontinent pad in bed  BMI: Body mass index is 22.4 kg/m .   Active diet order: Orders Placed This Encounter      Low Saturated Fat Na <2400 mg     Output: I/O last 3 completed shifts:  In: 600 [P.O.:600]  Out: 950 [Urine:950]     Labs:   Recent Labs   Lab 04/27/23  0602   HGB 10.0*   WBC 7.2     Pressure injury risk assessment:   Sensory Perception: 4-->no impairment  Moisture: 4-->rarely moist  Activity: 3-->walks occasionally  Mobility: 3-->slightly limited  Nutrition: 3-->adequate  Friction and Shear: 3-->no apparent problem  Linwood Score: 20    Prachi Gomez RN BS, CWOCN  Pager no longer is use, please contact  through [place]   Vocsd group: [facility] Nurse  Dept. Office Number: [phone]

## 2023-04-27 NOTE — PROGRESS NOTES
Care Management Discharge Note    Discharge Date: 04/26/2023     Discharge Disposition:     [facility]  [address]   [place] [address]  [phone]    Discharge Services: [facility] ([phone])    Discharge DME: Walker     Discharge Transportation: 7 am Cousin will provide transportation to airport. Brother-in-law will  from airport.    Private pay costs discussed: Not applicable    PAS Confirmation Code: N/A     Patient/family educated on Medicare website which has current facility and service quality ratings: N/A     Education Provided on the Discharge Plan: Yes     Persons Notified of Discharge Plans: Charge Nurse, Bedside Nurse, Patient, Patient's Sister, skilled nursing Staff    Patient/Family in Agreement with the Plan: Yes      Handoff Referral Completed: Yes    Additional Information:    Therapy's recommendations changed from TCU to Assisted Living with Home Health Care. Writer called patient's Assisted Living and they were in agreemetn with accepting patient back tomorrow. IMM completed, documented in Epic, faxed to HIM and original in chart. Copy provided to patient. Writer updated nursing staff that patient would like to have morning medications and breakfast given before discharge at 7. Writer to fax discharge orders to skilled nursing at [phone] once completed. Writer will complete external hand off to patient's primary care physician on file once orders are completed as well.     TALI Balderas, MSW  Adult Acute Care Float   Pager [phone]

## 2023-04-27 NOTE — PROGRESS NOTES
[facility]  CARDIOLOGY HEART FAILURE SERVICE (CARDS II) PROGRESS NOTE    I personally saw and examined this patient with resident, discussed care options with patient and sister, discussed care options with [facility] by phone, discussed care options per N.D. for care in [place] or [place].  45 minutes.        Patient Name: Rohit Garvey    Medical Record Number: [id number]    YOB: 1955  PCP: Kevyn Salazar    Admit Date/Time: 4/8/2023  3:08 PM     Assessment and Plan:  Rohit Garvey is a 67 year old adult male with a PMH significant for NICM (EF of 40-45%) c/b paroxysmal VT s/p dual chamber ICD placement 6/20/2022 c/b recurrent ICD shocks s/p VT ablation 10/31/2022 and re-ablation 12/29/2022, paroxysmal Afib on apixaban, developmental delay (possibly related to viral illnesses during infancy), T2DM, hypertension, and hyperlipidemia who was admitted as a transfer from [place] per recommendations of his cardiologists Dr. Urbina and Dr. Chanel for evaluation by advanced heart failure failure team and EP team for VT management and consideration of advanced therapies.    Today's updates:  - Ortho consulted 4/26: 10 lbs weight restriction, sling for comfort, repeat L clavicle x-ray in 1 month  - PT still recommending TCU placement at this time   - Patient does not meet requirements for acute hospital admission but remains admitted while waiting for TCU bed. Expanded search today to more locations in [place], also looking into TCUs in [place] that could accept his [place] medicare insurance      # Persistent Paroxysmal Ventricular Tachycardia s/p dual chamber ICD placement 6/2022 and VT ablation x 2  # Chronic HFrEF 2/2 NICM w/ LVEF 40-45%, NYHA II  High burden of ventricular arrhythmias despite being on 2 antiarrhythmic medications (amiodarone and mexiletine) and being s/p ICD and two attempted VT ablations. Transferred here for evaluation for  advanced therapies. ECHO 4/9 showed mildly reduced EF from 46% (Dec 2022) to 40-45%, however still does not qualify him for LVAD (EF < 25%). Possible heart transplant candidate if he meets criteria for VT storm.   VT episode at 0700 on 4/14. Required ATP and ICD shock. Pt not interested in any further cardiac procedures including ablation.   - EP consulted, appreciate recs   - ICD device interrogation ordered - 1 VT episode on 4/9/23 lasting 25 seconds   - VT episode at 0700 on 4/14. Required ATP and ICD shock. Per sister, EP adjusted ICD after this episode.    - Patient not interested in any operative/procedural interventions. EP does not plan to change his AAT or ICD settings. No need to go to hospital for outpatient ICD shocks unless >3 a day. Asked patient to notify outpatient providers if he is shocked by his ICD.   - Continue tele  - Per GO discussion w/ patient and sister 4/21: changed from full code to DNR/DNI status w/ plan to keep ICD on for the meantime   - SW consulted for drafting updated advance care directive to reflect new DNR/DNI status     Advanced therapies evaluation workup:  - ECHO 4/9 w/ mildly reduced LV function (EF reduction from 46% to 40-45%) and mild aortic valve calcification   - RHC 4/11:   RA mean 11mmHg. RV 38/4. RVEDP 13. PA 39/16/25. PCWP 16  TD CO/CI: 3.85 L/min / 2.25 L/min/m2  Shena CO/CI: 3.37 L/min / 1.97 L/min/m2    - Cardiopulmonary stress test cancelled as pt deemed not physically able to perform test per RN  - Advanced therapy  following   - Neuropsych testing 4/11 and 4/12  - Financial consult already placed  - Utox screen positive for amphetamines. Quant screen negative, indicating false positive on initial screen.   - Nicotine and cotinine screen negative. PEth negative  - Discussed at 4/14 heart transplant candidacy review     Patient ultimately decided to not pursue heart transplant or ablation. Goal is to go to TCU w/ plan to either return to his AL or  LTC w/ consideration of starting hospice. No further advanced therapies workup will be pursued at this time.      GDMTs:  - Antiarrhythmics: PTA amiodarone 200 mg BID and mexiletine 200 mg q8h   - ACEi/ARB: Not currently on due to LVEF of 45%.  - BB:   - Continue metoprolol succinate 12.5 mg daily    - Holding PTA carvedilol 3.125 mg BID 2/2 soft bps   - Aldosterone antagonist: Continue spironolactone 25 mg daily   - SGLT2i: Continue PTA empagliflozin 10 mg daily  - SCD prophylaxis: S/p secondary prevention ICD 6/20/22  - Volume status: Euvolemic: continue lasix 40 mg qday               - Discontinued PTA KCl supplement 20 meq daily now that spironolactone is added                - Continue PTA magnesium oxide 800 mg BID               - BMP BID w/ goal of K > 4 and Mg > 2               - Strict I/Os and daily weights   - Anticoagulation: PTA Eliquis 5 mg BID   - Statin therapy: PTA atorvastatin 80 mg daily   - OT to assist in compression stockings     # Recent L distal clavicular fracture undergoing conservative management   At baseline uses walker for ambulation. Had mechanical fall 2/21/23 sustaining fracture of the distal clavicle just medial to the acromioclavicular joint, per XR interpretations from ED visit at OSH. Per sister, pt was asked to be NWB LUE x 8 weeks until repeat images and f/u outpatient. As a result, has been largely wheelchair dependent as he cannot put weight on his walker.   - Ortho curbsided 4/26  - LUE 10 lbs weightbearing restriction  - Repeat L clavicle x-ray ~5/26/23 as outpatient  - Fall precautions  - PT/OT following, recommending TCU placement   - SW placed referrals for TCU. Awaiting bed availability     # UTI vs sterile pyuria, resolved    UA w/ large leuks and 131 WBCs w/ negative nitrites. Pt reported intermittent dysuria. Started on IV ceftriaxone. UC returned negative for bacteria.   - Completed 5 days of Augmentin (4/15-4/19)   - PTA empagliflozin held during treatment of UTI.  Resumed 4/20      # Chronic Diabetic R heel ulcer  Present since Dec 2022, per sister. RN noticed erythema and ?purulent drainage from area 4/15. WOC consulted and deemed ulcer not actively infected. Recommend q3 day dressing changes.   - wound care and dressing changes per WOC orders  - f/u outpatient w/ podiatry      # Positive U tox for amphetamines  Pt and sister deny any illegal or legal stimulant use. No obvious PTA drugs on inspection that could be causing a false positive. Urine quant screen ultimately showed < 50 ng/mL amphetamines, well below the level to indicate a positive result. Suspect false positive on urine tox screen.   - No further workup/intervention needed     # Afib w/o RVR  - metoprolol, amiodarone, and mexiletine   - PTA Eliquis 5 mg BID  - Tele     # ROSALINO on CKD, resolved  Uptrending Cr above baseline Cr of 1.3. No observed mention of etiology of pt's CKD in chart. UA w/o protein, RBCs, or casts. Suspect prerenal as Cr improved after diuresis held and has remained stable since restarting diuresis at a lower dose   - diuresis per above   - BID BMP     # Acute on chronic normocytic anemia concerning for mixed picture of iron deficiency and inflammatory   # Iron deficiency   # Hx of anemia of chronic disease   Hgb 8.7 on arrival and downtrending. Baseline Hgb ~9-10 reportedly 2/2 anemia of chronic disease. Tbili normal. No source of active bleeding. Workup revealed low iron level, normal TIBC, and low end-of-normal ferritin concerning for iron deficiency anemia.   - s/p IV Iron sucrose 200 mg qday x 5 days  - AM CBCs     # Chronic hyponatremia, intermittent   BL sodium ~130-133. Suspect hypervolemic hyponatremia in setting of CHF. Improved after IV diuresis   - daily BMP    # DM2   On metformin 1,000 mg BID PTA.   - hold PTA metformin, plan to resume on discharge   - Holding empagliflozin as above   - LDSS  - Hypoglycemia protocol      # BPH  - PTA tamulosin  - CTM w/ bladder scans and PVRs  PRN     # Developmental delay   Lives at assisted living where he receives assistance w/ medications, meals, etc. Independent in basic ADLs (feeding, clothing, bathing) at his baseline. Sister Citlali is his HCA and POA but pt currently has capacity   - Delirium precautions      # Constipation   - PTA metamucil     # R coccyx wound  - WOC consulted, appreciate recs:  - If pt has constant incontinent loose stools needing dressing changes Q shift recommend discontinuing the Mepilex dressing and applying criticaid barrier paste BID and PRN.    # Malnutrition:  Moderate malnutrition in the context of chronic illness  - Level of malnutrition: Moderate   - See nutrition note for recs, on BID supplements and encouraging PO intake    FEN: Soft bite sized (due to lack of dentition), 2 L fluid restriction   PPx: PTA Eliquis   Code: DNR/DNI  Dispo: Discharge to TCU once bed available    Pt was discussed and evaluated with Dr. Mitchell, attending physician, who agrees with the assessment and plan above.     Zoe Ludwig MD  Internal Medicine PGY-3      Interval Changes in Past 24 Hours:   No overnight events. Today, doing well. No complaints. Up in chair eating breakfast. No arrhythmias or new ICD shocks.     Review Of Systems  A 4-point ROS was negative aside from those listed above.    OBJECTIVE FINDINGS:    Temp:  [97.7  F (36.5  C)-98.2  F (36.8  C)] 98.2  F (36.8  C)  Pulse:  [59-62] 59  Resp:  [16-18] 16  BP: (101-119)/(60-82) 101/65  SpO2:  [92 %-99 %] 99 %       I/Os:     Intake/Output Summary (Last 24 hours) at 4/27/2023 0908  Last data filed at 4/27/2023 0700  Gross per 24 hour   Intake 240 ml   Output 1000 ml   Net -760 ml         Vitals:    04/25/23 0549 04/26/23 0400 04/27/23 0305   Weight: 60.1 kg (132 lb 9.6 oz) 59.5 kg (131 lb 3.2 oz) 59.2 kg (130 lb 8 oz)     Gen: Sitting in chair, NAD  Neck: No JVD   Resp: non-labored on room air, clear to auscultation bilaterally  CV: RRR  Abd: soft, NT, ND  Ext: warm and  well perfused, no peripheral edema     Current medications   Current Facility-Administered Medications   Medication     acetaminophen (TYLENOL) Suppository 650 mg     acetaminophen (TYLENOL) tablet 650 mg     alum & mag hydroxide-simethicone (MAALOX) suspension 30 mL     amiodarone (PACERONE) tablet 200 mg     apixaban ANTICOAGULANT (ELIQUIS) tablet 5 mg     artificial tears ophthalmic ointment     atorvastatin (LIPITOR) tablet 80 mg     calcium carbonate-vitamin D (CALTRATE) 600-10 MG-MCG per tablet 1 tablet     carboxymethylcellulose PF (REFRESH PLUS) 0.5 % ophthalmic solution 1 drop     glucose gel 15-30 g    Or     dextrose 50 % injection 25-50 mL    Or     glucagon injection 1 mg     empagliflozin (JARDIANCE) tablet 10 mg     furosemide (LASIX) tablet 40 mg     insulin aspart (NovoLOG) injection (RAPID ACTING)     insulin aspart (NovoLOG) injection (RAPID ACTING)     lidocaine (LMX4) cream     lidocaine 1 % 0.1-1 mL     magnesium oxide (MAG-OX) tablet 800 mg     medication instruction     metoprolol succinate ER (TOPROL-XL) 24 hr half-tab 12.5 mg     mexiletine (MEXITIL) capsule 200 mg     miconazole (MICATIN) 2 % powder     multivitamin w/minerals (THERA-VIT-M) tablet 1 tablet     nitroGLYcerin (NITROSTAT) sublingual tablet 0.4 mg     psyllium (METAMUCIL/KONSYL) capsule 1 capsule     sodium chloride (PF) 0.9% PF flush 3 mL     sodium chloride (PF) 0.9% PF flush 3 mL     spironolactone (ALDACTONE) tablet 25 mg     tamsulosin (FLOMAX) capsule 0.4 mg     LABS Reviewed:  Cr 1.32 (1.30), K 4.8, Mg 2.0     IMAGES Reviewed:    ECHO 4/9/23 Interpretation Summary:  Left ventricular function is decreased. The ejection fraction is 40-45%  (mildly reduced compared to 46% back in Dec 2022).   LV size and wall thickness is normal. Abnormal septal wall motion consistent w/ pacemaker.   Global right ventricular function is normal.  Mild aortic valve calcification is present. The mean gradient across the  aortic valve is 8  mmHg.  Pulmonary artery systolic pressure is normal.  IVC diameter and respiratory changes fall into an intermediate range  suggesting an RA pressure of 8 mmHg.  No pericardial effusion is present.    ICD interrogation 4/10/23:  Device: Abbott [id number] [place] HF  Normal Device Function.   Mode: DDDR 60-90 bpm  AP: 49%  BVP: 91%  Intrinsic rhythm: CHB w/ AS @ 60 bpm; no intrinsic R-waves at VVI 30 bpm  Lead Trends Appear Stable: Yes  Estimated battery longevity to RRT = 5.9 years. Remaining Capacity to OSCAR = 92%.  Atrial arrhythmia: None  AF burden: 0%  Anticoagulant: Eliquis  Ventricular Arrhythmia: 1 VT episode recorded on 4/9/23 at 9:43 AM, lasting ~25 seconds at 114 bpm. EGM reveals a slow VT which is converted with one sequence of ATP.  Setting changes: None    RHC 4/11/23:  RA mean 11mmHg  RV 40/11  PA 39/16/25  PCWP 16    Pa Sat 49.9%  Art Sat 99%    TD CO/CI: 3.85 L/min / 2.25 L/min/m2  Shena CO/CI: 3.37 L/min / 1.97 L/min/m2    PVR 2.3 (TD)  SVR  1538 (TD) Right sided filling pressures are mildly elevated. Left sided filling pressures are mildly elevated. Mild elevated pulmonary hypertension. Reduced cardiac output level. Hemodynamic data has been modified in Epic per physician review.

## 2023-04-27 NOTE — PLAN OF CARE
D: Rohit Garvey is a 67 year old adult male with a PMH significant for NICM (EF of 45%) c/b paroxysmal VT s/p dual chamber ICD placement 6/20/2022 c/b recurrent ICD shocks s/p VT ablation 10/31/2022 and re-ablation 12/29/2022, paroxysmal Afib on apixaban, developmental delay (possibly related to viral illnesses during infancy), T2DM, hypertension, and hyperlipidemia who was admitted as a transfer from [place].    I: Monitored vitals and assessed pt status.      PRN:    Tele: AV paced  O2: RA  Mobility: x1 walker/cane     A: Neuro: A/O x4.  Call light appropriate.  Able to make needs known.  Respiratory:  On room air.  Lung sounds clear.  Denies shortness of breath at rest.  Cardiac: VSS. BP WNL. Afebrile  GI: Last BM 4/26.  No report of N/V.   : Voiding adequate clear, yellow urine using urinal.  Endo:  Blood sugars ACHS.  Skin:  R heel wound, blanchable redness on sacrum/coccyx  LDA:  PIV - SL  Pain: Denies  Diet: Regular    Pt is cleared to have partial weight bearing on L arm. Sling only needs to be used for comfort.     P: Continue to monitor Pt status and report changes to Cards 2. Discharge to TCU pending placement.    Goal Outcome Evaluation:      Plan of Care Reviewed With: patient    Overall Patient Progress: no change

## 2023-04-28 VITALS
BODY MASS INDEX: 22.28 KG/M2 | SYSTOLIC BLOOD PRESSURE: 113 MMHG | RESPIRATION RATE: 15 BRPM | OXYGEN SATURATION: 98 % | DIASTOLIC BLOOD PRESSURE: 88 MMHG | HEIGHT: 64 IN | HEART RATE: 59 BPM | TEMPERATURE: 97.7 F | WEIGHT: 130.5 LBS

## 2023-04-28 LAB — GLUCOSE BLDC GLUCOMTR-MCNC: 178 MG/DL (ref 70–99)

## 2023-04-28 PROCEDURE — 99239 HOSP IP/OBS DSCHRG MGMT >30: CPT | Mod: GC | Performed by: INTERNAL MEDICINE

## 2023-04-28 PROCEDURE — 250N000013 HC RX MED GY IP 250 OP 250 PS 637: Performed by: STUDENT IN AN ORGANIZED HEALTH CARE EDUCATION/TRAINING PROGRAM

## 2023-04-28 PROCEDURE — 250N000013 HC RX MED GY IP 250 OP 250 PS 637

## 2023-04-28 PROCEDURE — 250N000013 HC RX MED GY IP 250 OP 250 PS 637: Performed by: INTERNAL MEDICINE

## 2023-04-28 RX ADMIN — Medication 1 CAPSULE: at 06:39

## 2023-04-28 RX ADMIN — AMIODARONE HYDROCHLORIDE 200 MG: 200 TABLET ORAL at 06:40

## 2023-04-28 RX ADMIN — Medication 1 TABLET: at 06:40

## 2023-04-28 RX ADMIN — Medication 12.5 MG: at 06:39

## 2023-04-28 RX ADMIN — MEXILETINE HYDROCHLORIDE 200 MG: 200 CAPSULE ORAL at 06:40

## 2023-04-28 RX ADMIN — ATORVASTATIN CALCIUM 80 MG: 80 TABLET, FILM COATED ORAL at 06:40

## 2023-04-28 RX ADMIN — APIXABAN 5 MG: 5 TABLET, FILM COATED ORAL at 06:40

## 2023-04-28 RX ADMIN — CALCIUM CARBONATE 600 MG (1,500 MG)-VITAMIN D3 400 UNIT TABLET 1 TABLET: at 06:40

## 2023-04-28 RX ADMIN — Medication 800 MG: at 06:40

## 2023-04-28 RX ADMIN — EMPAGLIFLOZIN 10 MG: 10 TABLET, FILM COATED ORAL at 06:40

## 2023-04-28 ASSESSMENT — ACTIVITIES OF DAILY LIVING (ADL)
ADLS_ACUITY_SCORE: 37

## 2023-04-28 NOTE — PLAN OF CARE
Physical Therapy Discharge Summary    Reason for therapy discharge:    Discharged to home with home therapy. Discharged to assisted living facility.    Progress towards therapy goal(s). See goals on Care Plan in [facility] electronic health record for goal details.  Goals partially met.  Barriers to achieving goals:   discharge from facility.    Therapy recommendation(s):    Continued therapy is recommended.  Rationale/Recommendations:  HH PT to maximize functional status and improve activity tolerance and strength.

## 2023-04-28 NOTE — PROGRESS NOTES
Floor charge nurse notified this writer of plans for early discharge at 7am to facilitate travel plans for patient (first flight to [facility], then drive out to [facility]). Defer to nursing regarding early administration of breakfast and am PO medications prior to discharging. All discharge orders prepped but for final signature and cardiology follow-up plan per hand-off I received.Will remind cards 2 team at sign out (shortly before 6:30am) so the final orders for discharge are signed early to facilitate travel.

## 2023-04-28 NOTE — PLAN OF CARE
Occupational Therapy Discharge Summary    Reason for therapy discharge:    Discharged to home with home therapy.    Progress towards therapy goal(s). See goals on Care Plan in [facility] electronic health record for goal details.  Goals partially met.  Barriers to achieving goals:   discharge from facility.    Therapy recommendation(s):    Continued therapy is recommended.  Rationale/Recommendations:  pt would benefit from continued therapy in HHOT/PT setting for progression of IND.

## 2023-04-28 NOTE — PROGRESS NOTES
Care Management Discharge Note    Writer faxed discharge orders to patient's Assisted Living Facility and completed external hand off to patient's primary care physician.    TALI Balderas, MSW  Adult Acute Care Float   Pager [phone]

## 2023-04-28 NOTE — PROGRESS NOTES
Care Management Follow Up    Length of Stay (days): 20    Expected Discharge Date: 04/28/2023     Concerns to be Addressed:  Discharge planning   Patient plan of care discussed at interdisciplinary rounds: Yes    Anticipated Discharge Disposition:  Return to assisted living     Anticipated Discharge Services:  Resume home PT    Additional Information:  Yesterday I spoke with Tanja at [facility] and informed her pt will be discharging back to his assisted living. She requested Discharge Orders & home care resumption orders be faxed to [phone].   DESHAWN Balderas, has been in communication with pt's assisted living.     Pt discharged this AM back to assisted living.   12:08pm:  Faxed AVS and H&P to [facility].         Danette Desai RN Care Coordinator  [facility]  On 4- RNCC coverage for [address]. Call [phone]      [facility] (home PT)  Phone: [phone]  Fax:  [phone]    [facility]  [place], [place]  Kaylin Samuels RN, phone: [phone]

## 2023-04-28 NOTE — DISCHARGE SUMMARY
[facility]   Cardiology II Service / Advanced Heart Failure  Discharge Summary     Rohit Garvey MRN# [id number]   YOB: 1955      Attending:    I personally saw and examined patient and formulated discharge plan and conveyed to patient and sister.   Age: 67 year old     DATE OF ADMISSION:  4/8/2023  DATE OF DISCHARGE: 4/28/2023  ADMITTING PROVIDER: Maddie Branch MD  DISCHARGE PROVIDER: Joseph Mitchell MD  PRIMARY PROVIDER: Kevyn Salazar    DIAGNOSES:   1. Persistent Paroxysmal Ventricular Tachycardia s/p dual chamber ICD placement 6/2022 and VT ablation x 2  2. Chronic HFrEF 2/2 NICM w/ LVEF 40-45%, NYHA II    3. Recent L distal clavicular fracture undergoing conservative management   4. UTI vs sterile pyuria, resolved    5. Chronic Diabetic R heel ulcer  6. Afib w/o RVR  7. ROSALINO on CKD, resolved  8. Acute on chronic normocytic anemia concerning for mixed picture of iron deficiency and inflammatory   9. Chronic hyponatremia, intermittent   10. DM2  11. Moderate Malnutrition   12. Developmental Delay     Summary of hospitalization: Please see the detailed H & P by Dr. Call from 4/8/2023. Briefly, Mr. Garvey is a 67 year old adult male with a PMH significant for NICM (EF of 40-45%) c/b paroxysmal VT s/p dual chamber ICD placement 6/20/2022 c/b recurrent ICD shocks s/p VT ablation 10/31/2022 and re-ablation 12/29/2022, paroxysmal Afib on apixaban, developmental delay (possibly related to viral illnesses during infancy), T2DM, hypertension, and hyperlipidemia who was admitted as a transfer from [place] per recommendations of his cardiologists Dr. Urbina and Dr. Chanel for consideration of advanced therapies or further VT management in the setting of high burden of ventricular arrhythmias despite being on 2 antiarrhythmic medications (amiodarone and mexiletine) and being s/p ICD and two attempted VT ablations. ECHO 4/9 showed mildly reduced EF from 46% (Dec 2022) to 40-45%,  "however still did not qualify him for LVAD (EF < 25%). Began workup for possible heart transplant candidacy, however, after learning more about the rigorous undertaking of getting a transplant and discussing options with his sister, pt decided he would not want to undergo any further cardiac interventions or surgeries including transplant or VT ablations. Based on this, code status was changed to DNR/DNI and follow up plan for reoccurring ICD shocks was made between patient and EP. Over the next 1.5 weeks, pt remained medically ready for discharge but waiting for TCU bed availability. During that time, he made excellent progress with PT/OT and was ultimately cleared for discharge back to his assisted living 4/28.     PHYSICAL EXAM:  Blood pressure 113/88, pulse 59, temperature 97.7  F (36.5  C), temperature source Oral, resp. rate 15, height 1.626 m (5' 4\"), weight 59.2 kg (130 lb 8 oz), SpO2 98 %.     Vitals:    04/25/23 0549 04/26/23 0400 04/27/23 0305   Weight: 60.1 kg (132 lb 9.6 oz) 59.5 kg (131 lb 3.2 oz) 59.2 kg (130 lb 8 oz)       Gen: Sitting in chair, NAD  Neck: No JVD   Resp: non-labored on room air, clear to auscultation bilaterally  CV: RRR  Abd: soft, NT, ND  Ext: warm and well perfused, no peripheral edema     LABS:   Last CBC:   Recent Labs   Lab Test 04/27/23  0602   WBC 7.2   RBC 3.93   HGB 10.0*   HCT 32.8*   MCV 84   MCH 25.4*   MCHC 30.5*   RDW 22.3*          Last CMP:  Recent Labs   Lab Test 04/28/23  0248 04/27/23  0630 04/27/23  0602 04/14/23  0802 04/14/23  0523   NA  --   --  138   < > 138   POTASSIUM  --   --  4.8   < > 3.7   CHLORIDE  --   --  105   < > 98   NEETA  --   --  9.1   < > 8.7*   CO2  --   --  22   < > 27   BUN  --   --  29.7*   < > 31.5*   CR  --   --  1.38*   < > 2.04*   *   < > 181*   < > 124*   AST  --   --   --   --  167*   ALT  --   --   --   --  150*   BILITOTAL  --   --   --   --  1.0   ALBUMIN  --   --   --   --  3.2*   PROTTOTAL  --   --   --   --  6.2* "   ALKPHOS  --   --   --   --  323*    < > = values in this interval not displayed.       IMAGING:  Results for orders placed or performed during the hospital encounter of 23   XR Chest Port 1 View    Narrative    Exam: Portable chest x-ray, 2023 6:28 AM    Indication: Hypoxia and concern for sepsis    Comparison: Chest x-ray 2022    Findings:   AP view of the chest. Left chest wall pacemaker with sequential leads  overlying the right atrial appendage, right ventricle, and coronary  sinus. Heart size is enlarged. No airspace opacities. No significant  pleural effusion. No pneumothorax.      Impression    Impression:   Cardiomegaly without evidence of acute airspace disease.    I have personally reviewed the examination and initial interpretation  and I agree with the findings.    MOMO REAGAN MD         SYSTEM ID:  [id number]   XR Clavicle Left    Narrative    Exam: XR CLAVICLE LEFT 2 VIEWS    History: 67 years years old. Fractured left clavicle , previous  images at OSH. Repeating XR to evaluate healing    COMPARISON: None available.    Findings:    AP and tangential view of the clavicle were obtained.    Comminuted distal clavicular fracture with visualization of fracture  lines. Acromioclavicular joint remains congruent. No widening of the  coracoclavicular distance.    Visualized lungs are clear.      Impression    Impression: Comminuted distal clavicular fracture with visualization  of fracture lines. No substantial bony bridging across the fragments.     CUAUHTEMOC TORRES         SYSTEM ID:  [id number]   Echo Limited     Value    LVEF  40-45% (mildly reduced)    Narrative    [id number]  [id number]  [id number]  [id number]^YADIRA^JACOBY^FRANCIA     [facility],[place]  Echocardiography Laboratory  [address] [place] [id number]     Name: RONEY SLOAN  MRN: [id number]  : 1955  Study Date: 2023 08:57 AM  Age: 67 yrs  Gender: Male  Patient Location:  [facility]  Reason For Study: Heart Failure  Ordering Physician: JACOBY BRANCH  Performed By: Taylor Branch     BSA: 1.8 m2  Height: 64 in  Weight: 155 lb  BP: 92/50 mmHg  ______________________________________________________________________________  Procedure  Limited Portable Echo Adult. Contrast Optison. Echocardiogram with two-  dimensional, color and spectral Doppler performed. Optison (NDC #6714-3946-53)  given intravenously. Patient was given 5 ml mixture of 3 ml Optison and 6 ml  saline. 4 ml wasted.  ______________________________________________________________________________  Interpretation Summary  Left ventricular function is decreased. The ejection fraction is 40-45%  (mildly reduced).  Global right ventricular function is normal.  Mild aortic valve calcification is present. The mean gradient across the  aortic valve is 8 mmHg.  Pulmonary artery systolic pressure is normal.  IVC diameter and respiratory changes fall into an intermediate range  suggesting an RA pressure of 8 mmHg.  No pericardial effusion is present.  ______________________________________________________________________________  Left Ventricle  Left ventricular size is normal. Left ventricular wall thickness is normal.  Left ventricular function is decreased. The ejection fraction is 40-45%  (mildly reduced). Abnormal septal motion consistent with pacemaker is present.     Right Ventricle  The right ventricle is normal size. Global right ventricular function is  normal. A pacemaker lead is noted in the right ventricle.     Atria  Both atria appear normal.     Mitral Valve  The mitral valve is normal.     Aortic Valve  The aortic valve is tricuspid. Mild aortic valve calcification is present. The  peak aortic velocity is 2.0 m/sec. The mean gradient across the aortic valve  is 8 mmHg.     Tricuspid Valve  The tricuspid valve is normal. Mild tricuspid insufficiency is present. The  right ventricular systolic pressure is  approximated at 21.6 mmHg plus the  right atrial pressure. Pulmonary artery systolic pressure is normal.     Pulmonic Valve  Mild pulmonic insufficiency is present.     Vessels  The aorta root is normal. IVC diameter and respiratory changes fall into an  intermediate range suggesting an RA pressure of 8 mmHg.     Pericardium  No pericardial effusion is present.     Compared to Previous Study  This study was compared with the study from 22 . Biventricular function  is stable.  ______________________________________________________________________________  MMode/2D Measurements & Calculations  IVSd: 0.72 cm  LVIDd: 4.7 cm  LVIDs: 3.6 cm  LVPWd: 0.65 cm  FS: 22.2 %  LV mass(C)d: 99.7 grams  LV mass(C)dI: 56.8 grams/m2  LVOT diam: 1.9 cm  LVOT area: 2.8 cm2  RWT: 0.28     Doppler Measurements & Calculations  Ao V2 max: 202.0 cm/sec  Ao max P.3 mmHg  Ao V2 mean: 132.0 cm/sec  Ao mean P.5 mmHg  Ao V2 VTI: 40.9 cm  CRISTINA(I,D): 1.0 cm2  CRISTINA(V,D): 1.1 cm2  LV V1 max P.5 mmHg  LV V1 max: 79.4 cm/sec  LV V1 VTI: 14.7 cm  SV(LVOT): 41.3 ml  SI(LVOT): 23.5 ml/m2  PA acc time: 0.10 sec  TR max rudy: 232.4 cm/sec  TR max P.6 mmHg  AV Rudy Ratio (DI): 0.39  CRISTINA Index (cm2/m2): 0.58     ______________________________________________________________________________  Report approved by: Sarah Jha 2023 12:46 PM         Cardiac Catheterization    Narrative       Right sided filling pressures are mildly elevated.     Left sided filling pressures are mildly elevated.     Mild elevated pulmonary hypertension.     Reduced cardiac output level.     Hemodynamic data has been modified in Epic per physician review.     Cardiac Device Check - Inpatient     Value    Date Time Interrogation Session [id number]    Implantable Pulse Generator  Abbott    Implantable Pulse Generator Model [id number] [place] HF    Implantable Pulse Generator Serial Number [id number]    Type Interrogation Session In Clinic     Clinic Name [facility]    Implantable Pulse Generator Type Cardiac Resynchronization Therapy - Defibrillator    Implantable Pulse Generator Implant Date 20230309    Implantable Lead  [employer]    Implantable Lead Model 1458Q Quartet    Implantable Lead Serial Number [id number]    Implantable Lead Implant Date 20230309    Implantable Lead Polarity Type Quadripolar Lead    Implantable Lead Location Detail 1 UNKNOWN    Implantable Lead Special Function 86 cm    Implantable Lead Location Left Ventricle    Implantable Lead  Medtronic    Implantable Lead Model 5076 CapSureFix Novus    Implantable Lead Serial Number [id number]    Implantable Lead Implant Date 20220620    Implantable Lead Polarity Type Bipolar Lead    Implantable Lead Location Detail 1 UNKNOWN    Implantable Lead Special Function 52 CM    Implantable Lead Location Right Atrium    Implantable Lead  Medtronic    Implantable Lead Model 6947M Sprint Quattro Secure    Implantable Lead Serial Number [id number]    Implantable Lead Implant Date 20220620    Implantable Lead Polarity Type Quadripolar Lead    Implantable Lead Location Detail 1 UNKNOWN    Implantable Lead Special Function 62 CM    Implantable Lead Location Right Ventricle    Hal Setting Mode (NBG Code) DDDR    Hal Setting Lower Rate Limit 60    Hal Setting Maximum Tracking Rate 90    Hal Setting Maximum Sensor Rate 90    Hal Setting Hysterisis Rate Off    Hal Setting Night Rate Off    Hal Setting ARTURO Delay Low 150    Hal Setting PAV Delay Low 180    Hal Setting PAV Delay High 100    Hal Setting ARTURO Delay High 100    Hal Setting AT Mode Switch Rate 180    Hal Setting AT Mode Switch Mode DDIR    Lead Channel Setting Sensing Polarity Bipolar    Lead Channel Setting Sensing Adaptation Mode Fixed    Lead Channel Setting Sensing Polarity Bipolar    Lead Channel Setting Sensing Sensitivity 0.3    Ventricular chambers  paced during CRT pacing. BiV    CRT LV-RV Delay 20    Lead Channel Setting Pacing Polarity Bipolar    Lead Channel Setting Pacing Pulse Width 0.5    Lead Channel Setting Pacing Amplitude 2.5    Lead Channel Setting Pacing Capture Mode Fixed Pacing    Lead Channel Setting Pacing Polarity Bipolar    Lead Channel Setting Pacing Pulse Width 0.5    Lead Channel Setting Pacing Amplitude 2.375    Lead Channel Setting Pacing Capture Mode Adaptive    Lead Channel Setting Pacing Polarity Bipolar    Lead Channel Setting Pacing Anode Location Right Ventricle    Lead Channel Setting Pacing Anode Terminal Coil    Lead Channel Setting Sensing Cathode Location Left Ventricle    Lead Channel Setting Sensing Cathode Terminal Ring3    Lead Channel Setting Pacing Pulse Width 0.5    Lead Channel Setting Pacing Amplitude 2.625    Lead Channel Setting Pacing Capture Mode Adaptive    Zone Setting Type Category VF    Zone Setting Detection Interval 270    Zone Setting Type Category VT    Zone Setting Detection Interval 300    Zone Setting Type Category VT    Zone Setting Detection Interval 590    Lead Channel Impedance Value 325    Lead Channel Sensing Intrinsic Amplitude 0.9    Lead Channel Pacing Threshold Amplitude 1.25    Lead Channel Pacing Threshold Pulse Width 0.5    Lead Channel Impedance Value 287.5    Lead Channel Pacing Threshold Amplitude 1.5    Lead Channel Pacing Threshold Pulse Width 0.5    Lead Channel Impedance Value 337.5    Lead Channel Pacing Threshold Amplitude 1.75    Lead Channel Pacing Threshold Pulse Width 0.5    Lead Channel Pacing Threshold Amplitude 0.75    Lead Channel Pacing Threshold Pulse Width 0.5    Battery Date Time of Measurements [id number]    Battery Status Middle of Service    Battery Remaining Longevity 70    Battery Remaining Percentage 92    Capacitor Charge Type Reformation    Capacitor Last Charge Date Time [id number]    Statistic Heart Rate Date Time Start [id number]    Statistic  Heart Rate Date Time End 20230410132930    Hal Statistic Date Time Start 20230309143540    Hal Statistic Date Time End 20230410132930    Hal Statistic RA Percent Paced 49.0    Hal Statistic RV Percent Paced 91.0    Atrial Tachy Statistic Date Time Start 20230309143540    Atrial Tachy Statistic Date Time End 20230410132930    Atrial Tachy Statistic Number Of Mode Switches 0.0    Therapy Statistic Recent Shocks Delivered 0.0    Therapy Statistic Recent Shocks Aborted 0.0    Therapy Statistic Recent ATP Delivered 1    Therapy Statistic Recent Date Time Start 20230309143540    Therapy Statistic Recent Date Time End 20230410132930    Episode Statistic Recent Count 1    Episode Statistic Type Category VT    Episode Statistic Recent Date Time Start 20230309143540    Episode Statistic Recent Date Time End 20230410132930    Narrative    Patient seen in [address] [place] [address] for evaluation and iterative programming of their   ICD per MD orders.    Device: Abbott [id number] [place] HF  Normal Device Function.   Mode: DDDR 60-90 bpm  AP: 49%  BVP: 91%  Intrinsic rhythm: CHB w/ AS @ 60 bpm; no intrinsic R-waves at VVI 30 bpm  Lead Trends Appear Stable: Yes  Estimated battery longevity to RRT = 5.9 years. Remaining Capacity to OSCAR   = 92%.  Atrial arrhythmia: None  AF burden: 0%  Anticoagulant: Eliquis  Ventricular Arrhythmia: 1 VT episode recorded on 4/9/23 at 9:43 AM,   lasting ~25 seconds at 114 bpm. EGM reveals a slow VT which is converted   with one sequence of ATP.  Setting changes: None    Plan: Continue inpatient evaluation and treatment.  AMELIE Ng RN    Multi lead ICD    I have reviewed and interpreted the device interrogation, settings,   programming and nurse's summary. The device is functioning within normal   device parameters. I agree with the current findings, assessment and plan.         PROCEDURES:     ECHO 4/9/23 Interpretation Summary:  Left ventricular function is decreased. The ejection fraction is  40-45%  (mildly reduced compared to 46% back in Dec 2022).   LV size and wall thickness is normal. Abnormal septal wall motion consistent w/ pacemaker.   Global right ventricular function is normal.  Mild aortic valve calcification is present. The mean gradient across the  aortic valve is 8 mmHg.  Pulmonary artery systolic pressure is normal.  IVC diameter and respiratory changes fall into an intermediate range  suggesting an RA pressure of 8 mmHg.  No pericardial effusion is present.     ICD interrogation 4/10/23:  Device: Abbott [id number] [place] HF  Normal Device Function.   Mode: DDDR 60-90 bpm  AP: 49%  BVP: 91%  Intrinsic rhythm: CHB w/ AS @ 60 bpm; no intrinsic R-waves at VVI 30 bpm  Lead Trends Appear Stable: Yes  Estimated battery longevity to RRT = 5.9 years. Remaining Capacity to OSCAR = 92%.  Atrial arrhythmia: None  AF burden: 0%  Anticoagulant: Eliquis  Ventricular Arrhythmia: 1 VT episode recorded on 4/9/23 at 9:43 AM, lasting ~25 seconds at 114 bpm. EGM reveals a slow VT which is converted with one sequence of ATP.  Setting changes: None     RHC 4/11/23:  RA mean 11mmHg  RV 40/11  PA 39/16/25  PCWP 16    Pa Sat 49.9%  Art Sat 99%    TD CO/CI: 3.85 L/min / 2.25 L/min/m2  Shena CO/CI: 3.37 L/min / 1.97 L/min/m2    PVR 2.3 (TD)  SVR  1538 (TD) Right sided filling pressures are mildly elevated. Left sided filling pressures are mildly elevated. Mild elevated pulmonary hypertension. Reduced cardiac output level. Hemodynamic data has been modified in Epic per physician review.    CONSULTATIONS: EP cardiology, adult neuropsych, [facility]    HOSPITAL COURSE BY PROBLEM:     # Persistent Paroxysmal Ventricular Tachycardia s/p dual chamber ICD placement 6/2022 and VT ablation x 2  # Chronic HFrEF 2/2 NICM w/ LVEF 40-45%, NYHA II  Patient w/ high burden of ventricular arrhythmias despite being on 2 antiarrhythmic medications (amiodarone and mexiletine) and being s/p ICD and two attempted VT ablations. Transferred to [facility]  for evaluation for advanced therapies. ECHO 4/9 showed mildly reduced EF from 46% (Dec 2022) to 40-45%, however still did not qualify him for LVAD (EF < 25%). RHC 4/11 revealed RA mean 11mmHg. RV 38/4. RVEDP 13. PA 39/16/25. PCWP 16, TD CO/CI: 3.85 L/min / 2.25 L/min/m2, and Shena CO/CI: 3.37 L/min / 1.97 L/min/m2. Possible heart transplant candidate if he met criteria for VT storm so neuropsych testing, financial consultation, and toxicology screens were obtained. However, after learning more about the rigorous undertaking of getting a transplant and discussing options with his sister, pt elected to not pursue further candidacy for heart transplant. During admission, pt had reoccurring episode of VT episode at 0700 on 4/14, requiring ATP and ICD shock. EP was consulted who adjusted pt's ICD settings. Discussed options for another ablation with patient but he declined, stating that he would not want any further cardiac procedures or surgeries. Based on this, code status was changed to DNR/DNI and plan made by EP if pt has any further shocks after discharge (see below).   - No need to go to hospital for outpatient ICD shocks unless >3 a day  - Patient to notify outpatient providers if he is shocked by his ICD  - Follow up with his local cardiologists 5/5, 6/6, and 7/24      GDMTs:  - Antiarrhythmics: PTA amiodarone 200 mg BID and mexiletine 200 mg q8h   - ACEi/ARB: Not currently on due to LVEF of 45%.  - BB: Continue metoprolol succinate 12.5 mg daily on discharge in place of PTA carvedilol   - Aldosterone antagonist: Continue spironolactone 25 mg daily   - SGLT2i: Continue PTA empagliflozin 10 mg daily  - SCD prophylaxis: S/p secondary prevention ICD 6/20/22  - Volume status: Euvolemic on discharge w/ weight of 130 lbs    -  Continue lasix 40 mg qday               - Discontinued PTA KCl supplement 20 meq daily now that spironolactone was added                - Continue PTA magnesium oxide 800 mg BID  -  Anticoagulation: PTA Eliquis 5 mg BID   - Statin therapy: PTA atorvastatin 80 mg daily      # Recent L distal clavicular fracture undergoing conservative management   At baseline uses walker for ambulation. Had mechanical fall 2/21/23 sustaining fracture of the distal clavicle just medial to the acromioclavicular joint, per XR interpretations from ED visit at OSH. Per sister, pt was asked to be NWB LUE x 8 weeks until repeat images and f/u outpatient. As a result, has been largely wheelchair dependent as he cannot put weight on his walker. Obtained repeat clavicle XR while inpatient 4/26 which showed minimal bony healing. Discussed imaging with Ortho who recommended NWB 10 lbs LUE w/ repeat XR in 1 month  - LUE 10 lbs weightbearing restriction  - F/u with Ortho or sports medicine provider outpatient near home in [place] w/ repeat L clavicle XR in 1 month     # Chronic Diabetic R heel ulcer  Present since Dec 2022, per sister. WOC consulted and deemed ulcer not actively infected. Recommend q3 day dressing changes.   - f/u outpatient w/ podiatry near home in [place]     # UTI vs sterile pyuria, resolved    UA w/ large leuks and 131 WBCs in setting of dysuria. UC returned negative for bacteria. Completed 5 days of Augmentin (4/15-4/19). Empagliflozin held while undergoing treatment, resumed prior to discharge.      # Positive U tox for amphetamines  Pt and sister deny any illegal or legal stimulant use. No obvious PTA drugs on inspection that could be causing a false positive. Urine quant screen ultimately showed < 50 ng/mL amphetamines, well below the level to indicate a positive result. Suspect false positive on urine tox screen.   - No further workup/intervention needed      # Afib w/o RVR  - Continue metoprolol, amiodarone, and mexiletine   - Continue PTA Eliquis 5 mg BID     # ROSALINO on CKD, resolved  Uptrending Cr above baseline Cr of 1.3 on admission. No observed mention of etiology of pt's CKD in chart. UA w/o protein,  RBCs, or casts. Suspect prerenal as Cr improved after diuresis held and has remained stable since restarting diuresis at a lower dose   - continue renal function monitoring on routine follow up, next visit 5/5      # Acute on chronic normocytic anemia concerning for mixed picture of iron deficiency and inflammatory   # Iron deficiency   # Hx of anemia of chronic disease    Baseline Hgb ~9-10 reportedly 2/2 anemia of chronic disease. Workup revealed low iron level, normal TIBC, and low end-of-normal ferritin concerning for iron deficiency anemia. S/p IV Iron sucrose 200 mg qday x 5 days.   - continue to monitor Hgb and iron levels outpatient      # Chronic hyponatremia, intermittent   BL sodium ~130-133. Suspect hypervolemic hyponatremia in setting of CHF. Improved after IV diuresis      # DM2   - Resumed PTA metformin 1,000 mg BID on discharge   - Continue empagliflozin    - F/u outpatient w/ PCP      # BPH  - PTA tamulosin     # Developmental delay   Lives at assisted living where he receives assistance w/ medications, meals, etc. Independent in basic ADLs (feeding, clothing, bathing) at his baseline. Sister Peg is his HCA and POA but pt currently has capacity      # Constipation   - PTA metamucil      # Malnutrition:  Moderate malnutrition in the context of chronic illness    PENDING RESULTS: None.     DISCHARGE MEDICATIONS:  Discharge Medication List as of 4/28/2023  6:49 AM      START taking these medications    Details   acetaminophen (TYLENOL) 325 MG tablet Take 2 tablets (650 mg) by mouth every 4 hours as needed for mild pain, Disp-30 tablet, R-0, E-Prescribe      calcium carbonate-vitamin D (CALTRATE) 600-10 MG-MCG per tablet Take 1 tablet by mouth daily, Disp-30 tablet, R-1, E-Prescribe      metoprolol succinate ER (TOPROL XL) 25 MG 24 hr tablet Take 0.5 tablets (12.5 mg) by mouth daily, Disp-15 tablet, R-1, E-Prescribe      miconazole (MICATIN) 2 % external powder Apply topically 2 times dailyDisp-43 g,  [id number]      multivitamin w/minerals (THERA-VIT-M) tablet Take 1 tablet by mouth daily, Disp-30 tablet, R-1, E-Prescribe      nitroGLYcerin (NITROSTAT) 0.4 MG sublingual tablet For chest pain place 1 tablet under the tongue every 5 minutes for 3 doses. If symptoms persist 5 minutes after 1st dose call 911., Disp-30 tablet, R-0, E-Prescribe      spironolactone (ALDACTONE) 25 MG tablet Take 1 tablet (25 mg) by mouth daily, Disp-30 tablet, R-1, E-Prescribe         CONTINUE these medications which have CHANGED    Details   furosemide (LASIX) 40 MG tablet Take 1 tablet (40 mg) by mouth daily, Disp-30 tablet, R-1, E-Prescribe      magnesium oxide (MAG-OX) 400 MG tablet Take 2 tablets (800 mg) by mouth 2 times daily, Disp-60 tablet, R-1, E-Prescribe         CONTINUE these medications which have NOT CHANGED    Details   amiodarone (PACERONE) 200 MG tablet Take 1 tablet (200 mg) by mouth 2 times daily, Disp-90 tablet, R-3, E-Prescribe      Artificial Tear Ointment (LACRI-LUBE OP) Place 1 Application into both eyes At Bedtime, Historical      atorvastatin (LIPITOR) 80 MG tablet Take 80 mg by mouth daily, Historical      CALCIUM-VITAMIN D PO Take 1 tablet by mouth 2 times daily Calcium 650 mg with 25 mcg vitamin D with zinc, Historical      Carboxymethylcellulose Sodium (ARTIFICIAL TEARS OP) Place 1 drop into both eyes 4 times daily, Historical      ELIQUIS ANTICOAGULANT 5 MG tablet Take 1 tablet by mouth 2 times daily, SHAYLEE, Historical      fish oil-omega-3 fatty acids 1000 MG capsule Take 1 g by mouth 3 times daily, Historical      GNP NATURAL FIBER 28.3 % POWD Take 1-2 each by mouth daily Metamucil, SHAYLEE, Historical      JARDIANCE 10 MG TABS tablet Take 10 mg by mouth daily, SHAYLEE, Historical      metFORMIN (GLUCOPHAGE) 500 MG tablet Take 1,000 mg by mouth 2 times daily (with meals), Historical      mexiletine (MEXITIL) 200 MG capsule Take 1 capsule (200 mg) by mouth every 8 hours, Disp-90 capsule, R-3, E-Prescribe       tamsulosin (FLOMAX) 0.4 MG capsule Take 0.4 mg by mouth every evening, Historical         STOP taking these medications       carvedilol (COREG) 3.125 MG tablet Comments:   Reason for Stopping:         colchicine (COLCYRS) 0.6 MG tablet Comments:   Reason for Stopping:         potassium chloride ER (KLOR-CON M) 20 MEQ CR tablet Comments:   Reason for Stopping:         UNABLE TO FIND Comments:   Reason for Stopping:         UNABLE TO FIND Comments:   Reason for Stopping:               DISCHARGE DISPOSITION: Rohit Garvey will discharge to home in stable condition.     DISCHARGE INSTRUCTIONS:  Discharge Procedure Orders   X-ray lt Clavicle   Standing Status: Future Standing Exp. Date: 07/26/23     Order Specific Question Answer Comments   Priority Routine      Home Care Referral   Referral Priority: Routine: Next available opening Referral Type: Home Health Therapies & Aides   Number of Visits Requested: 1     Reason for your hospital stay   Order Comments: You were transferred to the [facility] to consider advanced heart failure treatments and potential procedures for your ventricular tachycardia (irregular heart rhythms). After discussions, you elected to not pursue these treatments. You were started on medications to manage your heart failure and you didn't have further problems with your irregular heart rhythm. Take medications as directed on the new medication list.    Your broken clavicle (collar bone) was evaluated by our orthopedic team. You should have another x-ray in 1 month (~5/26/23). 10 lbs L arm weightbearing restriction.    After frequent evaluations by our physical and occupations therapists, you are now cleared to return to your assisted living facility. Home therapies have been ordered.    It was a pleasure taking care of you,    Zoe Ludwig MD  Internal Medicine PGY-3     Activity   Order Comments: Your activity upon discharge: activity as tolerated and left upper  extremity has 10 lbs weightbearing restriction (ok to wear L arm splint as needed for comfort)     Order Specific Question Answer Comments   Is discharge order? Yes      Monitor and record   Order Comments: Weigh yourself every morning     Discharge Follow Up - with Primary Care clinic within 3-5 days (RN to schedule prior to d/c for [facility] primary care     Add follow up information to the AVS prior to printing     When to contact your care team   Order Comments: Contact your care team If increased shortness of breath, If unable to lie down for sleep due to symptoms, If weight gain of 2 pounds a day for 2 days in a row OR 5 pounds in 1 week., Increased swelling in your ankles or legs, and Dizziness or lightheadedness     Follow Up and recommended labs and tests   Order Comments: Follow up with primary care provider, Kevyn Salazar, within 7 days to evaluate medication change and for hospital follow- up.  The following labs/tests are recommended: BMP, magnesium.    Follow-up with orthopedic surgery in one month with L clavicle x-ray ~5/26/23    Follow-up with podiatry for management of diabetic foot ulcer     Wound care and dressings   Order Comments: Instructions to care for your wound at home:     Right medial heel every other day  Paint mohsen-wound skin with No sting barrier film (371430)  Apply Iodosorb Gel ([id number]) to open wound bed 3mm thick to a small piece of adaptic   Place adaptic gel side down to the wound bed                    Cover with mepilex flex 4x4 (# [id number])  Keep heels elevated off bed.  Compression on in am and off at night      Sacrum wound: Every 3 days     Cleanse the area with NS and pat dry.    Apply No sting film barrier to periwound skin.    Cover wound with Sacral Mepilex (#[id number])    Change dressing Q 3 days.     Follow Up and recommended labs and tests   Order Comments: Follow up with your local cardiologist at [facility] at your scheduled appointment 5/5/23.     When to contact your care  team   Order Comments: Contact your electrophysiology cardiology team if you are shocked by your ICD. No need to go to hospital for outpatient ICD shocks unless >3 a day. Follow up at your scheduled follow up visit with your electrophysiologist on 5/5/23     Diet   Order Comments: Follow this diet upon discharge: Orders Placed This Encounter      Fluid restriction 2000 ML FLUID      Snacks/Supplements Adult: Other; If pt asks for a snack or supplement, please send; With Meals      Snacks/Supplements Adult: Glucerna; Between Meals      Low Saturated Fat Na <2400 mg     Order Specific Question Answer Comments   Is discharge order? Yes        60 minutes spent in discharge, including >50% in counseling and coordination of care, medication review and plan of care recommended on follow up. Questions were answered.     It was our pleasure to care for Rohit Garvey during this hospitalization. Please do not hesitate to contact me should there be questions regarding the hospital course or discharge plan.      Prachi Call, DO  Internal Medicine, PGY-I

## 2023-05-01 ENCOUNTER — PATIENT OUTREACH (OUTPATIENT)
Dept: CARE COORDINATION | Facility: CLINIC | Age: 68
End: 2023-05-01
Payer: MEDICARE

## 2023-05-01 NOTE — PROGRESS NOTES
[facility]    Background: Transitional Care Management program identified per system criteria and reviewed by [facility] team for possible outreach.    Assessment: Upon chart review, [facility] Team member will not proceed with patient outreach related to this episode of Transitional Care Management program due to reason below:    Patient has discharged to a Memory Care, Long-term Care, Assisted Living or Group Home where patient is receiving on-site support with their daily cares, including support with hospital follow up plan.    Plan: Transitional Care Management episode addressed appropriately per reason noted above.      Celia Newman RN  [facility], [facility]    *Connected Care Resource Team does NOT follow patient ongoing. Referrals are identified based on internal discharge reports and the outreach is to ensure patient has an understanding of their discharge instructions.

## 2023-05-21 ENCOUNTER — HEALTH MAINTENANCE LETTER (OUTPATIENT)
Age: 68
End: 2023-05-21

## 2023-10-22 ENCOUNTER — HEALTH MAINTENANCE LETTER (OUTPATIENT)
Age: 68
End: 2023-10-22

## 2024-03-10 ENCOUNTER — HEALTH MAINTENANCE LETTER (OUTPATIENT)
Age: 69
End: 2024-03-10

## 2024-07-28 ENCOUNTER — HEALTH MAINTENANCE LETTER (OUTPATIENT)
Age: 69
End: 2024-07-28

## 2024-12-15 ENCOUNTER — HEALTH MAINTENANCE LETTER (OUTPATIENT)
Age: 69
End: 2024-12-15

## 2025-03-16 ENCOUNTER — HEALTH MAINTENANCE LETTER (OUTPATIENT)
Age: 70
End: 2025-03-16

## 2025-06-29 ENCOUNTER — HEALTH MAINTENANCE LETTER (OUTPATIENT)
Age: 70
End: 2025-06-29

## (undated) DEVICE — INTRODUCER SHEATH FAST-CATH CATH-LOCK 8FRX12CM 406706

## (undated) DEVICE — MANIFOLD KIT ANGIO AUTOMATED 014613

## (undated) DEVICE — INTRODUCER SHEATH FAST-CATH 9FRX12CM 406116

## (undated) DEVICE — CATH ANGIO SUPERTORQUE PLUS JL4 6FRX100CM 533620

## (undated) DEVICE — ELECTRODE DEFIB CADENCE 22550R

## (undated) DEVICE — PACK HEART LEFT CUSTOM

## (undated) DEVICE — INTRO SHEATH 4FRX10CM PINNACLE RSS402

## (undated) DEVICE — INTRO SHEATH MICRO PLATINUM TIP 4FRX40CM 7274

## (undated) DEVICE — CATH ANGIO INFINITI JL4 4FRX100CM 538420

## (undated) DEVICE — NEEDLE XS TRANSSEPTAL 18GA 98CM G407212

## (undated) DEVICE — INTRO SHTH INTRO SHTH 40CM ST

## (undated) DEVICE — PATCH CARTO 3 EXTERNAL REFERENCE 3D MAPPING CREFP6

## (undated) DEVICE — CATH THERMOCOOL SMARTTOUCH SF DF CURVE

## (undated) DEVICE — KIT VENOUS FLUSH 60210177

## (undated) DEVICE — INTRO CATH 12CM 8.5FR FST-CATH

## (undated) DEVICE — CATH SOUNDSTAR 8FRX90CM 10439011

## (undated) DEVICE — INTRO SHTH INTRO SHTH 8.5F AGI

## (undated) DEVICE — TUBE SET SMARKABLATE IRRIGATION

## (undated) DEVICE — CATH EP 7FR X 115CM DECANAV CA

## (undated) DEVICE — CATH NAV PENTARAY F CURVE

## (undated) DEVICE — PAD DEFIBRILLATOR ELECTRODE 4.25INX6IN ADULT 2001M-C

## (undated) DEVICE — KIT MANIFOLD NAMIC STANDARD 72IN RIGHT HEART 2 PT 613000213

## (undated) DEVICE — Device

## (undated) DEVICE — INTRO SHEATH 7FRX10CM PINNACLE RSS702

## (undated) DEVICE — SHEATH AGILIS 8.5FR X 71CM 408310

## (undated) DEVICE — INTRO SHEATH 11FRX10CM PINNACLE RSS102

## (undated) DEVICE — SET PERICARDIOCENTESIS 8.3FRX40CM

## (undated) DEVICE — KIT MICROINTRODUCER VASCULAR  4FRX21GAX4CM

## (undated) DEVICE — PACK HEART RIGHT CUSTOM SAN32RHF18

## (undated) RX ORDER — FENTANYL CITRATE-0.9 % NACL/PF 10 MCG/ML
PLASTIC BAG, INJECTION (ML) INTRAVENOUS
Status: DISPENSED
Start: 2022-12-29

## (undated) RX ORDER — HEPARIN SODIUM 1000 [USP'U]/ML
INJECTION, SOLUTION INTRAVENOUS; SUBCUTANEOUS
Status: DISPENSED
Start: 2022-12-29

## (undated) RX ORDER — ONDANSETRON 2 MG/ML
INJECTION INTRAMUSCULAR; INTRAVENOUS
Status: DISPENSED
Start: 2022-12-29

## (undated) RX ORDER — METHYLPREDNISOLONE SODIUM SUCCINATE 125 MG/2ML
INJECTION, POWDER, LYOPHILIZED, FOR SOLUTION INTRAMUSCULAR; INTRAVENOUS
Status: DISPENSED
Start: 2022-10-31

## (undated) RX ORDER — PROTAMINE SULFATE 10 MG/ML
INJECTION, SOLUTION INTRAVENOUS
Status: DISPENSED
Start: 2022-10-31

## (undated) RX ORDER — DEXAMETHASONE SODIUM PHOSPHATE 4 MG/ML
INJECTION, SOLUTION INTRA-ARTICULAR; INTRALESIONAL; INTRAMUSCULAR; INTRAVENOUS; SOFT TISSUE
Status: DISPENSED
Start: 2022-12-29

## (undated) RX ORDER — HEPARIN SODIUM 1000 [USP'U]/ML
INJECTION, SOLUTION INTRAVENOUS; SUBCUTANEOUS
Status: DISPENSED
Start: 2022-10-31

## (undated) RX ORDER — CEFAZOLIN SODIUM 1 G/3ML
INJECTION, POWDER, FOR SOLUTION INTRAMUSCULAR; INTRAVENOUS
Status: DISPENSED
Start: 2022-10-31

## (undated) RX ORDER — CEFAZOLIN SODIUM 2 G/100ML
INJECTION, SOLUTION INTRAVENOUS
Status: DISPENSED
Start: 2022-10-31

## (undated) RX ORDER — CEFAZOLIN SODIUM 1 G/3ML
INJECTION, POWDER, FOR SOLUTION INTRAMUSCULAR; INTRAVENOUS
Status: DISPENSED
Start: 2022-12-29

## (undated) RX ORDER — METHYLPREDNISOLONE SODIUM SUCCINATE 125 MG/2ML
INJECTION, POWDER, LYOPHILIZED, FOR SOLUTION INTRAMUSCULAR; INTRAVENOUS
Status: DISPENSED
Start: 2022-12-29

## (undated) RX ORDER — FENTANYL CITRATE 50 UG/ML
INJECTION, SOLUTION INTRAMUSCULAR; INTRAVENOUS
Status: DISPENSED
Start: 2022-12-29

## (undated) RX ORDER — FENTANYL CITRATE 50 UG/ML
INJECTION, SOLUTION INTRAMUSCULAR; INTRAVENOUS
Status: DISPENSED
Start: 2022-10-31

## (undated) RX ORDER — PROPOFOL 10 MG/ML
INJECTION, EMULSION INTRAVENOUS
Status: DISPENSED
Start: 2022-10-31

## (undated) RX ORDER — PROTAMINE SULFATE 10 MG/ML
INJECTION, SOLUTION INTRAVENOUS
Status: DISPENSED
Start: 2022-12-29

## (undated) RX ORDER — BUPIVACAINE HYDROCHLORIDE 5 MG/ML
INJECTION, SOLUTION EPIDURAL; INTRACAUDAL
Status: DISPENSED
Start: 2022-10-31

## (undated) RX ORDER — SODIUM CHLORIDE 9 MG/ML
INJECTION, SOLUTION INTRAVENOUS
Status: DISPENSED
Start: 2022-11-01

## (undated) RX ORDER — DOPAMINE HYDROCHLORIDE 160 MG/100ML
INJECTION, SOLUTION INTRAVENOUS
Status: DISPENSED
Start: 2022-12-29

## (undated) RX ORDER — DOPAMINE HYDROCHLORIDE 160 MG/100ML
INJECTION, SOLUTION INTRAVENOUS
Status: DISPENSED
Start: 2022-10-31

## (undated) RX ORDER — PROPOFOL 10 MG/ML
INJECTION, EMULSION INTRAVENOUS
Status: DISPENSED
Start: 2022-12-29

## (undated) RX ORDER — CEFAZOLIN SODIUM 2 G/100ML
INJECTION, SOLUTION INTRAVENOUS
Status: DISPENSED
Start: 2022-12-29